# Patient Record
Sex: FEMALE | Race: WHITE | Employment: OTHER | URBAN - METROPOLITAN AREA
[De-identification: names, ages, dates, MRNs, and addresses within clinical notes are randomized per-mention and may not be internally consistent; named-entity substitution may affect disease eponyms.]

---

## 2017-01-12 ENCOUNTER — OFFICE VISIT (OUTPATIENT)
Dept: FAMILY MEDICINE CLINIC | Age: 72
End: 2017-01-12

## 2017-01-12 VITALS
WEIGHT: 243.8 LBS | DIASTOLIC BLOOD PRESSURE: 72 MMHG | TEMPERATURE: 98 F | HEIGHT: 63 IN | RESPIRATION RATE: 14 BRPM | SYSTOLIC BLOOD PRESSURE: 163 MMHG | HEART RATE: 51 BPM | BODY MASS INDEX: 43.2 KG/M2

## 2017-01-12 DIAGNOSIS — Z00.00 ROUTINE GENERAL MEDICAL EXAMINATION AT A HEALTH CARE FACILITY: Primary | ICD-10-CM

## 2017-01-12 PROCEDURE — G0438 PPPS, INITIAL VISIT: HCPCS | Performed by: FAMILY MEDICINE

## 2017-01-12 ASSESSMENT — ANXIETY QUESTIONNAIRES: GAD7 TOTAL SCORE: 1

## 2017-01-12 ASSESSMENT — PATIENT HEALTH QUESTIONNAIRE - PHQ9: SUM OF ALL RESPONSES TO PHQ QUESTIONS 1-9: 0

## 2017-01-12 ASSESSMENT — LIFESTYLE VARIABLES: HOW OFTEN DO YOU HAVE A DRINK CONTAINING ALCOHOL: 0

## 2017-03-04 LAB
CHOLESTEROL, TOTAL: 194 MG/DL
CHOLESTEROL/HDL RATIO: 4
HDLC SERPL-MCNC: 48 MG/DL (ref 35–70)
LDL CHOLESTEROL CALCULATED: 103 MG/DL (ref 0–160)
TRIGL SERPL-MCNC: 215 MG/DL
VLDLC SERPL CALC-MCNC: 43 MG/DL

## 2017-07-12 ENCOUNTER — OFFICE VISIT (OUTPATIENT)
Dept: FAMILY MEDICINE CLINIC | Age: 72
End: 2017-07-12

## 2017-07-12 ENCOUNTER — TELEPHONE (OUTPATIENT)
Dept: FAMILY MEDICINE CLINIC | Age: 72
End: 2017-07-12

## 2017-07-12 VITALS
HEART RATE: 57 BPM | BODY MASS INDEX: 41.28 KG/M2 | TEMPERATURE: 97.9 F | OXYGEN SATURATION: 97 % | SYSTOLIC BLOOD PRESSURE: 123 MMHG | HEIGHT: 64 IN | WEIGHT: 241.8 LBS | DIASTOLIC BLOOD PRESSURE: 71 MMHG

## 2017-07-12 DIAGNOSIS — R20.0 NUMBNESS IN FEET: Primary | ICD-10-CM

## 2017-07-12 DIAGNOSIS — E78.2 HYPERLIPIDEMIA, MIXED: ICD-10-CM

## 2017-07-12 DIAGNOSIS — Z78.0 POSTMENOPAUSAL: ICD-10-CM

## 2017-07-12 DIAGNOSIS — I10 ESSENTIAL HYPERTENSION: ICD-10-CM

## 2017-07-12 DIAGNOSIS — Z11.59 NEED FOR HEPATITIS C SCREENING TEST: ICD-10-CM

## 2017-07-12 DIAGNOSIS — Z23 NEED FOR DIPHTHERIA-TETANUS-PERTUSSIS (TDAP) VACCINE: ICD-10-CM

## 2017-07-12 PROCEDURE — 90471 IMMUNIZATION ADMIN: CPT | Performed by: NURSE PRACTITIONER

## 2017-07-12 PROCEDURE — 90715 TDAP VACCINE 7 YRS/> IM: CPT | Performed by: NURSE PRACTITIONER

## 2017-07-12 PROCEDURE — 99214 OFFICE O/P EST MOD 30 MIN: CPT | Performed by: NURSE PRACTITIONER

## 2017-07-12 RX ORDER — GABAPENTIN 300 MG/1
300 CAPSULE ORAL 3 TIMES DAILY
Qty: 90 CAPSULE | Refills: 3 | Status: SHIPPED | OUTPATIENT
Start: 2017-07-12 | End: 2017-10-01 | Stop reason: SDUPTHER

## 2017-07-12 RX ORDER — ATENOLOL 50 MG/1
50 TABLET ORAL NIGHTLY
Qty: 30 TABLET | Refills: 3 | Status: SHIPPED | OUTPATIENT
Start: 2017-07-12 | End: 2017-12-24 | Stop reason: SDUPTHER

## 2017-07-12 RX ORDER — LOSARTAN POTASSIUM AND HYDROCHLOROTHIAZIDE 25; 100 MG/1; MG/1
1 TABLET ORAL DAILY
Qty: 30 TABLET | Refills: 3 | Status: SHIPPED | OUTPATIENT
Start: 2017-07-12 | End: 2017-12-08 | Stop reason: SDUPTHER

## 2017-07-12 RX ORDER — AMLODIPINE BESYLATE 10 MG/1
10 TABLET ORAL DAILY
Qty: 30 TABLET | Refills: 3 | Status: SHIPPED | OUTPATIENT
Start: 2017-07-12 | End: 2017-12-08 | Stop reason: SDUPTHER

## 2017-07-12 ASSESSMENT — ENCOUNTER SYMPTOMS
ABDOMINAL DISTENTION: 0
ABDOMINAL PAIN: 0
EYE DISCHARGE: 0
RHINORRHEA: 0
CONSTIPATION: 0
COLOR CHANGE: 0
NAUSEA: 0
ANAL BLEEDING: 0
SORE THROAT: 0
EYE REDNESS: 0
BLOOD IN STOOL: 0
DIARRHEA: 0
COUGH: 0
SHORTNESS OF BREATH: 0

## 2017-09-28 DIAGNOSIS — R20.0 NUMBNESS IN FEET: ICD-10-CM

## 2017-09-29 RX ORDER — GABAPENTIN 300 MG/1
300 CAPSULE ORAL 3 TIMES DAILY
Qty: 270 CAPSULE | Refills: 1 | OUTPATIENT
Start: 2017-09-29

## 2017-10-01 DIAGNOSIS — R20.0 NUMBNESS IN FEET: ICD-10-CM

## 2017-10-02 RX ORDER — GABAPENTIN 300 MG/1
CAPSULE ORAL
Qty: 90 CAPSULE | Refills: 1 | Status: SHIPPED | OUTPATIENT
Start: 2017-10-02 | End: 2017-11-27 | Stop reason: SDUPTHER

## 2017-10-13 ENCOUNTER — TELEPHONE (OUTPATIENT)
Dept: FAMILY MEDICINE CLINIC | Age: 72
End: 2017-10-13

## 2017-10-13 LAB — HEPATITIS C ANTIBODY: NEGATIVE

## 2017-10-23 ENCOUNTER — HOSPITAL ENCOUNTER (OUTPATIENT)
Dept: WOMENS IMAGING | Age: 72
Discharge: HOME | End: 2017-10-23
Payer: MEDICARE

## 2017-10-23 DIAGNOSIS — Z85.3 HISTORY OF BREAST CANCER: Primary | ICD-10-CM

## 2017-10-23 DIAGNOSIS — Z78.0 POSTMENOPAUSAL: ICD-10-CM

## 2017-10-23 DIAGNOSIS — Z12.31 VISIT FOR SCREENING MAMMOGRAM: ICD-10-CM

## 2017-10-23 DIAGNOSIS — Z12.31 ENCOUNTER FOR SCREENING MAMMOGRAM FOR MALIGNANT NEOPLASM OF BREAST: ICD-10-CM

## 2017-10-23 PROCEDURE — 77063 BREAST TOMOSYNTHESIS BI: CPT

## 2017-10-23 PROCEDURE — 77080 DXA BONE DENSITY AXIAL: CPT

## 2017-10-24 ENCOUNTER — TELEPHONE (OUTPATIENT)
Dept: FAMILY MEDICINE CLINIC | Age: 72
End: 2017-10-24

## 2017-10-24 NOTE — TELEPHONE ENCOUNTER
Notes Recorded by Ron Bhat CNP on 10/24/2017 at 11:38 AM EDT  DEXA scan - within normal limits    Patient voiced understanding

## 2017-11-09 ENCOUNTER — HOSPITAL ENCOUNTER (OUTPATIENT)
Dept: INFUSION THERAPY | Age: 72
Discharge: HOME | End: 2017-11-09
Payer: MEDICARE

## 2017-11-09 ENCOUNTER — OFFICE VISIT (OUTPATIENT)
Dept: ONCOLOGY | Age: 72
End: 2017-11-09
Payer: MEDICARE

## 2017-11-09 VITALS
HEIGHT: 64 IN | SYSTOLIC BLOOD PRESSURE: 134 MMHG | HEART RATE: 52 BPM | DIASTOLIC BLOOD PRESSURE: 76 MMHG | WEIGHT: 240 LBS | TEMPERATURE: 97.2 F | OXYGEN SATURATION: 99 % | RESPIRATION RATE: 18 BRPM | BODY MASS INDEX: 40.97 KG/M2

## 2017-11-09 DIAGNOSIS — Z12.31 ENCOUNTER FOR SCREENING MAMMOGRAM FOR MALIGNANT NEOPLASM OF BREAST: ICD-10-CM

## 2017-11-09 DIAGNOSIS — Z17.0 MALIGNANT NEOPLASM OF LOWER-OUTER QUADRANT OF RIGHT BREAST OF FEMALE, ESTROGEN RECEPTOR POSITIVE (HCC): Primary | ICD-10-CM

## 2017-11-09 DIAGNOSIS — C50.511 MALIGNANT NEOPLASM OF LOWER-OUTER QUADRANT OF RIGHT BREAST OF FEMALE, ESTROGEN RECEPTOR POSITIVE (HCC): Primary | ICD-10-CM

## 2017-11-09 DIAGNOSIS — C50.511 MALIGNANT NEOPLASM OF LOWER-OUTER QUADRANT OF RIGHT FEMALE BREAST (HCC): ICD-10-CM

## 2017-11-09 LAB
ALBUMIN SERPL-MCNC: 4.2 G/DL (ref 3.5–5.1)
ALP BLD-CCNC: 92 U/L (ref 38–126)
ALT SERPL-CCNC: 23 U/L (ref 11–66)
AST SERPL-CCNC: 26 U/L (ref 5–40)
BASINOPHIL, AUTOMATED: 1 % (ref 0–12)
BILIRUB SERPL-MCNC: 0.7 MG/DL (ref 0.3–1.2)
BILIRUBIN DIRECT: < 0.2 MG/DL (ref 0–0.3)
BUN, WHOLE BLOOD: 20 MG/DL (ref 8–26)
CHLORIDE, WHOLE BLOOD: 100 MEQ/L (ref 98–109)
CREATININE, WHOLE BLOOD: 1 MG/DL (ref 0.5–1.2)
EOSINOPHILS RELATIVE PERCENT: 3 % (ref 0–12)
GFR, ESTIMATED: 58 ML/MIN/1.73M2
GLUCOSE, WHOLE BLOOD: 113 MG/DL (ref 70–108)
HCT VFR BLD CALC: 45.6 % (ref 37–47)
HEMOGLOBIN: 15.3 GM/DL (ref 12–16)
IONIZED CALCIUM, WHOLE BLOOD: 1.2 MMOL/L (ref 1.12–1.32)
LYMPHOCYTES # BLD: 20 % (ref 15–47)
MCH RBC QN AUTO: 28.8 PG (ref 27–31)
MCHC RBC AUTO-ENTMCNC: 33.4 GM/DL (ref 33–37)
MCV RBC AUTO: 86 FL (ref 81–99)
MONOCYTES: 5 % (ref 0–12)
PDW BLD-RTO: 12.9 % (ref 11.5–14.5)
PLATELET # BLD: 268 THOU/MM3 (ref 130–400)
PMV BLD AUTO: 7.3 MCM (ref 7.4–10.4)
POTASSIUM, WHOLE BLOOD: 4.5 MEQ/L (ref 3.5–4.9)
RBC # BLD: 5.3 MILL/MM3 (ref 4.2–5.4)
SEG NEUTROPHILS: 72 % (ref 43–75)
SODIUM, WHOLE BLOOD: 141 MEQ/L (ref 138–146)
TOTAL CO2, WHOLE BLOOD: 29 MEQ/L (ref 23–33)
TOTAL PROTEIN: 7.2 G/DL (ref 6.1–8)
WBC # BLD: 7.7 THOU/MM3 (ref 4.8–10.8)

## 2017-11-09 PROCEDURE — 36415 COLL VENOUS BLD VENIPUNCTURE: CPT

## 2017-11-09 PROCEDURE — 99211 OFF/OP EST MAY X REQ PHY/QHP: CPT

## 2017-11-09 PROCEDURE — 80047 BASIC METABLC PNL IONIZED CA: CPT

## 2017-11-09 PROCEDURE — 99213 OFFICE O/P EST LOW 20 MIN: CPT | Performed by: INTERNAL MEDICINE

## 2017-11-09 PROCEDURE — 80076 HEPATIC FUNCTION PANEL: CPT

## 2017-11-09 PROCEDURE — 85025 COMPLETE CBC W/AUTO DIFF WBC: CPT

## 2017-11-09 RX ORDER — AMIODARONE HYDROCHLORIDE 100 MG/1
TABLET ORAL
Refills: 8 | Status: ON HOLD | COMMUNITY
Start: 2017-10-28 | End: 2018-02-09 | Stop reason: DRUGHIGH

## 2017-11-09 RX ORDER — APIXABAN 5 MG/1
TABLET, FILM COATED ORAL
Refills: 3 | COMMUNITY
Start: 2017-10-31 | End: 2020-10-27 | Stop reason: SDUPTHER

## 2017-11-09 RX ORDER — CHLORAL HYDRATE 500 MG
3000 CAPSULE ORAL 3 TIMES DAILY
COMMUNITY
End: 2020-06-12 | Stop reason: ALTCHOICE

## 2017-11-09 NOTE — PROGRESS NOTES
[facility] PROFESSIONAL SERVICES  ONCOLOGY SPECIALISTS OF [facility]  [address] [facility] [address] [facility] [address]  [address] [address]  Dept: [phone]  Dept Fax: [phone]  Loc: [phone]    Subjective:      Chief Complaint: Le Pérez is a 67 y.o. female who is here for follow up for breast cancer. The patient was initially seen here on 05/02/2012. She was referred by Dr. Katerine Morales for consultation for a right breast cancer. .  She had a mammogram completed on March 27, 2012 which found a 2.5 cm irregular mass in the right breast.  On April 3, 2012, the patient had an ultrasound guided biopsy of the right breast mass. Pathology from this biopsy confirmed an invasive, well differentiated ductal adenocarcinoma, grade 1. Estrogen receptors were positive, progesterone receptors were positive and her-2-ana was not overexpressed. On April 19, 2012, the patient had a right partial mastectomy  and sentinel lymph node biopsy. Tissue pathology from the right breast found residual multifocal, invasive well differentiated ductal carcinoma. Margins were clear. The largest focus of invasive cancer was 2.3 cm. DCIS was present. One of the two sentinel lymph nodes had a micrometastases measuring  1 mm in size. Therefore, her stage of her right sided breast cancer was pT2, pN1mi, M0 (Stage IIB ). The patients Oncotype Dx recurrence score was noted to be 15. On 05/17/2012, the patient started chemotherapy. She had completed four cycles of Taxotere and Cytoxan chemotherapy treatment. Overall she tolerated  the combination of chemotherapy with very little complications. Her chief complaint with each cycle of therapy was fatigue. The patient has recently completed a course of radiation therapy with the [address] [place] [address]. HPI:  The patient is her today for follow up evaluation. Her general health has been stable.   The patient has no signs or symptoms that be suggestive of recurrence of malignancy on today's

## 2017-11-27 DIAGNOSIS — R20.0 NUMBNESS IN FEET: ICD-10-CM

## 2017-11-27 RX ORDER — GABAPENTIN 300 MG/1
CAPSULE ORAL
Qty: 90 CAPSULE | Refills: 2 | Status: SHIPPED | OUTPATIENT
Start: 2017-11-27 | End: 2018-01-12 | Stop reason: SDUPTHER

## 2017-12-08 DIAGNOSIS — I10 ESSENTIAL HYPERTENSION: ICD-10-CM

## 2017-12-08 RX ORDER — AMLODIPINE BESYLATE 10 MG/1
10 TABLET ORAL DAILY
Qty: 30 TABLET | Refills: 1 | Status: SHIPPED | OUTPATIENT
Start: 2017-12-08 | End: 2018-01-12 | Stop reason: SDUPTHER

## 2017-12-08 RX ORDER — LOSARTAN POTASSIUM AND HYDROCHLOROTHIAZIDE 25; 100 MG/1; MG/1
1 TABLET ORAL DAILY
Qty: 30 TABLET | Refills: 1 | Status: SHIPPED | OUTPATIENT
Start: 2017-12-08 | End: 2018-01-12 | Stop reason: SDUPTHER

## 2017-12-08 NOTE — TELEPHONE ENCOUNTER
Requested Prescriptions     Pending Prescriptions Disp Refills    amLODIPine (NORVASC) 10 MG tablet [Pharmacy Med Name: AMLODIPINE BESYLATE 10 MG TAB] 30 tablet 0     Sig: TAKE 1 TABLET BY MOUTH DAILY    losartan-hydrochlorothiazide (HYZAAR) 100-25 MG per tablet [Pharmacy Med Name: LOSARTAN-HCTZ 100-25 MG TAB] 30 tablet 0     Sig: TAKE 1 TABLET BY MOUTH DAILY      LV-07/12/2017  NV-01/12/2018

## 2017-12-24 DIAGNOSIS — I10 ESSENTIAL HYPERTENSION: ICD-10-CM

## 2017-12-26 NOTE — TELEPHONE ENCOUNTER
Last visit- 7/12/2017  Next visit- 1/12/18    Requested Prescriptions     Pending Prescriptions Disp Refills    atenolol (TENORMIN) 50 MG tablet [Pharmacy Med Name: ATENOLOL 50 MG TABLET] 30 tablet 3     Sig: TAKE 1 TABLET BY MOUTH NIGHTLY

## 2017-12-27 RX ORDER — ATENOLOL 50 MG/1
50 TABLET ORAL NIGHTLY
Qty: 30 TABLET | Refills: 3 | Status: SHIPPED | OUTPATIENT
Start: 2017-12-27 | End: 2018-01-12 | Stop reason: SDUPTHER

## 2018-01-12 ENCOUNTER — OFFICE VISIT (OUTPATIENT)
Dept: FAMILY MEDICINE CLINIC | Age: 73
End: 2018-01-12
Payer: MEDICARE

## 2018-01-12 VITALS
RESPIRATION RATE: 12 BRPM | BODY MASS INDEX: 39.27 KG/M2 | HEIGHT: 64 IN | HEART RATE: 54 BPM | WEIGHT: 230 LBS | OXYGEN SATURATION: 98 % | DIASTOLIC BLOOD PRESSURE: 86 MMHG | SYSTOLIC BLOOD PRESSURE: 124 MMHG

## 2018-01-12 DIAGNOSIS — Z00.00 ROUTINE GENERAL MEDICAL EXAMINATION AT A HEALTH CARE FACILITY: Primary | ICD-10-CM

## 2018-01-12 PROCEDURE — G0009 ADMIN PNEUMOCOCCAL VACCINE: HCPCS | Performed by: FAMILY MEDICINE

## 2018-01-12 PROCEDURE — 90732 PPSV23 VACC 2 YRS+ SUBQ/IM: CPT | Performed by: FAMILY MEDICINE

## 2018-01-12 PROCEDURE — G0439 PPPS, SUBSEQ VISIT: HCPCS | Performed by: FAMILY MEDICINE

## 2018-01-12 RX ORDER — GABAPENTIN 300 MG/1
CAPSULE ORAL
Qty: 270 CAPSULE | Refills: 1 | Status: SHIPPED | OUTPATIENT
Start: 2018-01-12 | End: 2018-01-12 | Stop reason: DRUGHIGH

## 2018-01-12 RX ORDER — LOSARTAN POTASSIUM AND HYDROCHLOROTHIAZIDE 25; 100 MG/1; MG/1
1 TABLET ORAL DAILY
Qty: 90 TABLET | Refills: 1 | Status: SHIPPED | OUTPATIENT
Start: 2018-01-12 | End: 2018-05-15 | Stop reason: SDUPTHER

## 2018-01-12 RX ORDER — ATENOLOL 50 MG/1
50 TABLET ORAL NIGHTLY
Qty: 90 TABLET | Refills: 1 | Status: ON HOLD | OUTPATIENT
Start: 2018-01-12 | End: 2018-02-09

## 2018-01-12 RX ORDER — AMLODIPINE BESYLATE 10 MG/1
10 TABLET ORAL DAILY
Qty: 90 TABLET | Refills: 1 | Status: SHIPPED | OUTPATIENT
Start: 2018-01-12 | End: 2018-02-01

## 2018-01-12 RX ORDER — GABAPENTIN 300 MG/1
CAPSULE ORAL
Qty: 360 CAPSULE | Refills: 1 | Status: ON HOLD | OUTPATIENT
Start: 2018-01-12 | End: 2018-06-07 | Stop reason: SDUPTHER

## 2018-01-12 ASSESSMENT — LIFESTYLE VARIABLES: HOW OFTEN DO YOU HAVE A DRINK CONTAINING ALCOHOL: 0

## 2018-01-12 ASSESSMENT — ANXIETY QUESTIONNAIRES: GAD7 TOTAL SCORE: 0

## 2018-01-12 ASSESSMENT — PATIENT HEALTH QUESTIONNAIRE - PHQ9: SUM OF ALL RESPONSES TO PHQ QUESTIONS 1-9: 0

## 2018-01-12 NOTE — PATIENT INSTRUCTIONS
You may receive a survey about your visit with us today. The feedback from our patients helps us identify what is working well and where the service to all patients can be enhanced. Thank you! Patient Education        Learning About Physical Activity  What is physical activity? Physical activity is any kind of activity that gets your body moving. The types of physical activity that can help you get fit and stay healthy include:  · Aerobic or \"cardio\" activities that make your heart beat faster and make you breathe harder, such as brisk walking, riding a bike, or running. Aerobic activities strengthen your heart and lungs and build up your endurance. · Strength training activities that make your muscles work against, or \"resist,\" something, such as lifting weights or doing push-ups. These activities help tone and strengthen your muscles. · Stretches that allow you to move your joints and muscles through their full range of motion. Stretching helps you be more flexible and avoid injury. What are the benefits of physical activity? Being active is one of the best things you can do to get fit and stay healthy. It helps you to:  · Feel stronger and have more energy to do all the things you like to do. · Focus better at school or work and perform better in sports. · Feel, think, and sleep better. · Reach and stay at a healthy weight. · Lose fat and build lean muscle. · Lower your risk for serious health problems. · Keep your bones, muscles, and joints strong. Being fit lets you do more physical activity. And it lets you work out harder without as much effort. How can you make physical activity part of your life? Get at least 30 minutes of exercise on most days of the week. Walking is a good choice. You also may want to do other activities, such as running, swimming, cycling, or playing tennis or team sports.   Pick activities that you like-ones that make your heart beat faster, your muscles stronger, and range used by all laboratories. The [place] of Medicine recommends a blood level of 20 ng/mL of vitamin D for healthy bones. And most people in the [place] and [facility]) meet this goal.  How can you get more vitamin D? Foods that contain vitamin D include:  · [place], tuna, and mackerel. These are some of the best foods to eat when you need to get more vitamin D.  · Cheese, egg yolks, and beef liver. These foods have vitamin D in small amounts. · Milk, soy drinks, orange juice, yogurt, margarine, and some kinds of cereal have vitamin D added to them. Some people don't make vitamin D as well as others. They may have to take extra care in getting enough vitamin D. Things that reduce how much vitamin D your body makes include:  · Dark skin, such as many  Americans have. · Age, especially if you are older than 72. · Digestive problems, such as Crohn's or celiac disease. · Liver and kidney disease. Some people who do not get enough vitamin D may need supplements. Are there any risks from taking vitamin D?  · Too much vitamin D:  ¨ Can damage your kidneys. ¨ Can cause nausea and vomiting, constipation, and weakness. ¨ Raises the amount of calcium in your blood. If this happens, you can get confused or have an irregular heart rhythm. · Vitamin D may interact with other medicines. Tell your doctor about all of the medicines you take, including over-the-counter drugs, herbs, and pills. Tell your doctor about all of your current medical problems. Where can you learn more? Go to [email or web address]://GlobantmaryEchelon.[email or web address] [email or web address] org and sign in to your Gozent account. Enter [id number] in the [facility] box to learn more about \"Learning About Vitamin D. \"     If you do not have an account, please click on the \"Sign Up Now\" link. Current as of: May 12, 2017  Content Version: 11.5  © 1328-1967 Healthwise, Incorporated. Care instructions adapted under license by [facility] ([facility]).  If you have questions about a much of it was added. You can figure out how much calcium is in a food by looking at the percent daily value section on the nutrition facts label. The food label assumes the daily value of calcium is 1,000 mg. So if one serving of a food has a daily value of 20% of calcium, that food has 200 mg of calcium in one serving. Some people who don't get enough calcium may need supplements. You can buy them as citrate or carbonate. Calcium carbonate is best absorbed when it is taken with food. Calcium citrate can be absorbed well with or without food. Spreading calcium out over the course of the day can reduce stomach upset. And your body absorbs it better when it is spread over the day. Try not to take more than 500 mg of calcium supplement at a time. Where can you learn more? Go to https://Consano.DreamDry. org and sign in to your zEconomy account. Enter S264 in the [employer] box to learn more about \"Learning About Calcium. \"     If you do not have an account, please click on the \"Sign Up Now\" link. Current as of: May 12, 2017  Content Version: 11.5  © 3051-9943 Healthwise, iLink. Care instructions adapted under license by [facility] ([facility]). If you have questions about a medical condition or this instruction, always ask your healthcare professional. [address] any warranty or liability for your use of this information. Personalized Preventive Plan for Rakan Iglesias - 1/12/2018  Medicare offers a range of preventive health benefits. Some of the tests and screenings are paid in full while other may be subject to a deductible, co-insurance, and/or copay. Some of these benefits include a comprehensive review of your medical history including lifestyle, illnesses that may run in your family, and various assessments and screenings as appropriate.     After reviewing your medical record and screening and assessments performed today your provider may have ordered likely to occur. What should I avoid before or after receiving this vaccine ? Follow your doctor's instructions about any restrictions on food, beverages, or activity. What are the possible side effects of this vaccine? You should not receive a booster vaccine if you had a life-threatening allergic reaction after the first shot. Keep track of any and all side effects you have after receiving this vaccine. If you ever need to receive a booster dose, you will need to tell your doctor if the previous shot caused any side effects. Becoming infected with pneumococcal disease (such as pneumonia or meningitis) is much more dangerous to your health than receiving this vaccine. However, like any medicine, this vaccine can cause side effects but the risk of serious side effects is extremely low. Get emergency medical help if you have any of these signs of an allergic reaction: hives; difficulty breathing; swelling of your face, lips, tongue, or throat. Call your doctor at once if you have a serious side effect such as:  high fever (103 degrees or higher);  easy bruising or bleeding;  swollen glands with skin rash or itching, joint pain, and general ill feeling;  pale or yellowed skin, dark colored urine, confusion or weakness;  numbness or tingly feeling in your feet and spreading upward, severe lower back pain;  changes in behavior, problems with vision, speech, swallowing, or bladder and bowel functions; or  slow heart rate, trouble breathing, feeling like you might pass out. Less serious side effects are more likely to occur, such as:  low fever (102 degrees or less), chills, tired feeling;  swelling, pain, tenderness, or redness anywhere on your body;  headache, nausea, vomiting;  joint or muscle pain;  swelling or stiffness in the arm or leg the vaccine was injected into;  mild skin rash; or  mild soreness, warmth, redness, swelling, or a hard lump where the shot was given.   This is not a complete list of side effects and others may occur. Call your doctor for medical advice about side effects. You may report vaccine side effects to the [place] [address] and [facility] at [phone]. What other drugs will affect pneumococcal polysaccharides vaccine (PPSV)? Before receiving this vaccine, tell the doctor about all other vaccines you have recently received. Also tell the doctor if you have recently received drugs or treatments that can weaken the immune system, including:  an oral, nasal, inhaled, or injectable steroid medicine;  medications to treat psoriasis, rheumatoid arthritis, or other autoimmune disorders, such as azathioprine (Imuran), etanercept (Enbrel), leflunomide ([address]), and others; or  medicines to treat or prevent organ transplant rejection, such as basiliximab (Simulect), cyclosporine (Sandimmune, Neoral, Gengraf), muromonab-CD3 (Orthoclone), mycophenolate mofetil (CellCept), sirolimus (Rapamune), or tacrolimus (Prograf). If you are using any of these medications, you may not be able to receive the vaccine, or may need to wait until the other treatments are finished. This list is not complete and other drugs may interact with PPSV. Tell your doctor about all medications you use. This includes prescription, over-the-counter, vitamin, and herbal products. Do not start a new medication without telling your doctor. Where can I get more information? Your doctor or pharmacist may have additional information about pneumococcal polysaccharides vaccine. You may also find additional information from your local health department or the Centers for Disease Control and Prevention. Remember, keep this and all other medicines out of the reach of children, never share your medicines with others, and use this medication only for the indication prescribed.   Every effort has been made to ensure that the information provided by [address] is accurate, up-to-date, and complete, but no guarantee is made to that effect. Drug information contained herein may be time sensitive. [employer]MVC information has been compiled for use by healthcare practitioners and consumers in the [place] and therefore [employer] does not warrant that uses outside of the [place] are appropriate, unless specifically indicated otherwise. [facility]'s drug information does not endorse drugs, diagnose patients or recommend therapy. [facility] drug information is an informational resource designed to assist licensed healthcare practitioners in caring for their patients and/or to serve consumers viewing this service as a supplement to, and not a substitute for, the expertise, skill, knowledge and judgment of healthcare practitioners. The absence of a warning for a given drug or drug combination in no way should be construed to indicate that the drug or drug combination is safe, effective or appropriate for any given patient. [facility] does not assume any responsibility for any aspect of healthcare administered with the aid of information [facility] provides. The information contained herein is not intended to cover all possible uses, directions, precautions, warnings, drug interactions, allergic reactions, or adverse effects. If you have questions about the drugs you are taking, check with your doctor, nurse or pharmacist.  Copyright 8837-7850 [address]: 5.02. Revision date: 10/17/2012. Care instructions adapted under license by [facility] ([facility]). If you have questions about a medical condition or this instruction, always ask your healthcare professional. [place] [address] any warranty or liability for your use of this information.

## 2018-01-12 NOTE — PROGRESS NOTES
Medicare Annual Wellness Visit  Name: Olivia Carreon Date: 2018   MRN: [id number] Sex: Female   Age: 67 y.o. Ethnicity: Non-/Non    : 1945 Race: Tarun Leal is here for Medicare AWV    Screenings for behavioral, psychosocial and functional/safety risks, and cognitive dysfunction are all negative except as indicated below. These results, as well as other patient data from the [address] form, are documented in Flowsheets linked to this Encounter. Allergies   Allergen Reactions    Morphine      vomit    Tape Pegge Hanks Tape] Itching    Vicodin [Hydrocodone-Acetaminophen] Nausea And Vomiting     Prior to Visit Medications    Medication Sig Taking? Authorizing Provider   atenolol (TENORMIN) 50 MG tablet TAKE 1 TABLET BY MOUTH NIGHTLY Yes Herman Jones CNP   amLODIPine (NORVASC) 10 MG tablet TAKE 1 TABLET BY MOUTH DAILY Yes Enedina Seals MD   losartan-hydrochlorothiazide (HYZAAR) 100-25 MG per tablet TAKE 1 TABLET BY MOUTH DAILY Yes Enedina Seals MD   gabapentin (NEURONTIN) 300 MG capsule TAKE 1 CAPSULE BY MOUTH 3 TIMES DAILY Yes Enedina Seals MD   ELIQUIS 5 MG TABS tablet TAKE 1 TABLET BY MOUTH TWICE A DAY Yes Historical Provider, MD   amiodarone (PACERONE) 100 MG tablet TAKE 1 TABLET DAILY.  Yes Historical Provider, MD   Omega-3 Fatty Acids (FISH OIL) 1000 MG CAPS Take 3,000 mg by mouth 3 times daily Yes Historical Provider, MD   Glucosamine-Chondroit-Vit C-Mn (GLUCOSAMINE 1500 COMPLEX PO) Take 2 tablets by mouth Yes Historical Provider, MD   CHONDROITIN SULFATE PO Take 1,200 mg by mouth Yes Historical Provider, MD   amiodarone (CORDARONE) 200 MG tablet Take 100 mg by mouth daily  Yes Historical Provider, MD   Docusate Calcium (STOOL SOFTENER PO) Take by mouth 2 times daily  Yes Historical Provider, MD   nitroGLYCERIN (NITROSTAT) 0.4 MG SL tablet Place 0.4 mg under the tongue every 5 minutes as needed for Chest pain Take 1 tablet for chest pain and repeat after 230 lb (104.3 kg)   11/09/17 240 lb (108.9 kg)   07/12/17 241 lb 12.8 oz (109.7 kg)     Vitals:    01/12/18 0842   BP: 124/86   Site: Left Arm   Position: Sitting   Cuff Size: Medium Adult   Pulse: 54   Resp: 12   SpO2: 98%   Weight: 230 lb (104.3 kg)   Height: 5' 4\" (1.626 m)         Patient's complete Health Risk Assessment and screening values have been reviewed and are found in Flowsheets. The following problems were reviewed today and where indicated follow up appointments were made and/or referrals ordered. Positive Risk Factor Screenings with Interventions:     Health Habits/Nutrition:  Health Habits/Nutrition  Do you exercise for at least 20 minutes 2-3 times per week?: Yes (does a lot of walking throughout the day)  Have you lost any weight without trying in the past 3 months?: No  Do you eat fewer than 2 meals per day?: No  Have you seen a dentist within the past year?: Yes  Body mass index is 39.48 kg/m².   Health Habits/Nutrition Interventions:  · Inadequate physical activity:  patient agrees to wear a pedometer and walk at least 10,000 steps/day    Hearing/Vision:  Hearing/Vision  Do you or your family notice any trouble with your hearing?: No  Do you have difficulty driving, watching TV, or doing any of your daily activities because of your eyesight?: No  Have you had an eye exam within the past year?: (!) No  Hearing/Vision Interventions:  · None indicated    Safety:  Safety  Do you have working smoke detectors?: Yes  Have all throw rugs been removed or fastened?: Yes  Do you have non-slip mats in all bathtubs?: Yes  Do all of your stairways have a railing or banister?: (!) No (does not have stairways in the home)  Are your doorways, halls and stairs free of clutter?: Yes  Do you always fasten your seatbelt when you are in a car?: Yes  Safety Interventions:  · Home safety tips provided      Personalized Preventive Plan   Current Health Maintenance Status  Immunization History   Administered Date(s) Administered    Influenza, High Dose 09/30/2016    Pneumococcal 13-valent Conjugate ([id number]) 09/30/2016    Tdap (Boostrix, Adacel) 07/12/2017        Health Maintenance   Topic Date Due    TSH testing  1945    Zostavax vaccine  06/01/2005    Flu vaccine (1) 09/01/2017    Pneumococcal low/med risk (2 of 2 - PPSV23) 09/30/2017    Potassium monitoring  10/08/2017    Creatinine monitoring  10/08/2017    Breast cancer screen  10/23/2018    Lipid screen  03/04/2022    Colon cancer screen colonoscopy  12/30/2024    DTaP/Tdap/Td vaccine (2 - Td) 07/12/2027    DEXA (modify frequency per FRAX score)  Completed    Hepatitis C screen  Completed     Recommendations for Preventive Services Due: see orders.   Recommended screening schedule for the next 5-10 years is provided to the patient in written form: see Patient Jasmin Hoang MD

## 2018-02-09 ENCOUNTER — HOSPITAL ENCOUNTER (OUTPATIENT)
Dept: INPATIENT UNIT | Age: 73
Discharge: HOME | End: 2018-02-09
Attending: INTERNAL MEDICINE | Admitting: INTERNAL MEDICINE
Payer: MEDICARE

## 2018-02-09 VITALS
HEIGHT: 65 IN | SYSTOLIC BLOOD PRESSURE: 166 MMHG | DIASTOLIC BLOOD PRESSURE: 94 MMHG | TEMPERATURE: 97.9 F | OXYGEN SATURATION: 97 % | WEIGHT: 230 LBS | RESPIRATION RATE: 20 BRPM | HEART RATE: 46 BPM | BODY MASS INDEX: 38.32 KG/M2

## 2018-02-09 LAB
ALBUMIN SERPL-MCNC: 3.9 G/DL (ref 3.5–5.1)
ALP BLD-CCNC: 87 U/L (ref 38–126)
ALT SERPL-CCNC: 25 U/L (ref 11–66)
ANION GAP SERPL CALCULATED.3IONS-SCNC: 14 MEQ/L (ref 8–16)
AST SERPL-CCNC: 23 U/L (ref 5–40)
BASOPHILS # BLD: 1.1 %
BASOPHILS ABSOLUTE: 0.1 THOU/MM3 (ref 0–0.1)
BILIRUB SERPL-MCNC: 0.6 MG/DL (ref 0.3–1.2)
BUN BLDV-MCNC: 19 MG/DL (ref 7–22)
CALCIUM SERPL-MCNC: 9.6 MG/DL (ref 8.5–10.5)
CHLORIDE BLD-SCNC: 100 MEQ/L (ref 98–111)
CO2: 26 MEQ/L (ref 23–33)
CREAT SERPL-MCNC: 0.7 MG/DL (ref 0.4–1.2)
EKG ATRIAL RATE: 49 BPM
EKG P AXIS: 8 DEGREES
EKG P-R INTERVAL: 198 MS
EKG Q-T INTERVAL: 484 MS
EKG QRS DURATION: 92 MS
EKG QTC CALCULATION (BAZETT): 437 MS
EKG R AXIS: -32 DEGREES
EKG T AXIS: 26 DEGREES
EKG VENTRICULAR RATE: 49 BPM
EOSINOPHIL # BLD: 1.9 %
EOSINOPHILS ABSOLUTE: 0.2 THOU/MM3 (ref 0–0.4)
GFR SERPL CREATININE-BSD FRML MDRD: 82 ML/MIN/1.73M2
GLUCOSE BLD-MCNC: 108 MG/DL (ref 70–108)
HCT VFR BLD CALC: 40.9 % (ref 37–47)
HEMOGLOBIN: 13.9 GM/DL (ref 12–16)
INR BLD: 1.03 (ref 0.85–1.13)
LYMPHOCYTES # BLD: 15.7 %
LYMPHOCYTES ABSOLUTE: 1.6 THOU/MM3 (ref 1–4.8)
MCH RBC QN AUTO: 28.7 PG (ref 27–31)
MCHC RBC AUTO-ENTMCNC: 34 GM/DL (ref 33–37)
MCV RBC AUTO: 84.5 FL (ref 81–99)
MONOCYTES # BLD: 5.3 %
MONOCYTES ABSOLUTE: 0.5 THOU/MM3 (ref 0.4–1.3)
NUCLEATED RED BLOOD CELLS: 0 /100 WBC
PDW BLD-RTO: 14.2 % (ref 11.5–14.5)
PLATELET # BLD: 274 THOU/MM3 (ref 130–400)
PMV BLD AUTO: 8.6 FL (ref 7.4–10.4)
POTASSIUM SERPL-SCNC: 3.7 MEQ/L (ref 3.5–5.2)
RBC # BLD: 4.84 MILL/MM3 (ref 4.2–5.4)
SEG NEUTROPHILS: 76 %
SEGMENTED NEUTROPHILS ABSOLUTE COUNT: 7.8 THOU/MM3 (ref 1.8–7.7)
SODIUM BLD-SCNC: 140 MEQ/L (ref 135–145)
TOTAL PROTEIN: 6.5 G/DL (ref 6.1–8)
WBC # BLD: 10.2 THOU/MM3 (ref 4.8–10.8)

## 2018-02-09 PROCEDURE — 85610 PROTHROMBIN TIME: CPT

## 2018-02-09 PROCEDURE — 36415 COLL VENOUS BLD VENIPUNCTURE: CPT

## 2018-02-09 PROCEDURE — 85025 COMPLETE CBC W/AUTO DIFF WBC: CPT

## 2018-02-09 PROCEDURE — 80053 COMPREHEN METABOLIC PANEL: CPT

## 2018-02-09 PROCEDURE — 93005 ELECTROCARDIOGRAM TRACING: CPT | Performed by: INTERNAL MEDICINE

## 2018-02-09 RX ORDER — SODIUM CHLORIDE 9 MG/ML
INJECTION, SOLUTION INTRAVENOUS CONTINUOUS
Status: DISCONTINUED | OUTPATIENT
Start: 2018-02-09 | End: 2018-02-09 | Stop reason: HOSPADM

## 2018-02-09 RX ORDER — ATENOLOL 50 MG/1
25 TABLET ORAL NIGHTLY
Qty: 90 TABLET | Refills: 1 | Status: SHIPPED | OUTPATIENT
Start: 2018-02-09 | End: 2018-09-27

## 2018-02-09 NOTE — FLOWSHEET NOTE
Pt noted to be in sinus bradycardia rhythm. 12 lead ekg obtained. Pt and family aware of rhythm being sinus valerie.

## 2018-02-09 NOTE — FLOWSHEET NOTE
Pt admitted to  2E14 ambulatory for cardioversion  Pt NPO. Patient accompanied by her son  Vital signs obtained. Assessment and data collection initiated. Oriented to room. Policies and procedures for 2E explained   All questions answered with no further questions at this time. Fall prevention and safety precautions discussed with patient.

## 2018-05-15 ENCOUNTER — OFFICE VISIT (OUTPATIENT)
Dept: FAMILY MEDICINE CLINIC | Age: 73
End: 2018-05-15
Payer: MEDICARE

## 2018-05-15 VITALS
SYSTOLIC BLOOD PRESSURE: 116 MMHG | OXYGEN SATURATION: 99 % | TEMPERATURE: 98.2 F | WEIGHT: 247.2 LBS | HEART RATE: 63 BPM | BODY MASS INDEX: 42.2 KG/M2 | HEIGHT: 64 IN | DIASTOLIC BLOOD PRESSURE: 82 MMHG | RESPIRATION RATE: 16 BRPM

## 2018-05-15 DIAGNOSIS — J30.1 ACUTE SEASONAL ALLERGIC RHINITIS DUE TO POLLEN: Primary | ICD-10-CM

## 2018-05-15 DIAGNOSIS — I10 ESSENTIAL HYPERTENSION: ICD-10-CM

## 2018-05-15 DIAGNOSIS — M54.10 RADICULAR PAIN OF RIGHT LOWER EXTREMITY: ICD-10-CM

## 2018-05-15 PROCEDURE — 99214 OFFICE O/P EST MOD 30 MIN: CPT | Performed by: FAMILY MEDICINE

## 2018-05-15 PROCEDURE — 96372 THER/PROPH/DIAG INJ SC/IM: CPT | Performed by: FAMILY MEDICINE

## 2018-05-15 RX ORDER — AMLODIPINE BESYLATE 10 MG/1
10 TABLET ORAL DAILY
Qty: 90 TABLET | Refills: 1 | Status: SHIPPED | OUTPATIENT
Start: 2018-05-15 | End: 2018-07-20

## 2018-05-15 RX ORDER — LOSARTAN POTASSIUM AND HYDROCHLOROTHIAZIDE 25; 100 MG/1; MG/1
1 TABLET ORAL DAILY
Qty: 90 TABLET | Refills: 1 | Status: SHIPPED | OUTPATIENT
Start: 2018-05-15 | End: 2019-02-10 | Stop reason: SDUPTHER

## 2018-05-15 RX ORDER — BETAMETHASONE SODIUM PHOSPHATE AND BETAMETHASONE ACETATE 3; 3 MG/ML; MG/ML
6 INJECTION, SUSPENSION INTRA-ARTICULAR; INTRALESIONAL; INTRAMUSCULAR; SOFT TISSUE ONCE
Status: COMPLETED | OUTPATIENT
Start: 2018-05-15 | End: 2018-05-15

## 2018-05-15 RX ORDER — GABAPENTIN 400 MG/1
CAPSULE ORAL
Qty: 120 CAPSULE | Refills: 5 | Status: SHIPPED | OUTPATIENT
Start: 2018-05-15 | End: 2018-07-16 | Stop reason: SDUPTHER

## 2018-05-15 RX ORDER — ROSUVASTATIN CALCIUM 5 MG/1
5 TABLET, COATED ORAL NIGHTLY
COMMUNITY
End: 2019-01-17 | Stop reason: CLARIF

## 2018-05-15 RX ORDER — GABAPENTIN 300 MG/1
CAPSULE ORAL
Qty: 360 CAPSULE | Refills: 1 | Status: CANCELLED | OUTPATIENT
Start: 2018-05-15 | End: 2018-06-12

## 2018-05-15 RX ADMIN — BETAMETHASONE SODIUM PHOSPHATE AND BETAMETHASONE ACETATE 6 MG: 3; 3 INJECTION, SUSPENSION INTRA-ARTICULAR; INTRALESIONAL; INTRAMUSCULAR; SOFT TISSUE at 10:54

## 2018-06-07 ENCOUNTER — HOSPITAL ENCOUNTER (OUTPATIENT)
Dept: INPATIENT UNIT | Age: 73
Discharge: HOME | End: 2018-06-07
Attending: INTERNAL MEDICINE | Admitting: INTERNAL MEDICINE
Payer: MEDICARE

## 2018-06-07 ENCOUNTER — APPOINTMENT (OUTPATIENT)
Dept: CARDIAC CATH/INVASIVE PROCEDURES | Age: 73
End: 2018-06-07
Attending: INTERNAL MEDICINE
Payer: MEDICARE

## 2018-06-07 VITALS
WEIGHT: 247 LBS | RESPIRATION RATE: 25 BRPM | TEMPERATURE: 98.5 F | HEIGHT: 64 IN | DIASTOLIC BLOOD PRESSURE: 118 MMHG | SYSTOLIC BLOOD PRESSURE: 161 MMHG | BODY MASS INDEX: 42.17 KG/M2 | OXYGEN SATURATION: 96 % | HEART RATE: 64 BPM

## 2018-06-07 PROBLEM — R94.39 ABNORMAL NUCLEAR STRESS TEST: Status: ACTIVE | Noted: 2018-06-07

## 2018-06-07 PROBLEM — I20.9 ANGINA PECTORIS SYNDROME (HCC): Status: ACTIVE | Noted: 2018-06-07

## 2018-06-07 LAB
ABO: NORMAL
ALBUMIN SERPL-MCNC: 3.8 G/DL (ref 3.5–5.1)
ALP BLD-CCNC: 78 U/L (ref 38–126)
ALT SERPL-CCNC: 20 U/L (ref 11–66)
ANION GAP SERPL CALCULATED.3IONS-SCNC: 12 MEQ/L (ref 8–16)
ANTIBODY SCREEN: NORMAL
AST SERPL-CCNC: 23 U/L (ref 5–40)
BILIRUB SERPL-MCNC: 0.8 MG/DL (ref 0.3–1.2)
BUN BLDV-MCNC: 17 MG/DL (ref 7–22)
CALCIUM SERPL-MCNC: 9.1 MG/DL (ref 8.5–10.5)
CHLORIDE BLD-SCNC: 103 MEQ/L (ref 98–111)
CHOLESTEROL, TOTAL: 132 MG/DL (ref 100–199)
CO2: 29 MEQ/L (ref 23–33)
CREAT SERPL-MCNC: 0.7 MG/DL (ref 0.4–1.2)
EKG ATRIAL RATE: 241 BPM
EKG Q-T INTERVAL: 366 MS
EKG QRS DURATION: 90 MS
EKG QTC CALCULATION (BAZETT): 356 MS
EKG R AXIS: -33 DEGREES
EKG VENTRICULAR RATE: 57 BPM
GFR SERPL CREATININE-BSD FRML MDRD: 82 ML/MIN/1.73M2
GLUCOSE BLD-MCNC: 110 MG/DL (ref 70–108)
HCT VFR BLD CALC: 39.8 % (ref 37–47)
HDLC SERPL-MCNC: 56 MG/DL
HEMOGLOBIN: 13.1 GM/DL (ref 12–16)
INR BLD: 1.03 (ref 0.85–1.13)
LDL CHOLESTEROL CALCULATED: 52 MG/DL
MCH RBC QN AUTO: 27.1 PG (ref 27–31)
MCHC RBC AUTO-ENTMCNC: 32.9 GM/DL (ref 33–37)
MCV RBC AUTO: 82.2 FL (ref 81–99)
PDW BLD-RTO: 14.8 % (ref 11.5–14.5)
PLATELET # BLD: 293 THOU/MM3 (ref 130–400)
PMV BLD AUTO: 7.8 FL (ref 7.4–10.4)
POTASSIUM SERPL-SCNC: 4.8 MEQ/L (ref 3.5–5.2)
RBC # BLD: 4.84 MILL/MM3 (ref 4.2–5.4)
RH FACTOR: NORMAL
SODIUM BLD-SCNC: 144 MEQ/L (ref 135–145)
TOTAL PROTEIN: 6.7 G/DL (ref 6.1–8)
TRIGL SERPL-MCNC: 121 MG/DL (ref 0–199)
WBC # BLD: 7.6 THOU/MM3 (ref 4.8–10.8)

## 2018-06-07 PROCEDURE — 93010 ELECTROCARDIOGRAM REPORT: CPT | Performed by: INTERNAL MEDICINE

## 2018-06-07 PROCEDURE — C1773 RET DEV, INSERTABLE: HCPCS

## 2018-06-07 PROCEDURE — 2500000003 HC RX 250 WO HCPCS

## 2018-06-07 PROCEDURE — C1769 GUIDE WIRE: HCPCS

## 2018-06-07 PROCEDURE — 93005 ELECTROCARDIOGRAM TRACING: CPT | Performed by: INTERNAL MEDICINE

## 2018-06-07 PROCEDURE — 86850 RBC ANTIBODY SCREEN: CPT

## 2018-06-07 PROCEDURE — 6360000002 HC RX W HCPCS

## 2018-06-07 PROCEDURE — 93458 L HRT ARTERY/VENTRICLE ANGIO: CPT | Performed by: INTERNAL MEDICINE

## 2018-06-07 PROCEDURE — 86901 BLOOD TYPING SEROLOGIC RH(D): CPT

## 2018-06-07 PROCEDURE — 2580000003 HC RX 258: Performed by: INTERNAL MEDICINE

## 2018-06-07 PROCEDURE — 80053 COMPREHEN METABOLIC PANEL: CPT

## 2018-06-07 PROCEDURE — 85027 COMPLETE CBC AUTOMATED: CPT

## 2018-06-07 PROCEDURE — 2780000010 HC IMPLANT OTHER

## 2018-06-07 PROCEDURE — 80061 LIPID PANEL: CPT

## 2018-06-07 PROCEDURE — 36415 COLL VENOUS BLD VENIPUNCTURE: CPT

## 2018-06-07 PROCEDURE — 85610 PROTHROMBIN TIME: CPT

## 2018-06-07 PROCEDURE — 86900 BLOOD TYPING SEROLOGIC ABO: CPT

## 2018-06-07 PROCEDURE — C1894 INTRO/SHEATH, NON-LASER: HCPCS

## 2018-06-07 RX ORDER — ONDANSETRON 2 MG/ML
4 INJECTION INTRAMUSCULAR; INTRAVENOUS EVERY 6 HOURS PRN
Status: DISCONTINUED | OUTPATIENT
Start: 2018-06-07 | End: 2018-06-07 | Stop reason: HOSPADM

## 2018-06-07 RX ORDER — ASPIRIN 325 MG
325 TABLET ORAL ONCE
Status: DISCONTINUED | OUTPATIENT
Start: 2018-06-07 | End: 2018-06-07 | Stop reason: HOSPADM

## 2018-06-07 RX ORDER — SODIUM CHLORIDE 0.9 % (FLUSH) 0.9 %
10 SYRINGE (ML) INJECTION PRN
Status: DISCONTINUED | OUTPATIENT
Start: 2018-06-07 | End: 2018-06-07 | Stop reason: HOSPADM

## 2018-06-07 RX ORDER — ACETAMINOPHEN 325 MG/1
650 TABLET ORAL EVERY 4 HOURS PRN
Status: DISCONTINUED | OUTPATIENT
Start: 2018-06-07 | End: 2018-06-07 | Stop reason: HOSPADM

## 2018-06-07 RX ORDER — SODIUM CHLORIDE 9 MG/ML
100 INJECTION, SOLUTION INTRAVENOUS CONTINUOUS
Status: DISCONTINUED | OUTPATIENT
Start: 2018-06-07 | End: 2018-06-07 | Stop reason: HOSPADM

## 2018-06-07 RX ORDER — SODIUM CHLORIDE 0.9 % (FLUSH) 0.9 %
10 SYRINGE (ML) INJECTION EVERY 12 HOURS SCHEDULED
Status: DISCONTINUED | OUTPATIENT
Start: 2018-06-07 | End: 2018-06-07 | Stop reason: SDUPTHER

## 2018-06-07 RX ORDER — SODIUM CHLORIDE 9 MG/ML
INJECTION, SOLUTION INTRAVENOUS CONTINUOUS
Status: DISCONTINUED | OUTPATIENT
Start: 2018-06-07 | End: 2018-06-07 | Stop reason: SDUPTHER

## 2018-06-07 RX ORDER — DIPHENHYDRAMINE HCL 25 MG
50 TABLET ORAL ONCE
Status: DISCONTINUED | OUTPATIENT
Start: 2018-06-07 | End: 2018-06-07 | Stop reason: HOSPADM

## 2018-06-07 RX ORDER — ATROPINE SULFATE 0.4 MG/ML
0.5 AMPUL (ML) INJECTION
Status: DISCONTINUED | OUTPATIENT
Start: 2018-06-07 | End: 2018-06-07 | Stop reason: HOSPADM

## 2018-06-07 RX ORDER — SODIUM CHLORIDE 0.9 % (FLUSH) 0.9 %
10 SYRINGE (ML) INJECTION EVERY 12 HOURS SCHEDULED
Status: DISCONTINUED | OUTPATIENT
Start: 2018-06-07 | End: 2018-06-07 | Stop reason: HOSPADM

## 2018-06-07 RX ADMIN — SODIUM CHLORIDE: 9 INJECTION, SOLUTION INTRAVENOUS at 08:33

## 2018-07-16 ENCOUNTER — OFFICE VISIT (OUTPATIENT)
Dept: FAMILY MEDICINE CLINIC | Age: 73
End: 2018-07-16
Payer: MEDICARE

## 2018-07-16 VITALS
BODY MASS INDEX: 43.64 KG/M2 | OXYGEN SATURATION: 95 % | SYSTOLIC BLOOD PRESSURE: 131 MMHG | WEIGHT: 254.25 LBS | HEART RATE: 65 BPM | DIASTOLIC BLOOD PRESSURE: 60 MMHG

## 2018-07-16 DIAGNOSIS — G57.93 NEUROPATHY INVOLVING BOTH LOWER EXTREMITIES: Primary | ICD-10-CM

## 2018-07-16 DIAGNOSIS — E78.00 HYPERCHOLESTEROLEMIA: ICD-10-CM

## 2018-07-16 DIAGNOSIS — I10 ESSENTIAL HYPERTENSION: ICD-10-CM

## 2018-07-16 PROCEDURE — 99214 OFFICE O/P EST MOD 30 MIN: CPT | Performed by: FAMILY MEDICINE

## 2018-07-16 RX ORDER — FLUTICASONE PROPIONATE 50 MCG
1 SPRAY, SUSPENSION (ML) NASAL 2 TIMES DAILY
COMMUNITY
Start: 2018-06-06 | End: 2019-12-12 | Stop reason: SDUPTHER

## 2018-07-16 RX ORDER — GABAPENTIN 600 MG/1
TABLET ORAL
Qty: 90 TABLET | Refills: 1 | Status: SHIPPED | OUTPATIENT
Start: 2018-07-16 | End: 2018-07-16 | Stop reason: SDUPTHER

## 2018-07-16 RX ORDER — GABAPENTIN 600 MG/1
TABLET ORAL
Qty: 90 TABLET | Refills: 1 | Status: SHIPPED | OUTPATIENT
Start: 2018-07-16 | End: 2018-09-13 | Stop reason: SDUPTHER

## 2018-07-16 RX ORDER — GABAPENTIN 400 MG/1
CAPSULE ORAL
Qty: 270 CAPSULE | Refills: 1 | Status: SHIPPED | OUTPATIENT
Start: 2018-07-16 | End: 2018-09-13 | Stop reason: SDUPTHER

## 2018-07-16 NOTE — PROGRESS NOTES
year after recieving vaccine     Hypercholesterolemia 2010    Hypertension 1987    Iron deficiency 08/2016    PONV (postoperative nausea and vomiting)     Sleep apnea 06/2018    Vertigo 1957    as a child           Allergies:  is allergic to morphine; tape [adhesive tape]; and vicodin [hydrocodone-acetaminophen]. Medications:   Current Outpatient Prescriptions   Medication Sig Dispense Refill    fluticasone (FLONASE) 50 MCG/ACT nasal spray 1 spray by Nasal route 2 times daily      gabapentin (NEURONTIN) 600 MG tablet Take 1 tablet PO at noon. 90 tablet 1    gabapentin (NEURONTIN) 400 MG capsule Take 400 mg in am and 800 mg QHS. 270 capsule 1    amLODIPine (NORVASC) 10 MG tablet Take 1 tablet by mouth daily 90 tablet 1    losartan-hydrochlorothiazide (HYZAAR) 100-25 MG per tablet Take 1 tablet by mouth daily 90 tablet 1    rosuvastatin (CRESTOR) 5 MG tablet Take 5 mg by mouth nightly       atenolol (TENORMIN) 50 MG tablet Take 0.5 tablets by mouth nightly 90 tablet 1    ELIQUIS 5 MG TABS tablet TAKE 1 TABLET BY MOUTH TWICE A DAY  3    Omega-3 Fatty Acids (FISH OIL) 1000 MG CAPS Take 3,000 mg by mouth 3 times daily      Glucosamine-Chondroit-Vit C-Mn (GLUCOSAMINE 1500 COMPLEX PO) Take 2 tablets by mouth      CHONDROITIN SULFATE PO Take 1,200 mg by mouth      amiodarone (CORDARONE) 200 MG tablet Take 100 mg by mouth daily       Docusate Calcium (STOOL SOFTENER PO) Take by mouth 2 times daily       nitroGLYCERIN (NITROSTAT) 0.4 MG SL tablet Place 0.4 mg under the tongue every 5 minutes as needed for Chest pain Take 1 tablet for chest pain and repeat after 5 min if no relief, call 911 and take another dose 5 min later. Max of 3 doses in 15 min.  aspirin 81 MG EC tablet Take 81 mg by mouth daily.  Multiple Vitamins-Minerals (CENTRUM SILVER PO) Take by mouth daily        No current facility-administered medications for this visit.         Review of systems:  Review of Systems - History obtained from the patient  General ROS: negative for - chills, fatigue or fever  ENT ROS: positive for - headaches, nasal congestion and nasal discharge  Allergy and Immunology ROS: positive for - seasonal allergies  Respiratory ROS: negative for - cough,  shortness of breath or wheezing  Cardiovascular ROS: negative for - chest pain or edema  Musculoskeletal ROS: positive for - leg pain  Neurological ROS: negative for - dizziness  Dermatological ROS: negative for - rash    Physical Examination:  /60 (Site: Left Arm, Position: Sitting, Cuff Size: Large Adult)   Pulse 65   Wt 254 lb 4 oz (115.3 kg)   SpO2 95%   BMI 43.64 kg/m²     General-  Alert and oriented x 3, NAD  HEENT: NC, AT, PERRLA, EOMI, anicteric sclerae  Ears: Normal tympanic membranes bilaterally  Nose: patent, clear runny nose  Mouth: no lesions, moist mucosas  Neck - supple, no significant adenopathy  Chest - clear to auscultation, no wheezes, rales or rhonchi, symmetric air entry  Heart - normal rate, regular rhythm  Abdomen - soft, nontender, nondistended, no masses or organomegaly  Extremities -  no pedal edema, no clubbing or cyanosis    Impression:   Diagnosis Orders   1. Neuropathy involving both lower extremities     2. Essential hypertension     3. Hypercholesterolemia         Plan:    Restart Zyrtec 10 mg PO daily  Start Flonase nasal spray 2 sprays each nostril daily  Celestone 1 cc IM  Continue Hyzaar 100/25 1 tablet PO daily  Continue Norvasc 10 mg PO daily  Continue Tenormin 50 mg PO QHS. Refer to pain management to help with radiculopathy  Increase Neurontin as follows: 300 mg in am and lunch, 800 mg at night x 3-4 days, then 400 in am, 300 at lunch and 800 at night, then 400 in am and lunch and 800 at night until follow up    Orders Placed This Encounter   Medications    DISCONTD: gabapentin (NEURONTIN) 600 MG tablet     Sig: Take 1 tablet PO at noon.      Dispense:  90 tablet     Refill:  1    gabapentin (NEURONTIN) 600 MG tablet     Sig: Take 1 tablet PO at noon. Dispense:  90 tablet     Refill:  1    gabapentin (NEURONTIN) 400 MG capsule     Sig: Take 400 mg in am and 800 mg QHS. Dispense:  270 capsule     Refill:  1       Follow Up:  Return in about 6 months (around 1/16/2019) for Routine Follow-Up, Medicare AWV.     Herman Park MD

## 2018-07-20 RX ORDER — AMLODIPINE BESYLATE 10 MG/1
10 TABLET ORAL DAILY
Qty: 90 TABLET | Refills: 1 | Status: SHIPPED | OUTPATIENT
Start: 2018-07-20 | End: 2019-05-23 | Stop reason: SDUPTHER

## 2018-08-30 ENCOUNTER — OFFICE VISIT (OUTPATIENT)
Dept: PHYSICAL MEDICINE AND REHAB | Age: 73
End: 2018-08-30
Payer: MEDICARE

## 2018-08-30 VITALS
HEIGHT: 64 IN | BODY MASS INDEX: 44.56 KG/M2 | DIASTOLIC BLOOD PRESSURE: 85 MMHG | HEART RATE: 61 BPM | WEIGHT: 261 LBS | SYSTOLIC BLOOD PRESSURE: 167 MMHG

## 2018-08-30 DIAGNOSIS — E66.01 OBESITY, CLASS III, BMI 40-49.9 (MORBID OBESITY) (HCC): ICD-10-CM

## 2018-08-30 DIAGNOSIS — M96.1 LUMBAR POST-LAMINECTOMY SYNDROME: ICD-10-CM

## 2018-08-30 DIAGNOSIS — M54.16 LUMBAR RADICULITIS: Primary | ICD-10-CM

## 2018-08-30 DIAGNOSIS — K43.2 INCISIONAL HERNIA OF ANTERIOR ABDOMINAL WALL WITHOUT OBSTRUCTION OR GANGRENE: ICD-10-CM

## 2018-08-30 DIAGNOSIS — Z01.818 PRE-OP EVALUATION: Primary | ICD-10-CM

## 2018-08-30 DIAGNOSIS — G89.4 CHRONIC PAIN SYNDROME: ICD-10-CM

## 2018-08-30 PROCEDURE — 99205 OFFICE O/P NEW HI 60 MIN: CPT | Performed by: PAIN MEDICINE

## 2018-08-30 ASSESSMENT — ENCOUNTER SYMPTOMS
ABDOMINAL PAIN: 0
RHINORRHEA: 0
NAUSEA: 0
SHORTNESS OF BREATH: 0
SORE THROAT: 0
WHEEZING: 0
BACK PAIN: 0
COLOR CHANGE: 0
DIARRHEA: 0
EYE PAIN: 0
VOMITING: 0
COUGH: 0
SINUS PRESSURE: 0
PHOTOPHOBIA: 0
CHEST TIGHTNESS: 0
CONSTIPATION: 0

## 2018-08-30 NOTE — PROGRESS NOTES
[address] [place] [address]  [address] [facility] [place] [address]  Dept: [phone]  Dept Fax: [phone]  Loc: [phone]    Visit Date: 8/30/2018    Jeff Smalls is a 68 y.o. female who is referred for pain management evaluation and treatment per Dr. Gino Noyola. CAGE and CAGE-AID Questions   1. In the last three months, have you felt you should cut down or stop drinking or using drugs? Yes []        No [x]     2. In the last three months, has anyone annoyed you or gotten on your nerves by telling you to cut down or stop drinking or using drugs? Yes []        No [x]     3. In the last three months, have you felt guilty or bad about how much you drink or use drugs? Yes []        No [x]     4. In the last three months, have you been waking up wanting to have an alcoholic drink or use drugs? Yes []        No []        Opioid Risk Tool:  Clinician Form       1. Family History of Substance Abuse: Female Male    Alcohol   []1   []3    Illegal drugs   []2   []3    Prescription drugs     []4   []4   2. Personal History of Substance Abuse:          Alcohol   []3   []3    Illegal drugs   []4   []4    Prescription drugs     []5   []5   3. Age (perez box if between 12 and 39):     []1   []1   4. History of Preadolescent Sexual Abuse:     []3   []0   5. Psychological Disease:      Attention deficit disorder, obsessive-compulsive disorder, bipolar, schizophrenia   []2   []2      Depression     []1   []1    Scoring Totals       Total Score  Low Risk  Moderate Risk  High Risk   Risk Category   0 - 3   4 - 7   8 or Above      Patient states symptoms interfere with:  A.  General Activity:  yes   B. Mood: yes    C. Walking Ability:   yes   D. Normal Work (Includes both work outside the home and housework):   no    E.  Relations with Other People:  yes   F. Sleep:   yes   G.  Enjoyment of Life:  yes       HPI:     Chief Complaint: Gastrointestinal: Negative for abdominal pain, constipation, diarrhea, nausea and vomiting. Endocrine: Negative for cold intolerance, heat intolerance, polydipsia, polyphagia and polyuria. Genitourinary: Negative for decreased urine volume, difficulty urinating, frequency and hematuria. Musculoskeletal: Positive for gait problem. Negative for arthralgias, back pain, joint swelling, myalgias, neck pain and neck stiffness. Skin: Negative for color change and rash. Allergic/Immunologic: Negative for food allergies and immunocompromised state. Neurological: Positive for tingling, weakness and numbness. Negative for dizziness, tremors, seizures, syncope, facial asymmetry, speech difficulty, light-headedness and headaches. Hematological: Does not bruise/bleed easily. Psychiatric/Behavioral: Positive for sleep disturbance. Negative for agitation, behavioral problems, confusion, decreased concentration, dysphoric mood, hallucinations, self-injury and suicidal ideas. The patient is not nervous/anxious and is not hyperactive. Objective:     Vitals:    08/30/18 1158 08/30/18 1205   BP: (!) 169/85 (!) 167/85   Site: Right Arm Left Arm   Position: Sitting Sitting   Pulse: 64 61   Weight: 261 lb (118.4 kg)    Height: 5' 4.02\" (1.626 m)        Physical Exam   Constitutional: She is oriented to person, place, and time. She appears well-developed and well-nourished. No distress. HENT:   Head: Normocephalic and atraumatic. Right Ear: External ear normal.   Left Ear: External ear normal.   Nose: Nose normal.   Mouth/Throat: Oropharynx is clear and moist. No oropharyngeal exudate. Eyes: Pupils are equal, round, and reactive to light. Conjunctivae and EOM are normal. Right eye exhibits no discharge. Left eye exhibits no discharge. No scleral icterus. Neck: Normal range of motion and full passive range of motion without pain. Neck supple. No muscular tenderness present. No neck rigidity.  No edema, no erythema and normal range of motion present. No thyromegaly present. Cardiovascular: Normal rate, regular rhythm, normal heart sounds and intact distal pulses. Exam reveals no gallop and no friction rub. No murmur heard. Pulmonary/Chest: Effort normal and breath sounds normal. No respiratory distress. She has no wheezes. She has no rales. She exhibits no tenderness. Abdominal: Soft. Bowel sounds are normal. She exhibits no distension. There is no tenderness. There is no rebound and no guarding. A hernia is present. Hernia confirmed positive in the ventral area. Musculoskeletal:        Right shoulder: She exhibits normal range of motion and no tenderness. Left shoulder: She exhibits normal range of motion and no tenderness. Right hip: She exhibits tenderness. Left hip: She exhibits no tenderness. Right ankle: She exhibits no swelling. Left ankle: She exhibits no swelling. Cervical back: She exhibits normal range of motion and no tenderness. Thoracic back: She exhibits no tenderness. Lumbar back: She exhibits decreased range of motion, tenderness and pain. Back:         Right lower leg: She exhibits no swelling. Left lower leg: She exhibits no swelling. Right foot: There is no swelling. Left foot: There is no swelling. Neurological: She is alert and oriented to person, place, and time. She has normal strength and normal reflexes. She is not disoriented. She displays no atrophy. No cranial nerve deficit or sensory deficit. She exhibits normal muscle tone. She displays a negative Romberg sign. Coordination and gait normal. She displays no Babinski's sign on the right side. She displays no Babinski's sign on the left side. SLR- NEG  SENSORY-RLE decreased light touch   Skin: Skin is warm. No rash noted. She is not diaphoretic. No erythema. No pallor. Psychiatric: Her mood appears not anxious.  Her affect is not angry, not blunt, not labile and not inappropriate. Her speech is not rapid and/or pressured, not delayed, not tangential and not slurred. She is not agitated, not aggressive, not hyperactive, not slowed, not withdrawn, not actively hallucinating and not combative. Thought content is not paranoid and not delusional. Cognition and memory are not impaired. She does not express impulsivity or inappropriate judgment. She does not exhibit a depressed mood. She expresses no homicidal and no suicidal ideation. She expresses no suicidal plans and no homicidal plans. She is communicative. She exhibits normal recent memory and normal remote memory. She is attentive. Nursing note and vitals reviewed. JEREMY test: neg  Yeoman's test: neg  Gaenslen test: neg     Assessment:     1. Lumbar radiculitis    2. Lumbar post-laminectomy syndrome    3. Chronic pain syndrome    4. Incisional hernia of anterior abdominal wall without obstruction or gangrene    5. Obesity, Class III, BMI 40-49.9 (morbid obesity) ([facility] [address]            Plan:      · Patient read and signed orientation and opioid agreement. · OARRS reviewed. · Discussed long term side effects of medications. · Discussed tolerance, dependency and addiction. · Discussed with patient they may not be pain free. · No evidence of abuse, diversion or aberrant behavior. · Medications and/or procedures improve function and quality of life. · Reviewed prior MRI Lumbar Spine in detail with patient. · Told patient MRI is 3years old. Need New MRI Lumbar Spine to evaluate pathology. · Patient is on Blood thinners for A.Fib. Told patient would need clearance prior to consider any interventional pain procedures. Verbalizes understanding. · Prescription Needs: STATES CAN NOT TOLERATE NARCOTICS. · Discussed Gabapentin, told to increase dose to 1200 mg every HS. Verbalize understanding. Previous Treatments tried:  · PT: Yes,  any benefit? No, how many weeks?  6, last date done: several

## 2018-09-05 ENCOUNTER — HOSPITAL ENCOUNTER (OUTPATIENT)
Dept: INPATIENT UNIT | Age: 73
Discharge: HOME | End: 2018-09-05
Attending: INTERNAL MEDICINE | Admitting: INTERNAL MEDICINE
Payer: MEDICARE

## 2018-09-05 VITALS
WEIGHT: 261 LBS | DIASTOLIC BLOOD PRESSURE: 91 MMHG | HEIGHT: 64 IN | TEMPERATURE: 98.2 F | RESPIRATION RATE: 15 BRPM | HEART RATE: 51 BPM | SYSTOLIC BLOOD PRESSURE: 135 MMHG | OXYGEN SATURATION: 98 % | BODY MASS INDEX: 44.56 KG/M2

## 2018-09-05 LAB
ALBUMIN SERPL-MCNC: 4 G/DL (ref 3.5–5.1)
ALP BLD-CCNC: 79 U/L (ref 38–126)
ALT SERPL-CCNC: 16 U/L (ref 11–66)
ANION GAP SERPL CALCULATED.3IONS-SCNC: 13 MEQ/L (ref 8–16)
AST SERPL-CCNC: 21 U/L (ref 5–40)
BASOPHILS # BLD: 1.4 %
BASOPHILS ABSOLUTE: 0.1 THOU/MM3 (ref 0–0.1)
BILIRUB SERPL-MCNC: 0.5 MG/DL (ref 0.3–1.2)
BUN BLDV-MCNC: 21 MG/DL (ref 7–22)
CALCIUM SERPL-MCNC: 9.5 MG/DL (ref 8.5–10.5)
CHLORIDE BLD-SCNC: 102 MEQ/L (ref 98–111)
CO2: 28 MEQ/L (ref 23–33)
CREAT SERPL-MCNC: 1 MG/DL (ref 0.4–1.2)
EKG ATRIAL RATE: 48 BPM
EKG ATRIAL RATE: 65 BPM
EKG P AXIS: 63 DEGREES
EKG P-R INTERVAL: 234 MS
EKG Q-T INTERVAL: 382 MS
EKG Q-T INTERVAL: 414 MS
EKG QRS DURATION: 94 MS
EKG QRS DURATION: 96 MS
EKG QTC CALCULATION (BAZETT): 341 MS
EKG QTC CALCULATION (BAZETT): 440 MS
EKG R AXIS: -36 DEGREES
EKG R AXIS: -41 DEGREES
EKG T AXIS: 0 DEGREES
EKG T AXIS: 17 DEGREES
EKG VENTRICULAR RATE: 48 BPM
EKG VENTRICULAR RATE: 68 BPM
EOSINOPHIL # BLD: 2.4 %
EOSINOPHILS ABSOLUTE: 0.2 THOU/MM3 (ref 0–0.4)
ERYTHROCYTE [DISTWIDTH] IN BLOOD BY AUTOMATED COUNT: 14 % (ref 11.5–14.5)
ERYTHROCYTE [DISTWIDTH] IN BLOOD BY AUTOMATED COUNT: 41.7 FL (ref 35–45)
GFR SERPL CREATININE-BSD FRML MDRD: 54 ML/MIN/1.73M2
GLUCOSE BLD-MCNC: 114 MG/DL (ref 70–108)
HCT VFR BLD CALC: 40.8 % (ref 37–47)
HEMOGLOBIN: 13.5 GM/DL (ref 12–16)
IMMATURE GRANS (ABS): 0.03 THOU/MM3 (ref 0–0.07)
IMMATURE GRANULOCYTES: 0.4 %
INR BLD: 1.51 (ref 0.85–1.13)
LYMPHOCYTES # BLD: 20.8 %
LYMPHOCYTES ABSOLUTE: 1.7 THOU/MM3 (ref 1–4.8)
MCH RBC QN AUTO: 27.4 PG (ref 26–33)
MCHC RBC AUTO-ENTMCNC: 33.1 GM/DL (ref 32.2–35.5)
MCV RBC AUTO: 82.9 FL (ref 81–99)
MONOCYTES # BLD: 8.5 %
MONOCYTES ABSOLUTE: 0.7 THOU/MM3 (ref 0.4–1.3)
NUCLEATED RED BLOOD CELLS: 0 /100 WBC
PLATELET # BLD: 280 THOU/MM3 (ref 130–400)
PMV BLD AUTO: 9.3 FL (ref 9.4–12.4)
POTASSIUM REFLEX MAGNESIUM: 4.8 MEQ/L (ref 3.5–5.2)
RBC # BLD: 4.92 MILL/MM3 (ref 4.2–5.4)
SEG NEUTROPHILS: 66.5 %
SEGMENTED NEUTROPHILS ABSOLUTE COUNT: 5.6 THOU/MM3 (ref 1.8–7.7)
SODIUM BLD-SCNC: 143 MEQ/L (ref 135–145)
TOTAL PROTEIN: 6.7 G/DL (ref 6.1–8)
WBC # BLD: 8.4 THOU/MM3 (ref 4.8–10.8)

## 2018-09-05 PROCEDURE — 2580000003 HC RX 258: Performed by: INTERNAL MEDICINE

## 2018-09-05 PROCEDURE — 2709999900 HC NON-CHARGEABLE SUPPLY

## 2018-09-05 PROCEDURE — 93005 ELECTROCARDIOGRAM TRACING: CPT | Performed by: INTERNAL MEDICINE

## 2018-09-05 PROCEDURE — 2500000003 HC RX 250 WO HCPCS

## 2018-09-05 PROCEDURE — 85610 PROTHROMBIN TIME: CPT

## 2018-09-05 PROCEDURE — 6360000002 HC RX W HCPCS

## 2018-09-05 PROCEDURE — 92960 CARDIOVERSION ELECTRIC EXT: CPT | Performed by: INTERNAL MEDICINE

## 2018-09-05 PROCEDURE — 85025 COMPLETE CBC W/AUTO DIFF WBC: CPT

## 2018-09-05 PROCEDURE — 80053 COMPREHEN METABOLIC PANEL: CPT

## 2018-09-05 PROCEDURE — 93010 ELECTROCARDIOGRAM REPORT: CPT | Performed by: NUCLEAR MEDICINE

## 2018-09-05 PROCEDURE — 36415 COLL VENOUS BLD VENIPUNCTURE: CPT

## 2018-09-05 RX ORDER — SODIUM CHLORIDE 9 MG/ML
INJECTION, SOLUTION INTRAVENOUS CONTINUOUS
Status: DISCONTINUED | OUTPATIENT
Start: 2018-09-05 | End: 2018-09-05 | Stop reason: HOSPADM

## 2018-09-05 RX ADMIN — SODIUM CHLORIDE: 9 INJECTION, SOLUTION INTRAVENOUS at 07:39

## 2018-09-05 NOTE — H&P
[address] [address]   Sedation/Analgesia History and Physical    Pt Name: Mateo Luis  MRN: [id number]  YOB: 1945  Provider Performing Procedure: Adebayo Mcfarland MD  Primary Care Physician: Nelson Ramirez MD      Pre-Procedure: {CARDIAC [id number]: symptomatic atrial fib    Consent: I have discussed with the patient and/or the patient representative the indication, alternatives, and the possible risks and/or complications of the planned procedure and the anesthesia methods. The patient and/or representative appear to understand and agree to proceed. Medical History:   has a past medical history of Atrial fibrillation ([facility] [address] Breast cancer ([facility] [address] GERD (gastroesophageal reflux disease); H/O: whooping cough; History of shingles; Hypercholesterolemia; Hypertension; Iron deficiency; PONV (postoperative nausea and vomiting); Sleep apnea; and Vertigo. .    Surgical History:     has a past surgical history that includes Abdominal hernia repair (2011); Foot surgery (Left, 1990); Colonoscopy (2015); Upper gastrointestinal endoscopy (2006); Breast surgery (Left, 2001); Breast surgery (Left, 1967, 1992); Breast lumpectomy (Right, 2012); back surgery (2009); Hysterectomy (1994); hernia repair (1995); Hemorrhoid surgery (09/01/2016); and Cardiac catheterization (09/08/2016). Allergies: Allergies as of 09/05/2018 - Review Complete 09/05/2018   Allergen Reaction Noted    Morphine  05/01/2012    Tape [adhesive tape] Itching 09/23/2016    Unable to assess  09/05/2018    Vicodin [hydrocodone-acetaminophen] Nausea And Vomiting 05/01/2012       Medications:   Coumadin use last 5 days:  No  Antiplatelet drug therapy use last 5 days:  Yes  Other anticoagulant use last 5 days:  No    Prior to Admission medications    Medication Sig Start Date End Date Taking?  Authorizing Provider   amLODIPine (NORVASC) 10 MG tablet TAKE 1 TABLET BY MOUTH DAILY 7/20/18  Yes Nelson Ramirez MD   fluticasone (FLONASE) 50 MCG/ACT nasal spray 1 spray by Nasal route 2 times daily As needed 6/6/18  Yes Historical Provider, MD   gabapentin (NEURONTIN) 600 MG tablet Take 1 tablet PO at noon. 7/16/18 8/27/18 Yes Maribel Miramontes MD   gabapentin (NEURONTIN) 400 MG capsule Take 400 mg in am and 800 mg QHS. Patient taking differently: Take 400 mg in am and 1200 mg QHS. 7/16/18 8/27/18 Yes Maribel Miramontes MD   losartan-hydrochlorothiazide [facility]) 100-25 MG per tablet Take 1 tablet by mouth daily 5/15/18  Yes Maribel Miramontes MD   rosuvastatin (CRESTOR) 5 MG tablet Take 5 mg by mouth nightly    Yes Historical Provider, MD   atenolol (TENORMIN) 50 MG tablet Take 0.5 tablets by mouth nightly 2/9/18  Yes Fili Artis MD   ELIQUIS 5 MG TABS tablet TAKE 1 TABLET BY MOUTH TWICE A DAY 10/31/17  Yes Historical Provider, MD   Omega-3 Fatty Acids (FISH OIL) 1000 MG CAPS Take 3,000 mg by mouth 3 times daily   Yes Historical Provider, MD   Glucosamine-Chondroit-Vit C-Mn (GLUCOSAMINE 1500 COMPLEX PO) Take 2 tablets by mouth   Yes Historical Provider, MD   amiodarone (CORDARONE) 200 MG tablet Take 100 mg by mouth daily    Yes Historical Provider, MD   Docusate Calcium (STOOL SOFTENER PO) Take by mouth 2 times daily    Yes Historical Provider, MD   aspirin 81 MG EC tablet Take 81 mg by mouth daily. Yes Historical Provider, MD   Multiple Vitamins-Minerals (CENTRUM SILVER PO) Take by mouth daily    Yes Historical Provider, MD   nitroGLYCERIN (NITROSTAT) 0.4 MG SL tablet Place 0.4 mg under the tongue every 5 minutes as needed for Chest pain Take 1 tablet for chest pain and repeat after 5 min if no relief, call 911 and take another dose 5 min later. Max of 3 doses in 15 min.     Historical Provider, MD       Vital Signs  Vitals:    09/05/18 0615   BP: 135/61   Pulse: 62   Resp: 24   Temp: 98.2 °F (36.8 °C)   SpO2: 97%       Physical:  Heart:  regular rate and rhythm  Lungs:  Clear  Abdomen:  Soft  Mental Status:  Alert and Oriented    Planned Procedure:  {[facility] CATH LAB

## 2018-09-05 NOTE — FLOWSHEET NOTE
CARMELLA STEIN [facility] cardioversion @ 360 joules by Dr. Guille Moran. Cardioverted to SB 44 per min.

## 2018-09-05 NOTE — FLOWSHEET NOTE
09/05/18 0951   Encounter Summary   Services provided to: Patient and family together   Referral/Consult From: Nurse;Patient; Family   Continue Visiting No  (9/5/18)   Complexity of Encounter Low   Length of Encounter 15 minutes   Routine   Type Initial   Assessment Calm; Approachable   Intervention Active listening;Nurtured hope   Outcome Acceptance;Comfort;Expressed gratitude;Engaged in conversation   Advance Directives (For Healthcare)   Healthcare Directive Yes, patient has an advance directive for healthcare treatment   S- The patient requested to make changes to her Advance Directive (LW/POA.)  O- The pt was in bed and the pt's son was supportively present. The pt stated that she has a [employer] but         would like to make changes. Since the pt was medicated, the pt will review the documents that were            given before completing. A- I offered emotional and spiritual support to the pt and her son. P- No follow up is needed at this time.

## 2018-09-05 NOTE — PLAN OF CARE
Problem: Discharge Planning:  Goal: Discharged to appropriate level of care  Discharged to appropriate level of care   Outcome: Completed Date Met: 09/05/18  Discharge to home

## 2018-09-07 RX ORDER — GABAPENTIN 400 MG/1
CAPSULE ORAL
Qty: 120 CAPSULE | Refills: 1 | Status: ON HOLD | OUTPATIENT
Start: 2018-09-07 | End: 2018-12-04 | Stop reason: DRUGHIGH

## 2018-09-11 ENCOUNTER — HOSPITAL ENCOUNTER (OUTPATIENT)
Dept: MRI IMAGING | Age: 73
Discharge: HOME | End: 2018-09-11
Payer: MEDICARE

## 2018-09-11 DIAGNOSIS — M96.1 LUMBAR POST-LAMINECTOMY SYNDROME: ICD-10-CM

## 2018-09-11 DIAGNOSIS — E66.01 OBESITY, CLASS III, BMI 40-49.9 (MORBID OBESITY) (HCC): ICD-10-CM

## 2018-09-11 DIAGNOSIS — M54.16 LUMBAR RADICULITIS: ICD-10-CM

## 2018-09-11 DIAGNOSIS — K43.2 INCISIONAL HERNIA OF ANTERIOR ABDOMINAL WALL WITHOUT OBSTRUCTION OR GANGRENE: ICD-10-CM

## 2018-09-11 DIAGNOSIS — G89.4 CHRONIC PAIN SYNDROME: ICD-10-CM

## 2018-09-11 PROCEDURE — 6360000004 HC RX CONTRAST MEDICATION: Performed by: PAIN MEDICINE

## 2018-09-11 PROCEDURE — 72158 MRI LUMBAR SPINE W/O & W/DYE: CPT

## 2018-09-11 PROCEDURE — A9579 GAD-BASE MR CONTRAST NOS,1ML: HCPCS | Performed by: PAIN MEDICINE

## 2018-09-11 RX ADMIN — GADOTERIDOL 20 ML: 279.3 INJECTION, SOLUTION INTRAVENOUS at 13:31

## 2018-09-13 ENCOUNTER — TELEPHONE (OUTPATIENT)
Dept: PHYSICAL MEDICINE AND REHAB | Age: 73
End: 2018-09-13

## 2018-09-13 ENCOUNTER — OFFICE VISIT (OUTPATIENT)
Dept: PHYSICAL MEDICINE AND REHAB | Age: 73
End: 2018-09-13
Payer: MEDICARE

## 2018-09-13 VITALS
HEIGHT: 64 IN | HEART RATE: 52 BPM | WEIGHT: 261 LBS | SYSTOLIC BLOOD PRESSURE: 118 MMHG | DIASTOLIC BLOOD PRESSURE: 76 MMHG | BODY MASS INDEX: 44.56 KG/M2

## 2018-09-13 DIAGNOSIS — M48.062 LUMBAR STENOSIS WITH NEUROGENIC CLAUDICATION: ICD-10-CM

## 2018-09-13 DIAGNOSIS — M96.1 POST LAMINECTOMY SYNDROME: ICD-10-CM

## 2018-09-13 DIAGNOSIS — M54.50 LUMBAR PAIN: ICD-10-CM

## 2018-09-13 DIAGNOSIS — M47.816 LUMBAR SPONDYLOSIS: ICD-10-CM

## 2018-09-13 DIAGNOSIS — M54.16 LUMBAR RADICULOPATHY: Primary | ICD-10-CM

## 2018-09-13 PROCEDURE — 99214 OFFICE O/P EST MOD 30 MIN: CPT | Performed by: NURSE PRACTITIONER

## 2018-09-13 ASSESSMENT — ENCOUNTER SYMPTOMS
ABDOMINAL PAIN: 0
BACK PAIN: 1
DIARRHEA: 0
CONSTIPATION: 0
COLOR CHANGE: 0

## 2018-09-13 NOTE — PROGRESS NOTES
stenosis. There is no unexpected enhancement. There are no suspicious findings in the visualized aspects of the retroperitoneum and paraspinal soft tissues. Impression       Moderate to severe spinal canal stenosis with stenosis of both lateral recesses at the L3-4 level. There is moderate to severe bilateral foraminal stenosis. This is the level above the fusion. Spinal canal stenosis at this level has worsened. The patient is allergic to morphine; tape [adhesive tape]; unable to assess; and vicodin [hydrocodone-acetaminophen]. Past Medical History  Yariel Fallon  has a past medical history of Atrial fibrillation ([address] Breast cancer ([facility] [address] GERD (gastroesophageal reflux disease); H/O: whooping cough; History of shingles; Hypercholesterolemia; Hypertension; Iron deficiency; PONV (postoperative nausea and vomiting); Sleep apnea; and Vertigo. Past Surgical History  The patient  has a past surgical history that includes Abdominal hernia repair (2011); Foot surgery (Left, 1990); Colonoscopy (2015); Upper gastrointestinal endoscopy (2006); Breast surgery (Left, 2001); Breast surgery (Left, 1967, 1992); Breast lumpectomy (Right, 2012); back surgery (2009); Hysterectomy (1994); hernia repair (1995); Hemorrhoid surgery (09/01/2016); and Cardiac catheterization (09/08/2016). Family History  This patient's family history includes Cancer in her mother, sister, sister, and sister; Diabetes in her mother; Heart Disease in her brother and mother; High Blood Pressure in her father and mother; Stroke in her brother and mother. Social History  Yariel Fallon  reports that she quit smoking about 23 years ago. She has a 2.00 pack-year smoking history. She has never used smokeless tobacco. She reports that she does not drink alcohol or use drugs.     Medications    Current Outpatient Prescriptions:     gabapentin (NEURONTIN) 400 MG capsule, TAKE ONE CAPSULE BY MOUTH IN THE MORNING, TAKE ONE CAPSULE AT LUNCH AND 2 CAPSULES AT BEDTIME (Patient taking differently: TAKE ONE CAPSULE BY MOUTH IN THE MORNING, TAKE 600mg CAPSULE AT LUNCH AND 1200mg CAPSULES AT BEDTIME), Disp: 120 capsule, Rfl: 1    amLODIPine (NORVASC) 10 MG tablet, TAKE 1 TABLET BY MOUTH DAILY, Disp: 90 tablet, Rfl: 1    fluticasone (FLONASE) 50 MCG/ACT nasal spray, 1 spray by Nasal route 2 times daily As needed, Disp: , Rfl:     losartan-hydrochlorothiazide (HYZAAR) 100-25 MG per tablet, Take 1 tablet by mouth daily, Disp: 90 tablet, Rfl: 1    rosuvastatin (CRESTOR) 5 MG tablet, Take 5 mg by mouth nightly , Disp: , Rfl:     atenolol (TENORMIN) 50 MG tablet, Take 0.5 tablets by mouth nightly, Disp: 90 tablet, Rfl: 1    ELIQUIS 5 MG TABS tablet, TAKE 1 TABLET BY MOUTH TWICE A DAY, Disp: , Rfl: 3    Omega-3 Fatty Acids (FISH OIL) 1000 MG CAPS, Take 3,000 mg by mouth 3 times daily, Disp: , Rfl:     Glucosamine-Chondroit-Vit C-Mn (GLUCOSAMINE 1500 COMPLEX PO), Take 2 tablets by mouth, Disp: , Rfl:     amiodarone (CORDARONE) 200 MG tablet, Take 100 mg by mouth daily , Disp: , Rfl:     Docusate Calcium (STOOL SOFTENER PO), Take by mouth 2 times daily , Disp: , Rfl:     nitroGLYCERIN (NITROSTAT) 0.4 MG SL tablet, Place 0.4 mg under the tongue every 5 minutes as needed for Chest pain Take 1 tablet for chest pain and repeat after 5 min if no relief, call 911 and take another dose 5 min later. Max of 3 doses in 15 min., Disp: , Rfl:     aspirin 81 MG EC tablet, Take 81 mg by mouth daily. , Disp: , Rfl:     Multiple Vitamins-Minerals (CENTRUM SILVER PO), Take by mouth daily , Disp: , Rfl:     Subjective:      Review of Systems   Constitutional: Positive for activity change. Negative for chills, diaphoresis, fatigue and fever. Gastrointestinal: Negative for abdominal pain, constipation and diarrhea. Musculoskeletal: Positive for arthralgias and back pain. Skin: Negative for color change, rash and wound.    Neurological: Positive for dizziness (position changes since increase in gabapentin, states she takes it slow and it goes away) and numbness. Hematological: Does not bruise/bleed easily. Psychiatric/Behavioral: Positive for sleep disturbance. Objective:     Vitals:    09/13/18 1406   BP: 118/76   Pulse: 52   Weight: 261 lb (118.4 kg)   Height: 5' 4\" (1.626 m)       Physical Exam   Constitutional: She is oriented to person, place, and time. She appears well-developed and well-nourished. No distress. Eyes: Right eye exhibits no discharge. Left eye exhibits no discharge. Neck: Neck supple. Pulmonary/Chest: Effort normal. No respiratory distress. Musculoskeletal: She exhibits no edema. Lumbar back: She exhibits decreased range of motion, tenderness and pain. Legs:  Neurological: She is alert and oriented to person, place, and time. A sensory deficit is present. No cranial nerve deficit. Skin: Skin is warm and dry. No rash noted. She is not diaphoretic. No erythema. No pallor. Psychiatric: She has a normal mood and affect. Her speech is normal and behavior is normal. Judgment and thought content normal.          Assessment:     1. Lumbar radiculopathy    2. Lumbar stenosis with neurogenic claudication    3. Post laminectomy syndrome    4. Lumbar spondylosis    5. Lumbar pain            Plan:      · OARRS reviewed. · Discussed long term side effects of medications. · Discussed tolerance, dependency and addiction. · Previous UDS reviewed  · UDS preformed today for compliance. · Patient told can not receive any pain medications from any other source. · Discussed with patient that they may not be pain free. · No evidence of abuse, diversion or aberrant behavior.   Medications and/or procedures to improve function and quality of life- patient understanding with this and that may not be pain free  Testing: none further  Procedures: LESI L3-4 of preferred other level   Will need clearance from Dr. Alon Chamberlain   Discussed with

## 2018-09-14 ENCOUNTER — TELEPHONE (OUTPATIENT)
Dept: PHYSICAL MEDICINE AND REHAB | Age: 73
End: 2018-09-14

## 2018-09-27 RX ORDER — ATENOLOL 50 MG/1
TABLET ORAL
Qty: 90 TABLET | Refills: 1 | Status: ON HOLD | OUTPATIENT
Start: 2018-09-27 | End: 2018-12-04 | Stop reason: DRUGHIGH

## 2018-10-15 ENCOUNTER — NURSE ONLY (OUTPATIENT)
Dept: FAMILY MEDICINE CLINIC | Age: 73
End: 2018-10-15
Payer: MEDICARE

## 2018-10-15 DIAGNOSIS — Z23 FLU VACCINE NEED: Primary | ICD-10-CM

## 2018-10-15 PROCEDURE — G0008 ADMIN INFLUENZA VIRUS VAC: HCPCS | Performed by: FAMILY MEDICINE

## 2018-10-15 PROCEDURE — 90662 IIV NO PRSV INCREASED AG IM: CPT | Performed by: FAMILY MEDICINE

## 2018-10-22 DIAGNOSIS — Z12.39 SCREENING BREAST EXAMINATION: ICD-10-CM

## 2018-10-22 DIAGNOSIS — Z85.3 HX OF BREAST CANCER: Primary | ICD-10-CM

## 2018-10-24 ENCOUNTER — HOSPITAL ENCOUNTER (OUTPATIENT)
Dept: WOMENS IMAGING | Age: 73
Discharge: HOME | End: 2018-10-24
Payer: MEDICARE

## 2018-10-24 DIAGNOSIS — C50.511 MALIGNANT NEOPLASM OF LOWER-OUTER QUADRANT OF RIGHT BREAST OF FEMALE, ESTROGEN RECEPTOR POSITIVE (HCC): ICD-10-CM

## 2018-10-24 DIAGNOSIS — Z17.0 MALIGNANT NEOPLASM OF LOWER-OUTER QUADRANT OF RIGHT BREAST OF FEMALE, ESTROGEN RECEPTOR POSITIVE (HCC): ICD-10-CM

## 2018-10-24 DIAGNOSIS — Z12.31 ENCOUNTER FOR SCREENING MAMMOGRAM FOR MALIGNANT NEOPLASM OF BREAST: ICD-10-CM

## 2018-10-24 PROCEDURE — 77063 BREAST TOMOSYNTHESIS BI: CPT

## 2018-11-09 ENCOUNTER — OFFICE VISIT (OUTPATIENT)
Dept: ONCOLOGY | Age: 73
End: 2018-11-09
Payer: MEDICARE

## 2018-11-09 ENCOUNTER — HOSPITAL ENCOUNTER (OUTPATIENT)
Dept: INFUSION THERAPY | Age: 73
Discharge: HOME | End: 2018-11-09
Payer: MEDICARE

## 2018-11-09 VITALS
HEIGHT: 64 IN | HEART RATE: 77 BPM | BODY MASS INDEX: 45.27 KG/M2 | RESPIRATION RATE: 18 BRPM | TEMPERATURE: 98 F | OXYGEN SATURATION: 95 % | WEIGHT: 265.2 LBS | SYSTOLIC BLOOD PRESSURE: 146 MMHG | DIASTOLIC BLOOD PRESSURE: 69 MMHG

## 2018-11-09 DIAGNOSIS — Z17.0 MALIGNANT NEOPLASM OF LOWER-OUTER QUADRANT OF RIGHT BREAST OF FEMALE, ESTROGEN RECEPTOR POSITIVE (HCC): Primary | ICD-10-CM

## 2018-11-09 DIAGNOSIS — C50.511 MALIGNANT NEOPLASM OF LOWER-OUTER QUADRANT OF RIGHT BREAST OF FEMALE, ESTROGEN RECEPTOR POSITIVE (HCC): Primary | ICD-10-CM

## 2018-11-09 DIAGNOSIS — Z17.0 MALIGNANT NEOPLASM OF LOWER-OUTER QUADRANT OF RIGHT BREAST OF FEMALE, ESTROGEN RECEPTOR POSITIVE (HCC): ICD-10-CM

## 2018-11-09 DIAGNOSIS — C50.511 MALIGNANT NEOPLASM OF LOWER-OUTER QUADRANT OF RIGHT BREAST OF FEMALE, ESTROGEN RECEPTOR POSITIVE (HCC): ICD-10-CM

## 2018-11-09 LAB
BASINOPHIL, AUTOMATED: 0 % (ref 0–3)
BUN, WHOLE BLOOD: 21 MG/DL (ref 8–26)
CHLORIDE, WHOLE BLOOD: 101 MEQ/L (ref 98–109)
CREATININE, WHOLE BLOOD: 0.8 MG/DL (ref 0.5–1.2)
EOSINOPHILS RELATIVE PERCENT: 3 % (ref 0–4)
GFR, ESTIMATED: 75 ML/MIN/1.73M2
GLUCOSE, WHOLE BLOOD: 119 MG/DL (ref 70–108)
HCT VFR BLD CALC: 39 % (ref 37–47)
HEMOGLOBIN: 13.2 GM/DL (ref 12–16)
IONIZED CALCIUM, WHOLE BLOOD: 1.17 MMOL/L (ref 1.12–1.32)
LYMPHOCYTES # BLD: 17 % (ref 15–47)
MCH RBC QN AUTO: 26.9 PG (ref 27–31)
MCHC RBC AUTO-ENTMCNC: 33.9 GM/DL (ref 33–37)
MCV RBC AUTO: 79 FL (ref 81–99)
MONOCYTES: 6 % (ref 0–12)
PDW BLD-RTO: 12.9 % (ref 11.5–14.5)
PLATELET # BLD: 268 THOU/MM3 (ref 130–400)
PMV BLD AUTO: 6.9 FL (ref 7.4–10.4)
POTASSIUM, WHOLE BLOOD: 4.3 MEQ/L (ref 3.5–4.9)
RBC # BLD: 4.92 MILL/MM3 (ref 4.2–5.4)
SEG NEUTROPHILS: 73 % (ref 43–75)
SODIUM, WHOLE BLOOD: 143 MEQ/L (ref 138–146)
TOTAL CO2, WHOLE BLOOD: 28 MEQ/L (ref 23–33)
WBC # BLD: 8.4 THOU/MM3 (ref 4.8–10.8)

## 2018-11-09 PROCEDURE — 99211 OFF/OP EST MAY X REQ PHY/QHP: CPT

## 2018-11-09 PROCEDURE — 80076 HEPATIC FUNCTION PANEL: CPT

## 2018-11-09 PROCEDURE — 36415 COLL VENOUS BLD VENIPUNCTURE: CPT

## 2018-11-09 PROCEDURE — 85025 COMPLETE CBC W/AUTO DIFF WBC: CPT

## 2018-11-09 PROCEDURE — 99213 OFFICE O/P EST LOW 20 MIN: CPT | Performed by: INTERNAL MEDICINE

## 2018-11-09 PROCEDURE — 80047 BASIC METABLC PNL IONIZED CA: CPT

## 2018-11-21 ENCOUNTER — HOSPITAL ENCOUNTER (OUTPATIENT)
Dept: CT IMAGING | Age: 73
Discharge: HOME | End: 2018-11-21
Payer: MEDICARE

## 2018-11-21 DIAGNOSIS — I48.0 AF (PAROXYSMAL ATRIAL FIBRILLATION) (HCC): ICD-10-CM

## 2018-11-21 PROCEDURE — 75572 CT HRT W/3D IMAGE: CPT

## 2018-11-21 PROCEDURE — 6360000004 HC RX CONTRAST MEDICATION: Performed by: INTERNAL MEDICINE

## 2018-11-21 RX ADMIN — IOPAMIDOL 80 ML: 755 INJECTION, SOLUTION INTRAVENOUS at 11:10

## 2018-12-03 ENCOUNTER — HOSPITAL ENCOUNTER (OUTPATIENT)
Dept: NURSING | Age: 73
Discharge: HOME | End: 2018-12-03
Payer: MEDICARE

## 2018-12-03 VITALS
SYSTOLIC BLOOD PRESSURE: 131 MMHG | RESPIRATION RATE: 16 BRPM | OXYGEN SATURATION: 95 % | HEART RATE: 71 BPM | DIASTOLIC BLOOD PRESSURE: 58 MMHG | WEIGHT: 264 LBS | BODY MASS INDEX: 45.32 KG/M2 | TEMPERATURE: 97.7 F

## 2018-12-03 LAB
LV EF: 55 %
LVEF MODALITY: NORMAL

## 2018-12-03 PROCEDURE — 93320 DOPPLER ECHO COMPLETE: CPT | Performed by: NUCLEAR MEDICINE

## 2018-12-03 PROCEDURE — 93325 DOPPLER ECHO COLOR FLOW MAPG: CPT

## 2018-12-03 PROCEDURE — 6370000000 HC RX 637 (ALT 250 FOR IP)

## 2018-12-03 PROCEDURE — 2709999900 HC NON-CHARGEABLE SUPPLY

## 2018-12-03 PROCEDURE — 93325 DOPPLER ECHO COLOR FLOW MAPG: CPT | Performed by: NUCLEAR MEDICINE

## 2018-12-03 PROCEDURE — 93320 DOPPLER ECHO COMPLETE: CPT

## 2018-12-03 PROCEDURE — 99152 MOD SED SAME PHYS/QHP 5/>YRS: CPT

## 2018-12-03 PROCEDURE — 6360000002 HC RX W HCPCS: Performed by: NUCLEAR MEDICINE

## 2018-12-03 PROCEDURE — 93312 ECHO TRANSESOPHAGEAL: CPT

## 2018-12-03 PROCEDURE — 93312 ECHO TRANSESOPHAGEAL: CPT | Performed by: NUCLEAR MEDICINE

## 2018-12-03 RX ORDER — FLUMAZENIL 0.1 MG/ML
0.5 INJECTION, SOLUTION INTRAVENOUS ONCE
Status: DISCONTINUED | OUTPATIENT
Start: 2018-12-03 | End: 2018-12-04 | Stop reason: HOSPADM

## 2018-12-03 RX ORDER — NALOXONE HYDROCHLORIDE 0.4 MG/ML
0.4 INJECTION, SOLUTION INTRAMUSCULAR; INTRAVENOUS; SUBCUTANEOUS PRN
Status: DISCONTINUED | OUTPATIENT
Start: 2018-12-03 | End: 2018-12-04 | Stop reason: HOSPADM

## 2018-12-03 RX ORDER — MIDAZOLAM HYDROCHLORIDE 1 MG/ML
10 INJECTION INTRAMUSCULAR; INTRAVENOUS PRN
Status: DISCONTINUED | OUTPATIENT
Start: 2018-12-03 | End: 2018-12-04 | Stop reason: HOSPADM

## 2018-12-03 RX ORDER — FENTANYL CITRATE 50 UG/ML
100 INJECTION, SOLUTION INTRAMUSCULAR; INTRAVENOUS PRN
Status: DISCONTINUED | OUTPATIENT
Start: 2018-12-03 | End: 2018-12-04 | Stop reason: HOSPADM

## 2018-12-03 RX ADMIN — MIDAZOLAM 2 MG: 1 INJECTION INTRAMUSCULAR; INTRAVENOUS at 14:01

## 2018-12-03 RX ADMIN — FENTANYL CITRATE 25 MCG: 50 INJECTION, SOLUTION INTRAMUSCULAR; INTRAVENOUS at 14:01

## 2018-12-03 ASSESSMENT — PAIN - FUNCTIONAL ASSESSMENT: PAIN_FUNCTIONAL_ASSESSMENT: 0-10

## 2018-12-03 NOTE — PROGRESS NOTES
1400 Dr. Binta Corral in.  1404 scope in.  1410 bubble study  1415 dr. Georgette Wang out of room to speak to son. Pt able to swallow without diffiuclty. 1430 pt provided with water. Pt able to swallow water without complaints. Son at bedside. 1445 vitals stable. Pt tolerating water. 1500 vitals stable. Pt tolerating water without difficulty. 1515 pt discharged via wheelchair with instructions with son with instructions with no complaints.                      __m__ Safety:       (Environmental)   [place] to environment   Ensure ID band is correct and in place/ allergy band as needed   Assess for fall risk   Initiate fall precautions as applicable (fall band, side rails, etc.)   Call light within reach   Bed in low position/ wheels locked    __m__ Pain:        Assess pain level and characteristics   Administer analgesics as ordered   Assess effectiveness of pain management and report to MD as needed    _m___ Knowledge Deficit:   Assess baseline knowledge   Provide teaching at level of understanding   Provide teaching via preferred learning method   Evaluate teaching effectiveness    __m__ Hemodynamic/Respiratory Status:       (Pre and Post Procedure Monitoring)   Assess/Monitor vital signs and LOC   Assess Baseline SpO2 prior to any sedation   Obtain weight/height   Assess vital signs/ LOC until patient meets discharge criteria   Monitor procedure site and notify MD of any issues

## 2018-12-03 NOTE — PROGRESS NOTES
_M___ Safety:       (Environmental)   [place] to environment   Ensure ID band is correct and in place/ allergy band as needed   Assess for fall risk   Initiate fall precautions as applicable (fall band, side rails, etc.)   Call light within reach   Bed in low position/ wheels locked    ____ Pain:        Assess pain level and characteristics   Administer analgesics as ordered   Assess effectiveness of pain management and report to MD as needed    ____ Knowledge Deficit:   Assess baseline knowledge   Provide teaching at level of understanding   Provide teaching via preferred learning method   Evaluate teaching effectiveness    ____ Hemodynamic/Respiratory Status:       (Pre and Post Procedure Monitoring)   Assess/Monitor vital signs and LOC   Assess Baseline SpO2 prior to any sedation   Obtain weight/height   Assess vital signs/ LOC until patient meets discharge criteria   Monitor procedure site and notify MD of any issues    ____ Infection-Risk of Central Venous Catheter:   Monitor for infection signs and symptoms (catheter site redness, temperature elevation, etc)   Assess for infection risks   Educate regarding infection prevention   Manage central venous catheter (flushes/ dressing changes per protocol)

## 2018-12-04 ENCOUNTER — HOSPITAL ENCOUNTER (OUTPATIENT)
Dept: INPATIENT UNIT | Age: 73
Discharge: HOME | End: 2018-12-04

## 2018-12-04 ENCOUNTER — ANESTHESIA EVENT (OUTPATIENT)
Dept: CARDIAC CATH/INVASIVE PROCEDURES | Age: 73
End: 2018-12-04
Payer: MEDICARE

## 2018-12-04 ENCOUNTER — ANESTHESIA (OUTPATIENT)
Dept: CARDIAC CATH/INVASIVE PROCEDURES | Age: 73
End: 2018-12-04
Payer: MEDICARE

## 2018-12-04 ENCOUNTER — APPOINTMENT (OUTPATIENT)
Dept: CARDIAC CATH/INVASIVE PROCEDURES | Age: 73
End: 2018-12-04
Payer: MEDICARE

## 2018-12-04 ENCOUNTER — HOSPITAL ENCOUNTER (OUTPATIENT)
Dept: INPATIENT UNIT | Age: 73
Discharge: HOME | End: 2018-12-05
Attending: INTERNAL MEDICINE | Admitting: INTERNAL MEDICINE
Payer: MEDICARE

## 2018-12-04 VITALS — DIASTOLIC BLOOD PRESSURE: 59 MMHG | SYSTOLIC BLOOD PRESSURE: 127 MMHG | OXYGEN SATURATION: 98 % | TEMPERATURE: 95.5 F

## 2018-12-04 DIAGNOSIS — I48.0 AF (PAROXYSMAL ATRIAL FIBRILLATION) (HCC): Primary | ICD-10-CM

## 2018-12-04 PROBLEM — Z98.890 S/P ABLATION OF ATRIAL FIBRILLATION: Status: ACTIVE | Noted: 2018-12-04

## 2018-12-04 PROBLEM — Z86.79 S/P ABLATION OF ATRIAL FIBRILLATION: Status: ACTIVE | Noted: 2018-12-04

## 2018-12-04 LAB
ACTIVATED CLOTTING TIME: 120 SECONDS (ref 1–150)
ACTIVATED CLOTTING TIME: 180 SECONDS (ref 1–150)
ACTIVATED CLOTTING TIME: 296 SECONDS (ref 1–150)
ACTIVATED CLOTTING TIME: 334 SECONDS (ref 1–150)
ANION GAP SERPL CALCULATED.3IONS-SCNC: 12 MEQ/L (ref 8–16)
BUN BLDV-MCNC: 19 MG/DL (ref 7–22)
CALCIUM SERPL-MCNC: 9.4 MG/DL (ref 8.5–10.5)
CHLORIDE BLD-SCNC: 103 MEQ/L (ref 98–111)
CO2: 30 MEQ/L (ref 23–33)
CREAT SERPL-MCNC: 0.8 MG/DL (ref 0.4–1.2)
EKG ATRIAL RATE: 101 BPM
EKG Q-T INTERVAL: 446 MS
EKG QRS DURATION: 90 MS
EKG QTC CALCULATION (BAZETT): 434 MS
EKG R AXIS: -38 DEGREES
EKG T AXIS: -28 DEGREES
EKG VENTRICULAR RATE: 57 BPM
ERYTHROCYTE [DISTWIDTH] IN BLOOD BY AUTOMATED COUNT: 14.4 % (ref 11.5–14.5)
ERYTHROCYTE [DISTWIDTH] IN BLOOD BY AUTOMATED COUNT: 42.9 FL (ref 35–45)
GFR SERPL CREATININE-BSD FRML MDRD: 70 ML/MIN/1.73M2
GLUCOSE BLD-MCNC: 114 MG/DL (ref 70–108)
HCT VFR BLD CALC: 40.5 % (ref 37–47)
HEMOGLOBIN: 13.1 GM/DL (ref 12–16)
INR BLD: 1.02 (ref 0.85–1.13)
MCH RBC QN AUTO: 26.7 PG (ref 26–33)
MCHC RBC AUTO-ENTMCNC: 32.3 GM/DL (ref 32.2–35.5)
MCV RBC AUTO: 82.5 FL (ref 81–99)
PLATELET # BLD: 294 THOU/MM3 (ref 130–400)
PMV BLD AUTO: 9.7 FL (ref 9.4–12.4)
POTASSIUM SERPL-SCNC: 4.6 MEQ/L (ref 3.5–5.2)
RBC # BLD: 4.91 MILL/MM3 (ref 4.2–5.4)
SODIUM BLD-SCNC: 145 MEQ/L (ref 135–145)
WBC # BLD: 8.7 THOU/MM3 (ref 4.8–10.8)

## 2018-12-04 PROCEDURE — C1732 CATH, EP, DIAG/ABL, 3D/VECT: HCPCS

## 2018-12-04 PROCEDURE — 2500000003 HC RX 250 WO HCPCS: Performed by: NURSE ANESTHETIST, CERTIFIED REGISTERED

## 2018-12-04 PROCEDURE — C1894 INTRO/SHEATH, NON-LASER: HCPCS

## 2018-12-04 PROCEDURE — 3700000000 HC ANESTHESIA ATTENDED CARE

## 2018-12-04 PROCEDURE — 93010 ELECTROCARDIOGRAM REPORT: CPT | Performed by: NUCLEAR MEDICINE

## 2018-12-04 PROCEDURE — 36415 COLL VENOUS BLD VENIPUNCTURE: CPT

## 2018-12-04 PROCEDURE — 93662 INTRACARDIAC ECG (ICE): CPT | Performed by: INTERNAL MEDICINE

## 2018-12-04 PROCEDURE — 2709999900 HC NON-CHARGEABLE SUPPLY

## 2018-12-04 PROCEDURE — 85027 COMPLETE CBC AUTOMATED: CPT

## 2018-12-04 PROCEDURE — 85347 COAGULATION TIME ACTIVATED: CPT

## 2018-12-04 PROCEDURE — C1759 CATH, INTRA ECHOCARDIOGRAPHY: HCPCS

## 2018-12-04 PROCEDURE — 7100000001 HC PACU RECOVERY - ADDTL 15 MIN

## 2018-12-04 PROCEDURE — 2580000003 HC RX 258: Performed by: NURSE ANESTHETIST, CERTIFIED REGISTERED

## 2018-12-04 PROCEDURE — C1733 CATH, EP, OTHR THAN COOL-TIP: HCPCS

## 2018-12-04 PROCEDURE — 2580000003 HC RX 258: Performed by: INTERNAL MEDICINE

## 2018-12-04 PROCEDURE — 93656 COMPRE EP EVAL ABLTJ ATR FIB: CPT | Performed by: INTERNAL MEDICINE

## 2018-12-04 PROCEDURE — 85610 PROTHROMBIN TIME: CPT

## 2018-12-04 PROCEDURE — 3700000001 HC ADD 15 MINUTES (ANESTHESIA)

## 2018-12-04 PROCEDURE — C1730 CATH, EP, 19 OR FEW ELECT: HCPCS

## 2018-12-04 PROCEDURE — 6360000002 HC RX W HCPCS

## 2018-12-04 PROCEDURE — 7100000000 HC PACU RECOVERY - FIRST 15 MIN

## 2018-12-04 PROCEDURE — 2720000010 HC SURG SUPPLY STERILE

## 2018-12-04 PROCEDURE — 94760 N-INVAS EAR/PLS OXIMETRY 1: CPT

## 2018-12-04 PROCEDURE — 6370000000 HC RX 637 (ALT 250 FOR IP): Performed by: INTERNAL MEDICINE

## 2018-12-04 PROCEDURE — 93613 INTRACARDIAC EPHYS 3D MAPG: CPT | Performed by: INTERNAL MEDICINE

## 2018-12-04 PROCEDURE — 2500000003 HC RX 250 WO HCPCS

## 2018-12-04 PROCEDURE — C1769 GUIDE WIRE: HCPCS

## 2018-12-04 PROCEDURE — 2700000000 HC OXYGEN THERAPY PER DAY

## 2018-12-04 PROCEDURE — 6360000002 HC RX W HCPCS: Performed by: NURSE ANESTHETIST, CERTIFIED REGISTERED

## 2018-12-04 PROCEDURE — 93005 ELECTROCARDIOGRAM TRACING: CPT | Performed by: INTERNAL MEDICINE

## 2018-12-04 PROCEDURE — 6360000004 HC RX CONTRAST MEDICATION: Performed by: INTERNAL MEDICINE

## 2018-12-04 PROCEDURE — 80048 BASIC METABOLIC PNL TOTAL CA: CPT

## 2018-12-04 RX ORDER — M-VIT,TX,IRON,MINS/CALC/FOLIC 27MG-0.4MG
1 TABLET ORAL DAILY
Status: DISCONTINUED | OUTPATIENT
Start: 2018-12-05 | End: 2018-12-05 | Stop reason: HOSPADM

## 2018-12-04 RX ORDER — LIDOCAINE HYDROCHLORIDE 20 MG/ML
INJECTION, SOLUTION INFILTRATION; PERINEURAL PRN
Status: DISCONTINUED | OUTPATIENT
Start: 2018-12-04 | End: 2018-12-04 | Stop reason: SDUPTHER

## 2018-12-04 RX ORDER — SODIUM CHLORIDE 0.9 % (FLUSH) 0.9 %
10 SYRINGE (ML) INJECTION EVERY 12 HOURS SCHEDULED
Status: DISCONTINUED | OUTPATIENT
Start: 2018-12-04 | End: 2018-12-05 | Stop reason: HOSPADM

## 2018-12-04 RX ORDER — HEPARIN SODIUM 1000 [USP'U]/ML
INJECTION, SOLUTION INTRAVENOUS; SUBCUTANEOUS PRN
Status: DISCONTINUED | OUTPATIENT
Start: 2018-12-04 | End: 2018-12-04 | Stop reason: SDUPTHER

## 2018-12-04 RX ORDER — SODIUM CHLORIDE 0.9 % (FLUSH) 0.9 %
10 SYRINGE (ML) INJECTION EVERY 12 HOURS SCHEDULED
Status: DISCONTINUED | OUTPATIENT
Start: 2018-12-04 | End: 2018-12-04

## 2018-12-04 RX ORDER — GABAPENTIN 300 MG/1
300 CAPSULE ORAL ONCE
Status: COMPLETED | OUTPATIENT
Start: 2018-12-04 | End: 2018-12-04

## 2018-12-04 RX ORDER — GABAPENTIN 300 MG/1
300 CAPSULE ORAL 3 TIMES DAILY
Status: DISCONTINUED | OUTPATIENT
Start: 2018-12-04 | End: 2018-12-04

## 2018-12-04 RX ORDER — GABAPENTIN 600 MG/1
TABLET ORAL DAILY
COMMUNITY
End: 2019-01-17 | Stop reason: CLARIF

## 2018-12-04 RX ORDER — FENTANYL CITRATE 50 UG/ML
INJECTION, SOLUTION INTRAMUSCULAR; INTRAVENOUS PRN
Status: DISCONTINUED | OUTPATIENT
Start: 2018-12-04 | End: 2018-12-04 | Stop reason: SDUPTHER

## 2018-12-04 RX ORDER — LABETALOL HYDROCHLORIDE 5 MG/ML
10 INJECTION, SOLUTION INTRAVENOUS EVERY 10 MIN PRN
Status: DISCONTINUED | OUTPATIENT
Start: 2018-12-04 | End: 2018-12-04

## 2018-12-04 RX ORDER — ASPIRIN 81 MG/1
81 TABLET ORAL DAILY
Status: DISCONTINUED | OUTPATIENT
Start: 2018-12-04 | End: 2018-12-05 | Stop reason: HOSPADM

## 2018-12-04 RX ORDER — NITROGLYCERIN 0.4 MG/1
0.4 TABLET SUBLINGUAL EVERY 5 MIN PRN
Status: DISCONTINUED | OUTPATIENT
Start: 2018-12-04 | End: 2018-12-05 | Stop reason: HOSPADM

## 2018-12-04 RX ORDER — AMIODARONE HYDROCHLORIDE 200 MG/1
100 TABLET ORAL DAILY
Status: DISCONTINUED | OUTPATIENT
Start: 2018-12-04 | End: 2018-12-05 | Stop reason: HOSPADM

## 2018-12-04 RX ORDER — ROSUVASTATIN CALCIUM 10 MG/1
5 TABLET, COATED ORAL NIGHTLY
Status: DISCONTINUED | OUTPATIENT
Start: 2018-12-04 | End: 2018-12-05 | Stop reason: HOSPADM

## 2018-12-04 RX ORDER — SODIUM CHLORIDE 0.9 % (FLUSH) 0.9 %
10 SYRINGE (ML) INJECTION PRN
Status: DISCONTINUED | OUTPATIENT
Start: 2018-12-04 | End: 2018-12-04

## 2018-12-04 RX ORDER — ATENOLOL 50 MG/1
25 TABLET ORAL DAILY
Status: DISCONTINUED | OUTPATIENT
Start: 2018-12-04 | End: 2018-12-05 | Stop reason: HOSPADM

## 2018-12-04 RX ORDER — PROPOFOL 10 MG/ML
INJECTION, EMULSION INTRAVENOUS PRN
Status: DISCONTINUED | OUTPATIENT
Start: 2018-12-04 | End: 2018-12-04 | Stop reason: SDUPTHER

## 2018-12-04 RX ORDER — ATENOLOL 50 MG/1
25 TABLET ORAL NIGHTLY
Status: ON HOLD | COMMUNITY
End: 2019-11-15

## 2018-12-04 RX ORDER — HYDROCORTISONE ACETATE 0.5 %
1500 CREAM (GRAM) TOPICAL ONCE
Status: DISCONTINUED | OUTPATIENT
Start: 2018-12-04 | End: 2018-12-04 | Stop reason: CLARIF

## 2018-12-04 RX ORDER — ONDANSETRON 2 MG/ML
INJECTION INTRAMUSCULAR; INTRAVENOUS PRN
Status: DISCONTINUED | OUTPATIENT
Start: 2018-12-04 | End: 2018-12-04 | Stop reason: SDUPTHER

## 2018-12-04 RX ORDER — GLYCOPYRROLATE 1 MG/5 ML
SYRINGE (ML) INTRAVENOUS PRN
Status: DISCONTINUED | OUTPATIENT
Start: 2018-12-04 | End: 2018-12-04 | Stop reason: SDUPTHER

## 2018-12-04 RX ORDER — FENTANYL CITRATE 50 UG/ML
25 INJECTION, SOLUTION INTRAMUSCULAR; INTRAVENOUS EVERY 5 MIN PRN
Status: DISCONTINUED | OUTPATIENT
Start: 2018-12-04 | End: 2018-12-04

## 2018-12-04 RX ORDER — LOSARTAN POTASSIUM AND HYDROCHLOROTHIAZIDE 25; 100 MG/1; MG/1
1 TABLET ORAL DAILY
Status: DISCONTINUED | OUTPATIENT
Start: 2018-12-04 | End: 2018-12-04

## 2018-12-04 RX ORDER — ACETAMINOPHEN 325 MG/1
650 TABLET ORAL EVERY 4 HOURS PRN
Status: DISCONTINUED | OUTPATIENT
Start: 2018-12-04 | End: 2018-12-05 | Stop reason: HOSPADM

## 2018-12-04 RX ORDER — PROTAMINE SULFATE 10 MG/ML
INJECTION, SOLUTION INTRAVENOUS PRN
Status: DISCONTINUED | OUTPATIENT
Start: 2018-12-04 | End: 2018-12-04 | Stop reason: SDUPTHER

## 2018-12-04 RX ORDER — DOCUSATE SODIUM 100 MG/1
100 CAPSULE, LIQUID FILLED ORAL 2 TIMES DAILY
Status: DISCONTINUED | OUTPATIENT
Start: 2018-12-04 | End: 2018-12-05 | Stop reason: HOSPADM

## 2018-12-04 RX ORDER — LOSARTAN POTASSIUM 100 MG/1
100 TABLET ORAL DAILY
Status: DISCONTINUED | OUTPATIENT
Start: 2018-12-05 | End: 2018-12-05

## 2018-12-04 RX ORDER — SODIUM CHLORIDE 0.9 % (FLUSH) 0.9 %
10 SYRINGE (ML) INJECTION PRN
Status: DISCONTINUED | OUTPATIENT
Start: 2018-12-04 | End: 2018-12-05 | Stop reason: HOSPADM

## 2018-12-04 RX ORDER — SUCCINYLCHOLINE CHLORIDE 20 MG/ML
INJECTION INTRAMUSCULAR; INTRAVENOUS PRN
Status: DISCONTINUED | OUTPATIENT
Start: 2018-12-04 | End: 2018-12-04 | Stop reason: SDUPTHER

## 2018-12-04 RX ORDER — SODIUM CHLORIDE 9 MG/ML
INJECTION, SOLUTION INTRAVENOUS CONTINUOUS
Status: DISCONTINUED | OUTPATIENT
Start: 2018-12-04 | End: 2018-12-05 | Stop reason: HOSPADM

## 2018-12-04 RX ORDER — HYDROCHLOROTHIAZIDE 25 MG/1
25 TABLET ORAL DAILY
Status: DISCONTINUED | OUTPATIENT
Start: 2018-12-05 | End: 2018-12-05

## 2018-12-04 RX ORDER — GABAPENTIN 400 MG/1
400 CAPSULE ORAL DAILY
Status: DISCONTINUED | OUTPATIENT
Start: 2018-12-05 | End: 2018-12-05 | Stop reason: HOSPADM

## 2018-12-04 RX ORDER — AMLODIPINE BESYLATE 10 MG/1
10 TABLET ORAL DAILY
Status: DISCONTINUED | OUTPATIENT
Start: 2018-12-05 | End: 2018-12-05 | Stop reason: HOSPADM

## 2018-12-04 RX ORDER — GABAPENTIN 600 MG/1
1200 TABLET ORAL NIGHTLY
Status: DISCONTINUED | OUTPATIENT
Start: 2018-12-04 | End: 2018-12-05 | Stop reason: HOSPADM

## 2018-12-04 RX ORDER — DEXAMETHASONE SODIUM PHOSPHATE 4 MG/ML
INJECTION, SOLUTION INTRA-ARTICULAR; INTRALESIONAL; INTRAMUSCULAR; INTRAVENOUS; SOFT TISSUE PRN
Status: DISCONTINUED | OUTPATIENT
Start: 2018-12-04 | End: 2018-12-04 | Stop reason: SDUPTHER

## 2018-12-04 RX ORDER — GABAPENTIN 300 MG/1
600 CAPSULE ORAL
Status: DISCONTINUED | OUTPATIENT
Start: 2018-12-05 | End: 2018-12-05 | Stop reason: HOSPADM

## 2018-12-04 RX ORDER — CHLORAL HYDRATE 500 MG
3000 CAPSULE ORAL DAILY
Status: DISCONTINUED | OUTPATIENT
Start: 2018-12-04 | End: 2018-12-04 | Stop reason: CLARIF

## 2018-12-04 RX ORDER — FENTANYL CITRATE 50 UG/ML
50 INJECTION, SOLUTION INTRAMUSCULAR; INTRAVENOUS EVERY 5 MIN PRN
Status: DISCONTINUED | OUTPATIENT
Start: 2018-12-04 | End: 2018-12-04

## 2018-12-04 RX ORDER — GABAPENTIN 400 MG/1
CAPSULE ORAL 2 TIMES DAILY
COMMUNITY
End: 2019-04-24

## 2018-12-04 RX ADMIN — PHENYLEPHRINE HYDROCHLORIDE 40 MCG/MIN: 10 INJECTION INTRAVENOUS at 09:59

## 2018-12-04 RX ADMIN — HEPARIN SODIUM 22000 UNITS: 1000 INJECTION, SOLUTION INTRAVENOUS; SUBCUTANEOUS at 10:03

## 2018-12-04 RX ADMIN — FENTANYL CITRATE 50 MCG: 50 INJECTION INTRAMUSCULAR; INTRAVENOUS at 09:10

## 2018-12-04 RX ADMIN — HEPARIN SODIUM 1000 UNITS: 1000 INJECTION, SOLUTION INTRAVENOUS; SUBCUTANEOUS at 10:58

## 2018-12-04 RX ADMIN — ASPIRIN 81 MG: 81 TABLET, COATED ORAL at 19:57

## 2018-12-04 RX ADMIN — HEPARIN SODIUM 4000 UNITS: 1000 INJECTION, SOLUTION INTRAVENOUS; SUBCUTANEOUS at 10:21

## 2018-12-04 RX ADMIN — APIXABAN 5 MG: 5 TABLET, FILM COATED ORAL at 19:57

## 2018-12-04 RX ADMIN — PROPOFOL 50 MG: 10 INJECTION, EMULSION INTRAVENOUS at 09:10

## 2018-12-04 RX ADMIN — SODIUM CHLORIDE: 9 INJECTION, SOLUTION INTRAVENOUS at 07:08

## 2018-12-04 RX ADMIN — GABAPENTIN 300 MG: 300 CAPSULE ORAL at 14:59

## 2018-12-04 RX ADMIN — PHENYLEPHRINE HYDROCHLORIDE 100 MCG: 10 INJECTION INTRAVENOUS at 09:05

## 2018-12-04 RX ADMIN — PROTAMINE SULFATE 45 MG: 10 INJECTION, SOLUTION INTRAVENOUS at 11:33

## 2018-12-04 RX ADMIN — IOPAMIDOL 10 ML: 755 INJECTION, SOLUTION INTRAVENOUS at 11:57

## 2018-12-04 RX ADMIN — SODIUM CHLORIDE: 9 INJECTION, SOLUTION INTRAVENOUS at 09:55

## 2018-12-04 RX ADMIN — GABAPENTIN 300 MG: 300 CAPSULE ORAL at 15:59

## 2018-12-04 RX ADMIN — Medication 0.2 MG: at 09:05

## 2018-12-04 RX ADMIN — ATENOLOL 25 MG: 25 TABLET ORAL at 19:57

## 2018-12-04 RX ADMIN — GABAPENTIN 1200 MG: 600 TABLET, FILM COATED ORAL at 20:20

## 2018-12-04 RX ADMIN — LIDOCAINE HYDROCHLORIDE 100 MG: 20 INJECTION, SOLUTION INFILTRATION; PERINEURAL at 09:05

## 2018-12-04 RX ADMIN — ONDANSETRON 4 MG: 2 INJECTION INTRAMUSCULAR; INTRAVENOUS at 11:25

## 2018-12-04 RX ADMIN — FENTANYL CITRATE 50 MCG: 50 INJECTION INTRAMUSCULAR; INTRAVENOUS at 09:05

## 2018-12-04 RX ADMIN — DEXAMETHASONE SODIUM PHOSPHATE 10 MG: 4 INJECTION, SOLUTION INTRAMUSCULAR; INTRAVENOUS at 09:05

## 2018-12-04 RX ADMIN — PROPOFOL 150 MG: 10 INJECTION, EMULSION INTRAVENOUS at 09:05

## 2018-12-04 RX ADMIN — Medication 10 ML: at 19:58

## 2018-12-04 RX ADMIN — SUCCINYLCHOLINE CHLORIDE 120 MG: 20 INJECTION, SOLUTION INTRAMUSCULAR; INTRAVENOUS at 09:05

## 2018-12-04 RX ADMIN — AMIODARONE HYDROCHLORIDE 100 MG: 200 TABLET ORAL at 19:56

## 2018-12-04 RX ADMIN — ROSUVASTATIN CALCIUM 5 MG: 10 TABLET, FILM COATED ORAL at 19:56

## 2018-12-04 RX ADMIN — DOCUSATE SODIUM 100 MG: 100 CAPSULE, LIQUID FILLED ORAL at 19:57

## 2018-12-04 ASSESSMENT — PULMONARY FUNCTION TESTS
PIF_VALUE: 22
PIF_VALUE: 1
PIF_VALUE: 22
PIF_VALUE: 3
PIF_VALUE: 1
PIF_VALUE: 23
PIF_VALUE: 22
PIF_VALUE: 23
PIF_VALUE: 7
PIF_VALUE: 22
PIF_VALUE: 23
PIF_VALUE: 23
PIF_VALUE: 22
PIF_VALUE: 23
PIF_VALUE: 23
PIF_VALUE: 22
PIF_VALUE: 22
PIF_VALUE: 8
PIF_VALUE: 22
PIF_VALUE: 22
PIF_VALUE: 23
PIF_VALUE: 22
PIF_VALUE: 3
PIF_VALUE: 22
PIF_VALUE: 21
PIF_VALUE: 22
PIF_VALUE: 22
PIF_VALUE: 23
PIF_VALUE: 22
PIF_VALUE: 23
PIF_VALUE: 23
PIF_VALUE: 2
PIF_VALUE: 23
PIF_VALUE: 22
PIF_VALUE: 23
PIF_VALUE: 7
PIF_VALUE: 19
PIF_VALUE: 22
PIF_VALUE: 1
PIF_VALUE: 24
PIF_VALUE: 22
PIF_VALUE: 21
PIF_VALUE: 23
PIF_VALUE: 23
PIF_VALUE: 22
PIF_VALUE: 23
PIF_VALUE: 1
PIF_VALUE: 22
PIF_VALUE: 4
PIF_VALUE: 22
PIF_VALUE: 24
PIF_VALUE: 23
PIF_VALUE: 22
PIF_VALUE: 23
PIF_VALUE: 23
PIF_VALUE: 22
PIF_VALUE: 5
PIF_VALUE: 22
PIF_VALUE: 3
PIF_VALUE: 23
PIF_VALUE: 21
PIF_VALUE: 22
PIF_VALUE: 23
PIF_VALUE: 23
PIF_VALUE: 21
PIF_VALUE: 22
PIF_VALUE: 18
PIF_VALUE: 34
PIF_VALUE: 22
PIF_VALUE: 22
PIF_VALUE: 8
PIF_VALUE: 22
PIF_VALUE: 1
PIF_VALUE: 23
PIF_VALUE: 22
PIF_VALUE: 21
PIF_VALUE: 22
PIF_VALUE: 22
PIF_VALUE: 23
PIF_VALUE: 21
PIF_VALUE: 22
PIF_VALUE: 23
PIF_VALUE: 3
PIF_VALUE: 7
PIF_VALUE: 21
PIF_VALUE: 2
PIF_VALUE: 22
PIF_VALUE: 23
PIF_VALUE: 28
PIF_VALUE: 22
PIF_VALUE: 23
PIF_VALUE: 22
PIF_VALUE: 8
PIF_VALUE: 22
PIF_VALUE: 23
PIF_VALUE: 22
PIF_VALUE: 21
PIF_VALUE: 22
PIF_VALUE: 23
PIF_VALUE: 22
PIF_VALUE: 23
PIF_VALUE: 3
PIF_VALUE: 23
PIF_VALUE: 22
PIF_VALUE: 21
PIF_VALUE: 22
PIF_VALUE: 22
PIF_VALUE: 2
PIF_VALUE: 21
PIF_VALUE: 22
PIF_VALUE: 19
PIF_VALUE: 21
PIF_VALUE: 22
PIF_VALUE: 19
PIF_VALUE: 21
PIF_VALUE: 24
PIF_VALUE: 8
PIF_VALUE: 2
PIF_VALUE: 22
PIF_VALUE: 21
PIF_VALUE: 22
PIF_VALUE: 7
PIF_VALUE: 21
PIF_VALUE: 22
PIF_VALUE: 23
PIF_VALUE: 22
PIF_VALUE: 23
PIF_VALUE: 23
PIF_VALUE: 22
PIF_VALUE: 22
PIF_VALUE: 21
PIF_VALUE: 22
PIF_VALUE: 22
PIF_VALUE: 3
PIF_VALUE: 19
PIF_VALUE: 23
PIF_VALUE: 22
PIF_VALUE: 21
PIF_VALUE: 22
PIF_VALUE: 23
PIF_VALUE: 23

## 2018-12-04 ASSESSMENT — PAIN DESCRIPTION - LOCATION
LOCATION: GROIN
LOCATION: GROIN

## 2018-12-04 ASSESSMENT — PAIN DESCRIPTION - PAIN TYPE
TYPE: SURGICAL PAIN
TYPE: SURGICAL PAIN

## 2018-12-04 ASSESSMENT — PAIN SCALES - GENERAL: PAINLEVEL_OUTOF10: 0

## 2018-12-04 ASSESSMENT — PAIN DESCRIPTION - ORIENTATION
ORIENTATION: RIGHT
ORIENTATION: RIGHT

## 2018-12-04 NOTE — ANESTHESIA POSTPROCEDURE EVALUATION
Department of Anesthesiology  Postprocedure Note    Patient: Steve Li  MRN: [id number]  YOB: 1945  Date of evaluation: 12/4/2018  Time:  2:40 PM     Procedure Summary     Date:  12/04/18 Room / Location:  [facility]    Anesthesia Start:  0900 Anesthesia Stop:  1211    Procedure:  [facility] W ANESTHESIA Diagnosis:      Scheduled Providers:  Arturo Alonso MD; VIC Lee - CRNA Responsible Provider:  Arturo Alonso MD    Anesthesia Type:  general ASA Status:  3          Anesthesia Type: general    Mulu Phase I: Mulu Score: 9    Mulu Phase II:      Last vitals: Reviewed and per EMR flowsheets.        Anesthesia Post Evaluation    Patient location during evaluation: PACU  Patient participation: complete - patient participated  Level of consciousness: awake and alert  Airway patency: patent  Nausea & Vomiting: no nausea  Complications: no  Cardiovascular status: blood pressure returned to baseline and hemodynamically stable  Respiratory status: acceptable and spontaneous ventilation  Hydration status: euvolemic

## 2018-12-04 NOTE — ANESTHESIA PRE PROCEDURE
Department of Anesthesiology  Preprocedure Note       Name:  Kristan Montana   Age:  68 y.o.  :  1945                                          MRN:  [id number]         Date:  2018      Surgeon: * No surgeons listed *    Procedure: [facility] CATH LAB W ANESTHESIA    Medications prior to admission:   Prior to Admission medications    Medication Sig Start Date End Date Taking?  Authorizing Provider   atenolol (TENORMIN) 50 MG tablet TAKE 1 TABLET BY MOUTH AT BEDTIME 18  Yes Tha Fontaine MD   gabapentin (NEURONTIN) 400 MG capsule TAKE ONE CAPSULE BY MOUTH IN THE MORNING, TAKE ONE CAPSULE AT LUNCH AND 2 CAPSULES AT BEDTIME  Patient taking differently: TAKE ONE CAPSULE BY MOUTH IN THE MORNING, TAKE 600mg CAPSULE AT LUNCH AND 1200mg CAPSULES AT BEDTIME 18 Yes Tha Fontaine MD   amLODIPine (NORVASC) 10 MG tablet TAKE 1 TABLET BY MOUTH DAILY 18  Yes Tha Fontaine MD   fluticasone (FLONASE) 50 MCG/ACT nasal spray 1 spray by Nasal route 2 times daily As needed 18  Yes Historical Provider, MD   losartan-hydrochlorothiazide (HYZAAR) 100-25 MG per tablet Take 1 tablet by mouth daily 5/15/18  Yes Tha Fontaine MD   rosuvastatin (CRESTOR) 5 MG tablet Take 5 mg by mouth nightly    Yes Historical Provider, MD   Omega-3 Fatty Acids (FISH OIL) 1000 MG CAPS Take 3,000 mg by mouth 3 times daily   Yes Historical Provider, MD   Glucosamine-Chondroit-Vit C-Mn (GLUCOSAMINE 1500 COMPLEX PO) Take 2 tablets by mouth   Yes Historical Provider, MD   amiodarone (CORDARONE) 200 MG tablet Take 100 mg by mouth daily    Yes Historical Provider, MD   Docusate Calcium (STOOL SOFTENER PO) Take by mouth 2 times daily    Yes Historical Provider, MD   Multiple Vitamins-Minerals (CENTRUM SILVER PO) Take by mouth daily    Yes Historical Provider, MD   ELIQUIS 5 MG TABS tablet TAKE 1 TABLET BY MOUTH TWICE A DAY 10/31/17   Historical Provider, MD   nitroGLYCERIN (NITROSTAT) 0.4 MG SL tablet Place 0.4 mg under the tongue every 5 minutes as

## 2018-12-04 NOTE — PROGRESS NOTES
Pharmacy Medication History Note      List of current medications patient is taking is complete. Source of information: Patient    Changes made to medication list:  Medications removed (include reason, ex. therapy complete or physician discontinued): ·     Medications added/doses adjusted:  · Atenolol changed to half a tablet nightly (25 mg)  · Corrected gabapentin orders    Other notes (ex. Recent course of antibiotics, Coumadin dosing):  ·   · Denies use of other OTC or herbal medications.       Allergies reviewed      Electronically signed by Cynthia Becerra, [address] [place] [address] on 12/4/2018 at 3:44 PM

## 2018-12-04 NOTE — PROCEDURES
[address] [place] [address]                                 PROCEDURE NOTE    PATIENT NAME: Elieser Anna                      :        1945  MED REC NO:   [id number]                           ROOM:       0005  ACCOUNT NO:   [de-identified]                           ADMIT DATE: 2018  PROVIDER:     ROCKY Luevano Tahiras:  2018    REFERRING PROVIDER:  Brianna Tanner. Josette Schneider M.D. PREOPERATIVE DIAGNOSES:  1. Long persistent atrial fibrillation. 2.  Hypertension. 3.  Coronary artery disease. 4.  Hypercholesterolemia. 5.  Obstructive sleep apnea. 6.  Family history of atherosclerotic heart disease. 7.  Iron deficiency anemia. POSTOPERATIVE DIAGNOSES:  1. Long persistent atrial fibrillation. 2.  Hypertension. 3.  Coronary artery disease. 4.  Hypercholesterolemia. 5.  Obstructive sleep apnea. 6.  Family history of atherosclerotic heart disease. 7.  Iron deficiency anemia. PROCEDURES PERFORMED:  1. Insertion of intracardiac echocardiogram.  2.  Transseptal puncture under the guidance of intracardiac  echocardiogram and fluoroscopy. 3.  Cryoballoon ablation of the pulmonary vein x4.  4.  Electrophysiology study. RECOMMENDATIONS:  1. No lifting more than 10 pounds for 10 days. 2.  No swimming for 10 days. 3.  No bathtub or whirlpool for more than 10 minutes for 10 days. 4.  It is okay to use shower less than 10 minutes. 5.  Continue same current medications. 6.  Restart Eliquis 4 hours after hemostasis of the right femoral vein  has been achieved. 7.  Stop amiodarone in one month. 8.  Thirty days event monitor in two months. 9.  Follow up with Dr. Josette Schneider in one to two months.     BRIEF HISTORY:  The patient is 26-year-old white female with history of  symptomatic long persistent atrial fibrillation, has been on amiodarone,  tried cardioversion without any success in the past, who has

## 2018-12-05 VITALS
WEIGHT: 264.4 LBS | HEART RATE: 53 BPM | RESPIRATION RATE: 16 BRPM | HEIGHT: 64 IN | TEMPERATURE: 98 F | BODY MASS INDEX: 45.14 KG/M2 | SYSTOLIC BLOOD PRESSURE: 115 MMHG | OXYGEN SATURATION: 96 % | DIASTOLIC BLOOD PRESSURE: 79 MMHG

## 2018-12-05 RX ORDER — LOSARTAN POTASSIUM AND HYDROCHLOROTHIAZIDE 25; 100 MG/1; MG/1
1 TABLET ORAL DAILY
Status: DISCONTINUED | OUTPATIENT
Start: 2018-12-05 | End: 2018-12-05 | Stop reason: HOSPADM

## 2018-12-05 RX ADMIN — GABAPENTIN 400 MG: 400 CAPSULE ORAL at 09:43

## 2018-12-05 RX ADMIN — AMLODIPINE BESYLATE 10 MG: 10 TABLET ORAL at 09:41

## 2018-12-05 RX ADMIN — LOSARTAN POTASSIUM AND HYDROCHLOROTHIAZIDE 1 TABLET: 25; 100 TABLET ORAL at 09:44

## 2018-12-05 RX ADMIN — APIXABAN 5 MG: 5 TABLET, FILM COATED ORAL at 09:42

## 2018-12-05 RX ADMIN — GABAPENTIN 600 MG: 300 CAPSULE ORAL at 12:19

## 2018-12-06 ENCOUNTER — TELEPHONE (OUTPATIENT)
Dept: FAMILY MEDICINE CLINIC | Age: 73
End: 2018-12-06

## 2018-12-10 ENCOUNTER — OFFICE VISIT (OUTPATIENT)
Dept: FAMILY MEDICINE CLINIC | Age: 73
End: 2018-12-10
Payer: MEDICARE

## 2018-12-10 VITALS
DIASTOLIC BLOOD PRESSURE: 82 MMHG | RESPIRATION RATE: 18 BRPM | HEART RATE: 76 BPM | HEIGHT: 64 IN | TEMPERATURE: 98.2 F | WEIGHT: 267 LBS | SYSTOLIC BLOOD PRESSURE: 131 MMHG | BODY MASS INDEX: 45.58 KG/M2

## 2018-12-10 DIAGNOSIS — Z98.890 STATUS POST ABLATION OF ATRIAL FIBRILLATION: Primary | ICD-10-CM

## 2018-12-10 DIAGNOSIS — Z86.79 STATUS POST ABLATION OF ATRIAL FIBRILLATION: Primary | ICD-10-CM

## 2018-12-10 DIAGNOSIS — M48.061 SPINAL STENOSIS OF LUMBAR REGION, UNSPECIFIED WHETHER NEUROGENIC CLAUDICATION PRESENT: ICD-10-CM

## 2018-12-10 PROCEDURE — 99214 OFFICE O/P EST MOD 30 MIN: CPT | Performed by: FAMILY MEDICINE

## 2018-12-10 RX ORDER — GABAPENTIN 600 MG/1
TABLET ORAL
Qty: 270 TABLET | Refills: 1 | Status: SHIPPED | OUTPATIENT
Start: 2018-12-10 | End: 2019-04-24 | Stop reason: SDUPTHER

## 2019-01-02 RX ORDER — GABAPENTIN 600 MG/1
TABLET ORAL
Qty: 90 TABLET | Refills: 1 | Status: SHIPPED | OUTPATIENT
Start: 2019-01-02 | End: 2019-01-17 | Stop reason: CLARIF

## 2019-01-17 ENCOUNTER — OFFICE VISIT (OUTPATIENT)
Dept: FAMILY MEDICINE CLINIC | Age: 74
End: 2019-01-17
Payer: MEDICARE

## 2019-01-17 VITALS
HEART RATE: 46 BPM | WEIGHT: 261 LBS | TEMPERATURE: 97.8 F | DIASTOLIC BLOOD PRESSURE: 80 MMHG | OXYGEN SATURATION: 97 % | SYSTOLIC BLOOD PRESSURE: 142 MMHG | BODY MASS INDEX: 44.56 KG/M2 | HEIGHT: 64 IN

## 2019-01-17 DIAGNOSIS — Z00.00 ROUTINE GENERAL MEDICAL EXAMINATION AT A HEALTH CARE FACILITY: Primary | ICD-10-CM

## 2019-01-17 PROCEDURE — G0439 PPPS, SUBSEQ VISIT: HCPCS | Performed by: FAMILY MEDICINE

## 2019-01-17 RX ORDER — ATORVASTATIN CALCIUM 20 MG/1
TABLET, FILM COATED ORAL
Refills: 4 | COMMUNITY
Start: 2019-01-11 | End: 2020-01-10 | Stop reason: SDUPTHER

## 2019-01-17 ASSESSMENT — PATIENT HEALTH QUESTIONNAIRE - PHQ9
SUM OF ALL RESPONSES TO PHQ QUESTIONS 1-9: 0
SUM OF ALL RESPONSES TO PHQ QUESTIONS 1-9: 0

## 2019-01-17 ASSESSMENT — LIFESTYLE VARIABLES: HOW OFTEN DO YOU HAVE A DRINK CONTAINING ALCOHOL: 0

## 2019-01-17 ASSESSMENT — ANXIETY QUESTIONNAIRES: GAD7 TOTAL SCORE: 0

## 2019-04-22 NOTE — TELEPHONE ENCOUNTER
Blake Mast called requesting a refill on the following medications:  Requested Prescriptions     Pending Prescriptions Disp Refills    gabapentin (NEURONTIN) 600 MG tablet 270 tablet 1     Sig: Take 1 tablet at noon and 2 at night     Pharmacy verified:  [facility]  . pv      Date of last visit:  1/17/2019  Date of next visit (if applicable): 0/36/7379

## 2019-04-24 RX ORDER — GABAPENTIN 600 MG/1
TABLET ORAL
Qty: 270 TABLET | Refills: 1 | Status: SHIPPED | OUTPATIENT
Start: 2019-04-24 | End: 2019-05-23 | Stop reason: SDUPTHER

## 2019-05-01 RX ORDER — GABAPENTIN 400 MG/1
400 CAPSULE ORAL
COMMUNITY
End: 2019-05-23 | Stop reason: SDUPTHER

## 2019-05-01 NOTE — PROGRESS NOTES
NPO after midnight  [employer] and drivers license  Wear comfortable clean clothing  Do not bring jewelry  Shower night before and morning of surgery with a liquid antibacterial soap  Bring  medications   Follow all instructions given by your physician   needed at discharge  Call [phone] for any questions      PT USES CPAP AT [address]

## 2019-05-08 ENCOUNTER — HOSPITAL ENCOUNTER (OUTPATIENT)
Dept: INPATIENT UNIT | Age: 74
Discharge: HOME | End: 2019-05-08
Attending: INTERNAL MEDICINE | Admitting: INTERNAL MEDICINE
Payer: MEDICARE

## 2019-05-08 VITALS
HEART RATE: 60 BPM | RESPIRATION RATE: 20 BRPM | WEIGHT: 250 LBS | BODY MASS INDEX: 42.68 KG/M2 | TEMPERATURE: 97.8 F | HEIGHT: 64 IN

## 2019-05-08 LAB
EKG ATRIAL RATE: 54 BPM
EKG P AXIS: 3 DEGREES
EKG P-R INTERVAL: 198 MS
EKG Q-T INTERVAL: 452 MS
EKG QRS DURATION: 96 MS
EKG QTC CALCULATION (BAZETT): 428 MS
EKG R AXIS: -37 DEGREES
EKG VENTRICULAR RATE: 54 BPM

## 2019-05-08 PROCEDURE — 93005 ELECTROCARDIOGRAM TRACING: CPT | Performed by: INTERNAL MEDICINE

## 2019-05-08 PROCEDURE — 2709999900 HC NON-CHARGEABLE SUPPLY

## 2019-05-08 PROCEDURE — 93010 ELECTROCARDIOGRAM REPORT: CPT | Performed by: NUCLEAR MEDICINE

## 2019-05-08 RX ORDER — SODIUM CHLORIDE 0.9 % (FLUSH) 0.9 %
10 SYRINGE (ML) INJECTION EVERY 12 HOURS SCHEDULED
Status: DISCONTINUED | OUTPATIENT
Start: 2019-05-08 | End: 2019-05-08 | Stop reason: HOSPADM

## 2019-05-08 RX ORDER — SODIUM CHLORIDE 9 MG/ML
INJECTION, SOLUTION INTRAVENOUS CONTINUOUS
Status: DISCONTINUED | OUTPATIENT
Start: 2019-05-08 | End: 2019-05-08 | Stop reason: HOSPADM

## 2019-05-08 NOTE — H&P
[facility]   Sedation/Analgesia History and Physical    Pt Name: Chelo Walters  MRN: [id number]  YOB: 1945  Provider Performing Procedure: Estela Horton MD  Primary Care Physician: Elyssa Mares MD      [facility]] symptomatic atrial fib  Consent: \"I have discu  Symptomatic atrial fib  Medical History:   has a past medical history of Arthritis, Atrial fibrillation ([address] Breast cancer ([facility] [address] CAD (coronary artery disease), Cerebral artery occlusion with cerebral infarction ([facility] [address] GERD (gastroesophageal reflux disease), H/O: whooping cough, History of shingles, Hypercholesterolemia, Hypertension, Iron deficiency, PONV (postoperative nausea and vomiting), Sleep apnea, and Vertigo. .    Surgical History:     has a past surgical history that includes Abdominal hernia repair (); Foot surgery (Left, ); Colonoscopy (); Upper gastrointestinal endoscopy (); Breast surgery (Left, ); Breast surgery (Left, , ); Breast lumpectomy (Right, ); back surgery (); Hysterectomy (); hernia repair (); Hemorrhoid surgery (2016); and Cardiac catheterization (2016). Allergies: Allergies as of 2019 - Review Complete 2019   Allergen Reaction Noted    Morphine  2012    Tape Kristin Mcardle tape] Itching 2016    Unable to assess  2018    Vicodin [hydrocodone-acetaminophen] Nausea And Vomiting 2012       Medications:   Coumadin use last 5 days:  No  Antiplatelet drug therapy use last 5 days:  Yes  Other anticoagulant use last 5 days:  No   sodium chloride flush  10 mL Intravenous 2 times per day     Prior to Admission medications    Medication Sig Start Date End Date Taking? Authorizing Provider   gabapentin (NEURONTIN) 400 MG capsule Take 400 mg by mouth daily (with breakfast).    Yes Historical Provider, MD   gabapentin (NEURONTIN) 600 MG tablet Take 1 tablet at noon and 2 at night 19 cardioversion  Sedation/ Anesthesia Plan: Midazolam and Sublimaze    ASA Classification: Class 3 - A patient with severe systemic disease that limits activity but is not incapacitating    Mallampati Airway Classification: II (soft palate, uvula, fauces visible)    · Pre-procedure diagnostic studies complete and results available. · Previous sedation/anesthesia experiences assessed. · The patient is an appropriate candidate to undergo the planned procedure sedation and anesthesia. (Refer to nursing sedation/analgesia documentation record)  · Formulation and discussion of sedation/procedure plan, risks, and expectations with patient and/or responsible adult completed. · Patient examined immediately prior to the procedure.  (Refer to nursing sedation/analgesia documentation record)    Ethan Vásquez MD  Electronically signed 5/8/2019 at 9:06 AM  Patient Name: Aubery Collet Record Number: [id number]  Date: 5/8/2019   Time: 9:06 AM   Room/Bed: 2E-17/017-A

## 2019-05-08 NOTE — PROGRESS NOTES
Patient admitted to 2E17  Ambulatory for cardioversion. Patient NPO. Patient accompanied by son. Vital signs obtained. Assessment and data collection intiated. Oriented to room. Policies and procedures for 2E explained. All questions answered with no further questions at this time. Fall prevention and safety precautions discussed with patient.

## 2019-05-23 ENCOUNTER — OFFICE VISIT (OUTPATIENT)
Dept: FAMILY MEDICINE CLINIC | Age: 74
End: 2019-05-23
Payer: MEDICARE

## 2019-05-23 VITALS
BODY MASS INDEX: 44.56 KG/M2 | TEMPERATURE: 97.7 F | WEIGHT: 261 LBS | HEIGHT: 64 IN | OXYGEN SATURATION: 97 % | HEART RATE: 48 BPM | SYSTOLIC BLOOD PRESSURE: 150 MMHG | DIASTOLIC BLOOD PRESSURE: 65 MMHG

## 2019-05-23 DIAGNOSIS — M62.830 SPASM OF MUSCLE OF LOWER BACK: ICD-10-CM

## 2019-05-23 DIAGNOSIS — Z23 NEED FOR PROPHYLACTIC VACCINATION AND INOCULATION AGAINST VARICELLA: ICD-10-CM

## 2019-05-23 DIAGNOSIS — Z13.1 DIABETES MELLITUS SCREENING: ICD-10-CM

## 2019-05-23 DIAGNOSIS — G57.93 NEUROPATHY INVOLVING BOTH LOWER EXTREMITIES: ICD-10-CM

## 2019-05-23 DIAGNOSIS — I10 ESSENTIAL HYPERTENSION: Primary | ICD-10-CM

## 2019-05-23 DIAGNOSIS — E78.00 HYPERCHOLESTEREMIA: ICD-10-CM

## 2019-05-23 PROCEDURE — 99214 OFFICE O/P EST MOD 30 MIN: CPT | Performed by: NURSE PRACTITIONER

## 2019-05-23 RX ORDER — TIZANIDINE 2 MG/1
2 TABLET ORAL EVERY 8 HOURS PRN
Qty: 10 TABLET | Refills: 0 | Status: SHIPPED | OUTPATIENT
Start: 2019-05-23 | End: 2019-10-03

## 2019-05-23 RX ORDER — LOSARTAN POTASSIUM AND HYDROCHLOROTHIAZIDE 25; 100 MG/1; MG/1
TABLET ORAL
Qty: 90 TABLET | Refills: 1 | Status: SHIPPED | OUTPATIENT
Start: 2019-05-23 | End: 2019-10-03 | Stop reason: SDUPTHER

## 2019-05-23 RX ORDER — AMLODIPINE BESYLATE 10 MG/1
10 TABLET ORAL DAILY
Qty: 90 TABLET | Refills: 1 | Status: SHIPPED | OUTPATIENT
Start: 2019-05-23 | End: 2019-10-03

## 2019-05-23 RX ORDER — GABAPENTIN 600 MG/1
TABLET ORAL
Qty: 270 TABLET | Refills: 1 | Status: SHIPPED | OUTPATIENT
Start: 2019-05-23 | End: 2019-10-03

## 2019-05-23 RX ORDER — GABAPENTIN 400 MG/1
400 CAPSULE ORAL
Qty: 30 CAPSULE | Refills: 3 | Status: SHIPPED | OUTPATIENT
Start: 2019-05-23 | End: 2019-09-12 | Stop reason: SDUPTHER

## 2019-05-23 NOTE — PROGRESS NOTES
[facility] [address] [facility], [address]  Ph:   [phone]  Fax: [phone]    Provider:  VIC Seals CNP    Patient:  Justine Alicea  YOB: 1945      Visit Date:  5/23/2019     Reason For Visit:   Chief Complaint   Patient presents with    Follow-up     4 month. Needs handicap placecard letter. Wants letter for 5 years if possible.  Hypertension    Hyperlipidemia    Lower Back Pain     Pulled muscle in back after twisting last friday. Discomfort started Sunday. Hermilo Prasad is a 68 y.o. female who comes today to the office for follow up of HTN, HLD, Neuropathy, back pain, and she needs her handicap placecard renewed. HTN: She is taking Hyzaar 100-25 mg 1 tablet PO daily and Amlodipine 10 mg 1 tablet PO daily. She also take Atenolol 50 1/2 tablet PO nightly, prescribed by Dr Cecilia Hogue. She follows a low sodium diet. She is not exercising regularly. Her blood pressures at  run about 120/80. She denies chest pain, shortness of breath, swelling of the extremities, palpitations or near syncope. She does have a family history of early ischemic heart disease. HLD: She is taking Atorvastatin 20 mg 1 tablet PO daily, prescribed by Dr Cecilia Hogue. Last lipid panel was June 2018, which showed total cholesterol 132, triglycerides 121, HDL 56, LDL 52. She is adherent to a low cholesterol diet. Cardiac history: she has history of Afib and CAD. she follows with Dr Cecilia Hogue. Cardiac cath June 2018 showed previous stented area of the RCA was still patent.  Distally the RCA was patent. Blaine Patella was a dominant artery. Patent left main.    Nonobstructive disease of the proximal LAD. Distally the LAD was patent. Patent codominant circumflex. Preserved systolic function of left ventricle, ejection fraction was 55%. Diastolic dysfunction of the left ventricle  She had elective cardioversion Sept 2018, a cryoablation in Dec 2018 with Dr Fredy Uriostegui (from [facility]). She was supposed to have cardioversion with Dr Alfrieda Lesch May 8 of this year, but she was found to be in sinus rhythm, and was discharged home. She remains on Eliquis 5 mg 1 tablet PO twice daily. Neuropathy: caused by a pinched Sciatic nerve for many years, she had spinal fusion in [address] [place] [address] in 2009. The neuropathy is in both feet but worse in the right. She is taking Neurontin 400 mg in the morning 1 tablet, 600 mg 1 tablet at noon, and 600 mg 2 tablets at night, and that has pain the discomfort tolerable. She is not able to walk for long distances due to the neuropathy and pain. She is having low back pain. May 17 she twisted to look out the window and developed a muscle spasm in the right side of her waist. It took a few days for that pain to subside, and the she was in a car for several hours over the weekend. She has been taking Tylenol which dulls the pain some, but she is very careful how she moves. The pain does not radiate down her leg. She does not have trouble sleeping at night, it is when she moves a certain way it \"\" her, like a spasm. Lupe Caraballo has lost a sister to breast cancer, and another sister just found out she needs a double mastectomy due to breast CA. She is tearful today talking about it. She denies helplessness or hopelessness. She still finds randa in activities and with her family. She reports her family is all very supportive of each other. She states she has been doing well, taking her medications as prescribed. She denies any side effects from her medications. Significant Past Medical History:    Past Medical History:   Diagnosis Date    Arthritis     Atrial fibrillation ([facility] [address] 08/2016    Breast cancer [facility]) 2012    [place]    CAD (coronary artery disease)     Cerebral artery occlusion with cerebral infarction ([facility] [address]     GERD (gastroesophageal reflux disease) 2001    dx 2001. but has never had an issue.  requires no meds    H/O: whooping cough 1945    History of shingles     1 year after recieving vaccine     Hypercholesterolemia 2010    Hypertension 1987    Iron deficiency 08/2016    PONV (postoperative nausea and vomiting)     Sleep apnea 06/2018    Vertigo 1957    as a child           Allergies:  is allergic to morphine; tape [adhesive tape]; unable to assess; and vicodin [hydrocodone-acetaminophen]. Medications:   Current Outpatient Medications   Medication Sig Dispense Refill    zoster recombinant adjuvanted vaccine (SHINGRIX) 50 MCG/0.5ML SUSR injection Inject 0.5 mLs into the muscle once for 1 dose 0.5 mL 0    amLODIPine (NORVASC) 10 MG tablet Take 1 tablet by mouth daily 90 tablet 1    gabapentin (NEURONTIN) 600 MG tablet Take 1 tablet at noon and 2 at night 270 tablet 1    losartan-hydrochlorothiazide (HYZAAR) 100-25 MG per tablet TAKE 1 TABLET BY MOUTH EVERY DAY 90 tablet 1    gabapentin (NEURONTIN) 400 MG capsule Take 1 capsule by mouth daily (with breakfast) for 120 days. 30 capsule 3    tiZANidine (ZANAFLEX) 2 MG tablet Take 1 tablet by mouth every 8 hours as needed (back spasms, pain) 10 tablet 0    atorvastatin (LIPITOR) 20 MG tablet TAKE 1 TABLET BY MOUTH AT BEDTIME  4    atenolol (TENORMIN) 50 MG tablet Take 25 mg by mouth nightly      fluticasone (FLONASE) 50 MCG/ACT nasal spray 1 spray by Nasal route 2 times daily As needed      ELIQUIS 5 MG TABS tablet TAKE 1 TABLET BY MOUTH TWICE A DAY  3    Omega-3 Fatty Acids (FISH OIL) 1000 MG CAPS Take 3,000 mg by mouth 3 times daily      Glucosamine-Chondroit-Vit C-Mn (GLUCOSAMINE 1500 COMPLEX PO) Take 2 tablets by mouth daily       Docusate Calcium (STOOL SOFTENER PO) Take 1 capsule by mouth 2 times daily       nitroGLYCERIN (NITROSTAT) 0.4 MG SL tablet Place 0.4 mg under the tongue every 5 minutes as needed for Chest pain Take 1 tablet for chest pain and repeat after 5 min if no relief, call 911 and take another dose 5 min later.  Max of 3 doses in 15 min.      aspirin 81 MG EC tablet Take 81 mg by mouth nightly       Multiple Vitamins-Minerals (CENTRUM SILVER PO) Take by mouth daily        No current facility-administered medications for this visit. Review of systems:  Review of Systems - History obtained from the patient  General ROS: negative for - chills, fatigue or fever  Psychological ROS: Positive for - tearfulness, mild depression  ENT ROS: negative for - headaches, nasal congestion or nasal discharge  Allergy and Immunology ROS: negative for - seasonal allergies  Hematological and Lymphatic ROS: negative for - bruising  Endocrine ROS: negative for - malaise/lethargy, polydipsia/polyuria or temperature intolerance  Respiratory ROS: negative for - cough,  shortness of breath or wheezing  Cardiovascular ROS: negative for - chest pain or edema  Gastrointestinal ROS: negative for - abdominal pain, constipation, diarrhea or nausea/vomiting  Musculoskeletal ROS: negative for - joint pain or muscle pain  Neurological ROS:  Positive for neuropathy, bilateral feet, right worse than left  Dermatological ROS: negative for - rash    Physical Examination:  BP (!) 150/65 (Site: Left Lower Arm, Position: Sitting, Cuff Size: Medium Adult)   Pulse (!) 48   Temp 97.7 °F (36.5 °C) (Oral)   Ht 5' 4\" (1.626 m)   Wt 261 lb (118.4 kg)   SpO2 97%   BMI 44.80 kg/m²     General-  Alert and oriented x 3, NAD  HEENT: NC, AT, PERRLA, EOMI, anicteric sclerae  Ears: Normal tympanic membranes bilaterally  Nose: patent, no lesions  Mouth: no lesions, moist mucosas  Neck - supple, no significant adenopathy  Chest - clear to auscultation, no wheezes, rales or rhonchi, symmetric air entry  Heart - normal rate, regular rhythm, normal S1, S2, no murmurs, rubs, clicks or gallops  Abdomen - soft, nontender, nondistended, no masses or organomegaly  Extremities - peripheral pulses normal, no pedal edema, no clubbing or cyanosis    Impression:   Diagnosis Orders   1.  Essential hypertension  amLODIPine (NORVASC) 10 MG tablet    losartan-hydrochlorothiazide (HYZAAR) 100-25 MG per tablet    Comprehensive Metabolic Panel    TSH    Urinalysis with Microscopic   2. Neuropathy involving both lower extremities  gabapentin (NEURONTIN) 600 MG tablet    gabapentin (NEURONTIN) 400 MG capsule   3. Hypercholesteremia  Lipid, Fasting    TSH   4. Spasm of muscle of lower back  tiZANidine (ZANAFLEX) 2 MG tablet   5. Need for prophylactic vaccination and inoculation against varicella  zoster recombinant adjuvanted vaccine (SHINGRIX) 50 MCG/0.5ML SUSR injection   6. Diabetes mellitus screening  Glucose, Fasting       Plan:  Orders Placed This Encounter   Procedures    Lipid, Fasting     Standing Status:   Future     Standing Expiration Date:   5/23/2020    Comprehensive Metabolic Panel     Standing Status:   Future     Standing Expiration Date:   5/23/2020    Glucose, Fasting     Standing Status:   Future     Standing Expiration Date:   5/23/2020    TSH     Standing Status:   Future     Standing Expiration Date:   5/23/2020    Urinalysis with Microscopic     Standing Status:   Future     Standing Expiration Date:   5/22/2020     Order Specific Question:   SPECIFY(EX-CATH,MIDSTREAM,CYSTO,ETC)?      Answer:   midstream     Orders Placed This Encounter   Medications    zoster recombinant adjuvanted vaccine (SHINGRIX) 50 MCG/0.5ML SUSR injection     Sig: Inject 0.5 mLs into the muscle once for 1 dose     Dispense:  0.5 mL     Refill:  0    amLODIPine (NORVASC) 10 MG tablet     Sig: Take 1 tablet by mouth daily     Dispense:  90 tablet     Refill:  1    gabapentin (NEURONTIN) 600 MG tablet     Sig: Take 1 tablet at noon and 2 at night     Dispense:  270 tablet     Refill:  1    losartan-hydrochlorothiazide (HYZAAR) 100-25 MG per tablet     Sig: TAKE 1 TABLET BY MOUTH EVERY DAY     Dispense:  90 tablet     Refill:  1    gabapentin (NEURONTIN) 400 MG capsule     Sig: Take 1 capsule by mouth daily (with breakfast) for 120 days. Dispense:  30 capsule     Refill:  3    tiZANidine (ZANAFLEX) 2 MG tablet     Sig: Take 1 tablet by mouth every 8 hours as needed (back spasms, pain)     Dispense:  10 tablet     Refill:  0     Continue Hyzaar 100-25 mg 1 tablet PO daily  Continue Amlodipine 10 mg 1 tablet PO daily  Continue Atenolol 50 mg 1/2 tablet PO daily by Dr [place] 20 mg 1 tablet PO daily by Dr Ly Gonzalez. Continue Neurontin 400 mg in the am, 600 mg 1 tablet at noon and 600 mg 2 tablets at night  Renew handicap Placecard for 5 years due to neuropathy and difficulty walking. Zanaflex 2 mg 1 tablet every 8 hours as needed for muscle spasms. Monitor for drowsiness, do not drive when taking. Stretches given for back    Follow Up:  Return in about 4 months (around 9/23/2019) for routine follow.     VIC Carney - CNP

## 2019-05-23 NOTE — PATIENT INSTRUCTIONS
Patient Education     Continue Hyzaar 100-25 mg 1 tablet PO daily  Continue Amlodipine 10 mg 1 tablet PO daily  Continue Atenolol 50 mg 1/2 tablet PO daily by Dr Marko Guzman 20 mg 1 tablet PO daily by Dr Naya Burroughs. Continue Neurontin 400 mg in the am, 600 mg 1 tablet at noon and 600 mg 2 tablets at night  Renew [employer]ap [employer] for 5 years. Zanaflex 2 mg 1 tablet every 8 hours as needed for muscle spasms. Monitor for drowsiness, do not drive when taking. Stretches given for back     Back Pain: Care Instructions  Your Care Instructions    Back pain has many possible causes. It is often related to problems with muscles and ligaments of the back. It may also be related to problems with the nerves, discs, or bones of the back. Moving, lifting, standing, sitting, or sleeping in an awkward way can strain the back. Sometimes you don't notice the injury until later. Arthritis is another common cause of back pain. Although it may hurt a lot, back pain usually improves on its own within several weeks. Most people recover in 12 weeks or less. Using good home treatment and being careful not to stress your back can help you feel better sooner. Follow-up care is a key part of your treatment and safety. Be sure to make and go to all appointments, and call your doctor if you are having problems. It's also a good idea to know your test results and keep a list of the medicines you take. How can you care for yourself at home? · Sit or lie in positions that are most comfortable and reduce your pain. Try one of these positions when you lie down:  ? Lie on your back with your knees bent and supported by large pillows. ? Lie on the floor with your legs on the seat of a sofa or chair. ? Lie on your side with your knees and hips bent and a pillow between your legs. ? Lie on your stomach if it does not make pain worse. · Do not sit up in bed, and avoid soft couches and twisted positions.  Bed rest can help relieve pain at first, but it delays healing. Avoid bed rest after the first day of back pain. · Change positions every 30 minutes. If you must sit for long periods of time, take breaks from sitting. Get up and walk around, or lie in a comfortable position. · Try using a heating pad on a low or medium setting for 15 to 20 minutes every 2 or 3 hours. Try a warm shower in place of one session with the heating pad. · You can also try an ice pack for 10 to 15 minutes every 2 to 3 hours. Put a thin cloth between the ice pack and your skin. · Take pain medicines exactly as directed. ? If the doctor gave you a prescription medicine for pain, take it as prescribed. ? If you are not taking a prescription pain medicine, ask your doctor if you can take an over-the-counter medicine. · Take short walks several times a day. You can start with 5 to 10 minutes, 3 or 4 times a day, and work up to longer walks. Walk on level surfaces and avoid hills and stairs until your back is better. · Return to work and other activities as soon as you can. Continued rest without activity is usually not good for your back. · To prevent future back pain, do exercises to stretch and strengthen your back and stomach. Learn how to use good posture, safe lifting techniques, and proper body mechanics. When should you call for help? Call your doctor now or seek immediate medical care if:    · You have new or worsening numbness in your legs.     · You have new or worsening weakness in your legs. (This could make it hard to stand up.)     · You lose control of your bladder or bowels.    Watch closely for changes in your health, and be sure to contact your doctor if:    · You have a fever, lose weight, or don't feel well.     · You do not get better as expected. Where can you learn more? Go to https://chmaryeb.WireOver. org and sign in to your RadioFrame account.  Enter Z549 in the Texert box to learn more about \"Back Pain: Care Instructions. \"     If you do not have an account, please click on the \"Sign Up Now\" link. Current as of: September 20, 2018  Content Version: 12.0  © 2912-6109 Healthwise, Incorporated. Care instructions adapted under license by [facility] ([facility]). If you have questions about a medical condition or this instruction, always ask your healthcare professional. [address] any warranty or liability for your use of this information. Patient Education        Back Spasm: Care Instructions  Your Care Instructions  A back spasm is sudden tightness and pain in your back muscles. It may happen from overuse or an injury. Things like sleeping in an awkward way, bending, lifting, standing, or sitting can sometimes cause a spasm. But the cause isn't always clear. Home treatment includes using heat or ice, taking over-the-counter (OTC) pain medicines, and avoiding activities that may cause back pain. For a back spasm that doesn't get better with home care, your doctor may prescribe medicine. Treatments such as massage or manipulation may also help ease a back spasm. Your doctor may also suggest exercise or physical therapy to help improve strength and flexibility in your back muscles. In most cases, getting back to your normal activities is good for your back. Just make sure to avoid doing things that make your pain worse. Follow-up care is a key part of your treatment and safety. Be sure to make and go to all appointments, and call your doctor if you are having problems. It's also a good idea to know your test results and keep a list of the medicines you take. How can you care for yourself at home?   Heat, ice, and medicines    · To relieve pain, use heat or ice (whichever feels better) on the affected area. ? Put a warm water bottle, a heating pad set on low, or a warm cloth on your back. Put a thin cloth between the heating pad and your skin.  Do not go to sleep with a heating pad on your skin.  ? Try ice or a cold pack on the area for 10 to 20 minutes at a time. Put a thin cloth between the ice and your skin.     · For most back pain you can take over-the-counter pain medicine. Nonsteroidal anti-inflammatory drugs (NSAIDs) such as ibuprofen or naproxen seem to work best. But if you can't take NSAIDs you can try acetaminophen. Your doctor can prescribe stronger medicines if needed. Be safe with medicines. Read and follow all instructions on the label.    Body positions and posture    · Sit or lie in positions that are most comfortable for you and that reduce pain. Try one of these positions when you lie down:  ? Lie on your back with your knees bent and supported by large pillows. ? Lie on the floor with your legs on the seat of a sofa or chair. ? Lie on your side with your knees and hips bent and a pillow between your legs. ? Lie on your stomach if it does not make pain worse.     · Do not sit up in bed. Avoid soft couches and twisted positions.     · Avoid bed rest after the first day of back pain. Bed rest can help relieve pain at first, but it delays healing. Continued rest without activity is usually not good for your back.     · If you must sit for long periods of time, take breaks from sitting. Change positions every 30 minutes. Get up and walk around, or lie in a comfortable position. Activity    · Take short walks several times a day. You can start with 5 to 10 minutes, 3 or 4 times a day, and work up to longer walks. Walk on level surfaces and avoid hills and stairs until your back starts to feel better.     · After your back spasm starts to feel better, try to stretch your muscles every day, especially before and after exercise and at bedtime. Regular stretching can help relax your muscles.     · To prevent future back pain, do exercises to stretch and strengthen your back and stomach.  Learn to use good posture, safe lifting techniques, and other ways to move to help you avoid back pain.   When should you call for help? Call 911 anytime you think you may need emergency care. For example, call if:    · You are unable to move an arm or a leg at all.   [facility] your doctor now or seek immediate medical care if:    · You have new or worse symptoms in your legs, belly, or buttocks. Symptoms may include:  ? Numbness or tingling. ? Weakness. ? Pain.     · You lose bladder or bowel control.    Watch closely for changes in your health, and be sure to contact your doctor if:    · You have a fever, lose weight, or don't feel well.     · You do not get better as expected. Where can you learn more? Go to https://Bit9eb.Morphlabs. org and sign in to your [employer] account. Enter E232 in the Model Metrics box to learn more about \"Back Spasm: Care Instructions. \"     If you do not have an account, please click on the \"Sign Up Now\" link. Current as of: September 20, 2018  Content Version: 12.0  © 5483-3869 51wan. Care instructions adapted under license by [facility] ([facility]). If you have questions about a medical condition or this instruction, always ask your healthcare professional. [place] [address] any warranty or liability for your use of this information. Patient Education        Back Stretches: Exercises  Your Care Instructions  Here are some examples of exercises for stretching your back. Start each exercise slowly. Ease off the exercise if you start to have pain. Your doctor or physical therapist will tell you when you can start these exercises and which ones will work best for you. How to do the exercises  Overhead stretch    1. Stand comfortably with your feet shoulder-width apart. 2. Looking straight ahead, raise both arms over your head and reach toward the ceiling. Do not allow your head to tilt back. 3. Hold for 15 to 30 seconds, then lower your arms to your sides. 4. Repeat 2 to 4 times.     Side stretch    1. Stand comfortably with your feet shoulder-width apart. 2. Raise one arm over your head, and then lean to the other side. 3. Slide your hand down your leg as you let the weight of your arm gently stretch your side muscles. Hold for 15 to 30 seconds. 4. Repeat 2 to 4 times on each side. Press-up    1. Lie on your stomach, supporting your body with your forearms. 2. Press your elbows down into the floor to raise your upper back. As you do this, relax your stomach muscles and allow your back to arch without using your back muscles. As your press up, do not let your hips or pelvis come off the floor. 3. Hold for 15 to 30 seconds, then relax. 4. Repeat 2 to 4 times. Relax and rest    1. Lie on your back with a rolled towel under your neck and a pillow under your knees. Extend your arms comfortably to your sides. 2. Relax and breathe normally. 3. Remain in this position for about 10 minutes. 4. If you can, do this 2 or 3 times each day. Follow-up care is a key part of your treatment and safety. Be sure to make and go to all appointments, and call your doctor if you are having problems. It's also a good idea to know your test results and keep a list of the medicines you take. Where can you learn more? Go to https://Vintedpe"RapidValue Solutions, Inc".EMUZE. org and sign in to your Zipalong account. Enter I848 in the [facility] box to learn more about \"Back Stretches: Exercises. \"     If you do not have an account, please click on the \"Sign Up Now\" link. Current as of: September 20, 2018  Content Version: 12.0  © 3252-3342 Healthwise, Ometria. Care instructions adapted under license by [facility] ([facility]). If you have questions about a medical condition or this instruction, always ask your healthcare professional. [place] [address] any warranty or liability for your use of this information.          Patient Education        Acute Low Back Pain: Exercises  Your Care Instructions  Here are some examples of typical rehabilitation exercises for your condition. Start each exercise slowly. Ease off the exercise if you start to have pain. Your doctor or physical therapist will tell you when you can start these exercises and which ones will work best for you. When you are not being active, find a comfortable position for rest. Some people are comfortable on the floor or a medium-firm bed with a small pillow under their head and another under their knees. Some people prefer to lie on their side with a pillow between their knees. Don't stay in one position for too long. Take short walks (10 to 20 minutes) every 2 to 3 hours. Avoid slopes, hills, and stairs until you feel better. Walk only distances you can manage without pain, especially leg pain. How to do the exercises  Back stretches    5. Get down on your hands and knees on the floor. 6. Relax your head and allow it to droop. Round your back up toward the ceiling until you feel a nice stretch in your upper, middle, and lower back. Hold this stretch for as long as it feels comfortable, or about 15 to 30 seconds. 7. Return to the starting position with a flat back while you are on your hands and knees. 8. Let your back sway by pressing your stomach toward the floor. Lift your buttocks toward the ceiling. 9. Hold this position for 15 to 30 seconds. 10. Repeat 2 to 4 times. Follow-up care is a key part of your treatment and safety. Be sure to make and go to all appointments, and call your doctor if you are having problems. It's also a good idea to know your test results and keep a list of the medicines you take. Where can you learn more? Go to https://natalie.Decision Rocket. org and sign in to your [employer] account. Enter J410 in the Blue Saint box to learn more about \"Acute Low Back Pain: Exercises. \"     If you do not have an account, please click on the \"Sign Up Now\" link.   Current as of: September 20, 2018  Content Version: 12.0  © 5336-7282 Healthwise, Incorporated. Care instructions adapted under license by [facility] ([facility]). If you have questions about a medical condition or this instruction, always ask your healthcare professional. [address] any warranty or liability for your use of this information. Patient Education        Sciatica: Exercises  Your Care Instructions  Here are some examples of typical rehabilitation exercises for your condition. Start each exercise slowly. Ease off the exercise if you start to have pain. Your doctor or physical therapist will tell you when you can start these exercises and which ones will work best for you. When you are not being active, find a comfortable position for rest. Some people are comfortable on the floor or a medium-firm bed with a small pillow under their head and another under their knees. Some people prefer to lie on their side with a pillow between their knees. Don't stay in one position for too long. Take short walks (10 to 20 minutes) every 2 to 3 hours. Avoid slopes, hills, and stairs until you feel better. Walk only distances you can manage without pain, especially leg pain. How to do the exercises  Back stretches    11. Get down on your hands and knees on the floor. 12. Relax your head and allow it to droop. Round your back up toward the ceiling until you feel a nice stretch in your upper, middle, and lower back. Hold this stretch for as long as it feels comfortable, or about 15 to 30 seconds. 13. Return to the starting position with a flat back while you are on your hands and knees. 14. Let your back sway by pressing your stomach toward the floor. Lift your buttocks toward the ceiling. 15. Hold this position for 15 to 30 seconds. 16. Repeat 2 to 4 times. Follow-up care is a key part of your treatment and safety. Be sure to make and go to all appointments, and call your doctor if you are having problems.  It's also a good idea to know your test results and keep a list of the medicines you take. Where can you learn more? Go to https://chpepiceweb.healthArcherMind [email or web address]. org and sign in to your QM Power account. Enter K498 in the userADgentshire box to learn more about \"Sciatica: Exercises. \"     If you do not have an account, please click on the \"Sign Up Now\" link. Current as of: September 20, 2018  Content Version: 12.0  © 4117-5071 Healthwise, Incorporated. Care instructions adapted under license by [facility] ([facility]). If you have questions about a medical condition or this instruction, always ask your healthcare professional. [place] [address] any warranty or liability for your use of this information. Patient Education        Grief (Actual/Anticipated): Care Instructions  Your Care Instructions    Grief is your emotional reaction to a major loss. The words \"sorrow\" and \"heartache\" often are used to describe feelings of grief. You feel grief when you lose a beloved person, pet, place, or thing. It is also natural to feel grief when you lose a valued way of life, such as a job, marriage, or good health. You may begin to grieve before a loss occurs. You may grieve for a loved one who is sick and dying. Children and adults often feel the pain of loss before a big move or divorce. This type of grief helps you get ready for a loss. Grief is different for each person. There is no \"normal\" or \"expected\" period of time for grieving. Some people adjust to their loss within a couple of months. Others may take 2 years or longer, especially if their lives were changed a lot or if the loss was sudden and shocking. Grieving can cause problems such as headaches, loss of appetite, and trouble with thinking or sleeping. You may withdraw from friends and family and behave in ways that are unusual for you. Grief may cause you to question your beliefs and views about life. Grief is natural and does not require medical treatment.  But if you have trouble sleeping, it may help to take sleeping pills for a short time. It may help to talk with people who have been through or are going through similar losses. You may also want to talk to a counselor about your feelings. Talking about your loss, sharing your cares and concerns, and getting support from others are important parts of healthy grieving. Follow-up care is a key part of your treatment and safety. Be sure to make and go to all appointments, and call your doctor if you are having problems. It's also a good idea to know your test results and keep a list of the medicines you take. How can you care for yourself at home? · Get enough sleep. Your mind helps make sense of your life while you sleep. Missing sleep can lead to illness and make it harder for you to deal with your grief. · Eat healthy foods. Try to avoid eating only foods that give you comfort. Ask someone to join you for a meal if you do not like eating alone. Consider taking a multivitamin every day. · Get some exercise every day. Even a walk can help you deal with your grief. Other exercises, such as yoga, can also help you manage stress. · Comfort yourself. Take time to look at photos or use special items that make you feel better. · Stay involved in your life. Do not withdraw from the activities you enjoy. People you know at work, Baptism, clubs, or other groups can help you get through your period of grief. · Think about joining a support group to help you deal with your grief. There are many support groups to help people recover from grief. When should you call for help? Call 911 anytime you think you may need emergency care. For example, call if:    · You feel you cannot stop from hurting yourself or someone else.    Watch closely for changes in your health, and be sure to contact your doctor if:    · You think you may be depressed.     · You do not get better as expected. Where can you learn more?   Go to https://chpepiceweb.[email or web address] and sign in to your Motion Traxxt account. Enter H249 in the Premium Advert Solutionshire box to learn more about \"Grief (Actual/Anticipated): Care Instructions. \"     If you do not have an account, please click on the \"Sign Up Now\" link. Current as of: April 18, 2018  Content Version: 12.0  © 4935-5767 Healthwise, Incorporated. Care instructions adapted under license by [facility] ([facility]). If you have questions about a medical condition or this instruction, always ask your healthcare professional. [place] [address] any warranty or liability for your use of this information.

## 2019-06-07 ENCOUNTER — NURSE ONLY (OUTPATIENT)
Dept: FAMILY MEDICINE CLINIC | Age: 74
End: 2019-06-07
Payer: MEDICARE

## 2019-06-07 DIAGNOSIS — Z13.1 DIABETES MELLITUS SCREENING: ICD-10-CM

## 2019-06-07 DIAGNOSIS — E78.00 HYPERCHOLESTEREMIA: ICD-10-CM

## 2019-06-07 DIAGNOSIS — I10 ESSENTIAL HYPERTENSION: ICD-10-CM

## 2019-06-07 LAB
ALBUMIN SERPL-MCNC: 4.1 G/DL (ref 3.5–5.1)
ALP BLD-CCNC: 102 U/L (ref 38–126)
ALT SERPL-CCNC: 22 U/L (ref 11–66)
ANION GAP SERPL CALCULATED.3IONS-SCNC: 16 MEQ/L (ref 8–16)
AST SERPL-CCNC: 21 U/L (ref 5–40)
BACTERIA: ABNORMAL
BILIRUB SERPL-MCNC: 0.8 MG/DL (ref 0.3–1.2)
BILIRUBIN URINE: NEGATIVE
BLOOD, URINE: NEGATIVE
BUN BLDV-MCNC: 19 MG/DL (ref 7–22)
CALCIUM SERPL-MCNC: 9.9 MG/DL (ref 8.5–10.5)
CASTS: ABNORMAL /LPF
CASTS: ABNORMAL /LPF
CHARACTER, URINE: ABNORMAL
CHLORIDE BLD-SCNC: 100 MEQ/L (ref 98–111)
CHOLESTEROL, FASTING: 137 MG/DL (ref 100–199)
CO2: 28 MEQ/L (ref 23–33)
COLOR: YELLOW
CREAT SERPL-MCNC: 0.8 MG/DL (ref 0.4–1.2)
CRYSTALS: ABNORMAL
EPITHELIAL CELLS, UA: ABNORMAL /HPF
GFR SERPL CREATININE-BSD FRML MDRD: 70 ML/MIN/1.73M2
GLUCOSE BLD-MCNC: 117 MG/DL (ref 70–108)
GLUCOSE, URINE: NEGATIVE MG/DL
HDLC SERPL-MCNC: 51 MG/DL
KETONES, URINE: NEGATIVE
LDL CHOLESTEROL CALCULATED: 46 MG/DL
LEUKOCYTE ESTERASE, URINE: ABNORMAL
MISCELLANEOUS LAB TEST RESULT: ABNORMAL
NITRITE, URINE: NEGATIVE
PH UA: 6.5 (ref 5–9)
POTASSIUM SERPL-SCNC: 4.8 MEQ/L (ref 3.5–5.2)
PROTEIN UA: NEGATIVE MG/DL
RBC URINE: ABNORMAL /HPF
RENAL EPITHELIAL, UA: ABNORMAL
SODIUM BLD-SCNC: 144 MEQ/L (ref 135–145)
SPECIFIC GRAVITY UA: 1.02 (ref 1–1.03)
TOTAL PROTEIN: 7.1 G/DL (ref 6.1–8)
TRIGLYCERIDE, FASTING: 201 MG/DL (ref 0–199)
TSH SERPL DL<=0.05 MIU/L-ACNC: 2.26 UIU/ML (ref 0.4–4.2)
UROBILINOGEN, URINE: 1 EU/DL (ref 0–1)
WBC UA: ABNORMAL /HPF
YEAST: ABNORMAL

## 2019-06-07 PROCEDURE — 36415 COLL VENOUS BLD VENIPUNCTURE: CPT | Performed by: NURSE PRACTITIONER

## 2019-06-11 ENCOUNTER — TELEPHONE (OUTPATIENT)
Dept: FAMILY MEDICINE CLINIC | Age: 74
End: 2019-06-11

## 2019-06-11 DIAGNOSIS — R73.09 ELEVATED GLUCOSE: Primary | ICD-10-CM

## 2019-06-11 NOTE — TELEPHONE ENCOUNTER
Called and advised patient on result note. Verbalized understanding. Aic will be drawn here this week per patient.

## 2019-09-12 DIAGNOSIS — G57.93 NEUROPATHY INVOLVING BOTH LOWER EXTREMITIES: ICD-10-CM

## 2019-09-12 RX ORDER — GABAPENTIN 400 MG/1
CAPSULE ORAL
Qty: 90 CAPSULE | Refills: 1 | Status: SHIPPED | OUTPATIENT
Start: 2019-09-12 | End: 2019-10-03

## 2019-10-03 ENCOUNTER — OFFICE VISIT (OUTPATIENT)
Dept: FAMILY MEDICINE CLINIC | Age: 74
End: 2019-10-03
Payer: MEDICARE

## 2019-10-03 VITALS
SYSTOLIC BLOOD PRESSURE: 183 MMHG | DIASTOLIC BLOOD PRESSURE: 96 MMHG | HEART RATE: 51 BPM | WEIGHT: 267.4 LBS | TEMPERATURE: 98.3 F | RESPIRATION RATE: 12 BRPM | BODY MASS INDEX: 45.65 KG/M2 | HEIGHT: 64 IN

## 2019-10-03 DIAGNOSIS — I48.0 PAROXYSMAL ATRIAL FIBRILLATION (HCC): ICD-10-CM

## 2019-10-03 DIAGNOSIS — I10 ESSENTIAL HYPERTENSION: Primary | ICD-10-CM

## 2019-10-03 DIAGNOSIS — E78.00 HYPERCHOLESTEREMIA: ICD-10-CM

## 2019-10-03 DIAGNOSIS — G57.93 NEUROPATHY INVOLVING BOTH LOWER EXTREMITIES: ICD-10-CM

## 2019-10-03 PROCEDURE — G0008 ADMIN INFLUENZA VIRUS VAC: HCPCS | Performed by: FAMILY MEDICINE

## 2019-10-03 PROCEDURE — 90653 IIV ADJUVANT VACCINE IM: CPT | Performed by: FAMILY MEDICINE

## 2019-10-03 PROCEDURE — 99214 OFFICE O/P EST MOD 30 MIN: CPT | Performed by: FAMILY MEDICINE

## 2019-10-03 RX ORDER — GABAPENTIN 600 MG/1
600 TABLET ORAL 3 TIMES DAILY
COMMUNITY
End: 2019-10-03 | Stop reason: SDUPTHER

## 2019-10-03 RX ORDER — LOSARTAN POTASSIUM AND HYDROCHLOROTHIAZIDE 25; 100 MG/1; MG/1
TABLET ORAL
Qty: 90 TABLET | Refills: 1 | Status: SHIPPED | OUTPATIENT
Start: 2019-10-03 | End: 2020-03-23

## 2019-10-03 RX ORDER — GABAPENTIN 600 MG/1
TABLET ORAL
Qty: 360 TABLET | Refills: 1 | Status: SHIPPED | OUTPATIENT
Start: 2019-10-03 | End: 2019-10-28

## 2019-10-08 ENCOUNTER — TELEPHONE (OUTPATIENT)
Dept: FAMILY MEDICINE CLINIC | Age: 74
End: 2019-10-08

## 2019-10-09 RX ORDER — LOSARTAN POTASSIUM 100 MG/1
100 TABLET ORAL DAILY
Qty: 90 TABLET | Refills: 0 | Status: ON HOLD | OUTPATIENT
Start: 2019-10-09 | End: 2019-11-15

## 2019-10-09 RX ORDER — HYDROCHLOROTHIAZIDE 25 MG/1
25 TABLET ORAL EVERY MORNING
Qty: 90 TABLET | Refills: 0 | Status: ON HOLD | OUTPATIENT
Start: 2019-10-09 | End: 2019-11-15

## 2019-10-28 ENCOUNTER — HOSPITAL ENCOUNTER (OUTPATIENT)
Dept: WOMENS IMAGING | Age: 74
Discharge: HOME | End: 2019-10-28
Payer: MEDICARE

## 2019-10-28 DIAGNOSIS — Z12.31 VISIT FOR SCREENING MAMMOGRAM: ICD-10-CM

## 2019-10-28 DIAGNOSIS — Z12.39 ENCOUNTER FOR SCREENING FOR MALIGNANT NEOPLASM OF BREAST: Primary | ICD-10-CM

## 2019-10-28 DIAGNOSIS — Z12.31 VISIT FOR SCREENING MAMMOGRAM: Primary | ICD-10-CM

## 2019-10-28 DIAGNOSIS — C50.511 MALIGNANT NEOPLASM OF LOWER-OUTER QUADRANT OF RIGHT BREAST OF FEMALE, ESTROGEN RECEPTOR POSITIVE (HCC): ICD-10-CM

## 2019-10-28 DIAGNOSIS — Z17.0 MALIGNANT NEOPLASM OF LOWER-OUTER QUADRANT OF RIGHT BREAST OF FEMALE, ESTROGEN RECEPTOR POSITIVE (HCC): ICD-10-CM

## 2019-10-28 DIAGNOSIS — Z12.39 SCREENING FOR MALIGNANT NEOPLASM OF BREAST: ICD-10-CM

## 2019-10-28 PROCEDURE — 77067 SCR MAMMO BI INCL CAD: CPT

## 2019-11-15 ENCOUNTER — APPOINTMENT (OUTPATIENT)
Dept: GENERAL RADIOLOGY | Age: 74
DRG: 308 | End: 2019-11-15
Payer: MEDICARE

## 2019-11-15 ENCOUNTER — HOSPITAL ENCOUNTER (INPATIENT)
Age: 74
LOS: 6 days | Discharge: HOME | DRG: 308 | End: 2019-11-22
Attending: EMERGENCY MEDICINE | Admitting: INTERNAL MEDICINE
Payer: MEDICARE

## 2019-11-15 DIAGNOSIS — I50.43 ACUTE ON CHRONIC COMBINED SYSTOLIC AND DIASTOLIC CONGESTIVE HEART FAILURE (HCC): Primary | ICD-10-CM

## 2019-11-15 DIAGNOSIS — I48.19 PERSISTENT ATRIAL FIBRILLATION (HCC): ICD-10-CM

## 2019-11-15 PROBLEM — I50.9 HEART FAILURE, UNSPECIFIED (HCC): Status: ACTIVE | Noted: 2019-11-15

## 2019-11-15 LAB
ALBUMIN SERPL-MCNC: 3.5 G/DL (ref 3.5–5.1)
ALP BLD-CCNC: 73 U/L (ref 38–126)
ALT SERPL-CCNC: 45 U/L (ref 11–66)
ANION GAP SERPL CALCULATED.3IONS-SCNC: 15 MEQ/L (ref 8–16)
AST SERPL-CCNC: 97 U/L (ref 5–40)
BACTERIA: ABNORMAL /HPF
BASOPHILS # BLD: 0.6 %
BASOPHILS ABSOLUTE: 0.1 THOU/MM3 (ref 0–0.1)
BILIRUB SERPL-MCNC: 1 MG/DL (ref 0.3–1.2)
BILIRUBIN DIRECT: 0.3 MG/DL (ref 0–0.3)
BILIRUBIN URINE: NEGATIVE
BLOOD, URINE: NEGATIVE
BUN BLDV-MCNC: 21 MG/DL (ref 7–22)
CALCIUM SERPL-MCNC: 8.8 MG/DL (ref 8.5–10.5)
CASTS 2: ABNORMAL /LPF
CASTS UA: ABNORMAL /LPF
CHARACTER, URINE: ABNORMAL
CHLORIDE BLD-SCNC: 94 MEQ/L (ref 98–111)
CO2: 23 MEQ/L (ref 23–33)
COLOR: ABNORMAL
CREAT SERPL-MCNC: 1.2 MG/DL (ref 0.4–1.2)
CRYSTALS, UA: ABNORMAL
EKG ATRIAL RATE: 312 BPM
EKG Q-T INTERVAL: 294 MS
EKG QRS DURATION: 92 MS
EKG QTC CALCULATION (BAZETT): 349 MS
EKG R AXIS: -51 DEGREES
EKG T AXIS: -41 DEGREES
EKG VENTRICULAR RATE: 85 BPM
EOSINOPHIL # BLD: 0.2 %
EOSINOPHILS ABSOLUTE: 0 THOU/MM3 (ref 0–0.4)
EPITHELIAL CELLS, UA: ABNORMAL /HPF
ERYTHROCYTE [DISTWIDTH] IN BLOOD BY AUTOMATED COUNT: 15.4 % (ref 11.5–14.5)
ERYTHROCYTE [DISTWIDTH] IN BLOOD BY AUTOMATED COUNT: 49.1 FL (ref 35–45)
GFR SERPL CREATININE-BSD FRML MDRD: 44 ML/MIN/1.73M2
GLUCOSE BLD-MCNC: 130 MG/DL (ref 70–108)
GLUCOSE URINE: NEGATIVE MG/DL
HCT VFR BLD CALC: 45.1 % (ref 37–47)
HEMOGLOBIN: 14.2 GM/DL (ref 12–16)
IMMATURE GRANS (ABS): 0.11 THOU/MM3 (ref 0–0.07)
IMMATURE GRANULOCYTES: 1.1 %
KETONES, URINE: NEGATIVE
LEUKOCYTE ESTERASE, URINE: ABNORMAL
LYMPHOCYTES # BLD: 2.7 %
LYMPHOCYTES ABSOLUTE: 0.3 THOU/MM3 (ref 1–4.8)
MAGNESIUM: 2.4 MG/DL (ref 1.6–2.4)
MCH RBC QN AUTO: 27.4 PG (ref 26–33)
MCHC RBC AUTO-ENTMCNC: 31.5 GM/DL (ref 32.2–35.5)
MCV RBC AUTO: 87.1 FL (ref 81–99)
MISCELLANEOUS 2: ABNORMAL
MONOCYTES # BLD: 2.5 %
MONOCYTES ABSOLUTE: 0.3 THOU/MM3 (ref 0.4–1.3)
NITRITE, URINE: POSITIVE
NUCLEATED RED BLOOD CELLS: 0 /100 WBC
OSMOLALITY CALCULATION: 269.2 MOSMOL/KG (ref 275–300)
PH UA: 5.5 (ref 5–9)
PLATELET # BLD: 247 THOU/MM3 (ref 130–400)
PMV BLD AUTO: 8.7 FL (ref 9.4–12.4)
POTASSIUM REFLEX MAGNESIUM: 4.2 MEQ/L (ref 3.5–5.2)
PRO-BNP: 4707 PG/ML (ref 0–900)
PROTEIN UA: 30
RBC # BLD: 5.18 MILL/MM3 (ref 4.2–5.4)
RBC URINE: ABNORMAL /HPF
RENAL EPITHELIAL, UA: ABNORMAL
SEG NEUTROPHILS: 92.9 %
SEGMENTED NEUTROPHILS ABSOLUTE COUNT: 9.4 THOU/MM3 (ref 1.8–7.7)
SODIUM BLD-SCNC: 132 MEQ/L (ref 135–145)
SPECIFIC GRAVITY, URINE: 1.02 (ref 1–1.03)
TOTAL PROTEIN: 6.6 G/DL (ref 6.1–8)
TROPONIN T: < 0.01 NG/ML
UROBILINOGEN, URINE: 2 EU/DL (ref 0–1)
WBC # BLD: 10.1 THOU/MM3 (ref 4.8–10.8)
WBC UA: ABNORMAL /HPF
YEAST: ABNORMAL

## 2019-11-15 PROCEDURE — G0378 HOSPITAL OBSERVATION PER HR: HCPCS

## 2019-11-15 PROCEDURE — 96372 THER/PROPH/DIAG INJ SC/IM: CPT

## 2019-11-15 PROCEDURE — 80048 BASIC METABOLIC PNL TOTAL CA: CPT

## 2019-11-15 PROCEDURE — 87077 CULTURE AEROBIC IDENTIFY: CPT

## 2019-11-15 PROCEDURE — 99285 EMERGENCY DEPT VISIT HI MDM: CPT

## 2019-11-15 PROCEDURE — 36415 COLL VENOUS BLD VENIPUNCTURE: CPT

## 2019-11-15 PROCEDURE — 84484 ASSAY OF TROPONIN QUANT: CPT

## 2019-11-15 PROCEDURE — 2709999900 HC NON-CHARGEABLE SUPPLY

## 2019-11-15 PROCEDURE — 6360000002 HC RX W HCPCS: Performed by: INTERNAL MEDICINE

## 2019-11-15 PROCEDURE — 6370000000 HC RX 637 (ALT 250 FOR IP): Performed by: INTERNAL MEDICINE

## 2019-11-15 PROCEDURE — 87186 SC STD MICRODIL/AGAR DIL: CPT

## 2019-11-15 PROCEDURE — 81001 URINALYSIS AUTO W/SCOPE: CPT

## 2019-11-15 PROCEDURE — 71045 X-RAY EXAM CHEST 1 VIEW: CPT

## 2019-11-15 PROCEDURE — 87086 URINE CULTURE/COLONY COUNT: CPT

## 2019-11-15 PROCEDURE — 93005 ELECTROCARDIOGRAM TRACING: CPT | Performed by: EMERGENCY MEDICINE

## 2019-11-15 PROCEDURE — 83880 ASSAY OF NATRIURETIC PEPTIDE: CPT

## 2019-11-15 PROCEDURE — 2580000003 HC RX 258: Performed by: EMERGENCY MEDICINE

## 2019-11-15 PROCEDURE — 85025 COMPLETE CBC W/AUTO DIFF WBC: CPT

## 2019-11-15 PROCEDURE — 83735 ASSAY OF MAGNESIUM: CPT

## 2019-11-15 PROCEDURE — 80076 HEPATIC FUNCTION PANEL: CPT

## 2019-11-15 RX ORDER — GABAPENTIN 600 MG/1
600 TABLET ORAL 2 TIMES DAILY
Status: DISCONTINUED | OUTPATIENT
Start: 2019-11-15 | End: 2019-11-16

## 2019-11-15 RX ORDER — M-VIT,TX,IRON,MINS/CALC/FOLIC 27MG-0.4MG
1 TABLET ORAL DAILY
Status: DISCONTINUED | OUTPATIENT
Start: 2019-11-16 | End: 2019-11-16

## 2019-11-15 RX ORDER — POTASSIUM CHLORIDE 7.45 MG/ML
10 INJECTION INTRAVENOUS PRN
Status: DISCONTINUED | OUTPATIENT
Start: 2019-11-15 | End: 2019-11-22 | Stop reason: HOSPADM

## 2019-11-15 RX ORDER — POTASSIUM CHLORIDE 20 MEQ/1
40 TABLET, EXTENDED RELEASE ORAL PRN
Status: DISCONTINUED | OUTPATIENT
Start: 2019-11-15 | End: 2019-11-22 | Stop reason: HOSPADM

## 2019-11-15 RX ORDER — PROMETHAZINE HYDROCHLORIDE 25 MG/ML
6.25 INJECTION, SOLUTION INTRAMUSCULAR; INTRAVENOUS EVERY 6 HOURS PRN
Status: DISCONTINUED | OUTPATIENT
Start: 2019-11-15 | End: 2019-11-22 | Stop reason: HOSPADM

## 2019-11-15 RX ORDER — GABAPENTIN 600 MG/1
1200 TABLET ORAL NIGHTLY
COMMUNITY
End: 2020-04-20

## 2019-11-15 RX ORDER — SODIUM CHLORIDE 0.9 % (FLUSH) 0.9 %
10 SYRINGE (ML) INJECTION EVERY 12 HOURS SCHEDULED
Status: DISCONTINUED | OUTPATIENT
Start: 2019-11-15 | End: 2019-11-22 | Stop reason: HOSPADM

## 2019-11-15 RX ORDER — DOCUSATE SODIUM 100 MG/1
100 CAPSULE, LIQUID FILLED ORAL 2 TIMES DAILY
Status: DISCONTINUED | OUTPATIENT
Start: 2019-11-15 | End: 2019-11-16

## 2019-11-15 RX ORDER — ASPIRIN 81 MG/1
81 TABLET ORAL NIGHTLY
Status: DISCONTINUED | OUTPATIENT
Start: 2019-11-15 | End: 2019-11-15 | Stop reason: SDUPTHER

## 2019-11-15 RX ORDER — SOTALOL HYDROCHLORIDE 120 MG/1
120 TABLET ORAL 2 TIMES DAILY
Status: DISCONTINUED | OUTPATIENT
Start: 2019-11-15 | End: 2019-11-22 | Stop reason: HOSPADM

## 2019-11-15 RX ORDER — SODIUM CHLORIDE 0.9 % (FLUSH) 0.9 %
10 SYRINGE (ML) INJECTION PRN
Status: DISCONTINUED | OUTPATIENT
Start: 2019-11-15 | End: 2019-11-22 | Stop reason: HOSPADM

## 2019-11-15 RX ORDER — SOTALOL HYDROCHLORIDE 120 MG/1
120 TABLET ORAL 2 TIMES DAILY
COMMUNITY
End: 2020-04-24 | Stop reason: SDUPTHER

## 2019-11-15 RX ORDER — HYDROCORTISONE ACETATE 0.5 %
2 CREAM (GRAM) TOPICAL DAILY
Status: DISCONTINUED | OUTPATIENT
Start: 2019-11-16 | End: 2019-11-16

## 2019-11-15 RX ORDER — CHLORAL HYDRATE 500 MG
3000 CAPSULE ORAL 3 TIMES DAILY
Status: DISCONTINUED | OUTPATIENT
Start: 2019-11-15 | End: 2019-11-16

## 2019-11-15 RX ORDER — GABAPENTIN 600 MG/1
1200 TABLET ORAL NIGHTLY
Status: DISCONTINUED | OUTPATIENT
Start: 2019-11-15 | End: 2019-11-15 | Stop reason: DRUGHIGH

## 2019-11-15 RX ORDER — OMEGA-3-ACID ETHYL ESTERS 1 G/1
1 CAPSULE, LIQUID FILLED ORAL DAILY
Status: DISCONTINUED | OUTPATIENT
Start: 2019-11-16 | End: 2019-11-15 | Stop reason: ALTCHOICE

## 2019-11-15 RX ORDER — LOSARTAN POTASSIUM 100 MG/1
100 TABLET ORAL DAILY
Status: DISCONTINUED | OUTPATIENT
Start: 2019-11-16 | End: 2019-11-15 | Stop reason: SDUPTHER

## 2019-11-15 RX ORDER — HYDROCORTISONE ACETATE 0.5 %
2 CREAM (GRAM) TOPICAL DAILY
Status: DISCONTINUED | OUTPATIENT
Start: 2019-11-15 | End: 2019-11-15 | Stop reason: RX

## 2019-11-15 RX ORDER — HYDRALAZINE HYDROCHLORIDE 100 MG/1
100 TABLET, FILM COATED ORAL 3 TIMES DAILY
Status: ON HOLD | COMMUNITY
End: 2019-11-19 | Stop reason: HOSPADM

## 2019-11-15 RX ORDER — NITROGLYCERIN 0.4 MG/1
0.4 TABLET SUBLINGUAL EVERY 5 MIN PRN
Status: DISCONTINUED | OUTPATIENT
Start: 2019-11-15 | End: 2019-11-22 | Stop reason: HOSPADM

## 2019-11-15 RX ORDER — 0.9 % SODIUM CHLORIDE 0.9 %
500 INTRAVENOUS SOLUTION INTRAVENOUS ONCE
Status: COMPLETED | OUTPATIENT
Start: 2019-11-15 | End: 2019-11-15

## 2019-11-15 RX ORDER — CHLORAL HYDRATE 500 MG
3000 CAPSULE ORAL 3 TIMES DAILY
Status: DISCONTINUED | OUTPATIENT
Start: 2019-11-15 | End: 2019-11-15 | Stop reason: CLARIF

## 2019-11-15 RX ORDER — ASPIRIN 81 MG/1
81 TABLET, CHEWABLE ORAL DAILY
Status: DISCONTINUED | OUTPATIENT
Start: 2019-11-16 | End: 2019-11-22 | Stop reason: HOSPADM

## 2019-11-15 RX ORDER — POTASSIUM CHLORIDE 20 MEQ/1
20 TABLET, EXTENDED RELEASE ORAL DAILY
Status: DISCONTINUED | OUTPATIENT
Start: 2019-11-16 | End: 2019-11-22 | Stop reason: HOSPADM

## 2019-11-15 RX ORDER — HYDROCHLOROTHIAZIDE 25 MG/1
25 TABLET ORAL DAILY
Status: DISCONTINUED | OUTPATIENT
Start: 2019-11-16 | End: 2019-11-15 | Stop reason: SDUPTHER

## 2019-11-15 RX ORDER — POTASSIUM CHLORIDE 1.5 G/1.77G
20 POWDER, FOR SOLUTION ORAL DAILY
COMMUNITY
End: 2019-12-30 | Stop reason: SDUPTHER

## 2019-11-15 RX ORDER — FLUTICASONE PROPIONATE 50 MCG
1 SPRAY, SUSPENSION (ML) NASAL 2 TIMES DAILY
Status: DISCONTINUED | OUTPATIENT
Start: 2019-11-15 | End: 2019-11-16

## 2019-11-15 RX ORDER — LOSARTAN POTASSIUM AND HYDROCHLOROTHIAZIDE 25; 100 MG/1; MG/1
1 TABLET ORAL DAILY
Status: DISCONTINUED | OUTPATIENT
Start: 2019-11-16 | End: 2019-11-16

## 2019-11-15 RX ORDER — GABAPENTIN 600 MG/1
600 TABLET ORAL 2 TIMES DAILY
Status: DISCONTINUED | OUTPATIENT
Start: 2019-11-16 | End: 2019-11-15

## 2019-11-15 RX ORDER — AMLODIPINE BESYLATE 10 MG/1
10 TABLET ORAL DAILY
Status: DISCONTINUED | OUTPATIENT
Start: 2019-11-16 | End: 2019-11-16

## 2019-11-15 RX ORDER — LOSARTAN POTASSIUM AND HYDROCHLOROTHIAZIDE 25; 100 MG/1; MG/1
1 TABLET ORAL DAILY
Status: DISCONTINUED | OUTPATIENT
Start: 2019-11-15 | End: 2019-11-15 | Stop reason: CLARIF

## 2019-11-15 RX ORDER — GABAPENTIN 600 MG/1
600 TABLET ORAL 2 TIMES DAILY
COMMUNITY
End: 2020-03-02 | Stop reason: SDUPTHER

## 2019-11-15 RX ORDER — ATORVASTATIN CALCIUM 20 MG/1
20 TABLET, FILM COATED ORAL NIGHTLY
Status: DISCONTINUED | OUTPATIENT
Start: 2019-11-15 | End: 2019-11-16

## 2019-11-15 RX ORDER — HYDRALAZINE HYDROCHLORIDE 50 MG/1
100 TABLET, FILM COATED ORAL 3 TIMES DAILY
Status: DISCONTINUED | OUTPATIENT
Start: 2019-11-15 | End: 2019-11-16

## 2019-11-15 RX ORDER — AMLODIPINE BESYLATE 5 MG/1
10 TABLET ORAL DAILY
Status: ON HOLD | COMMUNITY
End: 2019-11-19 | Stop reason: HOSPADM

## 2019-11-15 RX ADMIN — DOCUSATE SODIUM 100 MG: 100 CAPSULE, LIQUID FILLED ORAL at 20:46

## 2019-11-15 RX ADMIN — PROMETHAZINE HYDROCHLORIDE 6.25 MG: 25 INJECTION INTRAMUSCULAR; INTRAVENOUS at 20:38

## 2019-11-15 RX ADMIN — HYDRALAZINE HYDROCHLORIDE 100 MG: 50 TABLET, FILM COATED ORAL at 20:52

## 2019-11-15 RX ADMIN — Medication 3000 MG: at 20:46

## 2019-11-15 RX ADMIN — ATORVASTATIN CALCIUM 20 MG: 20 TABLET, FILM COATED ORAL at 20:46

## 2019-11-15 RX ADMIN — SODIUM CHLORIDE 500 ML: 9 INJECTION, SOLUTION INTRAVENOUS at 13:42

## 2019-11-15 RX ADMIN — GABAPENTIN 600 MG: 600 TABLET ORAL at 20:47

## 2019-11-15 RX ADMIN — SOTALOL HYDROCHLORIDE 120 MG: 120 TABLET ORAL at 20:51

## 2019-11-15 ASSESSMENT — PAIN SCALES - GENERAL: PAINLEVEL_OUTOF10: 0

## 2019-11-15 ASSESSMENT — ENCOUNTER SYMPTOMS
SHORTNESS OF BREATH: 0
VOMITING: 0
NAUSEA: 1
BACK PAIN: 0
EYE DISCHARGE: 0
RHINORRHEA: 0
ABDOMINAL PAIN: 0
EYE PAIN: 0
WHEEZING: 0
COUGH: 0
SORE THROAT: 0
DIARRHEA: 0

## 2019-11-16 PROBLEM — I50.9 HEART FAILURE (HCC): Status: ACTIVE | Noted: 2019-11-16

## 2019-11-16 LAB
EKG ATRIAL RATE: 288 BPM
EKG Q-T INTERVAL: 408 MS
EKG QRS DURATION: 88 MS
EKG QTC CALCULATION (BAZETT): 470 MS
EKG R AXIS: -49 DEGREES
EKG T AXIS: -49 DEGREES
EKG VENTRICULAR RATE: 80 BPM
ERYTHROCYTE [DISTWIDTH] IN BLOOD BY AUTOMATED COUNT: 15.2 % (ref 11.5–14.5)
ERYTHROCYTE [DISTWIDTH] IN BLOOD BY AUTOMATED COUNT: 47.4 FL (ref 35–45)
HCT VFR BLD CALC: 40.9 % (ref 37–47)
HEMOGLOBIN: 13.2 GM/DL (ref 12–16)
MCH RBC QN AUTO: 27.4 PG (ref 26–33)
MCHC RBC AUTO-ENTMCNC: 32.3 GM/DL (ref 32.2–35.5)
MCV RBC AUTO: 85 FL (ref 81–99)
PLATELET # BLD: 190 THOU/MM3 (ref 130–400)
PMV BLD AUTO: 9 FL (ref 9.4–12.4)
RBC # BLD: 4.81 MILL/MM3 (ref 4.2–5.4)
WBC # BLD: 7.2 THOU/MM3 (ref 4.8–10.8)

## 2019-11-16 PROCEDURE — 1200000003 HC TELEMETRY R&B

## 2019-11-16 PROCEDURE — 94760 N-INVAS EAR/PLS OXIMETRY 1: CPT

## 2019-11-16 PROCEDURE — 87040 BLOOD CULTURE FOR BACTERIA: CPT

## 2019-11-16 PROCEDURE — 36415 COLL VENOUS BLD VENIPUNCTURE: CPT

## 2019-11-16 PROCEDURE — 2580000003 HC RX 258: Performed by: INTERNAL MEDICINE

## 2019-11-16 PROCEDURE — 6370000000 HC RX 637 (ALT 250 FOR IP): Performed by: INTERNAL MEDICINE

## 2019-11-16 PROCEDURE — 85027 COMPLETE CBC AUTOMATED: CPT

## 2019-11-16 PROCEDURE — 2709999900 HC NON-CHARGEABLE SUPPLY

## 2019-11-16 PROCEDURE — 87186 SC STD MICRODIL/AGAR DIL: CPT

## 2019-11-16 PROCEDURE — 87086 URINE CULTURE/COLONY COUNT: CPT

## 2019-11-16 PROCEDURE — 87077 CULTURE AEROBIC IDENTIFY: CPT

## 2019-11-16 PROCEDURE — 93005 ELECTROCARDIOGRAM TRACING: CPT | Performed by: INTERNAL MEDICINE

## 2019-11-16 RX ORDER — ACETAMINOPHEN 500 MG
1000 TABLET ORAL EVERY 6 HOURS PRN
COMMUNITY

## 2019-11-16 RX ORDER — GABAPENTIN 400 MG/1
1200 CAPSULE ORAL NIGHTLY
Status: DISCONTINUED | OUTPATIENT
Start: 2019-11-16 | End: 2019-11-22 | Stop reason: HOSPADM

## 2019-11-16 RX ORDER — SODIUM CHLORIDE 9 MG/ML
INJECTION, SOLUTION INTRAVENOUS CONTINUOUS
Status: DISCONTINUED | OUTPATIENT
Start: 2019-11-16 | End: 2019-11-22 | Stop reason: HOSPADM

## 2019-11-16 RX ORDER — GABAPENTIN 600 MG/1
600 TABLET ORAL 2 TIMES DAILY
Status: DISCONTINUED | OUTPATIENT
Start: 2019-11-17 | End: 2019-11-22 | Stop reason: HOSPADM

## 2019-11-16 RX ORDER — ACETAMINOPHEN 500 MG
1000 TABLET ORAL EVERY 6 HOURS PRN
Status: DISCONTINUED | OUTPATIENT
Start: 2019-11-16 | End: 2019-11-22 | Stop reason: HOSPADM

## 2019-11-16 RX ADMIN — Medication 2 TABLET: at 08:14

## 2019-11-16 RX ADMIN — Medication 3000 MG: at 08:13

## 2019-11-16 RX ADMIN — SOTALOL HYDROCHLORIDE 120 MG: 120 TABLET ORAL at 20:28

## 2019-11-16 RX ADMIN — GABAPENTIN 1200 MG: 400 CAPSULE ORAL at 20:48

## 2019-11-16 RX ADMIN — ACETAMINOPHEN 1000 MG: 500 TABLET ORAL at 00:56

## 2019-11-16 RX ADMIN — GABAPENTIN 600 MG: 600 TABLET ORAL at 08:13

## 2019-11-16 RX ADMIN — MULTIPLE VITAMINS W/ MINERALS TAB 1 TABLET: TAB at 08:14

## 2019-11-16 RX ADMIN — ASPIRIN 81 MG: 81 TABLET, CHEWABLE ORAL at 08:12

## 2019-11-16 RX ADMIN — DOCUSATE SODIUM 100 MG: 100 CAPSULE, LIQUID FILLED ORAL at 08:13

## 2019-11-16 RX ADMIN — POTASSIUM CHLORIDE 20 MEQ: 1500 TABLET, EXTENDED RELEASE ORAL at 08:14

## 2019-11-16 RX ADMIN — SODIUM CHLORIDE: 9 INJECTION, SOLUTION INTRAVENOUS at 15:38

## 2019-11-16 ASSESSMENT — PAIN SCALES - GENERAL
PAINLEVEL_OUTOF10: 0

## 2019-11-17 LAB
ORGANISM: ABNORMAL
ORGANISM: ABNORMAL
URINE CULTURE REFLEX: ABNORMAL
URINE CULTURE REFLEX: ABNORMAL

## 2019-11-17 PROCEDURE — 94760 N-INVAS EAR/PLS OXIMETRY 1: CPT

## 2019-11-17 PROCEDURE — 2709999900 HC NON-CHARGEABLE SUPPLY

## 2019-11-17 PROCEDURE — 6360000002 HC RX W HCPCS: Performed by: INTERNAL MEDICINE

## 2019-11-17 PROCEDURE — 6370000000 HC RX 637 (ALT 250 FOR IP): Performed by: INTERNAL MEDICINE

## 2019-11-17 PROCEDURE — 1200000003 HC TELEMETRY R&B

## 2019-11-17 RX ORDER — LEVOFLOXACIN 5 MG/ML
750 INJECTION, SOLUTION INTRAVENOUS EVERY 24 HOURS
Status: DISCONTINUED | OUTPATIENT
Start: 2019-11-17 | End: 2019-11-17

## 2019-11-17 RX ORDER — SULFAMETHOXAZOLE AND TRIMETHOPRIM 800; 160 MG/1; MG/1
1 TABLET ORAL EVERY 12 HOURS SCHEDULED
Status: DISCONTINUED | OUTPATIENT
Start: 2019-11-17 | End: 2019-11-20

## 2019-11-17 RX ADMIN — ACETAMINOPHEN 1000 MG: 500 TABLET ORAL at 04:47

## 2019-11-17 RX ADMIN — SOTALOL HYDROCHLORIDE 120 MG: 120 TABLET ORAL at 20:14

## 2019-11-17 RX ADMIN — GABAPENTIN 600 MG: 600 TABLET ORAL at 13:12

## 2019-11-17 RX ADMIN — SULFAMETHOXAZOLE AND TRIMETHOPRIM 1 TABLET: 800; 160 TABLET ORAL at 22:05

## 2019-11-17 RX ADMIN — SOTALOL HYDROCHLORIDE 120 MG: 120 TABLET ORAL at 09:14

## 2019-11-17 RX ADMIN — LEVOFLOXACIN 750 MG: 5 INJECTION, SOLUTION INTRAVENOUS at 10:48

## 2019-11-17 RX ADMIN — POTASSIUM CHLORIDE 20 MEQ: 1500 TABLET, EXTENDED RELEASE ORAL at 09:14

## 2019-11-17 RX ADMIN — GABAPENTIN 600 MG: 600 TABLET ORAL at 09:13

## 2019-11-17 RX ADMIN — ASPIRIN 81 MG: 81 TABLET, CHEWABLE ORAL at 09:13

## 2019-11-17 RX ADMIN — GABAPENTIN 1200 MG: 400 CAPSULE ORAL at 20:14

## 2019-11-17 RX ADMIN — PROMETHAZINE HYDROCHLORIDE 6.25 MG: 25 INJECTION INTRAMUSCULAR; INTRAVENOUS at 10:46

## 2019-11-17 ASSESSMENT — PAIN SCALES - GENERAL
PAINLEVEL_OUTOF10: 0

## 2019-11-18 ENCOUNTER — APPOINTMENT (OUTPATIENT)
Dept: CARDIAC CATH/INVASIVE PROCEDURES | Age: 74
DRG: 308 | End: 2019-11-18
Payer: MEDICARE

## 2019-11-18 LAB
ORGANISM: ABNORMAL
URINE CULTURE, ROUTINE: ABNORMAL

## 2019-11-18 PROCEDURE — 97530 THERAPEUTIC ACTIVITIES: CPT

## 2019-11-18 PROCEDURE — 97166 OT EVAL MOD COMPLEX 45 MIN: CPT

## 2019-11-18 PROCEDURE — 2709999900 HC NON-CHARGEABLE SUPPLY

## 2019-11-18 PROCEDURE — 94761 N-INVAS EAR/PLS OXIMETRY MLT: CPT

## 2019-11-18 PROCEDURE — 92960 CARDIOVERSION ELECTRIC EXT: CPT | Performed by: INTERNAL MEDICINE

## 2019-11-18 PROCEDURE — 97162 PT EVAL MOD COMPLEX 30 MIN: CPT

## 2019-11-18 PROCEDURE — 93307 TTE W/O DOPPLER COMPLETE: CPT

## 2019-11-18 PROCEDURE — 2700000000 HC OXYGEN THERAPY PER DAY

## 2019-11-18 PROCEDURE — 5A2204Z RESTORATION OF CARDIAC RHYTHM, SINGLE: ICD-10-PCS | Performed by: INTERNAL MEDICINE

## 2019-11-18 PROCEDURE — 6360000002 HC RX W HCPCS

## 2019-11-18 PROCEDURE — 2500000003 HC RX 250 WO HCPCS

## 2019-11-18 PROCEDURE — 6370000000 HC RX 637 (ALT 250 FOR IP): Performed by: INTERNAL MEDICINE

## 2019-11-18 PROCEDURE — 1200000003 HC TELEMETRY R&B

## 2019-11-18 PROCEDURE — 2580000003 HC RX 258: Performed by: INTERNAL MEDICINE

## 2019-11-18 PROCEDURE — 93005 ELECTROCARDIOGRAM TRACING: CPT | Performed by: INTERNAL MEDICINE

## 2019-11-18 PROCEDURE — 97110 THERAPEUTIC EXERCISES: CPT

## 2019-11-18 RX ADMIN — SODIUM CHLORIDE: 9 INJECTION, SOLUTION INTRAVENOUS at 13:03

## 2019-11-18 RX ADMIN — GABAPENTIN 1200 MG: 400 CAPSULE ORAL at 21:40

## 2019-11-18 RX ADMIN — SOTALOL HYDROCHLORIDE 120 MG: 120 TABLET ORAL at 08:34

## 2019-11-18 RX ADMIN — POTASSIUM CHLORIDE 20 MEQ: 1500 TABLET, EXTENDED RELEASE ORAL at 08:34

## 2019-11-18 RX ADMIN — GABAPENTIN 600 MG: 600 TABLET ORAL at 08:35

## 2019-11-18 RX ADMIN — SULFAMETHOXAZOLE AND TRIMETHOPRIM 1 TABLET: 800; 160 TABLET ORAL at 08:35

## 2019-11-18 RX ADMIN — ASPIRIN 81 MG: 81 TABLET, CHEWABLE ORAL at 06:50

## 2019-11-18 RX ADMIN — SULFAMETHOXAZOLE AND TRIMETHOPRIM 1 TABLET: 800; 160 TABLET ORAL at 21:39

## 2019-11-18 RX ADMIN — SOTALOL HYDROCHLORIDE 120 MG: 120 TABLET ORAL at 21:44

## 2019-11-18 RX ADMIN — GABAPENTIN 600 MG: 600 TABLET ORAL at 14:42

## 2019-11-18 RX ADMIN — SODIUM CHLORIDE: 9 INJECTION, SOLUTION INTRAVENOUS at 23:30

## 2019-11-18 ASSESSMENT — PAIN SCALES - GENERAL
PAINLEVEL_OUTOF10: 0

## 2019-11-19 ENCOUNTER — TELEPHONE (OUTPATIENT)
Dept: FAMILY MEDICINE CLINIC | Age: 74
End: 2019-11-19

## 2019-11-19 LAB
ANION GAP SERPL CALCULATED.3IONS-SCNC: 12 MEQ/L (ref 8–16)
BUN BLDV-MCNC: 23 MG/DL (ref 7–22)
CALCIUM SERPL-MCNC: 8.6 MG/DL (ref 8.5–10.5)
CHLORIDE BLD-SCNC: 105 MEQ/L (ref 98–111)
CO2: 21 MEQ/L (ref 23–33)
CREAT SERPL-MCNC: 0.8 MG/DL (ref 0.4–1.2)
ERYTHROCYTE [DISTWIDTH] IN BLOOD BY AUTOMATED COUNT: 15.5 % (ref 11.5–14.5)
ERYTHROCYTE [DISTWIDTH] IN BLOOD BY AUTOMATED COUNT: 49.2 FL (ref 35–45)
GFR SERPL CREATININE-BSD FRML MDRD: 70 ML/MIN/1.73M2
GLUCOSE BLD-MCNC: 116 MG/DL (ref 70–108)
HCT VFR BLD CALC: 34.2 % (ref 37–47)
HEMOGLOBIN: 10.7 GM/DL (ref 12–16)
MCH RBC QN AUTO: 27 PG (ref 26–33)
MCHC RBC AUTO-ENTMCNC: 31.3 GM/DL (ref 32.2–35.5)
MCV RBC AUTO: 86.4 FL (ref 81–99)
PLATELET # BLD: 169 THOU/MM3 (ref 130–400)
PMV BLD AUTO: 9.8 FL (ref 9.4–12.4)
POTASSIUM SERPL-SCNC: 4.8 MEQ/L (ref 3.5–5.2)
RBC # BLD: 3.96 MILL/MM3 (ref 4.2–5.4)
SODIUM BLD-SCNC: 138 MEQ/L (ref 135–145)
WBC # BLD: 6.9 THOU/MM3 (ref 4.8–10.8)

## 2019-11-19 PROCEDURE — 2700000000 HC OXYGEN THERAPY PER DAY

## 2019-11-19 PROCEDURE — 2709999900 HC NON-CHARGEABLE SUPPLY

## 2019-11-19 PROCEDURE — 80048 BASIC METABOLIC PNL TOTAL CA: CPT

## 2019-11-19 PROCEDURE — 6360000002 HC RX W HCPCS

## 2019-11-19 PROCEDURE — 85027 COMPLETE CBC AUTOMATED: CPT

## 2019-11-19 PROCEDURE — 6360000002 HC RX W HCPCS: Performed by: INTERNAL MEDICINE

## 2019-11-19 PROCEDURE — 94640 AIRWAY INHALATION TREATMENT: CPT

## 2019-11-19 PROCEDURE — 97116 GAIT TRAINING THERAPY: CPT

## 2019-11-19 PROCEDURE — 97530 THERAPEUTIC ACTIVITIES: CPT

## 2019-11-19 PROCEDURE — 97110 THERAPEUTIC EXERCISES: CPT

## 2019-11-19 PROCEDURE — 1200000003 HC TELEMETRY R&B

## 2019-11-19 PROCEDURE — 93005 ELECTROCARDIOGRAM TRACING: CPT | Performed by: INTERNAL MEDICINE

## 2019-11-19 PROCEDURE — 94760 N-INVAS EAR/PLS OXIMETRY 1: CPT

## 2019-11-19 PROCEDURE — 97535 SELF CARE MNGMENT TRAINING: CPT

## 2019-11-19 PROCEDURE — 6370000000 HC RX 637 (ALT 250 FOR IP): Performed by: INTERNAL MEDICINE

## 2019-11-19 PROCEDURE — 2580000003 HC RX 258: Performed by: INTERNAL MEDICINE

## 2019-11-19 PROCEDURE — 36415 COLL VENOUS BLD VENIPUNCTURE: CPT

## 2019-11-19 RX ORDER — SULFAMETHOXAZOLE AND TRIMETHOPRIM 400; 80 MG/1; MG/1
1 TABLET ORAL DAILY
Qty: 7 TABLET | Refills: 0 | Status: SHIPPED | OUTPATIENT
Start: 2019-11-19 | End: 2019-11-29

## 2019-11-19 RX ORDER — FUROSEMIDE 10 MG/ML
20 INJECTION INTRAMUSCULAR; INTRAVENOUS ONCE
Status: COMPLETED | OUTPATIENT
Start: 2019-11-19 | End: 2019-11-19

## 2019-11-19 RX ORDER — FUROSEMIDE 10 MG/ML
INJECTION INTRAMUSCULAR; INTRAVENOUS
Status: COMPLETED
Start: 2019-11-19 | End: 2019-11-19

## 2019-11-19 RX ORDER — ALBUTEROL SULFATE 2.5 MG/3ML
2.5 SOLUTION RESPIRATORY (INHALATION) EVERY 6 HOURS PRN
Status: DISCONTINUED | OUTPATIENT
Start: 2019-11-19 | End: 2019-11-22 | Stop reason: HOSPADM

## 2019-11-19 RX ADMIN — FUROSEMIDE 20 MG: 10 INJECTION, SOLUTION INTRAMUSCULAR; INTRAVENOUS at 19:43

## 2019-11-19 RX ADMIN — ASPIRIN 81 MG: 81 TABLET, CHEWABLE ORAL at 09:02

## 2019-11-19 RX ADMIN — GABAPENTIN 1200 MG: 400 CAPSULE ORAL at 22:47

## 2019-11-19 RX ADMIN — SULFAMETHOXAZOLE AND TRIMETHOPRIM 1 TABLET: 800; 160 TABLET ORAL at 22:46

## 2019-11-19 RX ADMIN — GABAPENTIN 600 MG: 600 TABLET ORAL at 09:02

## 2019-11-19 RX ADMIN — SODIUM CHLORIDE: 9 INJECTION, SOLUTION INTRAVENOUS at 09:14

## 2019-11-19 RX ADMIN — POTASSIUM CHLORIDE 20 MEQ: 1500 TABLET, EXTENDED RELEASE ORAL at 09:11

## 2019-11-19 RX ADMIN — SULFAMETHOXAZOLE AND TRIMETHOPRIM 1 TABLET: 800; 160 TABLET ORAL at 09:03

## 2019-11-19 RX ADMIN — FUROSEMIDE 20 MG: 10 INJECTION INTRAMUSCULAR; INTRAVENOUS at 19:43

## 2019-11-19 RX ADMIN — ALBUTEROL SULFATE 2.5 MG: 2.5 SOLUTION RESPIRATORY (INHALATION) at 20:08

## 2019-11-19 RX ADMIN — Medication 10 ML: at 22:46

## 2019-11-19 RX ADMIN — GABAPENTIN 600 MG: 600 TABLET ORAL at 13:07

## 2019-11-19 RX ADMIN — SOTALOL HYDROCHLORIDE 120 MG: 120 TABLET ORAL at 09:02

## 2019-11-19 RX ADMIN — SOTALOL HYDROCHLORIDE 120 MG: 120 TABLET ORAL at 22:47

## 2019-11-19 ASSESSMENT — PAIN SCALES - GENERAL
PAINLEVEL_OUTOF10: 0

## 2019-11-20 ENCOUNTER — APPOINTMENT (OUTPATIENT)
Dept: GENERAL RADIOLOGY | Age: 74
DRG: 308 | End: 2019-11-20
Payer: MEDICARE

## 2019-11-20 LAB
ALLEN TEST: POSITIVE
BASE EXCESS (CALCULATED): -3.2 MMOL/L (ref -2.5–2.5)
COLLECTED BY:: ABNORMAL
DEVICE: ABNORMAL
EKG ATRIAL RATE: 53 BPM
EKG ATRIAL RATE: 87 BPM
EKG P AXIS: 64 DEGREES
EKG P AXIS: 82 DEGREES
EKG P-R INTERVAL: 208 MS
EKG P-R INTERVAL: 210 MS
EKG Q-T INTERVAL: 340 MS
EKG Q-T INTERVAL: 420 MS
EKG QRS DURATION: 90 MS
EKG QRS DURATION: 96 MS
EKG QTC CALCULATION (BAZETT): 394 MS
EKG QTC CALCULATION (BAZETT): 409 MS
EKG R AXIS: -48 DEGREES
EKG R AXIS: -70 DEGREES
EKG T AXIS: -15 DEGREES
EKG T AXIS: 51 DEGREES
EKG VENTRICULAR RATE: 53 BPM
EKG VENTRICULAR RATE: 87 BPM
HCO3: 23 MMOL/L (ref 23–28)
IFIO2: 5
O2 SATURATION: 95 %
PCO2: 43 MMHG (ref 35–45)
PH BLOOD GAS: 7.33 (ref 7.35–7.45)
PO2: 80 MMHG (ref 71–104)
PRO-BNP: 8143 PG/ML (ref 0–900)
SOURCE, BLOOD GAS: ABNORMAL
TROPONIN T: 0.15 NG/ML

## 2019-11-20 PROCEDURE — 94761 N-INVAS EAR/PLS OXIMETRY MLT: CPT

## 2019-11-20 PROCEDURE — 6370000000 HC RX 637 (ALT 250 FOR IP): Performed by: INTERNAL MEDICINE

## 2019-11-20 PROCEDURE — 2709999900 HC NON-CHARGEABLE SUPPLY

## 2019-11-20 PROCEDURE — 84484 ASSAY OF TROPONIN QUANT: CPT

## 2019-11-20 PROCEDURE — 2700000000 HC OXYGEN THERAPY PER DAY

## 2019-11-20 PROCEDURE — 6360000002 HC RX W HCPCS: Performed by: INTERNAL MEDICINE

## 2019-11-20 PROCEDURE — 94640 AIRWAY INHALATION TREATMENT: CPT

## 2019-11-20 PROCEDURE — 71045 X-RAY EXAM CHEST 1 VIEW: CPT

## 2019-11-20 PROCEDURE — 83880 ASSAY OF NATRIURETIC PEPTIDE: CPT

## 2019-11-20 PROCEDURE — 36415 COLL VENOUS BLD VENIPUNCTURE: CPT

## 2019-11-20 PROCEDURE — 2500000003 HC RX 250 WO HCPCS: Performed by: INTERNAL MEDICINE

## 2019-11-20 PROCEDURE — 36600 WITHDRAWAL OF ARTERIAL BLOOD: CPT

## 2019-11-20 PROCEDURE — 1200000003 HC TELEMETRY R&B

## 2019-11-20 PROCEDURE — 76937 US GUIDE VASCULAR ACCESS: CPT

## 2019-11-20 PROCEDURE — 82803 BLOOD GASES ANY COMBINATION: CPT

## 2019-11-20 PROCEDURE — C1751 CATH, INF, PER/CENT/MIDLINE: HCPCS

## 2019-11-20 PROCEDURE — 2580000003 HC RX 258: Performed by: INTERNAL MEDICINE

## 2019-11-20 RX ORDER — METHYLPREDNISOLONE SODIUM SUCCINATE 125 MG/2ML
125 INJECTION, POWDER, LYOPHILIZED, FOR SOLUTION INTRAMUSCULAR; INTRAVENOUS ONCE
Status: COMPLETED | OUTPATIENT
Start: 2019-11-20 | End: 2019-11-20

## 2019-11-20 RX ORDER — BUMETANIDE 0.25 MG/ML
2 INJECTION, SOLUTION INTRAMUSCULAR; INTRAVENOUS ONCE
Status: COMPLETED | OUTPATIENT
Start: 2019-11-20 | End: 2019-11-20

## 2019-11-20 RX ORDER — BUMETANIDE 0.25 MG/ML
2 INJECTION, SOLUTION INTRAMUSCULAR; INTRAVENOUS 2 TIMES DAILY
Status: COMPLETED | OUTPATIENT
Start: 2019-11-20 | End: 2019-11-21

## 2019-11-20 RX ORDER — IPRATROPIUM BROMIDE AND ALBUTEROL SULFATE 2.5; .5 MG/3ML; MG/3ML
1 SOLUTION RESPIRATORY (INHALATION) ONCE
Status: COMPLETED | OUTPATIENT
Start: 2019-11-20 | End: 2019-11-20

## 2019-11-20 RX ORDER — SODIUM CHLORIDE 0.9 % (FLUSH) 0.9 %
10 SYRINGE (ML) INJECTION EVERY 12 HOURS SCHEDULED
Status: DISCONTINUED | OUTPATIENT
Start: 2019-11-20 | End: 2019-11-20 | Stop reason: SDUPTHER

## 2019-11-20 RX ORDER — LIDOCAINE HYDROCHLORIDE 10 MG/ML
5 INJECTION, SOLUTION EPIDURAL; INFILTRATION; INTRACAUDAL; PERINEURAL ONCE
Status: DISCONTINUED | OUTPATIENT
Start: 2019-11-20 | End: 2019-11-22 | Stop reason: HOSPADM

## 2019-11-20 RX ORDER — SODIUM CHLORIDE 0.9 % (FLUSH) 0.9 %
10 SYRINGE (ML) INJECTION PRN
Status: DISCONTINUED | OUTPATIENT
Start: 2019-11-20 | End: 2019-11-20 | Stop reason: SDUPTHER

## 2019-11-20 RX ORDER — POTASSIUM CHLORIDE 20 MEQ/1
20 TABLET, EXTENDED RELEASE ORAL ONCE
Status: COMPLETED | OUTPATIENT
Start: 2019-11-20 | End: 2019-11-20

## 2019-11-20 RX ADMIN — PROMETHAZINE HYDROCHLORIDE 6.25 MG: 25 INJECTION INTRAMUSCULAR; INTRAVENOUS at 10:02

## 2019-11-20 RX ADMIN — GABAPENTIN 1200 MG: 400 CAPSULE ORAL at 21:56

## 2019-11-20 RX ADMIN — SOTALOL HYDROCHLORIDE 120 MG: 120 TABLET ORAL at 21:55

## 2019-11-20 RX ADMIN — METHYLPREDNISOLONE SODIUM SUCCINATE 125 MG: 125 INJECTION, POWDER, FOR SOLUTION INTRAMUSCULAR; INTRAVENOUS at 07:34

## 2019-11-20 RX ADMIN — IPRATROPIUM BROMIDE AND ALBUTEROL SULFATE 1 AMPULE: .5; 3 SOLUTION RESPIRATORY (INHALATION) at 06:50

## 2019-11-20 RX ADMIN — POTASSIUM CHLORIDE 20 MEQ: 1500 TABLET, EXTENDED RELEASE ORAL at 02:48

## 2019-11-20 RX ADMIN — BUMETANIDE 2 MG: 0.25 INJECTION INTRAMUSCULAR; INTRAVENOUS at 01:06

## 2019-11-20 RX ADMIN — GABAPENTIN 600 MG: 600 TABLET ORAL at 15:25

## 2019-11-20 RX ADMIN — ALBUTEROL SULFATE 2.5 MG: 2.5 SOLUTION RESPIRATORY (INHALATION) at 02:03

## 2019-11-20 RX ADMIN — BUMETANIDE 2 MG: 0.25 INJECTION INTRAMUSCULAR; INTRAVENOUS at 10:56

## 2019-11-20 RX ADMIN — SOTALOL HYDROCHLORIDE 120 MG: 120 TABLET ORAL at 13:11

## 2019-11-20 RX ADMIN — Medication 10 ML: at 10:58

## 2019-11-20 RX ADMIN — BUMETANIDE 2 MG: 0.25 INJECTION INTRAMUSCULAR; INTRAVENOUS at 21:55

## 2019-11-20 RX ADMIN — GABAPENTIN 600 MG: 600 TABLET ORAL at 10:49

## 2019-11-20 RX ADMIN — CEFTRIAXONE SODIUM 1 G: 1 INJECTION, POWDER, FOR SOLUTION INTRAMUSCULAR; INTRAVENOUS at 07:06

## 2019-11-20 RX ADMIN — ALBUTEROL SULFATE 2.5 MG: 2.5 SOLUTION RESPIRATORY (INHALATION) at 17:33

## 2019-11-20 RX ADMIN — Medication 10 ML: at 21:51

## 2019-11-20 RX ADMIN — ALBUTEROL SULFATE 2.5 MG: 2.5 SOLUTION RESPIRATORY (INHALATION) at 12:29

## 2019-11-20 RX ADMIN — ASPIRIN 81 MG: 81 TABLET, CHEWABLE ORAL at 10:50

## 2019-11-20 RX ADMIN — POTASSIUM CHLORIDE 20 MEQ: 1500 TABLET, EXTENDED RELEASE ORAL at 10:51

## 2019-11-20 ASSESSMENT — PAIN SCALES - GENERAL
PAINLEVEL_OUTOF10: 0

## 2019-11-21 ENCOUNTER — APPOINTMENT (OUTPATIENT)
Dept: GENERAL RADIOLOGY | Age: 74
DRG: 308 | End: 2019-11-21
Payer: MEDICARE

## 2019-11-21 PROCEDURE — 2500000003 HC RX 250 WO HCPCS: Performed by: INTERNAL MEDICINE

## 2019-11-21 PROCEDURE — 6370000000 HC RX 637 (ALT 250 FOR IP): Performed by: INTERNAL MEDICINE

## 2019-11-21 PROCEDURE — 97110 THERAPEUTIC EXERCISES: CPT

## 2019-11-21 PROCEDURE — 94761 N-INVAS EAR/PLS OXIMETRY MLT: CPT

## 2019-11-21 PROCEDURE — 97535 SELF CARE MNGMENT TRAINING: CPT

## 2019-11-21 PROCEDURE — 71045 X-RAY EXAM CHEST 1 VIEW: CPT

## 2019-11-21 PROCEDURE — 6360000002 HC RX W HCPCS: Performed by: INTERNAL MEDICINE

## 2019-11-21 PROCEDURE — 2700000000 HC OXYGEN THERAPY PER DAY

## 2019-11-21 PROCEDURE — 97116 GAIT TRAINING THERAPY: CPT

## 2019-11-21 PROCEDURE — 1200000003 HC TELEMETRY R&B

## 2019-11-21 PROCEDURE — 2709999900 HC NON-CHARGEABLE SUPPLY

## 2019-11-21 PROCEDURE — 2580000003 HC RX 258: Performed by: INTERNAL MEDICINE

## 2019-11-21 RX ADMIN — BUMETANIDE 2 MG: 0.25 INJECTION INTRAMUSCULAR; INTRAVENOUS at 07:54

## 2019-11-21 RX ADMIN — GABAPENTIN 1200 MG: 400 CAPSULE ORAL at 22:05

## 2019-11-21 RX ADMIN — Medication 10 ML: at 07:53

## 2019-11-21 RX ADMIN — GABAPENTIN 600 MG: 600 TABLET ORAL at 13:47

## 2019-11-21 RX ADMIN — CEFTRIAXONE SODIUM 1 G: 1 INJECTION, POWDER, FOR SOLUTION INTRAMUSCULAR; INTRAVENOUS at 07:53

## 2019-11-21 RX ADMIN — BUMETANIDE 2 MG: 0.25 INJECTION INTRAMUSCULAR; INTRAVENOUS at 22:05

## 2019-11-21 RX ADMIN — ASPIRIN 81 MG: 81 TABLET, CHEWABLE ORAL at 07:54

## 2019-11-21 RX ADMIN — Medication 10 ML: at 22:06

## 2019-11-21 RX ADMIN — SOTALOL HYDROCHLORIDE 120 MG: 120 TABLET ORAL at 07:53

## 2019-11-21 RX ADMIN — GABAPENTIN 600 MG: 600 TABLET ORAL at 07:55

## 2019-11-21 RX ADMIN — POTASSIUM CHLORIDE 20 MEQ: 1500 TABLET, EXTENDED RELEASE ORAL at 07:54

## 2019-11-21 RX ADMIN — SOTALOL HYDROCHLORIDE 120 MG: 120 TABLET ORAL at 22:06

## 2019-11-21 ASSESSMENT — PAIN SCALES - GENERAL
PAINLEVEL_OUTOF10: 0

## 2019-11-22 VITALS
TEMPERATURE: 97.9 F | RESPIRATION RATE: 16 BRPM | BODY MASS INDEX: 42.08 KG/M2 | HEIGHT: 64 IN | WEIGHT: 246.5 LBS | HEART RATE: 47 BPM | SYSTOLIC BLOOD PRESSURE: 124 MMHG | DIASTOLIC BLOOD PRESSURE: 64 MMHG | OXYGEN SATURATION: 94 %

## 2019-11-22 LAB — BLOOD CULTURE, ROUTINE: NORMAL

## 2019-11-22 PROCEDURE — 6370000000 HC RX 637 (ALT 250 FOR IP): Performed by: INTERNAL MEDICINE

## 2019-11-22 PROCEDURE — 2580000003 HC RX 258: Performed by: INTERNAL MEDICINE

## 2019-11-22 PROCEDURE — 6360000002 HC RX W HCPCS: Performed by: INTERNAL MEDICINE

## 2019-11-22 PROCEDURE — 2709999900 HC NON-CHARGEABLE SUPPLY

## 2019-11-22 PROCEDURE — 97110 THERAPEUTIC EXERCISES: CPT

## 2019-11-22 PROCEDURE — 97116 GAIT TRAINING THERAPY: CPT

## 2019-11-22 RX ADMIN — Medication 10 ML: at 09:52

## 2019-11-22 RX ADMIN — CEFTRIAXONE SODIUM 1 G: 1 INJECTION, POWDER, FOR SOLUTION INTRAMUSCULAR; INTRAVENOUS at 09:53

## 2019-11-22 RX ADMIN — GABAPENTIN 600 MG: 600 TABLET ORAL at 09:57

## 2019-11-22 RX ADMIN — GABAPENTIN 600 MG: 600 TABLET ORAL at 13:54

## 2019-11-22 RX ADMIN — POTASSIUM CHLORIDE 20 MEQ: 1500 TABLET, EXTENDED RELEASE ORAL at 09:52

## 2019-11-22 RX ADMIN — ASPIRIN 81 MG: 81 TABLET, CHEWABLE ORAL at 10:01

## 2019-11-22 RX ADMIN — SOTALOL HYDROCHLORIDE 120 MG: 120 TABLET ORAL at 09:52

## 2019-11-22 ASSESSMENT — PAIN SCALES - GENERAL
PAINLEVEL_OUTOF10: 0

## 2019-12-12 ENCOUNTER — OFFICE VISIT (OUTPATIENT)
Dept: INTERNAL MEDICINE CLINIC | Age: 74
End: 2019-12-12
Payer: MEDICARE

## 2019-12-12 VITALS
WEIGHT: 256.6 LBS | DIASTOLIC BLOOD PRESSURE: 80 MMHG | HEIGHT: 64 IN | SYSTOLIC BLOOD PRESSURE: 112 MMHG | BODY MASS INDEX: 43.81 KG/M2

## 2019-12-12 DIAGNOSIS — I48.0 AF (PAROXYSMAL ATRIAL FIBRILLATION) (HCC): Primary | ICD-10-CM

## 2019-12-12 DIAGNOSIS — R73.01 IMPAIRED FASTING GLUCOSE: ICD-10-CM

## 2019-12-12 DIAGNOSIS — I48.0 AF (PAROXYSMAL ATRIAL FIBRILLATION) (HCC): ICD-10-CM

## 2019-12-12 DIAGNOSIS — I10 ESSENTIAL HYPERTENSION: ICD-10-CM

## 2019-12-12 DIAGNOSIS — G62.9 NEUROPATHY: ICD-10-CM

## 2019-12-12 DIAGNOSIS — I50.32 CHRONIC DIASTOLIC HEART FAILURE (HCC): ICD-10-CM

## 2019-12-12 LAB
A/G RATIO: 1.4 (ref 1.1–2.2)
ALBUMIN SERPL-MCNC: 3.8 G/DL (ref 3.4–5)
ALP BLD-CCNC: 100 U/L (ref 40–129)
ALT SERPL-CCNC: 18 U/L (ref 10–40)
ANION GAP SERPL CALCULATED.3IONS-SCNC: 17 MMOL/L (ref 3–16)
AST SERPL-CCNC: 24 U/L (ref 15–37)
BASOPHILS ABSOLUTE: 0.1 K/UL (ref 0–0.2)
BASOPHILS RELATIVE PERCENT: 1.3 %
BILIRUB SERPL-MCNC: 0.9 MG/DL (ref 0–1)
BUN BLDV-MCNC: 14 MG/DL (ref 7–20)
CALCIUM SERPL-MCNC: 9.4 MG/DL (ref 8.3–10.6)
CHLORIDE BLD-SCNC: 100 MMOL/L (ref 99–110)
CO2: 24 MMOL/L (ref 21–32)
CREAT SERPL-MCNC: 0.7 MG/DL (ref 0.6–1.2)
EOSINOPHILS ABSOLUTE: 0.2 K/UL (ref 0–0.6)
EOSINOPHILS RELATIVE PERCENT: 2.8 %
GFR AFRICAN AMERICAN: >60
GFR NON-AFRICAN AMERICAN: >60
GLOBULIN: 2.7 G/DL
GLUCOSE BLD-MCNC: 120 MG/DL (ref 70–99)
HCT VFR BLD CALC: 39.5 % (ref 36–48)
HEMOGLOBIN: 13.2 G/DL (ref 12–16)
LYMPHOCYTES ABSOLUTE: 1.7 K/UL (ref 1–5.1)
LYMPHOCYTES RELATIVE PERCENT: 22.6 %
MCH RBC QN AUTO: 27.8 PG (ref 26–34)
MCHC RBC AUTO-ENTMCNC: 33.4 G/DL (ref 31–36)
MCV RBC AUTO: 83.2 FL (ref 80–100)
MONOCYTES ABSOLUTE: 0.4 K/UL (ref 0–1.3)
MONOCYTES RELATIVE PERCENT: 5.9 %
NEUTROPHILS ABSOLUTE: 5.1 K/UL (ref 1.7–7.7)
NEUTROPHILS RELATIVE PERCENT: 67.4 %
PDW BLD-RTO: 16.5 % (ref 12.4–15.4)
PLATELET # BLD: 270 K/UL (ref 135–450)
PMV BLD AUTO: 8.1 FL (ref 5–10.5)
POTASSIUM SERPL-SCNC: 4.1 MMOL/L (ref 3.5–5.1)
RBC # BLD: 4.75 M/UL (ref 4–5.2)
SODIUM BLD-SCNC: 141 MMOL/L (ref 136–145)
TOTAL PROTEIN: 6.5 G/DL (ref 6.4–8.2)
WBC # BLD: 7.5 K/UL (ref 4–11)

## 2019-12-12 PROCEDURE — 99203 OFFICE O/P NEW LOW 30 MIN: CPT | Performed by: INTERNAL MEDICINE

## 2019-12-12 RX ORDER — FLUTICASONE PROPIONATE 50 MCG
1 SPRAY, SUSPENSION (ML) NASAL 2 TIMES DAILY
Qty: 1 BOTTLE | Refills: 2 | Status: SHIPPED | OUTPATIENT
Start: 2019-12-12 | End: 2021-01-21

## 2019-12-12 ASSESSMENT — ENCOUNTER SYMPTOMS: SHORTNESS OF BREATH: 0

## 2019-12-13 LAB
ESTIMATED AVERAGE GLUCOSE: 111.2 MG/DL
HBA1C MFR BLD: 5.5 %

## 2019-12-23 ENCOUNTER — TELEPHONE (OUTPATIENT)
Dept: INTERNAL MEDICINE CLINIC | Age: 74
End: 2019-12-23

## 2019-12-30 RX ORDER — POTASSIUM CHLORIDE 1.5 G/1.77G
20 POWDER, FOR SOLUTION ORAL DAILY
Qty: 30 EACH | Refills: 3 | Status: SHIPPED | OUTPATIENT
Start: 2019-12-30 | End: 2020-04-20 | Stop reason: SDUPTHER

## 2020-01-07 NOTE — PROGRESS NOTES
None       Family History   Problem Relation Age of Onset    Cancer Mother         breast    High Blood Pressure Mother     Diabetes Mother     Heart Disease Mother         pacemaker   [place] Stroke Mother     High Blood Pressure Father     Cancer Sister         colon    Cancer Sister         breast    Stroke Brother     Heart Disease Brother     Cancer Sister         breast        has a past medical history of Arthritis, Atrial fibrillation ([facility] [address] Breast cancer ([facility] [address] CAD (coronary artery disease), GERD (gastroesophageal reflux disease), H/O: whooping cough, History of shingles, Hypercholesterolemia, Hypertension, Iron deficiency, PONV (postoperative nausea and vomiting), Sleep apnea, and Vertigo. Review of Systems   Constitutional: Negative. HENT: Negative. Eyes: Negative. Respiratory: Negative. Cardiovascular: Negative. Gastrointestinal: Negative. Endocrine: Negative. Genitourinary: Negative. Musculoskeletal: Negative. Skin: Negative. Allergic/Immunologic: Negative. Neurological: Negative. Hematological: Negative. Psychiatric/Behavioral: Negative. Vitals:    01/10/20 1426   BP: 120/80   Pulse: 68        Echo 11/15/19  Summary   Left ventricle size is normal.   Normal left ventricular wall thickness. Ejection fraction is visually estimated in the range of 55% to 60%. There were no regional wall motion abnormalities. There is a small circumferential pericardial effusion noted. Vitals:    01/10/20 1426   BP: 120/80   Pulse: 68         Objective:   Physical Exam  HENT:      Head: Normocephalic and atraumatic. Mouth/Throat:      Mouth: Mucous membranes are dry. Eyes:      Extraocular Movements: Extraocular movements intact. Pupils: Pupils are equal, round, and reactive to light. Neck:      Musculoskeletal: Normal range of motion and neck supple. Cardiovascular:      Rate and Rhythm: Normal rate and regular rhythm.       Pulses: Normal if no relief, call 911 and take another dose 5 min later. Max of 3 doses in 15 min.  aspirin 81 MG EC tablet Take 81 mg by mouth nightly       Multiple Vitamins-Minerals (CENTRUM SILVER PO) Take by mouth daily       gabapentin (NEURONTIN) 600 MG tablet Take 1 tablet in am and at noon, and 2 in the pm 120 tablet 5     No current facility-administered medications for this visit. Assessment:       Diagnosis Orders   1. AF (paroxysmal atrial fibrillation) ([facility])  EKG 12 lead   2. SOB (shortness of breath)     3. Diastolic heart failure, unspecified HF chronicity ([facility] [address]     4. Near syncope     5. RENE (obstructive sleep apnea)               Plan:      1 AF:    S/p ablation on sotalol and eliquis but in SR.  2  SOB:  Will check echo and MPI. 3. RENE  Not wearing mask. 4 near syncope:  No rhythm disturbance. Still has palpitations. 5.  bnp [id number] on 11/20/19. No lasix since hospitalization. Would resume lasix 20 mg daily.

## 2020-01-10 ENCOUNTER — OFFICE VISIT (OUTPATIENT)
Dept: CARDIOLOGY CLINIC | Age: 75
End: 2020-01-10
Payer: MEDICARE

## 2020-01-10 VITALS
SYSTOLIC BLOOD PRESSURE: 120 MMHG | WEIGHT: 251.4 LBS | HEART RATE: 68 BPM | BODY MASS INDEX: 43.15 KG/M2 | DIASTOLIC BLOOD PRESSURE: 80 MMHG

## 2020-01-10 PROCEDURE — 99203 OFFICE O/P NEW LOW 30 MIN: CPT | Performed by: INTERNAL MEDICINE

## 2020-01-10 PROCEDURE — 93000 ELECTROCARDIOGRAM COMPLETE: CPT | Performed by: INTERNAL MEDICINE

## 2020-01-10 RX ORDER — ATORVASTATIN CALCIUM 20 MG/1
20 TABLET, FILM COATED ORAL DAILY
Qty: 90 TABLET | Refills: 3 | Status: SHIPPED | OUTPATIENT
Start: 2020-01-10 | End: 2020-10-29 | Stop reason: SDUPTHER

## 2020-01-10 RX ORDER — FUROSEMIDE 20 MG/1
20 TABLET ORAL 2 TIMES DAILY
Qty: 60 TABLET | Refills: 3 | Status: SHIPPED | OUTPATIENT
Start: 2020-01-10 | End: 2020-04-20 | Stop reason: SDUPTHER

## 2020-01-10 ASSESSMENT — ENCOUNTER SYMPTOMS
EYES NEGATIVE: 1
ALLERGIC/IMMUNOLOGIC NEGATIVE: 1
GASTROINTESTINAL NEGATIVE: 1
RESPIRATORY NEGATIVE: 1

## 2020-01-22 ENCOUNTER — TELEPHONE (OUTPATIENT)
Dept: INTERNAL MEDICINE CLINIC | Age: 75
End: 2020-01-22

## 2020-01-22 NOTE — TELEPHONE ENCOUNTER
Patient daughter called stating her mother told her  She thinks she has a UTI. She states her urine has a very strong odor and feels the same way she did when she had this in November. She does have frequency but daughter states Dr. Federico Spangler just put her on Lasix so not sure if its that or not. She has been on this medication for a week and a half. Patient told her this has been going for about 3 days. Does she need an appointment or can you call her something in? Please call to advise.      [employer] [address], [facility].

## 2020-01-23 ENCOUNTER — NURSE ONLY (OUTPATIENT)
Dept: INTERNAL MEDICINE CLINIC | Age: 75
End: 2020-01-23
Payer: MEDICARE

## 2020-01-23 LAB
BILIRUBIN, POC: NORMAL
BLOOD URINE, POC: NEGATIVE
CLARITY, POC: NORMAL
COLOR, POC: NORMAL
GLUCOSE URINE, POC: NEGATIVE
KETONES, POC: NEGATIVE
LEUKOCYTE EST, POC: NORMAL
NITRITE, POC: NEGATIVE
PH, POC: 6
PROTEIN, POC: NORMAL
SPECIFIC GRAVITY, POC: 1.02
UROBILINOGEN, POC: 0.2

## 2020-01-23 PROCEDURE — 81002 URINALYSIS NONAUTO W/O SCOPE: CPT | Performed by: INTERNAL MEDICINE

## 2020-01-24 ENCOUNTER — TELEPHONE (OUTPATIENT)
Dept: INTERNAL MEDICINE CLINIC | Age: 75
End: 2020-01-24

## 2020-01-24 RX ORDER — NITROFURANTOIN 25; 75 MG/1; MG/1
100 CAPSULE ORAL 2 TIMES DAILY
Qty: 10 CAPSULE | Refills: 0 | Status: SHIPPED | OUTPATIENT
Start: 2020-01-24 | End: 2020-01-29

## 2020-01-24 NOTE — TELEPHONE ENCOUNTER
I sent in macrobid that she can take empirically over the weekend, pending the results of the culture on Monday, but odor is not a reliable indicator of a UTI so it's not clear whether a UTI is causing her symptoms.   If she continues to feel poorly with low blood pressure she should be evaluated in the ED over the weekend

## 2020-01-24 NOTE — TELEPHONE ENCOUNTER
Patient daughter awaiting her mother urine culture results. Daughter states that her mother is really uncomfortable , very foul smelling urine, no burning, but patient felling wiped out and blood pressure has been los, 92/55 and 95-59.  Daughter doesn't want to go over the weekend with out any advise    [facility] [phone]

## 2020-01-25 LAB
ORGANISM: ABNORMAL
URINE CULTURE, ROUTINE: ABNORMAL

## 2020-01-30 ENCOUNTER — TELEPHONE (OUTPATIENT)
Dept: INTERNAL MEDICINE CLINIC | Age: 75
End: 2020-01-30

## 2020-01-30 NOTE — TELEPHONE ENCOUNTER
Pt finished her abx 1/29 yesterday and still reporting symptoms of weakness with low BP. Her daughter not sure if due to the UTI and would need another round. Please advise.

## 2020-02-11 ENCOUNTER — TELEPHONE (OUTPATIENT)
Dept: CARDIOLOGY CLINIC | Age: 75
End: 2020-02-11

## 2020-02-12 ENCOUNTER — HOSPITAL ENCOUNTER (OUTPATIENT)
Dept: NON INVASIVE DIAGNOSTICS | Age: 75
Discharge: HOME | End: 2020-02-12
Payer: MEDICARE

## 2020-02-12 LAB
LV EF: 58 %
LV EF: 74 %
LVEF MODALITY: NORMAL
LVEF MODALITY: NORMAL

## 2020-02-12 PROCEDURE — A9502 TC99M TETROFOSMIN: HCPCS | Performed by: INTERNAL MEDICINE

## 2020-02-12 PROCEDURE — 93017 CV STRESS TEST TRACING ONLY: CPT

## 2020-02-12 PROCEDURE — 93306 TTE W/DOPPLER COMPLETE: CPT

## 2020-02-12 PROCEDURE — 3430000000 HC RX DIAGNOSTIC RADIOPHARMACEUTICAL: Performed by: INTERNAL MEDICINE

## 2020-02-12 PROCEDURE — 6360000002 HC RX W HCPCS: Performed by: INTERNAL MEDICINE

## 2020-02-12 PROCEDURE — 2580000003 HC RX 258: Performed by: INTERNAL MEDICINE

## 2020-02-12 PROCEDURE — 78452 HT MUSCLE IMAGE SPECT MULT: CPT

## 2020-02-12 RX ORDER — SODIUM CHLORIDE 0.9 % (FLUSH) 0.9 %
10 SYRINGE (ML) INJECTION PRN
Status: DISCONTINUED | OUTPATIENT
Start: 2020-02-12 | End: 2020-02-13 | Stop reason: HOSPADM

## 2020-02-12 RX ADMIN — TETROFOSMIN 30 MILLICURIE: 1.38 INJECTION, POWDER, LYOPHILIZED, FOR SOLUTION INTRAVENOUS at 10:26

## 2020-02-12 RX ADMIN — Medication 10 ML: at 09:21

## 2020-02-12 RX ADMIN — REGADENOSON 0.4 MG: 0.08 INJECTION, SOLUTION INTRAVENOUS at 10:26

## 2020-02-12 RX ADMIN — Medication 10 ML: at 10:27

## 2020-02-12 RX ADMIN — TETROFOSMIN 10 MILLICURIE: 1.38 INJECTION, POWDER, LYOPHILIZED, FOR SOLUTION INTRAVENOUS at 09:21

## 2020-02-14 ENCOUNTER — OFFICE VISIT (OUTPATIENT)
Dept: CARDIOLOGY CLINIC | Age: 75
End: 2020-02-14
Payer: MEDICARE

## 2020-02-14 VITALS
BODY MASS INDEX: 41.44 KG/M2 | SYSTOLIC BLOOD PRESSURE: 130 MMHG | WEIGHT: 241.4 LBS | HEART RATE: 56 BPM | DIASTOLIC BLOOD PRESSURE: 80 MMHG

## 2020-02-14 DIAGNOSIS — Z86.79 S/P ABLATION OF ATRIAL FIBRILLATION: ICD-10-CM

## 2020-02-14 DIAGNOSIS — Z98.890 S/P ABLATION OF ATRIAL FIBRILLATION: ICD-10-CM

## 2020-02-14 LAB
A/G RATIO: 1.6 (ref 1.1–2.2)
ALBUMIN SERPL-MCNC: 4.1 G/DL (ref 3.4–5)
ALP BLD-CCNC: 93 U/L (ref 40–129)
ALT SERPL-CCNC: 16 U/L (ref 10–40)
ANION GAP SERPL CALCULATED.3IONS-SCNC: 14 MMOL/L (ref 3–16)
AST SERPL-CCNC: 20 U/L (ref 15–37)
BASOPHILS ABSOLUTE: 0.1 K/UL (ref 0–0.2)
BASOPHILS RELATIVE PERCENT: 1.5 %
BILIRUB SERPL-MCNC: 0.7 MG/DL (ref 0–1)
BUN BLDV-MCNC: 22 MG/DL (ref 7–20)
CALCIUM SERPL-MCNC: 9.7 MG/DL (ref 8.3–10.6)
CHLORIDE BLD-SCNC: 101 MMOL/L (ref 99–110)
CHOLESTEROL, TOTAL: 128 MG/DL (ref 0–199)
CO2: 31 MMOL/L (ref 21–32)
CREAT SERPL-MCNC: 0.8 MG/DL (ref 0.6–1.2)
EOSINOPHILS ABSOLUTE: 0.2 K/UL (ref 0–0.6)
EOSINOPHILS RELATIVE PERCENT: 3.2 %
GFR AFRICAN AMERICAN: >60
GFR NON-AFRICAN AMERICAN: >60
GLOBULIN: 2.6 G/DL
GLUCOSE BLD-MCNC: 109 MG/DL (ref 70–99)
HCT VFR BLD CALC: 39.5 % (ref 36–48)
HDLC SERPL-MCNC: 43 MG/DL (ref 40–60)
HEMOGLOBIN: 12.6 G/DL (ref 12–16)
LDL CHOLESTEROL CALCULATED: 51 MG/DL
LYMPHOCYTES ABSOLUTE: 1.2 K/UL (ref 1–5.1)
LYMPHOCYTES RELATIVE PERCENT: 17.9 %
MCH RBC QN AUTO: 26.2 PG (ref 26–34)
MCHC RBC AUTO-ENTMCNC: 32 G/DL (ref 31–36)
MCV RBC AUTO: 82 FL (ref 80–100)
MONOCYTES ABSOLUTE: 0.5 K/UL (ref 0–1.3)
MONOCYTES RELATIVE PERCENT: 7 %
NEUTROPHILS ABSOLUTE: 4.8 K/UL (ref 1.7–7.7)
NEUTROPHILS RELATIVE PERCENT: 70.4 %
PDW BLD-RTO: 17.6 % (ref 12.4–15.4)
PLATELET # BLD: 304 K/UL (ref 135–450)
PMV BLD AUTO: 8.2 FL (ref 5–10.5)
POTASSIUM SERPL-SCNC: 3.3 MMOL/L (ref 3.5–5.1)
RBC # BLD: 4.82 M/UL (ref 4–5.2)
SODIUM BLD-SCNC: 146 MMOL/L (ref 136–145)
TOTAL PROTEIN: 6.7 G/DL (ref 6.4–8.2)
TRIGL SERPL-MCNC: 170 MG/DL (ref 0–150)
VLDLC SERPL CALC-MCNC: 34 MG/DL
WBC # BLD: 6.9 K/UL (ref 4–11)

## 2020-02-14 PROCEDURE — 99213 OFFICE O/P EST LOW 20 MIN: CPT | Performed by: INTERNAL MEDICINE

## 2020-02-14 NOTE — PROGRESS NOTES
Subjective:      Patient ID: Samreen Can is a 76 y.o. female    No chief complaint on file. HPI:  Pt has recently moved to [place], and is establishing a new visit with a Cardiologist.    Pt is s/p successful AF-RFA (19) . Procedure performed at [facility]. PMH includes AF, HTN, Heart Failure.      Allergies   Allergen Reactions    Morphine      vomit    Tape Henreitta Latus Tape] Itching    Unable To Assess      Any pain med makes her sick, vomit violently  Tolerates fentanly    Vicodin [Hydrocodone-Acetaminophen] Nausea And Vomiting       Social History     Socioeconomic History    Marital status:      Spouse name: None    Number of children: None    Years of education: None    Highest education level: None   Occupational History    Occupation: retired   Social Needs    Financial resource strain: None    Food insecurity:     Worry: None     Inability: None    Transportation needs:     Medical: None     Non-medical: None   Tobacco Use    Smoking status: Former Smoker     Packs/day: 1.00     Years: 2.00     Pack years: 2.00     Types: Cigarettes     Last attempt to quit: 1995     Years since quittin.1    Smokeless tobacco: Never Used   Substance and Sexual Activity    Alcohol use: No    Drug use: No    Sexual activity: Not Currently   Lifestyle    Physical activity:     Days per week: None     Minutes per session: None    Stress: None   Relationships    Social connections:     Talks on phone: None     Gets together: None     Attends Sabianist service: None     Active member of club or organization: None     Attends meetings of clubs or organizations: None     Relationship status: None    Intimate partner violence:     Fear of current or ex partner: None     Emotionally abused: None     Physically abused: None     Forced sexual activity: None   Other Topics Concern    None   Social History Narrative    None       Family History   Problem Relation Age of Onset    Cancer Mother         breast    High Blood Pressure Mother     Diabetes Mother     Heart Disease Mother         pacemaker    Stroke Mother     High Blood Pressure Father     Cancer Sister         colon    Cancer Sister         breast    Stroke Brother     Heart Disease Brother     Cancer Sister         breast        has a past medical history of Arthritis, Atrial fibrillation ([facility] [address] Breast cancer ([facility] [address] CAD (coronary artery disease), GERD (gastroesophageal reflux disease), H/O: whooping cough, History of shingles, Hypercholesterolemia, Hypertension, Iron deficiency, PONV (postoperative nausea and vomiting), Sleep apnea, and Vertigo. Review of Systems   Constitutional: Negative. HENT: Negative. Eyes: Negative. Respiratory: Negative. Cardiovascular: Negative. Gastrointestinal: Negative. Endocrine: Negative. Genitourinary: Negative. Musculoskeletal: Negative. Skin: Negative. Allergic/Immunologic: Negative. Neurological: Negative. Hematological: Negative. Psychiatric/Behavioral: Negative. Vitals:    02/14/20 1440   BP: 130/80   Pulse: 56        Echo 11/15/19  Summary   Left ventricle size is normal.   Normal left ventricular wall thickness. Ejection fraction is visually estimated in the range of 55% to 60%. There were no regional wall motion abnormalities. There is a small circumferential pericardial effusion noted. Vitals:    02/14/20 1440   BP: 130/80   Pulse: 56         Objective:   Physical Exam  HENT:      Head: Normocephalic and atraumatic. Mouth/Throat:      Mouth: Mucous membranes are dry. Eyes:      Extraocular Movements: Extraocular movements intact. Pupils: Pupils are equal, round, and reactive to light. Neck:      Musculoskeletal: Normal range of motion and neck supple. Cardiovascular:      Rate and Rhythm: Normal rate and regular rhythm. Pulses: Normal pulses.       Heart sounds: Normal heart Take 1 tablet for chest pain and repeat after 5 min if no relief, call 911 and take another dose 5 min later. Max of 3 doses in 15 min.  aspirin 81 MG EC tablet Take 81 mg by mouth nightly       Multiple Vitamins-Minerals (CENTRUM SILVER PO) Take by mouth daily       gabapentin (NEURONTIN) 600 MG tablet Take 1 tablet in am and at noon, and 2 in the pm 120 tablet 5     No current facility-administered medications for this visit. Assessment:       Diagnosis Orders   1. S/P ablation of atrial fibrillation  Comprehensive Metabolic Panel    CBC WITH AUTO DIFFERENTIAL    Lipid Panel   2. Essential hypertension               Plan:      1 AF:    S/p ablation on sotalol and eliquis but in SR.  2  SOB:  Will check echo and MPI. 3. RENE  Not wearing mask. 4 near syncope:  No rhythm disturbance. Still has palpitations. 5.  bnp 8143 on 11/20/19. No lasix since hospitalization. Would resume lasix 20 mg daily. Feels better check labs.

## 2020-03-02 ENCOUNTER — TELEPHONE (OUTPATIENT)
Dept: INTERNAL MEDICINE CLINIC | Age: 75
End: 2020-03-02

## 2020-03-02 RX ORDER — GABAPENTIN 600 MG/1
TABLET ORAL
Qty: 120 TABLET | Refills: 2 | Status: SHIPPED | OUTPATIENT
Start: 2020-03-02 | End: 2020-04-20 | Stop reason: SDUPTHER

## 2020-03-02 NOTE — TELEPHONE ENCOUNTER
Pt daughter stated the following:  Pt needs a refill on  gabapentin (NEURONTIN) 600 MG tablet.   Pharmacy on the file verified  Please call

## 2020-04-20 ENCOUNTER — TELEPHONE (OUTPATIENT)
Dept: INTERNAL MEDICINE CLINIC | Age: 75
End: 2020-04-20

## 2020-04-20 RX ORDER — LOSARTAN POTASSIUM AND HYDROCHLOROTHIAZIDE 25; 100 MG/1; MG/1
TABLET ORAL
Qty: 30 TABLET | Refills: 0 | Status: SHIPPED | OUTPATIENT
Start: 2020-04-20 | End: 2020-06-18 | Stop reason: SDUPTHER

## 2020-04-20 RX ORDER — FUROSEMIDE 20 MG/1
20 TABLET ORAL 2 TIMES DAILY
Qty: 60 TABLET | Refills: 3 | Status: SHIPPED | OUTPATIENT
Start: 2020-04-20 | End: 2020-08-18

## 2020-04-20 RX ORDER — POTASSIUM CHLORIDE 1.5 G/1.77G
20 POWDER, FOR SOLUTION ORAL DAILY
Qty: 30 EACH | Refills: 3 | Status: SHIPPED | OUTPATIENT
Start: 2020-04-20 | End: 2020-07-17

## 2020-04-20 RX ORDER — GABAPENTIN 600 MG/1
TABLET ORAL
Qty: 120 TABLET | Refills: 2 | Status: SHIPPED
Start: 2020-04-20 | End: 2020-06-12 | Stop reason: SDUPTHER

## 2020-04-24 ENCOUNTER — TELEPHONE (OUTPATIENT)
Dept: CARDIOLOGY CLINIC | Age: 75
End: 2020-04-24

## 2020-04-24 RX ORDER — SOTALOL HYDROCHLORIDE 120 MG/1
120 TABLET ORAL 2 TIMES DAILY
Qty: 180 TABLET | Refills: 1 | Status: SHIPPED | OUTPATIENT
Start: 2020-04-24 | End: 2020-08-21 | Stop reason: DRUGHIGH

## 2020-06-12 ENCOUNTER — OFFICE VISIT (OUTPATIENT)
Dept: INTERNAL MEDICINE CLINIC | Age: 75
End: 2020-06-12
Payer: MEDICARE

## 2020-06-12 VITALS
SYSTOLIC BLOOD PRESSURE: 130 MMHG | BODY MASS INDEX: 40.33 KG/M2 | TEMPERATURE: 98.6 F | DIASTOLIC BLOOD PRESSURE: 60 MMHG | HEIGHT: 64 IN | WEIGHT: 236.2 LBS

## 2020-06-12 PROCEDURE — 99213 OFFICE O/P EST LOW 20 MIN: CPT | Performed by: INTERNAL MEDICINE

## 2020-06-12 PROCEDURE — G0439 PPPS, SUBSEQ VISIT: HCPCS | Performed by: INTERNAL MEDICINE

## 2020-06-12 RX ORDER — HYDROCODONE BITARTRATE AND ACETAMINOPHEN 5; 325 MG/1; MG/1
1 TABLET ORAL 2 TIMES DAILY PRN
Qty: 10 TABLET | Refills: 0 | Status: SHIPPED | OUTPATIENT
Start: 2020-06-12 | End: 2020-06-17

## 2020-06-12 RX ORDER — PREGABALIN 50 MG/1
50 CAPSULE ORAL 3 TIMES DAILY
Qty: 90 CAPSULE | Refills: 1 | Status: SHIPPED | OUTPATIENT
Start: 2020-06-12 | End: 2020-07-17

## 2020-06-12 RX ORDER — ONDANSETRON 4 MG/1
4 TABLET, FILM COATED ORAL 3 TIMES DAILY PRN
Qty: 20 TABLET | Refills: 0 | Status: ON HOLD | OUTPATIENT
Start: 2020-06-12 | End: 2021-10-07 | Stop reason: HOSPADM

## 2020-06-12 ASSESSMENT — LIFESTYLE VARIABLES
AUDIT-C TOTAL SCORE: 1
HOW OFTEN DO YOU HAVE SIX OR MORE DRINKS ON ONE OCCASION: 0
HOW MANY STANDARD DRINKS CONTAINING ALCOHOL DO YOU HAVE ON A TYPICAL DAY: 0
HOW OFTEN DO YOU HAVE A DRINK CONTAINING ALCOHOL: 1

## 2020-06-12 ASSESSMENT — PATIENT HEALTH QUESTIONNAIRE - PHQ9
SUM OF ALL RESPONSES TO PHQ QUESTIONS 1-9: 0
SUM OF ALL RESPONSES TO PHQ QUESTIONS 1-9: 0

## 2020-06-12 NOTE — PROGRESS NOTES
2020     Luis Hsu (:  1945) is a 76 y.o. female, here for evaluation of the following medical concerns:    Chief Complaint   Patient presents with    Medicare AWV        HPI    Foot pain - gabapentin not working well in the afternoon and evening. Started working less well about 6 months ago. Last 3 months has been very bad. Previously gabapentin had worked better but affect seems to be wearing off. She has not used Lyrica previously. Will be having hernia surgery, working on weight loss which have been recommended by the surgeon. Following up later this summer. She has remained out of A. fib, feeling well. Review of Systems   Cardiovascular: Negative for palpitations. Musculoskeletal:        +foot pain   Neurological: Positive for numbness. Negative for weakness. Prior to Visit Medications    Medication Sig Taking? Authorizing Provider   HYDROcodone-acetaminophen (NORCO) 5-325 MG per tablet Take 1 tablet by mouth 2 times daily as needed for Pain for up to 5 days. Intended supply: 7 days. Take lowest dose possible to manage pain Yes John Sanders MD   ondansetron (ZOFRAN) 4 MG tablet Take 1 tablet by mouth 3 times daily as needed for Nausea or Vomiting Yes John Sanders MD   pregabalin (LYRICA) 50 MG capsule Take 1 capsule by mouth 3 times daily for 60 days.  Yes John Sanders MD   sotalol (BETAPACE) 120 MG tablet Take 1 tablet by mouth 2 times daily Yes VIC Valle - CNP   furosemide (LASIX) 20 MG tablet Take 1 tablet by mouth 2 times daily Yes John Sanders MD   potassium chloride (KLOR-CON) 20 MEQ packet Take 20 mEq by mouth daily Yes John Sanders MD   losartan-hydrochlorothiazide (HYZAAR) 100-25 MG per tablet TAKE ONE TABLET BY MOUTH DAILY Yes John Sanders MD   atorvastatin (LIPITOR) 20 MG tablet Take 1 tablet by mouth daily Yes Frank Vela MD   fluticasone (FLONASE) 50 MCG/ACT nasal spray 1 spray by Nasal route 2 times daily As needed Yes John Sanders MD Pulmonary effort is normal. No respiratory distress. Breath sounds: Normal breath sounds. Abdominal:      Palpations: Abdomen is soft. Hernia: A hernia is present. Musculoskeletal:      Right lower leg: No edema. Left lower leg: No edema. Lymphadenopathy:      Cervical: No cervical adenopathy. Skin:     General: Skin is warm and dry. Neurological:      Mental Status: She is alert and oriented to person, place, and time. Psychiatric:         Behavior: Behavior normal.         Thought Content: Thought content normal.         Judgment: Judgment normal.         ASSESSMENT/PLAN:  1. Routine general medical examination at a health care facility  - Discussed age appropriate preventive care including healthy diet, daily exercise, immunizations and age & gender guided screening tests. 2. Neuropathy  -Symptoms have not been well controlled with gabapentin at maximum dose. Symptoms worsening over time. We will change gabapentin to pregabalin. Counseled on titration down on the gabapentin dose, then will transition, started 50 mg 3 times daily then reassess. Given her severe pain, while she is titrating we will use a low dose narcotic. She has difficulty tolerating this medication so will use sparingly. - pregabalin (LYRICA) 50 MG capsule; Take 1 capsule by mouth 3 times daily for 60 days. Dispense: 90 capsule; Refill: 1    3. Right foot pain  -As above, will use while titrating up her medication for chronic neuropathy pain  - HYDROcodone-acetaminophen (NORCO) 5-325 MG per tablet; Take 1 tablet by mouth 2 times daily as needed for Pain for up to 5 days. Intended supply: 7 days. Take lowest dose possible to manage pain  Dispense: 10 tablet; Refill: 0    4. Malignant neoplasm of right female breast, unspecified estrogen receptor status, unspecified site of breast ([facility] [address]  -Due for mammogram in the fall    5. AF (paroxysmal atrial fibrillation) (HCC)  -Stable, continue current medication    6.

## 2020-06-12 NOTE — PROGRESS NOTES
Medicare Annual Wellness Visit  Name: Caitlyn Sánchez Date: 2020   MRN: <[id number]> Sex: Female   Age: 76 y.o. Ethnicity: Non-/Non    : 1945 Race: Isreal Whyte is here for Medicare AWV    Screenings for behavioral, psychosocial and functional/safety risks, and cognitive dysfunction are all negative except as indicated below. These results, as well as other patient data from the [address] form, are documented in Flowsheets linked to this Encounter. Allergies   Allergen Reactions    Morphine      vomit    Tape Yeimi Tyrell Tape] Itching    Unable To Assess      Any pain med makes her sick, vomit violently  Tolerates fentanly    Vicodin [Hydrocodone-Acetaminophen] Nausea And Vomiting         Prior to Visit Medications    Medication Sig Taking? Authorizing Provider   HYDROcodone-acetaminophen (NORCO) 5-325 MG per tablet Take 1 tablet by mouth 2 times daily as needed for Pain for up to 5 days. Intended supply: 7 days. Take lowest dose possible to manage pain Yes Soila Sepulveda MD   ondansetron (ZOFRAN) 4 MG tablet Take 1 tablet by mouth 3 times daily as needed for Nausea or Vomiting Yes Soila Sepulveda MD   pregabalin (LYRICA) 50 MG capsule Take 1 capsule by mouth 3 times daily for 60 days.  Yes Soila Sepulveda MD   sotalol (BETAPACE) 120 MG tablet Take 1 tablet by mouth 2 times daily Yes VIC Pacheco - CNP   furosemide (LASIX) 20 MG tablet Take 1 tablet by mouth 2 times daily Yes Soila Sepulveda MD   potassium chloride (KLOR-CON) 20 MEQ packet Take 20 mEq by mouth daily Yes Soila Sepulveda MD   losartan-hydrochlorothiazide (HYZAAR) 100-25 MG per tablet TAKE ONE TABLET BY MOUTH DAILY Yes Soila Sepulveda MD   atorvastatin (LIPITOR) 20 MG tablet Take 1 tablet by mouth daily Yes Evon Knowles MD   fluticasone (FLONASE) 50 MCG/ACT nasal spray 1 spray by Nasal route 2 times daily As needed Yes Soila Sepulveda MD   acetaminophen (TYLENOL) 500 MG tablet Take 1,000 mg by mouth every 6 hours as needed for Pain or Fever Yes Historical Provider, MD   gabapentin (NEURONTIN) 600 MG tablet Take 1 tablet in am and at noon, and 2 in the pm Yes Kasey Lo MD   ELIQUIS 5 MG TABS tablet TAKE 1 TABLET BY MOUTH TWICE A DAY Yes Historical Provider, MD   Glucosamine-Chondroit-Vit C-Mn (GLUCOSAMINE 1500 COMPLEX PO) Take 2 tablets by mouth daily  Yes Historical Provider, MD   Docusate Calcium (STOOL SOFTENER PO) Take 1 capsule by mouth 2 times daily  Yes Historical Provider, MD   nitroGLYCERIN (NITROSTAT) 0.4 MG SL tablet Place 0.4 mg under the tongue every 5 minutes as needed for Chest pain Take 1 tablet for chest pain and repeat after 5 min if no relief, call 911 and take another dose 5 min later. Max of 3 doses in 15 min. Yes Historical Provider, MD   aspirin 81 MG EC tablet Take 81 mg by mouth nightly  Yes Historical Provider, MD   Multiple Vitamins-Minerals (CENTRUM SILVER PO) Take by mouth daily  Yes Historical Provider, MD         Past Medical History:   Diagnosis Date    Arthritis     Atrial fibrillation ([facility] [address] 08/2016    Breast cancer ([facility] [address] 2012    Matthew Lav    CAD (coronary artery disease)     GERD (gastroesophageal reflux disease) 2001    dx 2001. but has never had an issue.  requires no meds    H/O: whooping cough 1945    History of shingles     1 year after recieving vaccine     Hypercholesterolemia 2010    Hypertension 1987    Iron deficiency 08/2016    PONV (postoperative nausea and vomiting)     Sleep apnea 06/2018    Vertigo 1957    as a child       Past Surgical History:   Procedure Laterality Date    ABDOMINAL HERNIA REPAIR  2011   [address] SURGERY  11/01/2019    BACK SURGERY  2009    disk replacement     BREAST LUMPECTOMY Right 2012    cancer    BREAST SURGERY Left 2001    benign tumor left breast 2001    BREAST SURGERY Left 1967, 1992    cyst in left    CARDIAC CATHETERIZATION  09/08/2016    3 [facility]    CARDIOVERSION  11/07/2019   

## 2020-06-14 PROBLEM — I50.9 HEART FAILURE, UNSPECIFIED (HCC): Status: RESOLVED | Noted: 2019-11-15 | Resolved: 2020-06-14

## 2020-06-18 ENCOUNTER — TELEPHONE (OUTPATIENT)
Dept: INTERNAL MEDICINE CLINIC | Age: 75
End: 2020-06-18

## 2020-06-18 RX ORDER — LOSARTAN POTASSIUM AND HYDROCHLOROTHIAZIDE 25; 100 MG/1; MG/1
TABLET ORAL
Qty: 90 TABLET | Refills: 1 | Status: SHIPPED | OUTPATIENT
Start: 2020-06-18 | End: 2020-12-18 | Stop reason: SDUPTHER

## 2020-07-10 ENCOUNTER — TELEPHONE (OUTPATIENT)
Dept: INTERNAL MEDICINE CLINIC | Age: 75
End: 2020-07-10

## 2020-07-17 ENCOUNTER — OFFICE VISIT (OUTPATIENT)
Dept: INTERNAL MEDICINE CLINIC | Age: 75
End: 2020-07-17
Payer: MEDICARE

## 2020-07-17 VITALS
BODY MASS INDEX: 39.37 KG/M2 | HEART RATE: 41 BPM | SYSTOLIC BLOOD PRESSURE: 120 MMHG | HEIGHT: 64 IN | DIASTOLIC BLOOD PRESSURE: 80 MMHG | WEIGHT: 230.6 LBS | OXYGEN SATURATION: 99 % | TEMPERATURE: 98.2 F

## 2020-07-17 DIAGNOSIS — I48.0 AF (PAROXYSMAL ATRIAL FIBRILLATION) (HCC): ICD-10-CM

## 2020-07-17 DIAGNOSIS — I10 ESSENTIAL HYPERTENSION: ICD-10-CM

## 2020-07-17 LAB
A/G RATIO: 1.7 (ref 1.1–2.2)
ALBUMIN SERPL-MCNC: 4.2 G/DL (ref 3.4–5)
ALP BLD-CCNC: 87 U/L (ref 40–129)
ALT SERPL-CCNC: 19 U/L (ref 10–40)
ANION GAP SERPL CALCULATED.3IONS-SCNC: 15 MMOL/L (ref 3–16)
AST SERPL-CCNC: 22 U/L (ref 15–37)
BASOPHILS ABSOLUTE: 0.1 K/UL (ref 0–0.2)
BASOPHILS RELATIVE PERCENT: 1.6 %
BILIRUB SERPL-MCNC: 0.7 MG/DL (ref 0–1)
BUN BLDV-MCNC: 34 MG/DL (ref 7–20)
CALCIUM SERPL-MCNC: 10 MG/DL (ref 8.3–10.6)
CHLORIDE BLD-SCNC: 104 MMOL/L (ref 99–110)
CHOLESTEROL, TOTAL: 167 MG/DL (ref 0–199)
CO2: 29 MMOL/L (ref 21–32)
CREAT SERPL-MCNC: 1 MG/DL (ref 0.6–1.2)
EOSINOPHILS ABSOLUTE: 0.1 K/UL (ref 0–0.6)
EOSINOPHILS RELATIVE PERCENT: 1.6 %
GFR AFRICAN AMERICAN: >60
GFR NON-AFRICAN AMERICAN: 54
GLOBULIN: 2.5 G/DL
GLUCOSE BLD-MCNC: 114 MG/DL (ref 70–99)
HCT VFR BLD CALC: 42.5 % (ref 36–48)
HDLC SERPL-MCNC: 48 MG/DL (ref 40–60)
HEMOGLOBIN: 14.1 G/DL (ref 12–16)
LDL CHOLESTEROL CALCULATED: 75 MG/DL
LYMPHOCYTES ABSOLUTE: 1.4 K/UL (ref 1–5.1)
LYMPHOCYTES RELATIVE PERCENT: 19.1 %
MCH RBC QN AUTO: 28.4 PG (ref 26–34)
MCHC RBC AUTO-ENTMCNC: 33.3 G/DL (ref 31–36)
MCV RBC AUTO: 85.5 FL (ref 80–100)
MONOCYTES ABSOLUTE: 0.6 K/UL (ref 0–1.3)
MONOCYTES RELATIVE PERCENT: 7.7 %
NEUTROPHILS ABSOLUTE: 5.1 K/UL (ref 1.7–7.7)
NEUTROPHILS RELATIVE PERCENT: 70 %
PDW BLD-RTO: 14.4 % (ref 12.4–15.4)
PLATELET # BLD: 264 K/UL (ref 135–450)
PMV BLD AUTO: 8.7 FL (ref 5–10.5)
POTASSIUM SERPL-SCNC: 4.5 MMOL/L (ref 3.5–5.1)
RBC # BLD: 4.97 M/UL (ref 4–5.2)
SODIUM BLD-SCNC: 148 MMOL/L (ref 136–145)
TOTAL PROTEIN: 6.7 G/DL (ref 6.4–8.2)
TRIGL SERPL-MCNC: 222 MG/DL (ref 0–150)
VLDLC SERPL CALC-MCNC: 44 MG/DL
WBC # BLD: 7.3 K/UL (ref 4–11)

## 2020-07-17 PROCEDURE — 93000 ELECTROCARDIOGRAM COMPLETE: CPT | Performed by: INTERNAL MEDICINE

## 2020-07-17 PROCEDURE — 99214 OFFICE O/P EST MOD 30 MIN: CPT | Performed by: INTERNAL MEDICINE

## 2020-07-17 RX ORDER — PREGABALIN 75 MG/1
75 CAPSULE ORAL 3 TIMES DAILY
Qty: 90 CAPSULE | Refills: 1 | Status: SHIPPED | OUTPATIENT
Start: 2020-07-17 | End: 2020-09-14 | Stop reason: SDUPTHER

## 2020-07-17 RX ORDER — POTASSIUM CHLORIDE 20 MEQ/1
20 TABLET, EXTENDED RELEASE ORAL DAILY
Qty: 90 TABLET | Refills: 1 | Status: SHIPPED | OUTPATIENT
Start: 2020-07-17 | End: 2020-12-18 | Stop reason: SDUPTHER

## 2020-07-17 NOTE — PROGRESS NOTES
2020     Ty Ramirez (:  1945) is a 76 y.o. female, here for evaluation of the following medical concerns:    Chief Complaint   Patient presents with    Foot Pain     bilat, worse in the right         HPI    Foot pain -Lyrica is helping but at the 50mg dose relief is fairly similar to the gabapentin. She has been able to tolerate it 3 times a day. About every 2-3 nights she is having trouble sleeping due to the pain cramping. A. Fib -she has remained out of A. fib. She has to be careful when getting up, she sometimes feels lightheaded and weak when she initially stands. No palpitations. Heart rate has been slow at home, into the 40s. She is taking sotalol. Hypertension - adherent to medication. Denies chest pain, SOB. Review of Systems   Cardiovascular: Negative for palpitations and leg swelling. Neurological: Positive for numbness. Prior to Visit Medications    Medication Sig Taking? Authorizing Provider   pregabalin (LYRICA) 75 MG capsule Take 1 capsule by mouth 3 times daily for 30 days.  Yes Kiki Bradley MD   potassium chloride (KLOR-CON M) 20 MEQ extended release tablet Take 1 tablet by mouth daily Yes Kiki Bradley MD   losartan-hydrochlorothiazide (HYZAAR) 100-25 MG per tablet TAKE ONE TABLET BY MOUTH DAILY Yes Kiki Bradley MD   sotalol (BETAPACE) 120 MG tablet Take 1 tablet by mouth 2 times daily Yes VIC Lambert - SHARON   furosemide (LASIX) 20 MG tablet Take 1 tablet by mouth 2 times daily Yes Kiki Bradley MD   atorvastatin (LIPITOR) 20 MG tablet Take 1 tablet by mouth daily Yes Luz Buck MD   acetaminophen (TYLENOL) 500 MG tablet Take 1,000 mg by mouth every 6 hours as needed for Pain or Fever Yes Historical Provider, MD   ELIQUIS 5 MG TABS tablet TAKE 1 TABLET BY MOUTH TWICE A DAY Yes Historical Provider, MD   Glucosamine-Chondroit-Vit C-Mn (GLUCOSAMINE 1500 COMPLEX PO) Take 2 tablets by mouth daily  Yes Historical Provider, MD   Docusate Calcium (STOOL SOFTENER PO) Take 1 capsule by mouth 2 times daily  Yes Historical Provider, MD   aspirin 81 MG EC tablet Take 81 mg by mouth nightly  Yes Historical Provider, MD   Multiple Vitamins-Minerals (CENTRUM SILVER PO) Take by mouth daily  Yes Historical Provider, MD   ondansetron (ZOFRAN) 4 MG tablet Take 1 tablet by mouth 3 times daily as needed for Nausea or Vomiting  Patient not taking: Reported on 7/17/2020  Kiki Bradley MD   fluticasone (FLONASE) 50 MCG/ACT nasal spray 1 spray by Nasal route 2 times daily As needed  Patient not taking: Reported on 7/17/2020  Kiki Bradley MD   gabapentin (NEURONTIN) 600 MG tablet Take 1 tablet in am and at noon, and 2 in the pm  Taya Given, MD   nitroGLYCERIN (NITROSTAT) 0.4 MG SL tablet Place 0.4 mg under the tongue every 5 minutes as needed for Chest pain Take 1 tablet for chest pain and repeat after 5 min if no relief, call 911 and take another dose 5 min later. Max of 3 doses in 15 min. Historical Provider, MD        Past Medical History:   Diagnosis Date    Arthritis     Atrial fibrillation ([address] 08/2016    Breast cancer [facility]) 2012    Ileene Candelario    CAD (coronary artery disease)     GERD (gastroesophageal reflux disease) 2001    dx 2001. but has never had an issue.  requires no meds    H/O: whooping cough 1945    History of shingles     1 year after recieving vaccine     Hypercholesterolemia 2010    Hypertension 1987    Iron deficiency 08/2016    PONV (postoperative nausea and vomiting)     Sleep apnea 06/2018    Vertigo 1957    as a child       Past Surgical History:   Procedure Laterality Date    ABDOMINAL HERNIA REPAIR  2011   [address] SURGERY  11/01/2019    BACK SURGERY  2009    disk replacement     BREAST LUMPECTOMY Right 2012    cancer    BREAST SURGERY Left 2001    benign tumor left breast 2001    BREAST SURGERY Left 1967, 1992    cyst in left    CARDIAC CATHETERIZATION  09/08/2016    [facility]    CARDIOVERSION  11/07/2019    COLONOSCOPY  2015    FOOT SURGERY Left 1990    heal spur    HEMORRHOID SURGERY  2016    3 removed. [facility]    UPPER GASTROINTESTINAL ENDOSCOPY  2006    VENTRICULAR ABLATION SURGERY  2019       Social History     Tobacco Use    Smoking status: Former Smoker     Packs/day: 1.00     Years: 2.00     Pack years: 2.00     Types: Cigarettes     Last attempt to quit: 1995     Years since quittin.5    Smokeless tobacco: Never Used   Substance Use Topics    Alcohol use: No        Family History   Problem Relation Age of Onset    Cancer Mother         breast    High Blood Pressure Mother     Diabetes Mother     Heart Disease Mother         pacemaker    Stroke Mother     High Blood Pressure Father     Cancer Sister         colon    Cancer Sister         breast    Stroke Brother     Heart Disease Brother     Cancer Sister         breast       Vitals:    20 1024   BP: 120/80   Pulse: (!) 41   Temp: 98.2 °F (36.8 °C)   SpO2: 99%   Weight: 230 lb 9.6 oz (104.6 kg)   Height: 5' 4\" (1.626 m)     Estimated body mass index is 39.58 kg/m² as calculated from the following:    Height as of this encounter: 5' 4\" (1.626 m). Weight as of this encounter: 230 lb 9.6 oz (104.6 kg). Physical Exam  Vitals signs reviewed. Constitutional:       General: She is not in acute distress. Appearance: She is well-developed. HENT:      Head: Normocephalic and atraumatic. Cardiovascular:      Rate and Rhythm: Regular rhythm. Bradycardia present. Heart sounds: Normal heart sounds. Pulmonary:      Effort: Pulmonary effort is normal. No respiratory distress. Breath sounds: Normal breath sounds. Musculoskeletal:      Right lower leg: No edema. Left lower leg: No edema. Skin:     General: Skin is warm and dry. Neurological:      General: No focal deficit present. Mental Status: She is alert.    Psychiatric:         Behavior: Behavior

## 2020-08-03 ENCOUNTER — TELEPHONE (OUTPATIENT)
Dept: CARDIOLOGY CLINIC | Age: 75
End: 2020-08-03

## 2020-08-05 RX ORDER — GABAPENTIN 600 MG/1
TABLET ORAL
Qty: 120 TABLET | Refills: 2 | Status: SHIPPED | OUTPATIENT
Start: 2020-08-05 | End: 2020-09-18

## 2020-08-18 RX ORDER — FUROSEMIDE 20 MG/1
TABLET ORAL
Qty: 60 TABLET | Refills: 2 | Status: SHIPPED | OUTPATIENT
Start: 2020-08-18 | End: 2020-09-18 | Stop reason: SDUPTHER

## 2020-08-21 ENCOUNTER — OFFICE VISIT (OUTPATIENT)
Dept: CARDIOLOGY CLINIC | Age: 75
End: 2020-08-21
Payer: MEDICARE

## 2020-08-21 VITALS
WEIGHT: 232.8 LBS | DIASTOLIC BLOOD PRESSURE: 80 MMHG | SYSTOLIC BLOOD PRESSURE: 130 MMHG | HEART RATE: 44 BPM | BODY MASS INDEX: 39.96 KG/M2 | TEMPERATURE: 97.3 F

## 2020-08-21 PROCEDURE — 99214 OFFICE O/P EST MOD 30 MIN: CPT | Performed by: INTERNAL MEDICINE

## 2020-08-21 RX ORDER — SOTALOL HYDROCHLORIDE 80 MG/1
80 TABLET ORAL 2 TIMES DAILY
Qty: 180 TABLET | Refills: 3 | Status: SHIPPED | OUTPATIENT
Start: 2020-08-21 | End: 2020-08-25 | Stop reason: SDUPTHER

## 2020-08-21 ASSESSMENT — ENCOUNTER SYMPTOMS
RESPIRATORY NEGATIVE: 1
ALLERGIC/IMMUNOLOGIC NEGATIVE: 1
GASTROINTESTINAL NEGATIVE: 1
EYES NEGATIVE: 1

## 2020-08-21 NOTE — PROGRESS NOTES
Subjective:      Patient ID: Zane Casey is a 76 y.o. female    CC:  Fatigue and slow HR on sotalol. HPI:  Pt has recently moved to [place], and is establishing a new visit with a Cardiologist.    Pt is s/p successful AF-RFA (19) . Procedure performed at [facility]. PMH includes AF, HTN, Heart Failure.      Allergies   Allergen Reactions    Morphine      vomit    Tape Lowanda Chamois Tape] Itching    Unable To Assess      Any pain med makes her sick, vomit violently  Tolerates fentanly    Vicodin [Hydrocodone-Acetaminophen] Nausea And Vomiting       Social History     Socioeconomic History    Marital status:      Spouse name: None    Number of children: None    Years of education: None    Highest education level: None   Occupational History    Occupation: retired   Social Needs    Financial resource strain: None    Food insecurity     Worry: None     Inability: None    Transportation needs     Medical: None     Non-medical: None   Tobacco Use    Smoking status: Former Smoker     Packs/day: 1.00     Years: 2.00     Pack years: 2.00     Types: Cigarettes     Last attempt to quit: 1995     Years since quittin.6    Smokeless tobacco: Never Used   Substance and Sexual Activity    Alcohol use: No    Drug use: No    Sexual activity: Not Currently   Lifestyle    Physical activity     Days per week: None     Minutes per session: None    Stress: None   Relationships    Social connections     Talks on phone: None     Gets together: None     Attends Gnosticist service: None     Active member of club or organization: None     Attends meetings of clubs or organizations: None     Relationship status: None    Intimate partner violence     Fear of current or ex partner: None     Emotionally abused: None     Physically abused: None     Forced sexual activity: None   Other Topics Concern    None   Social History Narrative    None       Family History   Problem Relation Age of Onset    Cancer Mother         breast    High Blood Pressure Mother     Diabetes Mother     Heart Disease Mother         pacemaker    Stroke Mother     High Blood Pressure Father     Cancer Sister         colon    Cancer Sister         breast    Stroke Brother     Heart Disease Brother     Cancer Sister         breast        has a past medical history of Arthritis, Atrial fibrillation ([facility] [address] Breast cancer ([facility] [address] CAD (coronary artery disease), GERD (gastroesophageal reflux disease), H/O: whooping cough, History of shingles, Hypercholesterolemia, Hypertension, Iron deficiency, PONV (postoperative nausea and vomiting), Sleep apnea, and Vertigo. Review of Systems   Constitutional: Negative. HENT: Negative. Eyes: Negative. Respiratory: Negative. Cardiovascular: Negative. Gastrointestinal: Negative. Endocrine: Negative. Genitourinary: Negative. Musculoskeletal: Negative. Skin: Negative. Allergic/Immunologic: Negative. Neurological: Negative. Hematological: Negative. Psychiatric/Behavioral: Negative. Vitals:    08/21/20 1533   BP: 130/80   Pulse: (!) 44   Temp: 97.3 °F (36.3 °C)        Echo 11/15/19  Summary   Left ventricle size is normal.   Normal left ventricular wall thickness. Ejection fraction is visually estimated in the range of 55% to 60%. There were no regional wall motion abnormalities. There is a small circumferential pericardial effusion noted. Vitals:    08/21/20 1533   BP: 130/80   Pulse: (!) 44   Temp: 97.3 °F (36.3 °C)         Objective:   Physical Exam  HENT:      Head: Normocephalic and atraumatic. Mouth/Throat:      Mouth: Mucous membranes are dry. Eyes:      Extraocular Movements: Extraocular movements intact. Pupils: Pupils are equal, round, and reactive to light. Neck:      Musculoskeletal: Normal range of motion and neck supple. Cardiovascular:      Rate and Rhythm: Normal rate and regular rhythm. Pulses: Normal pulses. Heart sounds: Normal heart sounds. Pulmonary:      Effort: Pulmonary effort is normal.      Breath sounds: Normal breath sounds. Abdominal:      General: Abdomen is flat. There is no distension. Palpations: Abdomen is soft. Hernia: A hernia is present. Skin:     General: Skin is warm and dry. Capillary Refill: Capillary refill takes 2 to 3 seconds. Neurological:      General: No focal deficit present. Mental Status: She is oriented to person, place, and time. Psychiatric:         Mood and Affect: Mood normal.         Behavior: Behavior normal.         Thought Content:  Thought content normal.         Current Outpatient Medications   Medication Sig Dispense Refill    sotalol (BETAPACE) 80 MG tablet Take 1 tablet by mouth 2 times daily 180 tablet 3    furosemide (LASIX) 20 MG tablet TAKE ONE TABLET BY MOUTH TWICE A DAY 60 tablet 2    gabapentin (NEURONTIN) 600 MG tablet TAKE ONE TABLET BY MOUTH DAILY AT 8AM AND 2PM, AND TAKE TWO TABLETS BY MOUTH EVERY NIGHT AT BEDTIME 120 tablet 2    potassium chloride (KLOR-CON M) 20 MEQ extended release tablet Take 1 tablet by mouth daily 90 tablet 1    losartan-hydrochlorothiazide (HYZAAR) 100-25 MG per tablet TAKE ONE TABLET BY MOUTH DAILY 90 tablet 1    ondansetron (ZOFRAN) 4 MG tablet Take 1 tablet by mouth 3 times daily as needed for Nausea or Vomiting 20 tablet 0    atorvastatin (LIPITOR) 20 MG tablet Take 1 tablet by mouth daily 90 tablet 3    fluticasone (FLONASE) 50 MCG/ACT nasal spray 1 spray by Nasal route 2 times daily As needed 1 Bottle 2    acetaminophen (TYLENOL) 500 MG tablet Take 1,000 mg by mouth every 6 hours as needed for Pain or Fever      ELIQUIS 5 MG TABS tablet TAKE 1 TABLET BY MOUTH TWICE A DAY  3    Glucosamine-Chondroit-Vit C-Mn (GLUCOSAMINE 1500 COMPLEX PO) Take 2 tablets by mouth daily       Docusate Calcium (STOOL SOFTENER PO) Take 1 capsule by mouth 2 times daily       nitroGLYCERIN (NITROSTAT) 0.4 MG SL tablet Place 0.4 mg under the tongue every 5 minutes as needed for Chest pain Take 1 tablet for chest pain and repeat after 5 min if no relief, call 911 and take another dose 5 min later. Max of 3 doses in 15 min.  aspirin 81 MG EC tablet Take 81 mg by mouth nightly       Multiple Vitamins-Minerals (CENTRUM SILVER PO) Take by mouth daily       pregabalin (LYRICA) 75 MG capsule Take 1 capsule by mouth 3 times daily for 30 days. 90 capsule 1     No current facility-administered medications for this visit. Assessment:          Diagnosis Orders   1. Essential hypertension     2. Hyperlipidemia, unspecified hyperlipidemia type     3. AF (paroxysmal atrial fibrillation) ([facility])            Plan:      1 AF:    S/p ablation on sotalol and eliquis but in SR but valerie in 45s. Need to decrease sotalol to 80 mg bid. 2  SOB:  Will check echo and MPI. 3. RENE  Not wearing mask. 4 near syncope:  No rhythm disturbance. Still has palpitations. 5.  bnp 8143 on 11/20/19. No lasix since hospitalization. Would resume lasix 20 mg daily. Feels better check labs.

## 2020-08-25 ENCOUNTER — TELEPHONE (OUTPATIENT)
Dept: CARDIOLOGY CLINIC | Age: 75
End: 2020-08-25

## 2020-08-25 RX ORDER — SOTALOL HYDROCHLORIDE 80 MG/1
80 TABLET ORAL 2 TIMES DAILY
Qty: 180 TABLET | Refills: 3 | Status: SHIPPED | OUTPATIENT
Start: 2020-08-25 | End: 2020-10-12 | Stop reason: ALTCHOICE

## 2020-08-25 NOTE — TELEPHONE ENCOUNTER
Pt daughter called in stated pt medication that was sent to pharmacy on 8-21-20 was not received by the pharmacy. Pharmacy would need medication resent out.  Thanks     [facility]   [address]   [address] [place] [address]

## 2020-09-14 RX ORDER — PREGABALIN 75 MG/1
75 CAPSULE ORAL 3 TIMES DAILY
Qty: 90 CAPSULE | Refills: 1 | Status: SHIPPED | OUTPATIENT
Start: 2020-09-14 | End: 2020-09-18 | Stop reason: SDUPTHER

## 2020-09-18 ENCOUNTER — OFFICE VISIT (OUTPATIENT)
Dept: INTERNAL MEDICINE CLINIC | Age: 75
End: 2020-09-18
Payer: MEDICARE

## 2020-09-18 VITALS
OXYGEN SATURATION: 97 % | HEIGHT: 64 IN | SYSTOLIC BLOOD PRESSURE: 140 MMHG | BODY MASS INDEX: 39.91 KG/M2 | DIASTOLIC BLOOD PRESSURE: 78 MMHG | HEART RATE: 48 BPM | WEIGHT: 233.8 LBS | TEMPERATURE: 97.5 F

## 2020-09-18 PROCEDURE — 99214 OFFICE O/P EST MOD 30 MIN: CPT | Performed by: INTERNAL MEDICINE

## 2020-09-18 RX ORDER — FUROSEMIDE 20 MG/1
20 TABLET ORAL 2 TIMES DAILY
Qty: 180 TABLET | Refills: 1 | Status: SHIPPED | OUTPATIENT
Start: 2020-09-18 | End: 2020-12-18 | Stop reason: SDUPTHER

## 2020-09-18 RX ORDER — PREGABALIN 75 MG/1
75 CAPSULE ORAL 3 TIMES DAILY
Qty: 270 CAPSULE | Refills: 1 | Status: SHIPPED | OUTPATIENT
Start: 2020-10-12 | End: 2021-03-16 | Stop reason: SDUPTHER

## 2020-09-18 NOTE — PROGRESS NOTES
2020     Cordelia Blizzard (:  1945) is a 76 y.o. female, here for evaluation of the following medical concerns:    Chief Complaint   Patient presents with    Follow-up      2 month        HPI    Neuropathy pain is improved with the lyrica. Much better during the day, has more symptoms at night but doesn't want to increase the dose as she is concerned about side effects since her balance is poor. A fib - sotalol dose was decreased due to bradycardia. Still has a lot of fatigue but HR has improved to about 50 bpm. No lightheadedness. BP has been a little high since decreasing the sotalol. Review of Systems   Cardiovascular: Negative for leg swelling. Neurological: Positive for numbness. Negative for light-headedness. Prior to Visit Medications    Medication Sig Taking? Authorizing Provider   pregabalin (LYRICA) 75 MG capsule Take 1 capsule by mouth 3 times daily for 180 days.  Yes Cat Galindo MD   furosemide (LASIX) 20 MG tablet Take 1 tablet by mouth 2 times daily Yes Cat Galindo MD   sotalol (BETAPACE) 80 MG tablet Take 1 tablet by mouth 2 times daily Yes Dana Murillo MD   potassium chloride (KLOR-CON M) 20 MEQ extended release tablet Take 1 tablet by mouth daily Yes Cat Galindo MD   losartan-hydrochlorothiazide (HYZAAR) 100-25 MG per tablet TAKE ONE TABLET BY MOUTH DAILY Yes Cat Galindo MD   atorvastatin (LIPITOR) 20 MG tablet Take 1 tablet by mouth daily Yes Dana Murillo MD   acetaminophen (TYLENOL) 500 MG tablet Take 1,000 mg by mouth every 6 hours as needed for Pain or Fever Yes Historical Provider, MD   ELIQUIS 5 MG TABS tablet TAKE 1 TABLET BY MOUTH TWICE A DAY Yes Historical Provider, MD   Glucosamine-Chondroit-Vit C-Mn (GLUCOSAMINE 1500 COMPLEX PO) Take 2 tablets by mouth daily  Yes Historical Provider, MD   Docusate Calcium (STOOL SOFTENER PO) Take 1 capsule by mouth 2 times daily  Yes Historical Provider, MD   aspirin 81 MG EC tablet Take 81 mg by mouth nightly Yes Historical Provider, MD   Multiple Vitamins-Minerals (CENTRUM SILVER PO) Take by mouth daily  Yes Historical Provider, MD   ondansetron (ZOFRAN) 4 MG tablet Take 1 tablet by mouth 3 times daily as needed for Nausea or Vomiting  Patient not taking: Reported on 9/18/2020  Toni Sheehan MD   fluticasone (FLONASE) 50 MCG/ACT nasal spray 1 spray by Nasal route 2 times daily As needed  Patient not taking: Reported on 9/18/2020  Toni Sheehan MD   nitroGLYCERIN (NITROSTAT) 0.4 MG SL tablet Place 0.4 mg under the tongue every 5 minutes as needed for Chest pain Take 1 tablet for chest pain and repeat after 5 min if no relief, call 911 and take another dose 5 min later. Max of 3 doses in 15 min. Historical Provider, MD        Past Medical History:   Diagnosis Date    Arthritis     Atrial fibrillation ([facility] [address] 08/2016    Breast cancer [facility]) 2012    Deejya Cortez    CAD (coronary artery disease)     GERD (gastroesophageal reflux disease) 2001    dx 2001. but has never had an issue. requires no meds    H/O: whooping cough 1945    History of shingles     1 year after recieving vaccine     Hypercholesterolemia 2010    Hypertension 1987    Iron deficiency 08/2016    PONV (postoperative nausea and vomiting)     Sleep apnea 06/2018    Vertigo 1957    as a child       Past Surgical History:   Procedure Laterality Date    ABDOMINAL HERNIA REPAIR  2011   [address] SURGERY  11/01/2019    BACK SURGERY  2009    disk replacement     BREAST LUMPECTOMY Right 2012    cancer    BREAST SURGERY Left 2001    benign tumor left breast 2001    BREAST SURGERY Left 1967, 1992    cyst in left    CARDIAC CATHETERIZATION  09/08/2016    3 Stents-at [facility]  11/07/2019    COLONOSCOPY  2015    FOOT SURGERY Left 1990    heal spur    HEMORRHOID SURGERY  09/01/2016    3 removed.     [address]    HYSTERECTOMY  1994    UPPER GASTROINTESTINAL ENDOSCOPY  2006    VENTRICULAR ABLATION SURGERY  11/01/2019 Social History     Tobacco Use    Smoking status: Former Smoker     Packs/day: 1.00     Years: 2.00     Pack years: 2.00     Types: Cigarettes     Last attempt to quit: 1995     Years since quittin.7    Smokeless tobacco: Never Used   Substance Use Topics    Alcohol use: No        Family History   Problem Relation Age of Onset    Cancer Mother         breast    High Blood Pressure Mother     Diabetes Mother     Heart Disease Mother         pacemaker   [facility] Stroke Mother     High Blood Pressure Father     Cancer Sister         colon    Cancer Sister         breast    Stroke Brother     Heart Disease Brother     Cancer Sister         breast       Vitals:    20 1112 20 1113   BP: (!) 140/80 (!) 140/78   Pulse: (!) 48    Temp: 97.5 °F (36.4 °C)    SpO2: 97%    Weight: 233 lb 12.8 oz (106.1 kg)    Height: 5' 4\" (1.626 m)      Estimated body mass index is 40.13 kg/m² as calculated from the following:    Height as of this encounter: 5' 4\" (1.626 m). Weight as of this encounter: 233 lb 12.8 oz (106.1 kg). Physical Exam  Vitals signs reviewed. Constitutional:       General: She is not in acute distress. Appearance: She is well-developed. HENT:      Head: Normocephalic and atraumatic. Cardiovascular:      Rate and Rhythm: Regular rhythm. Bradycardia present. Heart sounds: Normal heart sounds. Pulmonary:      Effort: Pulmonary effort is normal. No respiratory distress. Breath sounds: Normal breath sounds. Musculoskeletal:      Right lower leg: No edema. Left lower leg: No edema. Skin:     General: Skin is warm and dry. Neurological:      Mental Status: She is alert. Psychiatric:         Mood and Affect: Mood normal.         Behavior: Behavior normal.         Thought Content: Thought content normal.         Judgment: Judgment normal.         ASSESSMENT/PLAN:  1.  Neuropathy  - improved  - OARRS reviewed  - continue pregabalin 75 mg TID  - pregabalin (LYRICA) 75 MG capsule; Take 1 capsule by mouth 3 times daily for 180 days. Dispense: 270 capsule; Refill: 1    2. AF (paroxysmal atrial fibrillation) (HCC)  - stable, HR improved  - following up with cardiologist  - Comprehensive Metabolic Panel; Future  - CBC Auto Differential; Future  - Lipid Panel; Future    3. Essential hypertension  - above goal today. She will be seeing her cardiologist in 2 weeks, will monitor BP at home, if persistently high antihypertensive regimen will need to be adjusted  - Comprehensive Metabolic Panel; Future  - CBC Auto Differential; Future  - Lipid Panel; Future      Return in about 3 months (around 12/18/2020).

## 2020-10-12 RX ORDER — SOTALOL HYDROCHLORIDE 80 MG/1
TABLET ORAL
Qty: 60 TABLET | Refills: 3
Start: 2020-10-12 | End: 2021-01-21

## 2020-10-27 NOTE — TELEPHONE ENCOUNTER
Pt called in for medication refill ELIQUIS 5 MG TABS tablet     Last refill: 10-13-17   Last visit:08-21-20  Next visit:11-13-20    atorvastatin (LIPITOR) 20 MG tablet     Last refill:01-10-20  Last visit:08-21-20  Next visit:11-13-20    [address] [facility]   [phone]

## 2020-10-29 RX ORDER — ATORVASTATIN CALCIUM 20 MG/1
20 TABLET, FILM COATED ORAL DAILY
Qty: 90 TABLET | Refills: 3 | Status: SHIPPED | OUTPATIENT
Start: 2020-10-29 | End: 2021-01-11 | Stop reason: SDUPTHER

## 2020-10-29 RX ORDER — APIXABAN 5 MG/1
TABLET, FILM COATED ORAL
Qty: 60 TABLET | Refills: 3 | Status: SHIPPED | OUTPATIENT
Start: 2020-10-29 | End: 2021-02-23

## 2020-11-13 ENCOUNTER — OFFICE VISIT (OUTPATIENT)
Dept: CARDIOLOGY CLINIC | Age: 75
End: 2020-11-13
Payer: MEDICARE

## 2020-11-13 VITALS
TEMPERATURE: 97.5 F | WEIGHT: 230 LBS | DIASTOLIC BLOOD PRESSURE: 70 MMHG | SYSTOLIC BLOOD PRESSURE: 132 MMHG | BODY MASS INDEX: 39.48 KG/M2 | HEART RATE: 51 BPM

## 2020-11-13 PROCEDURE — 99214 OFFICE O/P EST MOD 30 MIN: CPT | Performed by: INTERNAL MEDICINE

## 2020-11-13 ASSESSMENT — ENCOUNTER SYMPTOMS
RESPIRATORY NEGATIVE: 1
EYES NEGATIVE: 1
ALLERGIC/IMMUNOLOGIC NEGATIVE: 1
GASTROINTESTINAL NEGATIVE: 1

## 2020-11-13 NOTE — PROGRESS NOTES
Subjective:      Patient ID: Salty Yu is a 76 y.o. female    CC:  Fatigue and slow HR on sotalol. HPI:  Pt has recently moved to [place], and is establishing a new visit with a Cardiologist.    Pt is s/p successful AF-RFA (19) . Procedure performed at [facility]. PMH includes AF, HTN, Heart Failure. Had 3 days of AF.     Allergies   Allergen Reactions    Morphine      vomit    Tape Hagan Just Tape] Itching    Unable To Assess      Any pain med makes her sick, vomit violently  Tolerates fentanly    Vicodin [Hydrocodone-Acetaminophen] Nausea And Vomiting       Social History     Socioeconomic History    Marital status:      Spouse name: None    Number of children: None    Years of education: None    Highest education level: None   Occupational History    Occupation: retired   Social Needs    Financial resource strain: None    Food insecurity     Worry: None     Inability: None    Transportation needs     Medical: None     Non-medical: None   Tobacco Use    Smoking status: Former Smoker     Packs/day: 1.00     Years: 2.00     Pack years: 2.00     Types: Cigarettes     Last attempt to quit: 1995     Years since quittin.8    Smokeless tobacco: Never Used   Substance and Sexual Activity    Alcohol use: No    Drug use: No    Sexual activity: Not Currently   Lifestyle    Physical activity     Days per week: None     Minutes per session: None    Stress: None   Relationships    Social connections     Talks on phone: None     Gets together: None     Attends Latter-day service: None     Active member of club or organization: None     Attends meetings of clubs or organizations: None     Relationship status: None    Intimate partner violence     Fear of current or ex partner: None     Emotionally abused: None     Physically abused: None     Forced sexual activity: None   Other Topics Concern    None   Social History Narrative    None       Family History   Problem Relation Age of Onset    Cancer Mother         breast    High Blood Pressure Mother     Diabetes Mother     Heart Disease Mother         pacemaker   [facility] Stroke Mother     High Blood Pressure Father     Cancer Sister         colon    Cancer Sister         breast    Stroke Brother     Heart Disease Brother     Cancer Sister         breast        has a past medical history of Arthritis, Atrial fibrillation ([facility] [address] Breast cancer ([facility] [address] CAD (coronary artery disease), GERD (gastroesophageal reflux disease), H/O: whooping cough, History of shingles, Hypercholesterolemia, Hypertension, Iron deficiency, PONV (postoperative nausea and vomiting), Sleep apnea, and Vertigo. Review of Systems   Constitutional: Negative. HENT: Negative. Eyes: Negative. Respiratory: Negative. Cardiovascular: Negative. Gastrointestinal: Negative. Endocrine: Negative. Genitourinary: Negative. Musculoskeletal: Negative. Skin: Negative. Allergic/Immunologic: Negative. Neurological: Negative. Hematological: Negative. Psychiatric/Behavioral: Negative. Vitals:    11/13/20 1614   BP: 132/70   Pulse: 51   Temp: 97.5 °F (36.4 °C)        Echo 11/15/19  Summary   Left ventricle size is normal.   Normal left ventricular wall thickness. Ejection fraction is visually estimated in the range of 55% to 60%. There were no regional wall motion abnormalities. There is a small circumferential pericardial effusion noted. Vitals:    11/13/20 1614   BP: 132/70   Pulse: 51   Temp: 97.5 °F (36.4 °C)         Objective:   Physical Exam  HENT:      Head: Normocephalic and atraumatic. Mouth/Throat:      Mouth: Mucous membranes are dry. Eyes:      Extraocular Movements: Extraocular movements intact. Pupils: Pupils are equal, round, and reactive to light. Neck:      Musculoskeletal: Normal range of motion and neck supple.    Cardiovascular:      Rate and Rhythm: Normal rate and regular rhythm. Pulses: Normal pulses. Heart sounds: Normal heart sounds. Pulmonary:      Effort: Pulmonary effort is normal.      Breath sounds: Normal breath sounds. Abdominal:      General: Abdomen is flat. There is no distension. Palpations: Abdomen is soft. Hernia: A hernia is present. Skin:     General: Skin is warm and dry. Capillary Refill: Capillary refill takes 2 to 3 seconds. Neurological:      General: No focal deficit present. Mental Status: She is oriented to person, place, and time. Psychiatric:         Mood and Affect: Mood normal.         Behavior: Behavior normal.         Thought Content: Thought content normal.         Current Outpatient Medications   Medication Sig Dispense Refill    ELIQUIS 5 MG TABS tablet TAKE 1 TABLET BY MOUTH TWICE A DAY 60 tablet 3    atorvastatin (LIPITOR) 20 MG tablet Take 1 tablet by mouth daily 90 tablet 3    sotalol (BETAPACE) 80 MG tablet Take one half tablet (40 mg) two times a day 60 tablet 3    pregabalin (LYRICA) 75 MG capsule Take 1 capsule by mouth 3 times daily for 180 days.  270 capsule 1    furosemide (LASIX) 20 MG tablet Take 1 tablet by mouth 2 times daily 180 tablet 1    potassium chloride (KLOR-CON M) 20 MEQ extended release tablet Take 1 tablet by mouth daily 90 tablet 1    losartan-hydrochlorothiazide (HYZAAR) 100-25 MG per tablet TAKE ONE TABLET BY MOUTH DAILY 90 tablet 1    ondansetron (ZOFRAN) 4 MG tablet Take 1 tablet by mouth 3 times daily as needed for Nausea or Vomiting 20 tablet 0    fluticasone (FLONASE) 50 MCG/ACT nasal spray 1 spray by Nasal route 2 times daily As needed 1 Bottle 2    acetaminophen (TYLENOL) 500 MG tablet Take 1,000 mg by mouth every 6 hours as needed for Pain or Fever      Glucosamine-Chondroit-Vit C-Mn (GLUCOSAMINE 1500 COMPLEX PO) Take 2 tablets by mouth daily       Docusate Calcium (STOOL SOFTENER PO) Take 1 capsule by mouth 2 times daily       nitroGLYCERIN (NITROSTAT) 0.4 MG SL tablet Place 0.4 mg under the tongue every 5 minutes as needed for Chest pain Take 1 tablet for chest pain and repeat after 5 min if no relief, call 911 and take another dose 5 min later. Max of 3 doses in 15 min.  aspirin 81 MG EC tablet Take 81 mg by mouth nightly       Multiple Vitamins-Minerals (CENTRUM SILVER PO) Take by mouth daily        No current facility-administered medications for this visit. Assessment:          Diagnosis Orders   1. AF (paroxysmal atrial fibrillation) ([facility] [address]     2. Essential hypertension            Plan:      1 AF:    S/p ablation on sotalol and eliquis but in SR but valerie in 45s. Taking sotalol to 40 bid. Some valerie and some af breakthru but not sustained needs to see EPS.   2  SOB:  Will check echo and MPI. 3. RENE  Not wearing mask. 4 near syncope:  No rhythm disturbance. Still has palpitations. 5.  bnp 8143 on 11/20/19. No lasix since hospitalization. Would resume lasix 20 mg daily. Feels better check labs.

## 2020-11-23 ENCOUNTER — TELEPHONE (OUTPATIENT)
Dept: CARDIOLOGY CLINIC | Age: 75
End: 2020-11-23

## 2020-11-24 ENCOUNTER — TELEPHONE (OUTPATIENT)
Dept: CARDIOLOGY CLINIC | Age: 75
End: 2020-11-24

## 2020-11-24 NOTE — TELEPHONE ENCOUNTER
The patient's daughter Jeremiah Reid called stating that she was unable to schedule the patient's echo due to the diagnosis code not meeting medical necessity. Please call  Jeremiah Reid back at [phone] once the order is updated, so she can schedule the patients echo.

## 2020-11-24 NOTE — TELEPHONE ENCOUNTER
Spoke with patient's daughter to let her know the order has been corrected to reflect proper diagnosis to meet medical necessity. She thanked us for letting her know and handling this so quickly.

## 2020-12-14 ENCOUNTER — TELEPHONE (OUTPATIENT)
Dept: INTERNAL MEDICINE CLINIC | Age: 75
End: 2020-12-14

## 2020-12-14 NOTE — TELEPHONE ENCOUNTER
There's a couple of options - she's scheduled to see me later in the week, why don't we discuss it then and decide which to try

## 2020-12-15 DIAGNOSIS — I10 ESSENTIAL HYPERTENSION: ICD-10-CM

## 2020-12-15 DIAGNOSIS — I48.0 AF (PAROXYSMAL ATRIAL FIBRILLATION) (HCC): ICD-10-CM

## 2020-12-15 LAB
A/G RATIO: 1.7 (ref 1.1–2.2)
ALBUMIN SERPL-MCNC: 4.1 G/DL (ref 3.4–5)
ALP BLD-CCNC: 105 U/L (ref 40–129)
ALT SERPL-CCNC: 17 U/L (ref 10–40)
ANION GAP SERPL CALCULATED.3IONS-SCNC: 11 MMOL/L (ref 3–16)
AST SERPL-CCNC: 23 U/L (ref 15–37)
BASOPHILS ABSOLUTE: 0.2 K/UL (ref 0–0.2)
BASOPHILS RELATIVE PERCENT: 2.3 %
BILIRUB SERPL-MCNC: 0.8 MG/DL (ref 0–1)
BUN BLDV-MCNC: 23 MG/DL (ref 7–20)
CALCIUM SERPL-MCNC: 9.5 MG/DL (ref 8.3–10.6)
CHLORIDE BLD-SCNC: 101 MMOL/L (ref 99–110)
CHOLESTEROL, TOTAL: 154 MG/DL (ref 0–199)
CO2: 30 MMOL/L (ref 21–32)
CREAT SERPL-MCNC: 0.8 MG/DL (ref 0.6–1.2)
EOSINOPHILS ABSOLUTE: 0.1 K/UL (ref 0–0.6)
EOSINOPHILS RELATIVE PERCENT: 2 %
GFR AFRICAN AMERICAN: >60
GFR NON-AFRICAN AMERICAN: >60
GLOBULIN: 2.4 G/DL
GLUCOSE BLD-MCNC: 114 MG/DL (ref 70–99)
HCT VFR BLD CALC: 42.8 % (ref 36–48)
HDLC SERPL-MCNC: 48 MG/DL (ref 40–60)
HEMOGLOBIN: 14.3 G/DL (ref 12–16)
LDL CHOLESTEROL CALCULATED: 59 MG/DL
LYMPHOCYTES ABSOLUTE: 1.2 K/UL (ref 1–5.1)
LYMPHOCYTES RELATIVE PERCENT: 16 %
MCH RBC QN AUTO: 28.3 PG (ref 26–34)
MCHC RBC AUTO-ENTMCNC: 33.5 G/DL (ref 31–36)
MCV RBC AUTO: 84.6 FL (ref 80–100)
MONOCYTES ABSOLUTE: 0.4 K/UL (ref 0–1.3)
MONOCYTES RELATIVE PERCENT: 6.1 %
NEUTROPHILS ABSOLUTE: 5.3 K/UL (ref 1.7–7.7)
NEUTROPHILS RELATIVE PERCENT: 73.6 %
PDW BLD-RTO: 13.9 % (ref 12.4–15.4)
PLATELET # BLD: 251 K/UL (ref 135–450)
PMV BLD AUTO: 8.6 FL (ref 5–10.5)
POTASSIUM SERPL-SCNC: 4 MMOL/L (ref 3.5–5.1)
RBC # BLD: 5.06 M/UL (ref 4–5.2)
SODIUM BLD-SCNC: 142 MMOL/L (ref 136–145)
TOTAL PROTEIN: 6.5 G/DL (ref 6.4–8.2)
TRIGL SERPL-MCNC: 237 MG/DL (ref 0–150)
VLDLC SERPL CALC-MCNC: 47 MG/DL
WBC # BLD: 7.3 K/UL (ref 4–11)

## 2020-12-18 ENCOUNTER — OFFICE VISIT (OUTPATIENT)
Dept: INTERNAL MEDICINE CLINIC | Age: 75
End: 2020-12-18
Payer: MEDICARE

## 2020-12-18 VITALS
SYSTOLIC BLOOD PRESSURE: 126 MMHG | WEIGHT: 228.8 LBS | HEIGHT: 64 IN | HEART RATE: 53 BPM | TEMPERATURE: 98 F | OXYGEN SATURATION: 98 % | DIASTOLIC BLOOD PRESSURE: 70 MMHG | BODY MASS INDEX: 39.06 KG/M2

## 2020-12-18 PROCEDURE — 99214 OFFICE O/P EST MOD 30 MIN: CPT | Performed by: INTERNAL MEDICINE

## 2020-12-18 RX ORDER — POTASSIUM CHLORIDE 20 MEQ/1
20 TABLET, EXTENDED RELEASE ORAL DAILY
Qty: 90 TABLET | Refills: 1 | Status: SHIPPED | OUTPATIENT
Start: 2020-12-18 | End: 2021-06-25

## 2020-12-18 RX ORDER — LOSARTAN POTASSIUM AND HYDROCHLOROTHIAZIDE 25; 100 MG/1; MG/1
TABLET ORAL
Qty: 90 TABLET | Refills: 1 | Status: SHIPPED | OUTPATIENT
Start: 2020-12-18 | End: 2021-06-25

## 2020-12-18 RX ORDER — FUROSEMIDE 20 MG/1
20 TABLET ORAL 2 TIMES DAILY
Qty: 180 TABLET | Refills: 1 | Status: SHIPPED | OUTPATIENT
Start: 2020-12-18 | End: 2021-06-08 | Stop reason: SDUPTHER

## 2020-12-18 SDOH — ECONOMIC STABILITY: FOOD INSECURITY: WITHIN THE PAST 12 MONTHS, THE FOOD YOU BOUGHT JUST DIDN'T LAST AND YOU DIDN'T HAVE MONEY TO GET MORE.: NEVER TRUE

## 2020-12-18 SDOH — ECONOMIC STABILITY: INCOME INSECURITY: HOW HARD IS IT FOR YOU TO PAY FOR THE VERY BASICS LIKE FOOD, HOUSING, MEDICAL CARE, AND HEATING?: NOT HARD AT ALL

## 2020-12-18 SDOH — ECONOMIC STABILITY: FOOD INSECURITY: WITHIN THE PAST 12 MONTHS, YOU WORRIED THAT YOUR FOOD WOULD RUN OUT BEFORE YOU GOT MONEY TO BUY MORE.: NEVER TRUE

## 2020-12-18 SDOH — ECONOMIC STABILITY: TRANSPORTATION INSECURITY
IN THE PAST 12 MONTHS, HAS LACK OF TRANSPORTATION KEPT YOU FROM MEETINGS, WORK, OR FROM GETTING THINGS NEEDED FOR DAILY LIVING?: NOT ASKED

## 2020-12-18 SDOH — ECONOMIC STABILITY: TRANSPORTATION INSECURITY
IN THE PAST 12 MONTHS, HAS THE LACK OF TRANSPORTATION KEPT YOU FROM MEDICAL APPOINTMENTS OR FROM GETTING MEDICATIONS?: NOT ASKED

## 2020-12-18 ASSESSMENT — ENCOUNTER SYMPTOMS: SHORTNESS OF BREATH: 0

## 2020-12-18 NOTE — PROGRESS NOTES
2020     Tameka Stovall (:  1945) is a 76 y.o. female, here for evaluation of the following medical concerns:    Chief Complaint   Patient presents with    Hypertension     3 month follow up    Medication Management     lyrica    Medication Refill        HPI    Hypertension -adherent to medication. No chest pain, shortness of breath, or leg swelling. Neuropathy -she is taking the Lyrica 75 mg 3 times a day. During the day her symptoms are manageable but at night it is more severe. Having more trouble sleeping. A. Fib -she was referred to EP by her cardiologist due to the bradycardia, sooner to keep her out of A. fib without causing bradycardia. She has her mammogram scheduled for January, and is a little overdue. Review of Systems   Respiratory: Negative for shortness of breath. Cardiovascular: Negative for leg swelling. Neurological: Positive for numbness. Prior to Visit Medications    Medication Sig Taking? Authorizing Provider   furosemide (LASIX) 20 MG tablet Take 1 tablet by mouth 2 times daily Yes David Ortiz MD   losartan-hydroCHLOROthiazide (HYZAAR) 100-25 MG per tablet TAKE ONE TABLET BY MOUTH DAILY Yes David Ortiz MD   potassium chloride (KLOR-CON M) 20 MEQ extended release tablet Take 1 tablet by mouth daily Yes David Ortiz MD   ELIQUIS 5 MG TABS tablet TAKE 1 TABLET BY MOUTH TWICE A DAY Yes Cary Messina MD   atorvastatin (LIPITOR) 20 MG tablet Take 1 tablet by mouth daily Yes Cary Messina MD   sotalol (BETAPACE) 80 MG tablet Take one half tablet (40 mg) two times a day Yes Cary Messina MD   pregabalin (LYRICA) 75 MG capsule Take 1 capsule by mouth 3 times daily for 180 days.  Yes David Ortiz MD   ondansetron (ZOFRAN) 4 MG tablet Take 1 tablet by mouth 3 times daily as needed for Nausea or Vomiting Yes David Ortiz MD   fluticasone (FLONASE) 50 MCG/ACT nasal spray 1 spray by Nasal route 2 times daily As needed Yes David Ortiz MD acetaminophen (TYLENOL) 500 MG tablet Take 1,000 mg by mouth every 6 hours as needed for Pain or Fever Yes Historical Provider, MD   Glucosamine-Chondroit-Vit C-Mn (GLUCOSAMINE 1500 COMPLEX PO) Take 2 tablets by mouth daily  Yes Historical Provider, MD   Docusate Calcium (STOOL SOFTENER PO) Take 1 capsule by mouth 2 times daily  Yes Historical Provider, MD   nitroGLYCERIN (NITROSTAT) 0.4 MG SL tablet Place 0.4 mg under the tongue every 5 minutes as needed for Chest pain Take 1 tablet for chest pain and repeat after 5 min if no relief, call 911 and take another dose 5 min later. Max of 3 doses in 15 min. Yes Historical Provider, MD   aspirin 81 MG EC tablet Take 81 mg by mouth nightly  Yes Historical Provider, MD   Multiple Vitamins-Minerals (CENTRUM SILVER PO) Take by mouth daily  Yes Historical Provider, MD        Past Medical History:   Diagnosis Date    Arthritis     Atrial fibrillation ([facility] [address] 08/2016    Breast cancer ([facility] [address] 2012    Author [place]    CAD (coronary artery disease)     GERD (gastroesophageal reflux disease) 2001    dx 2001. but has never had an issue. requires no meds    H/O: whooping cough 1945    History of shingles     1 year after recieving vaccine     Hypercholesterolemia 2010    Hypertension 1987    Iron deficiency 08/2016    PONV (postoperative nausea and vomiting)     Sleep apnea 06/2018    Vertigo 1957    as a child       Past Surgical History:   Procedure Laterality Date    ABDOMINAL HERNIA REPAIR  2011   [address] SURGERY  11/01/2019    BACK SURGERY  2009    disk replacement     BREAST LUMPECTOMY Right 2012    cancer    BREAST SURGERY Left 2001    benign tumor left breast 2001    BREAST SURGERY Left 1967, 1992    cyst in left    CARDIAC CATHETERIZATION  09/08/2016    3 Stents-at [facility]  11/07/2019    COLONOSCOPY  2015    FOOT SURGERY Left 1990    heal spur    HEMORRHOID SURGERY  09/01/2016    3 removed.     [id number] [address]    UPPER GASTROINTESTINAL ENDOSCOPY  2006    VENTRICULAR ABLATION SURGERY  2019       Social History     Tobacco Use    Smoking status: Former Smoker     Packs/day: 1.00     Years: 2.00     Pack years: 2.00     Types: Cigarettes     Quit date: 1995     Years since quittin.9    Smokeless tobacco: Never Used   Substance Use Topics    Alcohol use: No        Family History   Problem Relation Age of Onset    Cancer Mother         breast    High Blood Pressure Mother     Diabetes Mother     Heart Disease Mother         pacemaker    Stroke Mother     High Blood Pressure Father     Cancer Sister         colon    Cancer Sister         breast    Stroke Brother     Heart Disease Brother     Cancer Sister         breast       Vitals:    20 1124   BP: 126/70   Site: Left Upper Arm   Pulse: 53   Temp: 98 °F (36.7 °C)   SpO2: 98%   Weight: 228 lb 12.8 oz (103.8 kg)   Height: 5' 4\" (1.626 m)     Estimated body mass index is 39.27 kg/m² as calculated from the following:    Height as of this encounter: 5' 4\" (1.626 m). Weight as of this encounter: 228 lb 12.8 oz (103.8 kg). Physical Exam  Vitals signs reviewed. Constitutional:       General: She is not in acute distress. Appearance: Normal appearance. She is well-developed. HENT:      Head: Normocephalic and atraumatic. Cardiovascular:      Rate and Rhythm: Normal rate and regular rhythm. Heart sounds: Normal heart sounds. Pulmonary:      Effort: Pulmonary effort is normal. No respiratory distress. Breath sounds: Normal breath sounds. Musculoskeletal:      Right lower leg: No edema. Left lower leg: No edema. Skin:     General: Skin is warm and dry. Neurological:      General: No focal deficit present. Mental Status: She is alert and oriented to person, place, and time.    Psychiatric:         Mood and Affect: Mood normal.         Behavior: Behavior normal. Thought Content: Thought content normal.         Judgment: Judgment normal.         ASSESSMENT/PLAN:  1. Essential hypertension  -Stable, continue current medication  - losartan-hydroCHLOROthiazide (HYZAAR) 100-25 MG per tablet; TAKE ONE TABLET BY MOUTH DAILY  Dispense: 90 tablet; Refill: 1    2. Neuropathy  -Since her symptoms are less severe at night, will try changing administration time of Lyrica to 75 mg in the morning and 150 mg at night.  -If this is not adequate, then can titrate up further on the daily dose    3. Chronic atrial fibrillation (HCC)  -Stable, still having bradycardia. Will be seeing EP    4. Malignant neoplasm of lower-outer quadrant of right breast of female, estrogen receptor positive ([facility] [address]  -Mammogram scheduled      Return in about 3 months (around 3/18/2021) for neuropathy.

## 2021-01-08 ENCOUNTER — HOSPITAL ENCOUNTER (OUTPATIENT)
Dept: MAMMOGRAPHY | Age: 76
Discharge: HOME | End: 2021-01-08
Payer: MEDICARE

## 2021-01-08 ENCOUNTER — NURSE ONLY (OUTPATIENT)
Dept: CARDIOLOGY CLINIC | Age: 76
End: 2021-01-08

## 2021-01-08 ENCOUNTER — HOSPITAL ENCOUNTER (OUTPATIENT)
Dept: NON INVASIVE DIAGNOSTICS | Age: 76
Discharge: HOME | End: 2021-01-08
Payer: MEDICARE

## 2021-01-08 DIAGNOSIS — I48.91 ATRIAL FIBRILLATION, UNSPECIFIED TYPE (HCC): ICD-10-CM

## 2021-01-08 DIAGNOSIS — Z12.31 VISIT FOR SCREENING MAMMOGRAM: ICD-10-CM

## 2021-01-08 DIAGNOSIS — R06.02 SOB (SHORTNESS OF BREATH): ICD-10-CM

## 2021-01-08 DIAGNOSIS — R00.1 BRADYCARDIA: ICD-10-CM

## 2021-01-08 DIAGNOSIS — I10 ESSENTIAL HYPERTENSION: ICD-10-CM

## 2021-01-08 LAB
LV EF: 58 %
LVEF MODALITY: NORMAL

## 2021-01-08 PROCEDURE — 93242 EXT ECG>48HR<7D RECORDING: CPT | Performed by: INTERNAL MEDICINE

## 2021-01-08 PROCEDURE — 77063 BREAST TOMOSYNTHESIS BI: CPT

## 2021-01-08 PROCEDURE — 93306 TTE W/DOPPLER COMPLETE: CPT

## 2021-01-08 PROCEDURE — 93356 MYOCRD STRAIN IMG SPCKL TRCK: CPT

## 2021-01-11 ENCOUNTER — TELEPHONE (OUTPATIENT)
Dept: CARDIOLOGY CLINIC | Age: 76
End: 2021-01-11

## 2021-01-11 RX ORDER — ATORVASTATIN CALCIUM 20 MG/1
20 TABLET, FILM COATED ORAL DAILY
Qty: 90 TABLET | Refills: 3 | Status: SHIPPED | OUTPATIENT
Start: 2021-01-11 | End: 2022-01-07

## 2021-01-11 NOTE — TELEPHONE ENCOUNTER
[facility] Medication Refills:        atorvastatin (LIPITOR) 20 MG tablet  Take 1 tablet by mouth daily, Disp-90 tablet,R-3    [facility] [address] [phone]    Last Office Visit:  11/13/20     Next Office Visit: 01/21/21    Last Refill: 10/29/20    Last Labs: 12/15/20

## 2021-01-12 NOTE — PROGRESS NOTES
[address]   Cardiac Electrophysiology Consultation   Date: 1/21/2021  Reason for Consultation: AF  Consult Requesting Physician: No att. providers found     Chief Complaint:   Chief Complaint   Patient presents with    Atrial Fibrillation      HPI: Mya Allred is a 76 y.o. female referred by Dr. Phillip Shipman for AF. PMH significant for HTN, RENE, HLD, CAD, and PAF / AFL, s/p AFL ablation [place]) and started on sotalol at this time, s/p cryoablation for Afib in 12/2018, s/p RFA/PVI of 11/1/19 ([facility]) with recurrence of AF post-procedure and initiated on sotalol with DCCV on 11/18/19. Her sotalol has been steadily decreased due to bradycardia, currently taking 20 mg BID.  7 day monitor (1/2020) showed SB with average HR (51 (36-88), but no AF. Interval History: Today, she is here today with her daughter for management of AF, presenting in SB, rate 48, with stable QTc. She states that she has always had a low HR and when it gets to 45 bpm or less, she is symptomatic with fatigue and dizziness. She reports her last episode of breakthrough of AF was around [place] time, but lasted only 2 hrs. When in AF, she feels fatigued, palpitations, and lightheadedness. She admits to being diagnosed with RENE; however, after her ablation, she has not been able to tolerate the CPAP. The daughter is unsure if the pt snores. Atrial Fibrillation:    BMI    :   Body mass index is 39.31 kg/m².     Duration   :   Longstanding    Symptoms   :  fatigue, palpitations, lightheadedness, decline in his functional capacity    Previous DCCV :  11/18/19    Previous AAD           :   Sotalol started     Beta blocker  :   sotalol    ACE / ARB  :   losartan    OAC   :   Eliquis    LVEF    :   55-60%    Left atrial size :   4.6 cm    RENE   :   Yes, noncompliant with CPAP    Past Medical History:   Diagnosis Date    Arthritis     Atrial fibrillation ([facility] [address] 08/2016    [facility]    [place]  CAD (coronary artery disease)     GERD (gastroesophageal reflux disease) 2001    dx 2001. but has never had an issue. requires no meds    H/O: whooping cough 1945    History of shingles     1 year after recieving vaccine     Hypercholesterolemia 2010    Hypertension 1987    Iron deficiency 08/2016    PONV (postoperative nausea and vomiting)     Sleep apnea 06/2018    Vertigo 1957    as a child        Past Surgical History:   Procedure Laterality Date    ABDOMINAL HERNIA REPAIR  2011   [address]GERY  11/01/2019    BACK SURGERY  2009    disk replacement     BREAST LUMPECTOMY Right 2012    cancer    BREAST SURGERY Left 2001    benign tumor left breast 2001    BREAST SURGERY Left 1967, 1992    cyst in left    CARDIAC CATHETERIZATION  09/08/2016    3 Stents[facility]at [facility]  11/07/2019    COLONOSCOPY  2015    FOOT SURGERY Left 1990    heal spur    HEMORRHOID SURGERY  09/01/2016    3 removed. [facility]    UPPER GASTROINTESTINAL ENDOSCOPY  2006    VENTRICULAR ABLATION SURGERY  11/01/2019       Allergies: Allergies   Allergen Reactions    Morphine      vomit    Tape Nena Panning Tape] Itching    Unable To Assess      Any pain med makes her sick, vomit violently  Tolerates fentanly    Vicodin [Hydrocodone-Acetaminophen] Nausea And Vomiting       Medication:   Prior to Admission medications    Medication Sig Start Date End Date Taking?  Authorizing Provider   atorvastatin (LIPITOR) 20 MG tablet Take 1 tablet by mouth daily 1/11/21  Yes Rivka Knapp MD   furosemide (LASIX) 20 MG tablet Take 1 tablet by mouth 2 times daily 12/18/20  Yes Mishel Jolly MD   losartan-hydroCHLOROthiazide (HYZAAR) 100-25 MG per tablet TAKE ONE TABLET BY MOUTH DAILY 12/18/20  Yes Mishel Jloly MD   potassium chloride (KLOR-CON M) 20 MEQ extended release tablet Take 1 tablet by mouth daily 12/18/20  Yes Mishel Jolly MD ELIQUIS 5 MG TABS tablet TAKE 1 TABLET BY MOUTH TWICE A DAY 10/29/20  Yes Rivka Knapp MD   sotalol (BETAPACE) 80 MG tablet Take one half tablet (40 mg) two times a day  Patient taking differently: Take 20 mg by mouth 2 times daily Take one half tablet (40 mg) two times a day 10/12/20  Yes Rivka Knapp MD   pregabalin (LYRICA) 75 MG capsule Take 1 capsule by mouth 3 times daily for 180 days. 10/12/20 4/10/21 Yes Mishel Jolly MD   ondansetron (ZOFRAN) 4 MG tablet Take 1 tablet by mouth 3 times daily as needed for Nausea or Vomiting 6/12/20  Yes Mishel Jolly MD   acetaminophen (TYLENOL) 500 MG tablet Take 1,000 mg by mouth every 6 hours as needed for Pain or Fever   Yes Historical Provider, MD   Glucosamine-Chondroit-Vit C-Mn (GLUCOSAMINE 1500 COMPLEX PO) Take 1 tablet by mouth 2 times daily    Yes Historical Provider, MD   Docusate Calcium (STOOL SOFTENER PO) Take 1 capsule by mouth 2 times daily    Yes Historical Provider, MD   nitroGLYCERIN (NITROSTAT) 0.4 MG SL tablet Place 0.4 mg under the tongue every 5 minutes as needed for Chest pain Take 1 tablet for chest pain and repeat after 5 min if no relief, call 911 and take another dose 5 min later. Max of 3 doses in 15 min. Yes Historical Provider, MD   aspirin 81 MG EC tablet Take 81 mg by mouth nightly    Yes Historical Provider, MD   Multiple Vitamins-Minerals (CENTRUM SILVER PO) Take by mouth daily    Yes Historical Provider, MD   fluticasone (FLONASE) 50 MCG/ACT nasal spray 1 spray by Nasal route 2 times daily As needed  Patient not taking: Reported on 1/21/2021 12/12/19   Mishel Jolly MD       Social History:   reports that she quit smoking about 26 years ago. Her smoking use included cigarettes. She has a 2.00 pack-year smoking history. She has never used smokeless tobacco. She reports that she does not drink alcohol or use drugs.         Family History: family history includes Cancer in her mother, sister, sister, and sister; Diabetes in her mother; Heart Disease in her brother and mother; High Blood Pressure in her father and mother; Stroke in her brother and mother. Reviewed. Denies family history of sudden cardiac death, arrhythmia, premature CAD    Review of System:    · General ROS: negative for - chills, fever   · Psychological ROS: negative for - anxiety or depression  · Ophthalmic ROS: negative for - eye pain or loss of vision  · ENT ROS: negative for - epistaxis, headaches, nasal discharge, sore throat   · Allergy and Immunology ROS: negative for - hives, nasal congestion   · Hematological and Lymphatic ROS: negative for - bleeding problems, blood clots, bruising or jaundice  · Endocrine ROS: negative for - skin changes, temperature intolerance or unexpected weight changes  · Respiratory ROS: negative for - cough, hemoptysis, pleuritic pain, SOB, sputum changes or wheezing  · Cardiovascular ROS: Per HPI. · Gastrointestinal ROS: negative for - abdominal pain, blood in stools, diarrhea, hematemesis, melena, nausea/vomiting or swallowing difficulty/pain  · Genito-Urinary ROS: negative for - dysuria or incontinence  · Musculoskeletal ROS: negative for - joint swelling or muscle pain  · Neurological ROS: negative for - confusion, dizziness, gait disturbance, headaches, numbness/tingling, seizures, speech problems, tremors, visual changes or weakness  · Dermatological ROS: negative for - rash    Physical Examination:  Vitals:    01/21/21 0904   Temp: 97.1 °F (36.2 °C)       · Constitutional: Oriented. No distress. · Head: Normocephalic and atraumatic. · Mouth/Throat: Oropharynx is clear and moist.   · Eyes: Conjunctivae normal. EOM are normal.   · Neck: Normal range of motion. Neck supple. No rigidity. No JVD present. · Cardiovascular: Normal rate, regular rhythm, S1&S2 and intact distal pulses. · Pulmonary/Chest: Bilateral respiratory sounds. No wheezes. No rhonchi. · Abdominal: Soft. Bowel sounds present. No distension, No tenderness. · Musculoskeletal: No tenderness. No edema    · Lymphadenopathy: Has no cervical adenopathy. · Neurological: Alert and oriented. Cranial nerve appears intact, No Gross deficit   · Skin: Skin is warm and dry. No rash noted. · Psychiatric: Has a normal mood, affect and behavior     Labs:  Reviewed. ECG: reviewed, SB, rate 48, with QRS duration 106 ms, QTc 459 ms. No pathologic Q waves, ventricular pre-excitation, or QT prolongation. Studies:   1. 7 day CAM (1/8-1/15/21):  SB with average HR 51 (36-88), 22 runs of AT with longest lasting 21 beats and max rate of 113 bpm.  PVC <1%    2. Echo 1/8/21:   Summary   Normal left ventricle size and systolic function with an estimated ejection   fraction of 55-60%. Mild concentric left ventricular hypertrophy. No regional wall motion abnormalities are seen. Grade II diastolic dysfunction with elevated LV filling pressures. Global strain: -21.7%   The left atrium is dilated. Mild mitral regurgitation. Trivial aortic regurgitation. Trivial tricuspid regurgitation. The pulmonic valve is not well visualized. Estimated pulmonary artery systolic pressure is normal at 37 mmHg assuming a   right atrial pressure of 3 mmHg. 3. Stress Test 2/12/20:    Summary    There is symmetric isotope uptake at stress and rest. There is no evidence    of myocardial ischemia.    The LVEF is 74% with normal LV wall motion.        This is a low risk cardiac scan.         4. Cath 6/7/18: The coronary angiogram showed the RCA is a dominant artery. RCA _____  intervention and the ostium of the RCA was patent. Distally the RCA was  patent. Left main was patent, bifurcates to the LAD and circumflex. Circumflex is a codominant artery and was patent.   The LAD does exhibit about 20-50% narrowing proximally. Distally circumflex was patent. The left ventriculogram showed a preserved systolic function of the left  ventricle. No mitral regurg. No gradient across the aortic valve. Left  ventricular end-diastolic pressure was 19. In summary, preserved systolic  function of the left ventricle, ejection fraction of about 55%. No mitral  regurg. No gradient across the aortic valve. Stented area of the RCA is still patent. Distally the RCA was patent. RCA  is a dominant artery. Patent left main. Nonobstructive disease of the proximal LAD. Distally the LAD was patent. Patent codominant circumflex. Preserved systolic function of left ventricle, ejection fraction was 25%   Diastolic dysfunction of the left ventricle. The MCOT, echocardiogram, stress test, and coronary angiography/PCI were reviewed by myself and used for my plan of care. Procedures:  1.  none    Assessment/Plan:   1. PAF / AFL, s/p AFL ablation, s/p cryoablation in 12/2018, s/p RFA/PVI of 11/1/19 ([facility]), on sotalol and Eliquis  2. HTN, stable on losartan  3. RENE, noncomplaint with CPAP  4. HLD, stable on statin  5. CAD   6.   Bradycardia As far as her atrial fibrillation is concerned, she had a cryoablation followed by she had recurrence in 3 months. Time, she was symptomatic and hence she was ablated with radiofrequency in November 2019. At the time, she had reisolation of the RI PV and posterior wall, along with CTI line. Postprocedure, she also have recurrent A. fib and possible pericarditis. Hence, she was initiated on sotalol 120 mg p.o. every 12 hours and on steroids. She underwent cardioversion after getting loaded with sotalol. After that, she did reasonably well for the next few months. However, she is had some personal stressors and started to have recurrent episodes of atrial fibrillation during which she was symptomatic with lightheadedness, shortness of breath and palpitations. These episodes are long lasting. In other hand, when she is in sinus rhythm, she has significant bradycardia, down to 40 bpm and she was symptomatic with fatigability when her heart rate was low. Hence, over a period of time, her sotalol has been reduced from 120, to 80 mg, 40 mg twice daily and currently she is on sotalol 20 mg BID. This current dose is not effective in treating AF; however, it is lowering her HR. hence, we will stop sotalol from today. As she has not had recurrence of AF for over a month, it is reasonable to monitor for now. If her AF becomes more frequent, will proceed with rhythm management at that time. Secondary to history of CAD, status post PCI, she is not a candidate for class Ic medications. If needed, she will require class III initiation, along with pacemaker secondary to tachybradycardia syndrome. Offered a referral to sleep physician for proper fitting CPAP. She has multiple masks at home and wishes to try it again. If she in not successful, we can refer to sleep clinic.     Follow up in 6 months with EP NP Thank you for allowing me to participate in the care of Ramon Varma. All questions and concerns were addressed to the patient/family. Alternatives to my treatment were discussed. Maddie Prado RN, am scribing for and in the presence of Dr. Susan Bucio. 01/21/21 9:14 AM  Sharmaine Couch RN    I, Nadir Morris MD, personally performed the services prescribed in this documentation as scribed by Ms. Sharmaine Couch RN in my presence and it is both accurate and complete.        Nadir Morris MD  Cardiac Electrophysiology  [facility]

## 2021-01-21 ENCOUNTER — OFFICE VISIT (OUTPATIENT)
Dept: CARDIOLOGY CLINIC | Age: 76
End: 2021-01-21
Payer: MEDICARE

## 2021-01-21 VITALS
TEMPERATURE: 97.1 F | SYSTOLIC BLOOD PRESSURE: 124 MMHG | BODY MASS INDEX: 39.09 KG/M2 | HEART RATE: 48 BPM | DIASTOLIC BLOOD PRESSURE: 74 MMHG | HEIGHT: 64 IN | WEIGHT: 229 LBS

## 2021-01-21 DIAGNOSIS — I50.30 DIASTOLIC HEART FAILURE, UNSPECIFIED HF CHRONICITY (HCC): ICD-10-CM

## 2021-01-21 DIAGNOSIS — E78.5 HYPERLIPIDEMIA, UNSPECIFIED HYPERLIPIDEMIA TYPE: ICD-10-CM

## 2021-01-21 DIAGNOSIS — Z86.79 S/P ABLATION OF ATRIAL FIBRILLATION: ICD-10-CM

## 2021-01-21 DIAGNOSIS — I10 ESSENTIAL HYPERTENSION: ICD-10-CM

## 2021-01-21 DIAGNOSIS — G47.33 OSA (OBSTRUCTIVE SLEEP APNEA): ICD-10-CM

## 2021-01-21 DIAGNOSIS — I48.0 AF (PAROXYSMAL ATRIAL FIBRILLATION) (HCC): Primary | ICD-10-CM

## 2021-01-21 DIAGNOSIS — Z98.890 S/P ABLATION OF ATRIAL FIBRILLATION: ICD-10-CM

## 2021-01-21 PROCEDURE — 99204 OFFICE O/P NEW MOD 45 MIN: CPT | Performed by: INTERNAL MEDICINE

## 2021-01-21 PROCEDURE — 93000 ELECTROCARDIOGRAM COMPLETE: CPT | Performed by: INTERNAL MEDICINE

## 2021-01-28 DIAGNOSIS — R00.2 PALPITATION: ICD-10-CM

## 2021-01-28 PROCEDURE — 93244 EXT ECG>48HR<7D REV&INTERPJ: CPT | Performed by: INTERNAL MEDICINE

## 2021-02-02 ENCOUNTER — HOSPITAL ENCOUNTER (OUTPATIENT)
Dept: WOMENS IMAGING | Age: 76
Discharge: HOME | End: 2021-02-02
Payer: MEDICARE

## 2021-02-02 ENCOUNTER — HOSPITAL ENCOUNTER (OUTPATIENT)
Dept: ULTRASOUND IMAGING | Age: 76
Discharge: HOME | End: 2021-02-02
Payer: MEDICARE

## 2021-02-02 DIAGNOSIS — R92.8 ABNORMAL MAMMOGRAM: ICD-10-CM

## 2021-02-02 PROCEDURE — G0279 TOMOSYNTHESIS, MAMMO: HCPCS

## 2021-02-02 PROCEDURE — 76642 ULTRASOUND BREAST LIMITED: CPT

## 2021-02-03 ENCOUNTER — TELEPHONE (OUTPATIENT)
Dept: CARDIOLOGY CLINIC | Age: 76
End: 2021-02-03

## 2021-02-03 NOTE — LETTER
[address]  [place] [address] [id number] [place] [address]. [address] [facility]  Phone: [phone]  Fax: [phone]        21      Dear Dr. Chanel Watkins,  45, may hold Eliquis for 2 days prior to her planned breast biopsy on 21. Please resume Eliquis as soon as possible. For any questions or concerns, please notify my office.     Sincerely,      Devyn Dotson M.D.

## 2021-02-03 NOTE — TELEPHONE ENCOUNTER
Natalie Castro from Dr. Pickett [facility] office called stating that the patient is going to have a Breast Biopsy on 02/11/21 and they need to know if the patient can hold her Eliquis 5 mg on 02/10/21& 02/11/21. The patient will resume her medication on 02/12/21. Please put a note in epic.     [id number]:[phone]  RQJ:[phone]

## 2021-02-05 NOTE — TELEPHONE ENCOUNTER
Per BISHOP, pt may hold Eliquis for 2 days prior to breast biopsy on 2/9/21. Letter completed. LVM for Monica from the breast center. Pt's daughter also aware.

## 2021-02-11 ENCOUNTER — HOSPITAL ENCOUNTER (OUTPATIENT)
Dept: ULTRASOUND IMAGING | Age: 76
Discharge: HOME | End: 2021-02-11
Payer: MEDICARE

## 2021-02-11 ENCOUNTER — HOSPITAL ENCOUNTER (OUTPATIENT)
Dept: MAMMOGRAPHY | Age: 76
Discharge: HOME | End: 2021-02-11
Payer: MEDICARE

## 2021-02-11 DIAGNOSIS — R93.89 ABNORMAL ULTRASOUND: ICD-10-CM

## 2021-02-11 DIAGNOSIS — R92.8 ABNORMAL MAMMOGRAM: ICD-10-CM

## 2021-02-11 PROCEDURE — 88342 IMHCHEM/IMCYTCHM 1ST ANTB: CPT

## 2021-02-11 PROCEDURE — 19083 BX BREAST 1ST LESION US IMAG: CPT

## 2021-02-11 PROCEDURE — 88305 TISSUE EXAM BY PATHOLOGIST: CPT

## 2021-02-11 PROCEDURE — A4648 IMPLANTABLE TISSUE MARKER: HCPCS

## 2021-02-11 PROCEDURE — 77065 DX MAMMO INCL CAD UNI: CPT

## 2021-02-11 PROCEDURE — 88360 TUMOR IMMUNOHISTOCHEM/MANUAL: CPT

## 2021-02-23 RX ORDER — APIXABAN 5 MG/1
TABLET, FILM COATED ORAL
Qty: 180 TABLET | Refills: 3 | Status: SHIPPED | OUTPATIENT
Start: 2021-02-23 | End: 2021-02-26

## 2021-02-23 NOTE — TELEPHONE ENCOUNTER
Requested Prescriptions     Pending Prescriptions Disp Refills    ELIQUIS 5 MG TABS tablet [Pharmacy Med Name: ELIQUIS 5 MG TABLET] 180 tablet 3     Sig: TAKE ONE TABLET BY MOUTH TWICE A DAY                Last Office Visit: 1/21/2021     Next Office Visit: 3/5/2021

## 2021-02-26 NOTE — TELEPHONE ENCOUNTER
Requested Prescriptions     Pending Prescriptions Disp Refills    ELIQUIS 5 MG TABS tablet [Pharmacy Med Name: ELIQUIS 5 MG TABLET] 60 tablet 2     Sig: TAKE ONE TABLET BY MOUTH TWICE A DAY            Last Office Visit: 1/21/2021     Next Office Visit: 3/5/2021

## 2021-03-03 RX ORDER — APIXABAN 5 MG/1
TABLET, FILM COATED ORAL
Qty: 90 TABLET | Refills: 3 | Status: SHIPPED | OUTPATIENT
Start: 2021-03-03 | End: 2021-07-09 | Stop reason: SDUPTHER

## 2021-03-05 ENCOUNTER — OFFICE VISIT (OUTPATIENT)
Dept: CARDIOLOGY CLINIC | Age: 76
End: 2021-03-05
Payer: MEDICARE

## 2021-03-05 VITALS
WEIGHT: 230.8 LBS | TEMPERATURE: 97.3 F | HEART RATE: 48 BPM | DIASTOLIC BLOOD PRESSURE: 88 MMHG | SYSTOLIC BLOOD PRESSURE: 148 MMHG | BODY MASS INDEX: 39.4 KG/M2 | HEIGHT: 64 IN

## 2021-03-05 DIAGNOSIS — I50.30 DIASTOLIC HEART FAILURE, UNSPECIFIED HF CHRONICITY (HCC): ICD-10-CM

## 2021-03-05 DIAGNOSIS — I10 ESSENTIAL HYPERTENSION: ICD-10-CM

## 2021-03-05 DIAGNOSIS — I48.0 AF (PAROXYSMAL ATRIAL FIBRILLATION) (HCC): Primary | ICD-10-CM

## 2021-03-05 PROCEDURE — 99215 OFFICE O/P EST HI 40 MIN: CPT | Performed by: INTERNAL MEDICINE

## 2021-03-05 PROCEDURE — 93000 ELECTROCARDIOGRAM COMPLETE: CPT | Performed by: INTERNAL MEDICINE

## 2021-03-05 NOTE — PROGRESS NOTES
Subjective:      Patient ID: Samuel Sanchez is a 76 y.o. female    CC:  Fatigue and slow HR on sotalol. HPI:  Pt has recently moved to [place], and is establishing a new visit with a Cardiologist.    Pt is s/p successful AF-RFA (19) . Procedure performed at [facility]. PMH includes AF, HTN, Heart Failure. Had 3 days of AF. No chest pains and can walk 1-2 flights of stairs w/o severe SOB and no chest pains. Needs breast seed implantation and lumpectomy 3/22 and 3/23/21.     Allergies   Allergen Reactions    Morphine      vomit    Tape Dyane Nordmann Tape] Itching    Unable To Assess      Any pain med makes her sick, vomit violently  Tolerates fentanly    Vicodin [Hydrocodone-Acetaminophen] Nausea And Vomiting       Social History     Socioeconomic History    Marital status:      Spouse name: None    Number of children: None    Years of education: None    Highest education level: None   Occupational History    Occupation: retired   Social Needs    Financial resource strain: Not hard at all   Socialtyze insecurity     Worry: Never true     Inability: Never true   [employer] needs     Medical: None     Non-medical: None   Tobacco Use    Smoking status: Former Smoker     Packs/day: 1.00     Years: 2.00     Pack years: 2.00     Types: Cigarettes     Quit date: 1995     Years since quittin.1    Smokeless tobacco: Never Used   Substance and Sexual Activity    Alcohol use: No    Drug use: No    Sexual activity: Not Currently   Lifestyle    Physical activity     Days per week: None     Minutes per session: None    Stress: None   Relationships    Social connections     Talks on phone: None     Gets together: None     Attends Denominational service: None     Active member of club or organization: None     Attends meetings of clubs or organizations: None     Relationship status: None    Intimate partner violence     Fear of current or ex partner: None Emotionally abused: None     Physically abused: None     Forced sexual activity: None   Other Topics Concern    None   Social History Narrative    None       Family History   Problem Relation Age of Onset    Cancer Mother         breast    High Blood Pressure Mother     Diabetes Mother     Heart Disease Mother         pacemaker   Buren Neer Stroke Mother     High Blood Pressure Father     Cancer Sister         colon    Cancer Sister         breast    Stroke Brother     Heart Disease Brother     Cancer Sister         breast        has a past medical history of Arthritis, Atrial fibrillation ([address] Breast cancer ([facility] [address] CAD (coronary artery disease), GERD (gastroesophageal reflux disease), H/O: whooping cough, History of shingles, Hypercholesterolemia, Hypertension, Iron deficiency, PONV (postoperative nausea and vomiting), Sleep apnea, and Vertigo. Review of Systems   Constitutional: Negative. HENT: Negative. Eyes: Negative. Respiratory: Negative. Cardiovascular: Negative. Gastrointestinal: Negative. Endocrine: Negative. Genitourinary: Negative. Musculoskeletal: Negative. Skin: Negative. Allergic/Immunologic: Negative. Neurological: Negative. Hematological: Negative. Psychiatric/Behavioral: Negative. Vitals:    03/05/21 1427   BP: (!) 148/88   Pulse: (!) 48   Temp: 97.3 °F (36.3 °C)        Echo 11/15/19  Summary   Left ventricle size is normal.   Normal left ventricular wall thickness. Ejection fraction is visually estimated in the range of 55% to 60%. There were no regional wall motion abnormalities. There is a small circumferential pericardial effusion noted. Vitals:    03/05/21 1427   BP: (!) 148/88   Pulse: (!) 48   Temp: 97.3 °F (36.3 °C)         Objective:   Physical Exam  HENT:      Head: Normocephalic and atraumatic. Mouth/Throat:      Mouth: Mucous membranes are dry. Eyes:      Extraocular Movements: Extraocular movements intact.       Pupils: Pupils are equal, round, and reactive to light. Neck:      Musculoskeletal: Normal range of motion and neck supple. Cardiovascular:      Rate and Rhythm: Normal rate and regular rhythm. Pulses: Normal pulses. Heart sounds: Normal heart sounds. Pulmonary:      Effort: Pulmonary effort is normal.      Breath sounds: Normal breath sounds. Abdominal:      General: Abdomen is flat. There is no distension. Palpations: Abdomen is soft. Hernia: A hernia is present. Skin:     General: Skin is warm and dry. Capillary Refill: Capillary refill takes 2 to 3 seconds. Neurological:      General: No focal deficit present. Mental Status: She is oriented to person, place, and time. Psychiatric:         Mood and Affect: Mood normal.         Behavior: Behavior normal.         Thought Content: Thought content normal.         Current Outpatient Medications   Medication Sig Dispense Refill    ELIQUIS 5 MG TABS tablet TAKE ONE TABLET BY MOUTH TWICE A DAY 90 tablet 3    atorvastatin (LIPITOR) 20 MG tablet Take 1 tablet by mouth daily 90 tablet 3    furosemide (LASIX) 20 MG tablet Take 1 tablet by mouth 2 times daily 180 tablet 1    losartan-hydroCHLOROthiazide (HYZAAR) 100-25 MG per tablet TAKE ONE TABLET BY MOUTH DAILY 90 tablet 1    potassium chloride (KLOR-CON M) 20 MEQ extended release tablet Take 1 tablet by mouth daily 90 tablet 1    pregabalin (LYRICA) 75 MG capsule Take 1 capsule by mouth 3 times daily for 180 days.  270 capsule 1    ondansetron (ZOFRAN) 4 MG tablet Take 1 tablet by mouth 3 times daily as needed for Nausea or Vomiting 20 tablet 0    acetaminophen (TYLENOL) 500 MG tablet Take 1,000 mg by mouth every 6 hours as needed for Pain or Fever      Glucosamine-Chondroit-Vit C-Mn (GLUCOSAMINE 1500 COMPLEX PO) Take 1 tablet by mouth 2 times daily       Docusate Calcium (STOOL SOFTENER PO) Take 1 capsule by mouth 2 times daily       nitroGLYCERIN (NITROSTAT) 0.4 MG SL tablet Place 0.4 mg under the tongue every 5 minutes as needed for Chest pain Take 1 tablet for chest pain and repeat after 5 min if no relief, call 911 and take another dose 5 min later. Max of 3 doses in 15 min.  aspirin 81 MG EC tablet Take 81 mg by mouth nightly       Multiple Vitamins-Minerals (CENTRUM SILVER PO) Take by mouth daily        No current facility-administered medications for this visit. Assessment:          Diagnosis Orders   1. AF (paroxysmal atrial fibrillation) ([facility])  EKG 12 Lead   2. Essential hypertension     3. Diastolic heart failure, unspecified HF chronicity ([facility])            Plan:      1 AF:    S/p ablation on sotalol and eliquis but in SR but valerie in 45s. Sotalol was stopped by EP services. .    2  SOB:  Will check echo and MPI. 3. RENE  Not wearing mask. 4 near syncope:  No rhythm disturbance. Still has palpitations. 5.  bnp 8143 on 11/20/19. No lasix since hospitalization. 6.  Stop eliquis 4 days before seed and then surgery. Hold lasix K and losartan day of procedure. ECG unchanged from 1/20 and myoview was negative and normal in 2/20 and echo then was WNL as well. .  7.  May proceed with breast surgery with acceptable cardiac risk.

## 2021-03-16 ENCOUNTER — OFFICE VISIT (OUTPATIENT)
Dept: INTERNAL MEDICINE CLINIC | Age: 76
End: 2021-03-16
Payer: MEDICARE

## 2021-03-16 VITALS
SYSTOLIC BLOOD PRESSURE: 138 MMHG | WEIGHT: 232 LBS | HEART RATE: 69 BPM | HEIGHT: 64 IN | DIASTOLIC BLOOD PRESSURE: 80 MMHG | BODY MASS INDEX: 39.61 KG/M2 | OXYGEN SATURATION: 98 % | TEMPERATURE: 97.3 F

## 2021-03-16 DIAGNOSIS — I50.30 DIASTOLIC HEART FAILURE, UNSPECIFIED HF CHRONICITY (HCC): ICD-10-CM

## 2021-03-16 DIAGNOSIS — I48.20 CHRONIC ATRIAL FIBRILLATION (HCC): ICD-10-CM

## 2021-03-16 DIAGNOSIS — Z01.818 PREOP EXAMINATION: Primary | ICD-10-CM

## 2021-03-16 DIAGNOSIS — G62.9 NEUROPATHY: ICD-10-CM

## 2021-03-16 LAB
ANION GAP SERPL CALCULATED.3IONS-SCNC: 10 MMOL/L (ref 3–16)
BUN BLDV-MCNC: 22 MG/DL (ref 7–20)
CALCIUM SERPL-MCNC: 9.1 MG/DL (ref 8.3–10.6)
CHLORIDE BLD-SCNC: 98 MMOL/L (ref 99–110)
CO2: 30 MMOL/L (ref 21–32)
CREAT SERPL-MCNC: 0.9 MG/DL (ref 0.6–1.2)
GFR AFRICAN AMERICAN: >60
GFR NON-AFRICAN AMERICAN: >60
GLUCOSE BLD-MCNC: 106 MG/DL (ref 70–99)
POTASSIUM SERPL-SCNC: 4.4 MMOL/L (ref 3.5–5.1)
SODIUM BLD-SCNC: 138 MMOL/L (ref 136–145)

## 2021-03-16 PROCEDURE — 99214 OFFICE O/P EST MOD 30 MIN: CPT | Performed by: INTERNAL MEDICINE

## 2021-03-16 RX ORDER — PREGABALIN 100 MG/1
100 CAPSULE ORAL 3 TIMES DAILY
Qty: 270 CAPSULE | Refills: 0 | Status: SHIPPED | OUTPATIENT
Start: 2021-03-16 | End: 2021-06-14

## 2021-03-16 ASSESSMENT — PATIENT HEALTH QUESTIONNAIRE - PHQ9
SUM OF ALL RESPONSES TO PHQ9 QUESTIONS 1 & 2: 0
SUM OF ALL RESPONSES TO PHQ QUESTIONS 1-9: 0
1. LITTLE INTEREST OR PLEASURE IN DOING THINGS: 0

## 2021-03-16 NOTE — PROGRESS NOTES
Preoperative Consultation      Charissa Watkins  YOB: 1945    Date of Service:  3/16/2021    Vitals:    03/16/21 1030   BP: 138/80   Site: Left Upper Arm   Pulse: 69   Temp: 97.3 °F (36.3 °C)   SpO2: 98%   Weight: 232 lb (105.2 kg)   Height: 5' 4\" (1.626 m)      Wt Readings from Last 2 Encounters:   03/16/21 232 lb (105.2 kg)   03/05/21 230 lb 12.8 oz (104.7 kg)     BP Readings from Last 3 Encounters:   03/16/21 138/80   03/05/21 (!) 148/88   01/21/21 124/74        Chief Complaint   Patient presents with   Jnuie Torres Pre-op Exam     breast surgery     Hypertension     6 month follow up     Allergies   Allergen Reactions    Morphine      vomit    Tape Vonna Marquita Tape] Itching    Unable To Assess      Any pain med makes her sick, vomit violently  Tolerates fentanly    Vicodin [Hydrocodone-Acetaminophen] Nausea And Vomiting     Outpatient Medications Marked as Taking for the 3/16/21 encounter (Office Visit) with Cezar Guerrier MD   Medication Sig Dispense Refill    pregabalin (LYRICA) 100 MG capsule Take 1 capsule by mouth 3 times daily for 90 days.  270 capsule 0    ELIQUIS 5 MG TABS tablet TAKE ONE TABLET BY MOUTH TWICE A DAY 90 tablet 3    atorvastatin (LIPITOR) 20 MG tablet Take 1 tablet by mouth daily 90 tablet 3    furosemide (LASIX) 20 MG tablet Take 1 tablet by mouth 2 times daily 180 tablet 1    losartan-hydroCHLOROthiazide (HYZAAR) 100-25 MG per tablet TAKE ONE TABLET BY MOUTH DAILY 90 tablet 1    potassium chloride (KLOR-CON M) 20 MEQ extended release tablet Take 1 tablet by mouth daily 90 tablet 1    ondansetron (ZOFRAN) 4 MG tablet Take 1 tablet by mouth 3 times daily as needed for Nausea or Vomiting 20 tablet 0    acetaminophen (TYLENOL) 500 MG tablet Take 1,000 mg by mouth every 6 hours as needed for Pain or Fever      Glucosamine-Chondroit-Vit C-Mn (GLUCOSAMINE 1500 COMPLEX PO) Take 1 tablet by mouth 2 times daily       Docusate Calcium (STOOL SOFTENER PO) Take 1 capsule by mouth 2 times daily       nitroGLYCERIN (NITROSTAT) 0.4 MG SL tablet Place 0.4 mg under the tongue every 5 minutes as needed for Chest pain Take 1 tablet for chest pain and repeat after 5 min if no relief, call 911 and take another dose 5 min later. Max of 3 doses in 15 min.  aspirin 81 MG EC tablet Take 81 mg by mouth nightly       Multiple Vitamins-Minerals (CENTRUM SILVER PO) Take by mouth daily          This patient presents to the office today for a preoperative consultation at the request of surgeon, Dr. Kim Huang, who plans on performing right breast seed localized lumpectomy and sentinel node biopsy on March 23. She has seen her cardiologist for preop evaluation. No a fib episodes recently. She is off of beta blockers. Denies chest pain or shortness of breath with exertion. Planned anesthesia: General   Known anesthesia problems: post-op N/V, does well with propofol   Bleeding risk: Anticoagulant therapy- Eliquis, already been told to hold 4 days prior to surgery    Patient Active Problem List   Diagnosis    [facility] [address]    Anemia    Diverticulosis of large intestine without hemorrhage    Second degree hemorrhoids    Rectal bleeding    Collagenous colitis    Vertigo    Hypertension    Breast cancer ([facility] [address]    Iron deficiency    Unstable angina (HCC)    Obesity (BMI 30-39. 9)    Angina pectoris syndrome (HCC)    Abnormal nuclear stress test    Chronic atrial fibrillation (HCC)    S/P ablation of atrial fibrillation    Heart failure ([facility] [address]       Past Medical History:   Diagnosis Date    Arthritis     Atrial fibrillation ([facility] [address] 08/2016    Breast cancer ([facility] [address] 2012    Hunter Ding    CAD (coronary artery disease)     GERD (gastroesophageal reflux disease) 2001    dx 2001. but has never had an issue.  requires no meds    H/O: whooping cough 1945    History of shingles     1 year after recieving vaccine     Hypercholesterolemia 2010    Hypertension 1987    Iron deficiency 08/2016    PONV (postoperative nausea and vomiting)     Sleep apnea 06/2018    Vertigo 1957    as a child     Past Surgical History:   Procedure Laterality Date    ABDOMINAL HERNIA REPAIR  2011   [address] SURGERY  11/01/2019    BACK SURGERY  2009    disk replacement     BREAST LUMPECTOMY Right 2012    cancer    BREAST SURGERY Left 2001    benign tumor left breast 2001    BREAST SURGERY Left 1967, 1992    cyst in left    CARDIAC CATHETERIZATION  09/08/2016    3 Stents-at [address]  11/07/2019    COLONOSCOPY  2015    FOOT SURGERY Left 1990    heal spur    HEMORRHOID SURGERY  09/01/2016    3 removed.     [address]    HYSTERECTOMY  1994    UPPER GASTROINTESTINAL ENDOSCOPY  2006    US BREAST BIOPSY NEEDLE ADDITIONAL RIGHT Right 2/11/2021    US BREAST BIOPSY NEEDLE ADDITIONAL RIGHT 2/11/2021 [facility]    US BREAST NEEDLE BIOPSY RIGHT Right 2/11/2021    US BREAST NEEDLE BIOPSY RIGHT 2/11/2021 [facility]    VENTRICULAR ABLATION SURGERY  11/01/2019     Family History   Problem Relation Age of Onset    Cancer Mother         breast    High Blood Pressure Mother     Diabetes Mother     Heart Disease Mother         pacemaker    Stroke Mother     High Blood Pressure Father     Cancer Sister         colon    Cancer Sister         breast    Stroke Brother     Heart Disease Brother     Cancer Sister         breast     Social History     Socioeconomic History    Marital status:      Spouse name: Not on file    Number of children: Not on file    Years of education: Not on file    Highest education level: Not on file   Occupational History    Occupation: retired   Social Needs    Financial resource strain: Not hard at all   Christian[employer]Gladis insecurity     Worry: Never true     Inability: Never true   [employer] needs     Medical: Not on file     Non-medical: Not on file   Tobacco Use    Smoking status: Former Smoker     Packs/day: 1.00     Years: 2.00 Pack years: 2.00     Types: Cigarettes     Quit date: 1995     Years since quittin.2    Smokeless tobacco: Never Used   Substance and Sexual Activity    Alcohol use: No    Drug use: No    Sexual activity: Not Currently   Lifestyle    Physical activity     Days per week: Not on file     Minutes per session: Not on file    Stress: Not on file   Relationships    Social connections     Talks on phone: Not on file     Gets together: Not on file     Attends Worship service: Not on file     Active member of club or organization: Not on file     Attends meetings of clubs or organizations: Not on file     Relationship status: Not on file    Intimate partner violence     Fear of current or ex partner: Not on file     Emotionally abused: Not on file     Physically abused: Not on file     Forced sexual activity: Not on file   Other Topics Concern    Not on file   Social History Narrative    Not on file       Review of Systems  A comprehensive review of systems was negative except for what was noted in the HPI. Physical Exam   Constitutional: She is oriented to person, place, and time. She appears well-developed and well-nourished. No distress. HENT:   Head: Normocephalic and atraumatic. Eyes: Conjunctivae and EOM are normal. Pupils are equal, round, and reactive to light. Neck: Trachea normal and normal range of motion. Neck supple. No JVD present. Carotid bruit is not present. No mass and no thyromegaly present. Cardiovascular: Normal rate, regular rhythm, normal heart sounds and intact distal pulses. Exam reveals no gallop and no friction rub. No murmur heard. Pulmonary/Chest: Effort normal and breath sounds normal. No respiratory distress. She has no wheezes. She has no rales. Abdominal: Soft. Normal aorta and bowel sounds are normal. She exhibits no distension and no mass. There is no hepatosplenomegaly. No tenderness. Musculoskeletal: She exhibits trace pedal edema and no tenderness. Neurological: She is alert and oriented to person, place, and time. She has normal strength. No cranial nerve deficit or sensory deficit. Coordination and gait normal.   Skin: Skin is warm and dry. No rash noted. No erythema. Psychiatric: She has a normal mood and affect. Her behavior is normal.     Lab Review   No visits with results within 2 Month(s) from this visit. Latest known visit with results is:   Hospital Outpatient Visit on 01/08/2021   Component Date Value    Left Ventricular Ejectio* 01/08/2021 58     LVEF MODALITY 01/08/2021 ECHO            Assessment:       76 y.o. patient with planned surgery as above. Known risk factors for perioperative complications: Arrhythmia, Congestive heart failure, Hypertension  Current medications which may produce withdrawal symptoms if withheld perioperatively: none      Plan:     1. Preop examination  - Preoperative workup as follows: electrolytes, creatinine  - Change in medication regimen before surgery: Hold eliquis 4 days prior to surgery, hold furosemide, losartan and potassium day of surgery  - Prophylaxis for cardiac events with perioperative beta-blockers: Not indicated  - No contraindications to planned surgery    2. Chronic atrial fibrillation (HCC)  - stable, in sinus rhythm  - Basic Metabolic Panel; Future    3. Diastolic heart failure, unspecified HF chronicity (HCC)  - stable, compensated    4. Neuropathy  - continues to be problematic, increase lyrica to 100mg three times daily  - pregabalin (LYRICA) 100 MG capsule; Take 1 capsule by mouth 3 times daily for 90 days. Dispense: 270 capsule;  Refill: 0

## 2021-03-18 ENCOUNTER — TELEPHONE (OUTPATIENT)
Dept: CARDIOLOGY CLINIC | Age: 76
End: 2021-03-18

## 2021-03-18 NOTE — TELEPHONE ENCOUNTER
Nurse Tonia with [facility] needs EKG with interpretation fax to [phone].

## 2021-04-02 ENCOUNTER — VIRTUAL VISIT (OUTPATIENT)
Dept: INTERNAL MEDICINE CLINIC | Age: 76
End: 2021-04-02
Payer: MEDICARE

## 2021-04-02 DIAGNOSIS — G62.9 NEUROPATHY: Primary | ICD-10-CM

## 2021-04-02 PROCEDURE — 99213 OFFICE O/P EST LOW 20 MIN: CPT | Performed by: INTERNAL MEDICINE

## 2021-04-02 RX ORDER — PREGABALIN 150 MG/1
CAPSULE ORAL
Qty: 30 CAPSULE | Refills: 0 | Status: SHIPPED | OUTPATIENT
Start: 2021-04-02 | End: 2021-04-29

## 2021-04-02 ASSESSMENT — PATIENT HEALTH QUESTIONNAIRE - PHQ9
SUM OF ALL RESPONSES TO PHQ QUESTIONS 1-9: 0
SUM OF ALL RESPONSES TO PHQ QUESTIONS 1-9: 0
2. FEELING DOWN, DEPRESSED OR HOPELESS: 0

## 2021-04-29 DIAGNOSIS — G62.9 NEUROPATHY: ICD-10-CM

## 2021-04-29 RX ORDER — PREGABALIN 150 MG/1
CAPSULE ORAL
Qty: 30 CAPSULE | Refills: 1 | Status: SHIPPED | OUTPATIENT
Start: 2021-04-29 | End: 2021-06-16 | Stop reason: SDUPTHER

## 2021-06-08 NOTE — TELEPHONE ENCOUNTER
I Medication Refills:      furosemide (LASIX) 20 MG tablet  Take 1 tablet by mouth 2 times daily, Disp-180 tablet, R-1    BRIAN Pryor [address] [place] [address], [address] [place] [address] [phone] [facility] [phone]    The patient is requesting a 90 day supply       Last Office Visit: 01/21/21    Next Office Visit: 07/09/21    Last Refill: 12/18/21    Last Labs: 03/18/21

## 2021-06-09 RX ORDER — FUROSEMIDE 20 MG/1
20 TABLET ORAL 2 TIMES DAILY
Qty: 180 TABLET | Refills: 3 | Status: SHIPPED | OUTPATIENT
Start: 2021-06-09 | End: 2022-04-25

## 2021-06-12 DIAGNOSIS — G62.9 NEUROPATHY: ICD-10-CM

## 2021-06-14 RX ORDER — PREGABALIN 100 MG/1
CAPSULE ORAL
Qty: 180 CAPSULE | Refills: 0 | Status: SHIPPED | OUTPATIENT
Start: 2021-06-14 | End: 2021-09-01

## 2021-06-16 ENCOUNTER — TELEPHONE (OUTPATIENT)
Dept: INTERNAL MEDICINE CLINIC | Age: 76
End: 2021-06-16

## 2021-06-16 DIAGNOSIS — G62.9 NEUROPATHY: ICD-10-CM

## 2021-06-16 RX ORDER — PREGABALIN 150 MG/1
CAPSULE ORAL
Qty: 30 CAPSULE | Refills: 1 | Status: SHIPPED | OUTPATIENT
Start: 2021-06-16 | End: 2021-07-01 | Stop reason: SDUPTHER

## 2021-06-25 DIAGNOSIS — I10 ESSENTIAL HYPERTENSION: ICD-10-CM

## 2021-06-25 RX ORDER — LOSARTAN POTASSIUM AND HYDROCHLOROTHIAZIDE 25; 100 MG/1; MG/1
TABLET ORAL
Qty: 90 TABLET | Refills: 0 | Status: SHIPPED | OUTPATIENT
Start: 2021-06-25 | End: 2021-07-01 | Stop reason: SDUPTHER

## 2021-06-25 RX ORDER — POTASSIUM CHLORIDE 1500 MG/1
TABLET, EXTENDED RELEASE ORAL
Qty: 90 TABLET | Refills: 0 | Status: SHIPPED | OUTPATIENT
Start: 2021-06-25 | End: 2021-06-28

## 2021-06-28 RX ORDER — POTASSIUM CHLORIDE 1500 MG/1
TABLET, EXTENDED RELEASE ORAL
Qty: 90 TABLET | Refills: 0 | Status: SHIPPED | OUTPATIENT
Start: 2021-06-28 | End: 2021-07-01 | Stop reason: SDUPTHER

## 2021-07-01 ENCOUNTER — VIRTUAL VISIT (OUTPATIENT)
Dept: INTERNAL MEDICINE CLINIC | Age: 76
End: 2021-07-01
Payer: MEDICARE

## 2021-07-01 DIAGNOSIS — I10 ESSENTIAL HYPERTENSION: ICD-10-CM

## 2021-07-01 DIAGNOSIS — G62.9 NEUROPATHY: Primary | ICD-10-CM

## 2021-07-01 PROCEDURE — 99214 OFFICE O/P EST MOD 30 MIN: CPT | Performed by: INTERNAL MEDICINE

## 2021-07-01 RX ORDER — POTASSIUM CHLORIDE 20 MEQ/1
TABLET, EXTENDED RELEASE ORAL
Qty: 90 TABLET | Refills: 1 | Status: SHIPPED | OUTPATIENT
Start: 2021-07-01 | End: 2021-09-02

## 2021-07-01 RX ORDER — PREGABALIN 225 MG/1
CAPSULE ORAL
Qty: 90 CAPSULE | Refills: 0 | Status: SHIPPED | OUTPATIENT
Start: 2021-07-01 | End: 2021-08-30

## 2021-07-01 RX ORDER — LOSARTAN POTASSIUM AND HYDROCHLOROTHIAZIDE 25; 100 MG/1; MG/1
TABLET ORAL
Qty: 90 TABLET | Refills: 1 | Status: SHIPPED | OUTPATIENT
Start: 2021-07-01 | End: 2021-11-16 | Stop reason: SDUPTHER

## 2021-07-01 NOTE — PROGRESS NOTES
Elena Garcia (:  1945) is a 68 y.o. female,Established patient, here for evaluation of the following chief complaint(s): Follow-Up from Hospital, Peripheral Neuropathy, and Medication Management         ASSESSMENT/PLAN:  1. Neuropathy  -Uncontrolled  -Continue pregabalin 100 mg in the morning and midday, increase the nighttime dose to 225 mg  - OARRS reviewed, no concerns  -     pregabalin (LYRICA) 225 MG capsule; TAKE ONE CAPSULE BY MOUTH ONCE NIGHTLY, Disp-90 capsule, R-0Normal  2. Essential hypertension  - Stable  -     losartan-hydroCHLOROthiazide (HYZAAR) 100-25 MG per tablet; TAKE ONE TABLET BY MOUTH DAILY, Disp-90 tablet, R-1Normal  -Potassium 20 mEq daily      Return in about 3 months (around 10/1/2021) for neuropathy. SUBJECTIVE/OBJECTIVE:  HPI    The nerve pain at night is no better with the 150 mg of pregabalin. She added 25 in addition to 175 mg and still noticed no difference. 3-4 nights per week the pain is so severe that she does not sleep. During the day pain is much better, 100 mg seems to be working and when she is busy she does not notice the pain as much. She has not noted any significant symptoms at nighttime with the higher dose. She needs a refill on the losartanHCTZ as well as a potassium. Review of Systems    Patient-Reported Vitals 2021   Patient-Reported Weight 235lb   Patient-Reported Height -   Patient-Reported Systolic -   Patient-Reported Diastolic -   Patient-Reported Pulse 85        Physical Exam  Vitals reviewed. Constitutional:       General: She is not in acute distress. Appearance: Normal appearance. HENT:      Head: Normocephalic and atraumatic. Pulmonary:      Effort: Pulmonary effort is normal. No respiratory distress. Neurological:      General: No focal deficit present. Mental Status: She is alert. Elena Garcia, was evaluated through a synchronous (real-time) audio-video encounter.  The patient (or guardian if applicable) is aware that this is a billable service. Verbal consent to proceed has been obtained within the past 12 months. The visit was conducted pursuant to the emergency declaration under the [address] [place] [address] authority and the [employer] General Act. Patient identification was verified, and a caregiver was present when appropriate. The patient was located in a state where the provider was credentialed to provide care. An electronic signature was used to authenticate this note.     --Bernie Dixon MD

## 2021-07-09 ENCOUNTER — OFFICE VISIT (OUTPATIENT)
Dept: CARDIOLOGY CLINIC | Age: 76
End: 2021-07-09
Payer: MEDICARE

## 2021-07-09 VITALS
DIASTOLIC BLOOD PRESSURE: 66 MMHG | BODY MASS INDEX: 41.33 KG/M2 | HEART RATE: 86 BPM | WEIGHT: 240.8 LBS | SYSTOLIC BLOOD PRESSURE: 128 MMHG

## 2021-07-09 DIAGNOSIS — I48.3 TYPICAL ATRIAL FLUTTER (HCC): ICD-10-CM

## 2021-07-09 DIAGNOSIS — I48.0 PAROXYSMAL ATRIAL FIBRILLATION (HCC): ICD-10-CM

## 2021-07-09 DIAGNOSIS — E78.5 HYPERLIPIDEMIA, UNSPECIFIED HYPERLIPIDEMIA TYPE: ICD-10-CM

## 2021-07-09 DIAGNOSIS — I10 HTN (HYPERTENSION), BENIGN: Primary | ICD-10-CM

## 2021-07-09 PROCEDURE — 99214 OFFICE O/P EST MOD 30 MIN: CPT | Performed by: INTERNAL MEDICINE

## 2021-07-09 RX ORDER — METOPROLOL SUCCINATE 25 MG/1
25 TABLET, EXTENDED RELEASE ORAL DAILY
Qty: 30 TABLET | Refills: 3 | Status: SHIPPED | OUTPATIENT
Start: 2021-07-09 | End: 2021-08-19 | Stop reason: DRUGHIGH

## 2021-07-09 RX ORDER — APIXABAN 5 MG/1
5 TABLET, FILM COATED ORAL 2 TIMES DAILY
Qty: 180 TABLET | Refills: 3 | Status: SHIPPED | OUTPATIENT
Start: 2021-07-09 | End: 2022-07-11

## 2021-07-09 ASSESSMENT — ENCOUNTER SYMPTOMS
RESPIRATORY NEGATIVE: 1
GASTROINTESTINAL NEGATIVE: 1
ALLERGIC/IMMUNOLOGIC NEGATIVE: 1
EYES NEGATIVE: 1

## 2021-07-09 NOTE — PROGRESS NOTES
Connections:     Frequency of Communication with Friends and Family:     Frequency of Social Gatherings with Friends and Family:     Attends Worship Services:     Active Member of Clubs or Organizations:     Attends Club or Organization Meetings:     Marital Status:    Intimate Partner Violence:     Fear of Current or Ex-Partner:     Emotionally Abused:     Physically Abused:     Sexually Abused:        Family History   Problem Relation Age of Onset    Cancer Mother         breast    High Blood Pressure Mother     Diabetes Mother     Heart Disease Mother         pacemaker    Stroke Mother     High Blood Pressure Father     Cancer Sister         colon    Cancer Sister         breast    Stroke Brother     Heart Disease Brother     Cancer Sister         breast        has a past medical history of Arthritis, Atrial fibrillation ([facility] [address] Breast cancer ([facility] [address] CAD (coronary artery disease), GERD (gastroesophageal reflux disease), H/O: whooping cough, History of shingles, Hypercholesterolemia, Hypertension, Iron deficiency, PONV (postoperative nausea and vomiting), Sleep apnea, and Vertigo. Review of Systems   Constitutional: Negative. HENT: Negative. Eyes: Negative. Respiratory: Negative. Cardiovascular: Negative. Gastrointestinal: Negative. Endocrine: Negative. Genitourinary: Negative. Musculoskeletal: Negative. Skin: Negative. Allergic/Immunologic: Negative. Neurological: Negative. Hematological: Negative. Psychiatric/Behavioral: Negative. Vitals:    07/09/21 1056   BP: 128/66   Pulse: 86        Echo 11/15/19  Summary   Left ventricle size is normal.   Normal left ventricular wall thickness. Ejection fraction is visually estimated in the range of 55% to 60%. There were no regional wall motion abnormalities. There is a small circumferential pericardial effusion noted.     Vitals:    07/09/21 1056   BP: 128/66   Pulse: 86         Objective: Physical Exam  HENT:      Head: Normocephalic and atraumatic. Mouth/Throat:      Mouth: Mucous membranes are dry. Eyes:      Extraocular Movements: Extraocular movements intact. Pupils: Pupils are equal, round, and reactive to light. Cardiovascular:      Rate and Rhythm: Normal rate and regular rhythm. Pulses: Normal pulses. Heart sounds: Normal heart sounds. Pulmonary:      Effort: Pulmonary effort is normal.      Breath sounds: Normal breath sounds. Abdominal:      General: Abdomen is flat. There is no distension. Palpations: Abdomen is soft. Hernia: A hernia is present. Musculoskeletal:      Cervical back: Normal range of motion and neck supple. Skin:     General: Skin is warm and dry. Capillary Refill: Capillary refill takes 2 to 3 seconds. Neurological:      General: No focal deficit present. Mental Status: She is oriented to person, place, and time. Psychiatric:         Mood and Affect: Mood normal.         Behavior: Behavior normal.         Thought Content:  Thought content normal.         Current Outpatient Medications   Medication Sig Dispense Refill    losartan-hydroCHLOROthiazide (HYZAAR) 100-25 MG per tablet TAKE ONE TABLET BY MOUTH DAILY 90 tablet 1    potassium chloride (KLOR-CON M20) 20 MEQ extended release tablet TAKE ONE TABLET BY MOUTH DAILY 90 tablet 1    pregabalin (LYRICA) 225 MG capsule TAKE ONE CAPSULE BY MOUTH ONCE NIGHTLY 90 capsule 0    pregabalin (LYRICA) 100 MG capsule Take one capsule by mouth twice daily in the morning and afternoon 180 capsule 0    furosemide (LASIX) 20 MG tablet Take 1 tablet by mouth 2 times daily 180 tablet 3    ELIQUIS 5 MG TABS tablet TAKE ONE TABLET BY MOUTH TWICE A DAY 90 tablet 3    atorvastatin (LIPITOR) 20 MG tablet Take 1 tablet by mouth daily 90 tablet 3    ondansetron (ZOFRAN) 4 MG tablet Take 1 tablet by mouth 3 times daily as needed for Nausea or Vomiting 20 tablet 0    acetaminophen

## 2021-07-27 ENCOUNTER — TELEPHONE (OUTPATIENT)
Dept: CARDIOLOGY CLINIC | Age: 76
End: 2021-07-27

## 2021-07-27 NOTE — TELEPHONE ENCOUNTER
Per daughter, pt's heart rate fluctuates during the day from the to 60's in the am to mid 100's while active. Pt is not experiencing any other symptoms at this time. Dr Dayan Murry started pt on Toprol during last visit in July. Daughter wanted to make BISHOP aware of the situation and was wondering if he wanted to increase any of her medications at this time. Kota can you advise in his absence?

## 2021-07-28 NOTE — TELEPHONE ENCOUNTER
Daughter is going to have pt record her pulse 3 x a day until Monday and get back to us with the readings

## 2021-08-13 NOTE — PROGRESS NOTES
[facility]   Electrophysiology  Office Visit  Date: 8/19/2021    Chief Complaint   Patient presents with    Atrial Fibrillation    Atrial Flutter    Bradycardia    Tachycardia       Cardiac HX: Edwige Kitchen is a 68 y.o. woman with a h/o HTN, HLD, RENE not on CPAP, CAD, s/p PCI of the RCA, pAF/AFL, s/p RFA of AFL ([employer], [place]), s/p cryo RFA for AF (12/2018), s/p RFA/PVI of AF (11/1/19, [facility]), recurrent pAF post RFA, placed on sotalol w/ a DCCV to NSR (11/18/19), noted to have SB on sotalol and dose decreased, now on 20 mg BID, 1 week CAM (1/8/20211/15/2021) showed an average HR 51 (1263), SB, first-degree AV block, 22 AT episodes with the longest lasting 21 beats in length, less than 1% PAC/PVC burden, no AF. Interval History/HPI: Patient is here for follow-up for paroxysmal AF/AFL. Patient has a longstanding history of atrial fibrillation and atrial flutter. She underwent a cryoablation in 2018 for AF, had a flutter ablation prior to that and had an RFA/PVI of atrial fibrillation in November 2019. Post catheter ablation she had recurrent atrial fibrillation was placed on sotalol. Underwent a cardioversion to normal sinus rhythm. She became bradycardic on the sotalol and the dose was gradually decreased to 20 mg twice a day. She had seen Dr. Aileen Cole in January at which time he stopped the sotalol due to low heart rate and an ineffective dose. Patient had seen Dr. Starr Vo in the office on 7/9/2021 and was noted to be in AFL at that time. Placed her on Toprol-XL 25 mg daily for heart rate control. Today she presents in the office in AFL with a heart rate of 107 bpm. She feels like she is probably out of rhythm the majority of the time since she had been seen in Dr. Katheryn Basurto office. She said her rate varies from 60 up to 120. She does feel tired and fatigued when she is out of rhythm. She has had no dizziness, lightheadedness or syncopal events. She denies any chest discomfort.  She does note shortness of breath especially with stairs. She denies PND, orthopnea or lower extremity edema. At the last office visit with Dr. Nicky Pretty they had discussed rhythm management if she were to go back out of rhythm. This included class III antiarrhythmic medications along with pacemaker due to tachybradycardia syndrome. This was discussed at length with the patient and the daughter in the office today. Home medications:   Current Outpatient Medications on File Prior to Visit   Medication Sig Dispense Refill    ELIQUIS 5 MG TABS tablet Take 1 tablet by mouth 2 times daily 180 tablet 3    losartan-hydroCHLOROthiazide (HYZAAR) 100-25 MG per tablet TAKE ONE TABLET BY MOUTH DAILY 90 tablet 1    potassium chloride (KLOR-CON M20) 20 MEQ extended release tablet TAKE ONE TABLET BY MOUTH DAILY 90 tablet 1    pregabalin (LYRICA) 225 MG capsule TAKE ONE CAPSULE BY MOUTH ONCE NIGHTLY 90 capsule 0    pregabalin (LYRICA) 100 MG capsule Take one capsule by mouth twice daily in the morning and afternoon 180 capsule 0    furosemide (LASIX) 20 MG tablet Take 1 tablet by mouth 2 times daily 180 tablet 3    atorvastatin (LIPITOR) 20 MG tablet Take 1 tablet by mouth daily 90 tablet 3    ondansetron (ZOFRAN) 4 MG tablet Take 1 tablet by mouth 3 times daily as needed for Nausea or Vomiting 20 tablet 0    acetaminophen (TYLENOL) 500 MG tablet Take 1,000 mg by mouth every 6 hours as needed for Pain or Fever      Glucosamine-Chondroit-Vit C-Mn (GLUCOSAMINE 1500 COMPLEX PO) Take 1 tablet by mouth 2 times daily       Docusate Calcium (STOOL SOFTENER PO) Take 1 capsule by mouth 2 times daily       nitroGLYCERIN (NITROSTAT) 0.4 MG SL tablet Place 0.4 mg under the tongue every 5 minutes as needed for Chest pain Take 1 tablet for chest pain and repeat after 5 min if no relief, call 911 and take another dose 5 min later. Max of 3 doses in 15 min.       aspirin 81 MG EC tablet Take 81 mg by mouth nightly       Multiple Vitamins-Minerals (CENTRUM SILVER PO) Take by mouth daily        No current facility-administered medications on file prior to visit. Past Medical History:   Diagnosis Date    Arthritis     Atrial fibrillation ([address] 08/2016    Breast cancer [facility]) 2012    [facility]    CAD (coronary artery disease)     GERD (gastroesophageal reflux disease) 2001    dx 2001. but has never had an issue. requires no meds    H/O: whooping cough 1945    History of shingles     1 year after recieving vaccine     Hypercholesterolemia 2010    Hypertension 1987    Iron deficiency 08/2016    PONV (postoperative nausea and vomiting)     Sleep apnea 06/2018    Vertigo 1957    as a child        Past Surgical History:   Procedure Laterality Date    ABDOMINAL HERNIA REPAIR  2011   [address] SURGERY  11/01/2019    BACK SURGERY  2009    disk replacement     BREAST LUMPECTOMY Right 2012    cancer    BREAST SURGERY Left 2001    benign tumor left breast 2001    BREAST SURGERY Left 1967, 1992    cyst in left    CARDIAC CATHETERIZATION  09/08/2016    3 Stents-at [facility]  11/07/2019    COLONOSCOPY  2015    FOOT SURGERY Left 1990    heal spur    HEMORRHOID SURGERY  09/01/2016    3 removed. [facility]    UPPER GASTROINTESTINAL ENDOSCOPY  2006    US BREAST BIOPSY NEEDLE ADDITIONAL RIGHT Right 2/11/2021    US BREAST BIOPSY NEEDLE ADDITIONAL RIGHT 2/11/2021 [facility]    US BREAST NEEDLE BIOPSY RIGHT Right 2/11/2021    US BREAST NEEDLE BIOPSY RIGHT 2/11/2021 [facility]TR[facility]    VENTRICULAR ABLATION SURGERY  11/01/2019       Allergies   Allergen Reactions    Morphine      vomit    Tape [place] Tape] Itching    Unable To Assess      Any pain med makes her sick, vomit violently  Tolerates fentanly    Vicodin [Hydrocodone-Acetaminophen] Nausea And Vomiting       Social History:  Reviewed. reports that she quit smoking about 26 years ago. Her smoking use included cigarettes. She has a 2.00 pack-year smoking history. She has never used smokeless tobacco. She reports that she does not drink alcohol and does not use drugs. Family History:  Reviewed. family history includes Cancer in her mother, sister, sister, and sister; Diabetes in her mother; Heart Disease in her brother and mother; High Blood Pressure in her father and mother; Stroke in her brother and mother. Review of System:    · Constitutional: No fevers, chills. · Eyes: No visual changes or diplopia. No scleral icterus. · ENT: No Headaches. No mouth sores or sore throat. · Cardiovascular: No for chest pain, Yes for dyspnea on exertion, Yes for palpitations or No for loss of consciousness. No cough, hemoptysis, No for pleuritic pain, or phlebitis. · Respiratory: No for cough or wheezing. No hematemesis. · Gastrointestinal: No abdominal pain, blood in stools. · Genitourinary: No dysuria, or hematuria. · Musculoskeletal: No gait disturbance,    · Integumentary: No rash or pruritis. · Neurological: No headache, change in muscle strength, numbness or tingling. · Psychiatric: No anxiety, or depression. · Endocrine: No temperature intolerance. No excessive thirst, fluid intake, or urination. · Hem/Lymph: No abnormal bruising or bleeding, blood clots or swollen lymph nodes. · Allergic/Immunologic: No nasal congestion or hives. Physical Examination:  Vitals:    08/19/21 1031   BP: 130/82   Pulse: 107         Wt Readings from Last 3 Encounters:   08/19/21 244 lb 6.4 oz (110.9 kg)   07/09/21 240 lb 12.8 oz (109.2 kg)   03/16/21 232 lb (105.2 kg)       · Constitutional: Oriented. No distress. · Head: Normocephalic and atraumatic. · Mouth/Throat: Oropharynx is clear and moist.   · Eyes: Conjunctivae clear without jaunduice. PERRL. · Neck: Neck supple. No rigidity. No JVD present. · Cardiovascular: Normal rate, regular rhythm, S1&S2. · Pulmonary/Chest: Bilateral respiratory sounds. No wheezes, No rhonchi. · Abdominal: Soft. Bowel sounds present. No distension, No tenderness. · Musculoskeletal: No tenderness. No edema    · Lymphadenopathy: Has no cervical adenopathy. · Neurological: Alert and oriented. Cranial nerve appears intact, No Gross deficit   · Skin: Skin is warm and dry. No rash noted. · Psychiatric: Has a normal mood, affect and behavior     Labs:  Reviewed. No results for input(s): NA, K, CL, CO2, PHOS, BUN, CREATININE in the last 72 hours. Invalid input(s): CA,  TSH  No results for input(s): WBC, HGB, HCT, MCV, PLT in the last 72 hours. No results found for: CKTOTAL, CKMB, CKMBINDEX, TROPONINI  No results found for: BNP  Lab Results   Component Value Date    INR 1.02 12/04/2018    INR 1.51 09/05/2018    INR 1.03 06/07/2018     Lab Results   Component Value Date    CHOL 154 12/15/2020    HDL 48 12/15/2020    TRIG 237 12/15/2020       ECG: Personally reviewed: AFL, , , QTc 442    ECHO:  1.8.2021   Summary   Normal left ventricle size and systolic function with an estimated ejection   fraction of 55-60%. Mild concentric left ventricular hypertrophy. No regional wall motion abnormalities are seen. Grade II diastolic dysfunction with elevated LV filling pressures. Global strain: -21.7%   The left atrium is dilated. Mild mitral regurgitation. Trivial aortic regurgitation. Trivial tricuspid regurgitation. The pulmonic valve is not well visualized. Estimated pulmonary artery systolic pressure is normal at 37 mmHg assuming a   right atrial pressure of 3 mmHg. Stress Test: 2.12.2020  Summary   [place] is symmetric isotope uptake at stress and rest. There is no evidence    of myocardial ischemia.    The LVEF is 74% with normal LV wall motion.        This is a low risk cardiac scan. Cardiac Angiography: 6.7.2018 (Dr Ari Esparza)  The coronary angiogram showed the RCA is a dominant artery. RCA _____  intervention and the ostium of the RCA was patent.   Distally the RCA was  patent. Left main was patent, bifurcates to the LAD and circumflex. Circumflex is a codominant artery and was patent. The LAD does exhibit  about 20-50% narrowing proximally. Distally circumflex was patent. Problem List:   Patient Active Problem List    Diagnosis Date Noted    Angina pectoris syndrome ([address] 06/07/2018    Abnormal nuclear stress test 06/07/2018    Obesity (BMI 30-39.9) 10/09/2016    Unstable angina ([address] 10/08/2016    Breast cancer ([facility] 05/10/2012    Heart failure ([address] 11/16/2019    S/P ablation of atrial fibrillation 12/04/2018    Chronic atrial fibrillation ([address]     Vertigo     Hypertension     Diverticulosis of large intestine without hemorrhage 08/24/2016    Second degree hemorrhoids 08/24/2016    Rectal bleeding 08/24/2016    Collagenous colitis 08/24/2016    Iron deficiency 08/01/2016    Anemia 05/24/2015    Breast cancer ([address] 01/01/2012       Assessment:   1. Paroxysmal atrial fibrillation (HCC)    2. Paroxysmal atrial flutter ([address]    3. Encounter for monitoring sotalol therapy    4. Coronary artery disease involving native coronary artery of native heart without angina pectoris    5. Benign essential HTN         Cardiac HX: Sandhya Ramos is a 68 y.o. woman with a h/o HTN, HLD, RENE not on CPAP, CAD, s/p PCI of the RCA, pAF/AFL, s/p RFA of AFL ([address], [place]), s/p cryo RFA for AF (12/2018), s/p RFA/PVI of AF (11/1/19, [facility]), recurrent pAF post RFA, placed on sotalol w/ a DCCV to NSR (112/18/19), noted to have SB on sotalol and dose decreased, now on 20 mg BID, 1 week CAM (1/8/20211/15/2021) showed an average HR 51 (5793), SB, first-degree AV block, 22 AT episodes with the longest lasting 21 beats in length, less than 1% PAC/PVC burden, no AF. [id number] 5. TSH 2.260 (6.7.2019). Helio Erazo     AF/AFL  - In AFL -  - S/p RFA of AFL, s/p cryo RFA of AF (12/2018), s/p RFA/PVI of AF (11/2019)  - On Eliquis 5mg BID - no s/s bleeding - continue  - Off sotalol since January  - On Toprol-XL 25 mg daily - will increase to twice daily and patient is to hold for heart rate less than 60  - 1 week CAM (1/8/20211/15/2021) showed an average HR 51 (3688), SB, first-degree AV block, 22 AT episodes with the longest lasting 21 beats in length, less than 1% PAC/PVC burden, no AF. - Will repeat 1 week CAM to assess heart rates and AF/AFL burden  - RENE -encouraged to wear CPAP  - The risks, benefits and alternatives of the procedure were discussed with the patient. The risks including, but not limited to, the risks of vascular injury, bleeding, infection, device malfunction, lead dislodgement, radiation exposure, injury to cardiac and surrounding structures (including pneumothorax), stroke, myocardial infarction and death were discussed in detail. The patient opted to proceed with the device implantation.   - Will plan for sotalol (will verify with Dr. Yris Gibson) and PPM implant after 9/17/2021 (patient will be on vacation)  - ECG ordered and results personally reviewed     CAD  - No symptoms  - On ASA/statin    HTN  - BP well controlled in the office today  - On Hyzaar 100-25 mg daily, Toprol-XL 25 mg daily    Chronic dCHF  - Appears euvolemic  - EF 55-60%  - On lasix 20 mg BID  - On Potassium 20 meq QD  - Reviewed last labs - stable    EF of 05-47%  No systolic HF  ASA and Statin for CAD  Anticoagulation for AF   No tobacco use. All questions and concerns were addressed to the patient/family. Alternatives to my treatment were discussed. The note was completed using EMR. Every effort was made to ensure accuracy; however, inadvertent computerized transcription errors may be present. Patient received education regarding their diagnosis, treatment and medications while in the office today.     Kateryna Alan [facility]       I  have spent 40 minutes in care of the patient including direct face to face time, chart preparation, reviewing diagnostic testing, other provider notes and coordinating patient care.

## 2021-08-19 ENCOUNTER — OFFICE VISIT (OUTPATIENT)
Dept: CARDIOLOGY CLINIC | Age: 76
End: 2021-08-19
Payer: MEDICARE

## 2021-08-19 VITALS
WEIGHT: 244.4 LBS | SYSTOLIC BLOOD PRESSURE: 130 MMHG | HEART RATE: 107 BPM | HEIGHT: 62 IN | BODY MASS INDEX: 44.98 KG/M2 | DIASTOLIC BLOOD PRESSURE: 82 MMHG

## 2021-08-19 DIAGNOSIS — I10 BENIGN ESSENTIAL HTN: ICD-10-CM

## 2021-08-19 DIAGNOSIS — I48.0 PAROXYSMAL ATRIAL FIBRILLATION (HCC): Primary | ICD-10-CM

## 2021-08-19 DIAGNOSIS — I48.92 PAROXYSMAL ATRIAL FLUTTER (HCC): ICD-10-CM

## 2021-08-19 DIAGNOSIS — Z51.81 ENCOUNTER FOR MONITORING SOTALOL THERAPY: ICD-10-CM

## 2021-08-19 DIAGNOSIS — I25.10 CORONARY ARTERY DISEASE INVOLVING NATIVE CORONARY ARTERY OF NATIVE HEART WITHOUT ANGINA PECTORIS: ICD-10-CM

## 2021-08-19 DIAGNOSIS — Z79.899 ENCOUNTER FOR MONITORING SOTALOL THERAPY: ICD-10-CM

## 2021-08-19 PROCEDURE — 93000 ELECTROCARDIOGRAM COMPLETE: CPT | Performed by: NURSE PRACTITIONER

## 2021-08-19 PROCEDURE — 93242 EXT ECG>48HR<7D RECORDING: CPT | Performed by: INTERNAL MEDICINE

## 2021-08-19 PROCEDURE — 99215 OFFICE O/P EST HI 40 MIN: CPT | Performed by: NURSE PRACTITIONER

## 2021-08-19 RX ORDER — METOPROLOL SUCCINATE 25 MG/1
25 TABLET, EXTENDED RELEASE ORAL 2 TIMES DAILY
Qty: 60 TABLET | Refills: 5 | Status: ON HOLD
Start: 2021-08-19 | End: 2021-10-07 | Stop reason: HOSPADM

## 2021-08-19 NOTE — PATIENT INSTRUCTIONS
Increase Toprol XL to 25 mg twice a day. Hold second dose if HR is < 60 BPM.     [facility] office at:  [facility] MD Galo Johnson RN    Permanent Pacemaker (PPM)   Implantable Cardioverter Defibrillator (ICD)  Care Instructions      Your permanent pacemaker/ICD is a sophisticated piece of electronic equipment and both the wound and the device need special care during your recovery period and into the future. Please use these instructions as guide. If you have any questions please call the office. Wound Care:    Keep the incision site clean and dry for one week.  Do not get the incision or bandage wet.  You may sponge bathe but DO NOT shower for the first seven days or until after your first follow up office visit.  Do not remove the steri strips (the pieces of \"tape\" that cover the incision). These will eventually fall off on their own. The outer dressing may be taken off once you get home.  DO NOT apply soaps, ointments,  lotion or powder to the incision site.  Wear comfortable clothes that will not rub on the incision site. Avoid bra straps or suspenders.  At your one week follow up appointment your bandage will be removed and you will be given further instruction on care of the site at that time.  Your bra strap may lay directly over the incision. If it does - please do not wear the bra strap on the incisional side for 4 weeks to prevent irritation.       When to contact the office:  Bryce Dos Santos in how your incision site is healing including:  o Increase in swelling and/or tenderness   o Increase in redness at the incision site or surrounding the device  o Drainage from the incision   If you experience:  o Lightheadedness, dizziness or passing out  o Increase in fatigue or shortness of breath  o Fever (temperature greater than 100 degrees F)  o Chills  o  Prolonged hiccups or chest discomfort   If you have any questions     Activity:    Do not raise your elbow above shoulder level or reach behind your back for 4 weeks after your procedure.  DO NOT lift more than 8 pounds with your affected arm for 4 weeks after your procedure. (A gallon of milk is 8 pounds).  Avoid excessive pushing, pulling or twisting.  DO NOT drive until instructed it is OK by your provider.  Wear a sling only as a reminder to limit the activity of your affected arm. It is important to remember to still use your affected arm to maintain mobility but no excessive movements.  No sports activities until approved by your provider. Precautions:   You may use common household appliances, if they are in good repair (even microwaves). You should not lean against them while they are on.  When using cellular and cordless phones, keep them at least 6 inches away from your device. (Use on the opposite side of your device.)   Do not hold or carry strong magnets.  You may NOT perform welding.  Airport and security screening devices should be avoided. You will need to show your ID card and ask for a hand search.  Always tell your health care professional (including the dentist) that you have a device and show your ID card. ID Card:  [facility] You will be provided a temporary ID card at the time of your hospital discharge and a permanent ID card in approximately 8 weeks.  It is very important that you carry this ID card with you at ALL times.  Consider purchasing a medical alert bracelet identifying your device and .  Please refer to your pacemaker booklet for more information about your device. Follow Up Care:   The pacemaker/ICD DOES NOT replace your current medications. It is important for you to continue your medications as prescribed.  You will be given your first follow up appointment approximately one week after your procedure to check your wound and device.    You will then have a follow up approximately one month after that appointment and then every 3-6 months thereafter.  If you are unsure of your follow up appointment [address] FOLLOW-UP APPOINTMENTS    Follow-up with device technician,  in 7-10 days for a wound and device check. Follow-up with Adela Lizarraga CNP at 1 month for a wound and device check. Follow-up with Dr. Sam Leiva at 3 months for an appointment and device check. [address] [facility], [address] Phone: [phone]. If you are unable to make this appointment, please call to reschedule. Sotalol/ Betapace      Medication Information for Your Sotalol (Betapace) Admission    Why am I going to start Sotalol? You have atrial fibrillation or atrial flutter that may be causing you  problems. Sotalol, also called Betapace, is one of the medications   used to try to keep you out of atrial fibrillation/ flutter. Why do I need to come in to the hospital?    - Although sotalol is safe in most people, there are a small  number of people who will have an exaggerated electrical  effect from this medication. You will be monitored with an EKG  (electrocardiogram) 2 hours after each oral dose of sotalol  to make sure there are no EKG changes. - If your doctor suspects that you are having this exaggerated  effect or your EKG shows changes (a prolonged QTc), your dose   will either be reduced or the medication stopped altogether.    - On the third or fifth day (physician preference), you may have a cardioversion   (electrical shock) to try to get you out of atrial fibrillation and into regular rhythm  (normal sinus rhythm). (In some cases, you will have a cardioversion before starting  the medication. )    - After five total doses monitored in the hospital, it is safe for you  to leave the hospital and continue to take the medicine at  home. How do I take Sotalol? - Sotalol is a pill that you take every 12 hours.     - You must be very careful to take it as close to every 12 hours  as possible. You may have breakthrough of your arrhythmia   (atrial fibrillation or atrial flutter) if doses are taken late and not 12 hours apart. What happens if I'm late with a dose? - If it's within one to two hours of your scheduled time, take it right away  and stay on your schedule of every 12 hours    - If you are late by more than one or two hours, skip the dose and take  the next one as scheduled. What happens if I skip more than one dose?    - Do not miss more than one dose. - If you miss more than one dose, you must stop taking the  medication and let your doctor know. You will most likely be  readmitted to the hospital to restart the medication, just as you   did the first time. - Most people set an alarm on their watches, cell phones or home alarm clocks to ring  every day at the scheduled times to help them remember. - It may help for you to have a pill carrier on your keychain and keep one or two  capsules with you at all times. - Do not take an extra dose of sotalol ever. What are the side effects of Sotalol? - This medicine is very well tolerated. There are very minimal side effects. Talk to your  doctor if you think you are having a side effect from this medicine, as it is rare and  unusual.  - Call your doctor right away or seek emergency help if you feel faint, get dizzy or  lightheaded, or have fast or abnormal heartbeat sensation different from your atrial  fibrillation. Is there anything I should look out for?    - Let your doctor know if you have any of the following:    Severe diarrhea  Heavy, profuse sweating  Vomiting  Dramatic change in urination (urinating too much or too little). - You MUST get an EKG and blood tests at least every six months, or, in some cases,  every three months.  Make sure to contact your doctor's office if it has been longer  than six months since your last EKG or blood test.    Are there any medicines I should avoid? - Look over the following list of medications carefully. Notify your doctor if you are  taking one of these medicines. You should stop these medicines two days before  your scheduled hospitalization:    - Cimetidine (Tagamet®, Tagamet HB®)  - Verapamil (Calan®, Calan SR®, Covera-HS®, Isoptin®, Isoptin SR®,    Verelan®, Verelan PM®, Tiney Skip)  - Ketoconazole (Jaleesa Less, Extina®)  - Trimethoprim alone (Proloprim®, Trimpex®) or the combination of     trimethoprim and sulfamethoxazole (Bactrim®, Septra®, Sulfatrim®)  - Prochlorperazine (Compazine®, Compro®)  - Megestrol (Megace®)  - Hydrochlorothiazide alone or in combination with other medicines (Esidrix®,    Ezide®, Hydrodiuril®, Hydro-Par®, Microzide®, Oretic®)    - If you receive any new prescriptions, please make sure the prescribing doctor is  aware you are on sotalol (betapace) to check for drug-drug interactions. You may   check with our office at [phone] to review any new prescriptions prior to starting.     - Please check any new medications/ prescriptions including over the counter medications via the website www. [employer]. org. Can I stop taking blood thinners? - In general, if you were on a blood thinner before starting Sotalol, you should  remain on one at all times, even if you believe you are no longer in atrial fibrillation. What will happen during my hospitalization?    - You will receive an EKG and blood test before your arrival in the hospital.    - Once you arrive, your hospital team will start you on dofetilide. (It is a capsule.)    - You will get an EKG two hours after each dose, for a total of five doses. - On the third or fifth day (physician preference), you may have a cardioversion (electrical shock)   to try to get you out of atrial fibrillation and into regular rhythm (normal sinus rhythm). (In some cases, you will have a cardioversion before starting the medication. )    - You will most likely go home on the third day. Be sure to follow up with your doctor as  scheduled, no later than three months after starting the medicine. Plan to have  someone drive you home. Who should I call for more questions? - Call our office and ask to speak to the heart rhythm team, including Dr. Ky Rooney CNP, Alberto Corrales, [address]  at [phone].    - Call the pharmacist who dispensed the medication. Where can I get this medication?    - In most cases, you will leave the hospital with a 30-day supply of Sotalol in hand. In addition, a prescription will be sent to your home pharmacy. - Within one to two weeks after leaving the hospital, please call your home pharmacy  and verify that the prescription was received.

## 2021-08-24 NOTE — TELEPHONE ENCOUNTER
Pt daughter called stating that new script for Metoprolol was not fill. I called in medication to pharmacy requested with enough refills .

## 2021-09-01 ENCOUNTER — TELEPHONE (OUTPATIENT)
Dept: CARDIOLOGY CLINIC | Age: 76
End: 2021-09-01

## 2021-09-01 ENCOUNTER — PATIENT MESSAGE (OUTPATIENT)
Dept: CARDIOLOGY CLINIC | Age: 76
End: 2021-09-01

## 2021-09-01 ENCOUNTER — VIRTUAL VISIT (OUTPATIENT)
Dept: INTERNAL MEDICINE CLINIC | Age: 76
End: 2021-09-01
Payer: MEDICARE

## 2021-09-01 DIAGNOSIS — I48.0 PAROXYSMAL ATRIAL FIBRILLATION (HCC): Primary | ICD-10-CM

## 2021-09-01 DIAGNOSIS — I48.20 CHRONIC ATRIAL FIBRILLATION (HCC): ICD-10-CM

## 2021-09-01 DIAGNOSIS — G62.9 NEUROPATHY: Primary | ICD-10-CM

## 2021-09-01 DIAGNOSIS — R00.1 BRADYCARDIA: ICD-10-CM

## 2021-09-01 DIAGNOSIS — I49.5 TACHY-BRADY SYNDROME (HCC): ICD-10-CM

## 2021-09-01 DIAGNOSIS — I10 ESSENTIAL HYPERTENSION: ICD-10-CM

## 2021-09-01 PROCEDURE — 99214 OFFICE O/P EST MOD 30 MIN: CPT | Performed by: INTERNAL MEDICINE

## 2021-09-01 RX ORDER — PREGABALIN 150 MG/1
CAPSULE ORAL
Qty: 60 CAPSULE | Refills: 2 | Status: SHIPPED | OUTPATIENT
Start: 2021-09-01 | End: 2021-12-03 | Stop reason: SDUPTHER

## 2021-09-01 NOTE — TELEPHONE ENCOUNTER
Per [facility], schedule pt for sotalol loading admission and PPM placement on 10/5, arriving at 0700 for admission/medication followed by PPM at 1430. Reviewed complete instructions with the pt, posted to [address], per her request.  Will ask  about holding Eliquis once her monitor report is completed (pt mailed CAM back on 8/26).

## 2021-09-01 NOTE — PROGRESS NOTES
Deysi Johnson (:  1945) is a 68 y.o. female,Established patient, here for evaluation of the following chief complaint(s): Insomnia (lyrica, pt stated it is not working, starts out good but by 2/3 she starts with nerves in feet )         ASSESSMENT/PLAN:  1. Neuropathy  -Increase pregabalin to 150 mg in the morning and early afternoon, continue 225 mg at night. -     pregabalin (LYRICA) 150 MG capsule; Take 1 capsule by mouth in the morning and early afternoon, Disp-60 capsule, R-2Normal  2. Chronic atrial fibrillation (HCC)  -     Comprehensive Metabolic Panel; Future  -     Lipid Panel; Future  -     CBC Auto Differential; Future  3. Essential hypertension  -     Comprehensive Metabolic Panel; Future  -     Lipid Panel; Future  -     CBC Auto Differential; Future      Return in about 3 months (around 2021) for neuropathy. SUBJECTIVE/OBJECTIVE:  HPI    She is still struggling with neuropathy symptoms. At about 3 PM she starts getting restless leg symptoms, she is okay in the morning. Then symptoms progressed and so she takes her nighttime dose, even with the nighttime dose she is getting up multiple times during the night, being awoken by the neuropathy in her feet. Currently she is taking her dose at 8 AM, 1 PM, then bedtime. She is supposed to be getting a pacemaker placed soon, they are waiting for the cardiologist office to call them to schedule. Review of Systems    Patient-Reported Vitals 2021   Patient-Reported Weight 239 lbs   Patient-Reported Height 5 4   Patient-Reported Systolic (No Data)   Patient-Reported Diastolic (No Data)   Patient-Reported Pulse 117        Physical Exam  Constitutional:       General: She is not in acute distress. Appearance: Normal appearance. HENT:      Head: Normocephalic and atraumatic. Pulmonary:      Effort: Pulmonary effort is normal. No respiratory distress. Neurological:      General: No focal deficit present.       Mental Status: She is alert. Psychiatric:         Mood and Affect: Mood normal.         Behavior: Behavior normal.         Thought Content: Thought content normal.         Judgment: Judgment normal.                   [place], was evaluated through a synchronous (real-time) audio-video encounter. The patient (or guardian if applicable) is aware that this is a billable service. Verbal consent to proceed has been obtained within the past 12 months. The visit was conducted pursuant to the emergency declaration under the [address] [place] [address], [address] [place] [address] authority and the [employer] General Act. Patient identification was verified, and a caregiver was present when appropriate. The patient was located in a state where the provider was credentialed to provide care. An electronic signature was used to authenticate this note.     --Erica Pham MD

## 2021-09-01 NOTE — LETTER
[address]  EP Procedure Sheet  9/1/21    James Gonzalez  1945    Physician: Dr. Patricia Conn    EP Procedures  x Pacemaker implant--dual chamber 9/27 at 1430  EP Study    ICD implant  Atrial flutter ablation     Biv implant  Atrial fibrillation ablation    Generator Change  SVT ablation    Lead revision  VT ablation    Lead extraction +/- upgrade  AVN ablation    Loop implant x Cardioversion (during PPM placement)   x Other: admission for dofetilide 9/27 at 0700  MARGIE     Equipment  x Medtronic   JAE Mapping System    [place]  [address] [address]  CryoAblation    Biotronik  Laser Lead Extraction    Special Equipment       EP Procedures Scheduling Request  Time Requested  See above   Specific Day    Anesthesia    CT surgery backup    Location [facility]     Pre-Procedure Labs / Imaging   PT/INR  Type & cross    CBC  Units PRBC    BMP/Mg  Units FFP    Venogram  CXR    Echo  Pulmonary CTA for Pulmonary vein mapping     Patient Instructions

## 2021-09-02 ENCOUNTER — PREP FOR PROCEDURE (OUTPATIENT)
Dept: CARDIOLOGY CLINIC | Age: 76
End: 2021-09-02

## 2021-09-02 RX ORDER — CEFAZOLIN SODIUM 1 G/3ML
2000 INJECTION, POWDER, FOR SOLUTION INTRAMUSCULAR; INTRAVENOUS ONCE
Status: CANCELLED | OUTPATIENT
Start: 2021-10-05

## 2021-09-02 RX ORDER — SODIUM CHLORIDE 9 MG/ML
INJECTION, SOLUTION INTRAVENOUS CONTINUOUS
Status: CANCELLED | OUTPATIENT
Start: 2021-09-02

## 2021-09-02 RX ORDER — SODIUM CHLORIDE 0.9 % (FLUSH) 0.9 %
5-40 SYRINGE (ML) INJECTION EVERY 12 HOURS SCHEDULED
Status: CANCELLED | OUTPATIENT
Start: 2021-09-02

## 2021-09-02 RX ORDER — SODIUM CHLORIDE 9 MG/ML
25 INJECTION, SOLUTION INTRAVENOUS PRN
Status: CANCELLED | OUTPATIENT
Start: 2021-09-02

## 2021-09-02 RX ORDER — SODIUM CHLORIDE 0.9 % (FLUSH) 0.9 %
5-40 SYRINGE (ML) INJECTION PRN
Status: CANCELLED | OUTPATIENT
Start: 2021-09-02

## 2021-09-07 PROCEDURE — 93244 EXT ECG>48HR<7D REV&INTERPJ: CPT | Performed by: INTERNAL MEDICINE

## 2021-09-09 NOTE — TELEPHONE ENCOUNTER
Spoke with pt. Aware that [facility] does not want her to stop Eliquis prior to the PPM.  Pt verbalized understanding.

## 2021-09-15 DIAGNOSIS — I48.91 ATRIAL FIBRILLATION, UNSPECIFIED TYPE (HCC): ICD-10-CM

## 2021-10-04 ENCOUNTER — TELEPHONE (OUTPATIENT)
Dept: CARDIOLOGY CLINIC | Age: 76
End: 2021-10-04

## 2021-10-04 ENCOUNTER — ANESTHESIA EVENT (OUTPATIENT)
Dept: CARDIAC CATH/INVASIVE PROCEDURES | Age: 76
End: 2021-10-04

## 2021-10-04 NOTE — TELEPHONE ENCOUNTER
Due to urgent inpatient procedure, we are moving her PPM placement on 10/5 from 1430 to 0800. Spoke with pt and daughter, Cassie Holland. Asked that she arrive at [facility] tomorrow and reminded to stay NPO p MN. Advised that all other instructions remain the same. Verbalized understanding.

## 2021-10-05 ENCOUNTER — HOSPITAL ENCOUNTER (OUTPATIENT)
Dept: GENERAL RADIOLOGY | Age: 76
Discharge: HOME | DRG: 243 | End: 2021-10-05
Payer: MEDICARE

## 2021-10-05 ENCOUNTER — ANESTHESIA (OUTPATIENT)
Dept: CARDIAC CATH/INVASIVE PROCEDURES | Age: 76
End: 2021-10-05

## 2021-10-05 ENCOUNTER — PROCEDURE VISIT (OUTPATIENT)
Dept: CARDIOLOGY CLINIC | Age: 76
End: 2021-10-05

## 2021-10-05 ENCOUNTER — HOSPITAL ENCOUNTER (INPATIENT)
Dept: CARDIAC CATH/INVASIVE PROCEDURES | Age: 76
LOS: 2 days | Discharge: HOME | DRG: 243 | End: 2021-10-07
Attending: INTERNAL MEDICINE | Admitting: INTERNAL MEDICINE
Payer: MEDICARE

## 2021-10-05 VITALS — DIASTOLIC BLOOD PRESSURE: 87 MMHG | OXYGEN SATURATION: 98 % | SYSTOLIC BLOOD PRESSURE: 168 MMHG

## 2021-10-05 DIAGNOSIS — I49.5 TACHY-BRADY SYNDROME (HCC): ICD-10-CM

## 2021-10-05 DIAGNOSIS — I48.20 CHRONIC ATRIAL FIBRILLATION (HCC): ICD-10-CM

## 2021-10-05 DIAGNOSIS — Z95.0 CARDIAC PACEMAKER IN SITU: Primary | ICD-10-CM

## 2021-10-05 PROBLEM — I48.91 ATRIAL FIBRILLATION (HCC): Status: ACTIVE | Noted: 2021-10-05

## 2021-10-05 LAB
ANION GAP SERPL CALCULATED.3IONS-SCNC: 6 MMOL/L (ref 3–16)
BUN BLDV-MCNC: 22 MG/DL (ref 7–20)
CALCIUM SERPL-MCNC: 8.8 MG/DL (ref 8.3–10.6)
CHLORIDE BLD-SCNC: 105 MMOL/L (ref 99–110)
CO2: 31 MMOL/L (ref 21–32)
CREAT SERPL-MCNC: 0.8 MG/DL (ref 0.6–1.2)
EKG ATRIAL RATE: 54 BPM
EKG ATRIAL RATE: 60 BPM
EKG ATRIAL RATE: 60 BPM
EKG DIAGNOSIS: NORMAL
EKG P AXIS: 49 DEGREES
EKG P AXIS: 82 DEGREES
EKG P-R INTERVAL: 210 MS
EKG P-R INTERVAL: 278 MS
EKG P-R INTERVAL: 280 MS
EKG Q-T INTERVAL: 422 MS
EKG Q-T INTERVAL: 424 MS
EKG Q-T INTERVAL: 438 MS
EKG QRS DURATION: 106 MS
EKG QRS DURATION: 108 MS
EKG QRS DURATION: 88 MS
EKG QTC CALCULATION (BAZETT): 415 MS
EKG QTC CALCULATION (BAZETT): 422 MS
EKG QTC CALCULATION (BAZETT): 424 MS
EKG R AXIS: -46 DEGREES
EKG R AXIS: -58 DEGREES
EKG R AXIS: -59 DEGREES
EKG T AXIS: -20 DEGREES
EKG T AXIS: -23 DEGREES
EKG T AXIS: 2 DEGREES
EKG VENTRICULAR RATE: 54 BPM
EKG VENTRICULAR RATE: 60 BPM
EKG VENTRICULAR RATE: 60 BPM
GFR AFRICAN AMERICAN: >60
GFR NON-AFRICAN AMERICAN: >60
GLUCOSE BLD-MCNC: 113 MG/DL (ref 70–99)
MAGNESIUM: 2.2 MG/DL (ref 1.8–2.4)
POTASSIUM SERPL-SCNC: 4.2 MMOL/L (ref 3.5–5.1)
SODIUM BLD-SCNC: 142 MMOL/L (ref 136–145)

## 2021-10-05 PROCEDURE — C1898 LEAD, PMKR, OTHER THAN TRANS: HCPCS

## 2021-10-05 PROCEDURE — 93005 ELECTROCARDIOGRAM TRACING: CPT | Performed by: INTERNAL MEDICINE

## 2021-10-05 PROCEDURE — 3700000000 HC ANESTHESIA ATTENDED CARE

## 2021-10-05 PROCEDURE — 2580000003 HC RX 258: Performed by: INTERNAL MEDICINE

## 2021-10-05 PROCEDURE — 93010 ELECTROCARDIOGRAM REPORT: CPT | Performed by: INTERNAL MEDICINE

## 2021-10-05 PROCEDURE — 1200000000 HC SEMI PRIVATE

## 2021-10-05 PROCEDURE — 2500000003 HC RX 250 WO HCPCS

## 2021-10-05 PROCEDURE — B51N1ZZ FLUOROSCOPY OF LEFT UPPER EXTREMITY VEINS USING LOW OSMOLAR CONTRAST: ICD-10-PCS | Performed by: INTERNAL MEDICINE

## 2021-10-05 PROCEDURE — 99024 POSTOP FOLLOW-UP VISIT: CPT | Performed by: INTERNAL MEDICINE

## 2021-10-05 PROCEDURE — 83735 ASSAY OF MAGNESIUM: CPT

## 2021-10-05 PROCEDURE — 2060000000 HC ICU INTERMEDIATE R&B

## 2021-10-05 PROCEDURE — 6360000002 HC RX W HCPCS: Performed by: NURSE ANESTHETIST, CERTIFIED REGISTERED

## 2021-10-05 PROCEDURE — 33208 INSRT HEART PM ATRIAL & VENT: CPT

## 2021-10-05 PROCEDURE — 2580000003 HC RX 258: Performed by: NURSE ANESTHETIST, CERTIFIED REGISTERED

## 2021-10-05 PROCEDURE — 71045 X-RAY EXAM CHEST 1 VIEW: CPT

## 2021-10-05 PROCEDURE — 3700000001 HC ADD 15 MINUTES (ANESTHESIA)

## 2021-10-05 PROCEDURE — 6370000000 HC RX 637 (ALT 250 FOR IP): Performed by: NURSE PRACTITIONER

## 2021-10-05 PROCEDURE — 2580000003 HC RX 258: Performed by: ANESTHESIOLOGY

## 2021-10-05 PROCEDURE — C1894 INTRO/SHEATH, NON-LASER: HCPCS

## 2021-10-05 PROCEDURE — C1785 PMKR, DUAL, RATE-RESP: HCPCS

## 2021-10-05 PROCEDURE — 6360000002 HC RX W HCPCS: Performed by: INTERNAL MEDICINE

## 2021-10-05 PROCEDURE — 93005 ELECTROCARDIOGRAM TRACING: CPT | Performed by: NURSE PRACTITIONER

## 2021-10-05 PROCEDURE — 02HK3JZ INSERTION OF PACEMAKER LEAD INTO RIGHT VENTRICLE, PERCUTANEOUS APPROACH: ICD-10-PCS | Performed by: INTERNAL MEDICINE

## 2021-10-05 PROCEDURE — 33208 INSRT HEART PM ATRIAL & VENT: CPT | Performed by: INTERNAL MEDICINE

## 2021-10-05 PROCEDURE — 6370000000 HC RX 637 (ALT 250 FOR IP): Performed by: INTERNAL MEDICINE

## 2021-10-05 PROCEDURE — 6360000002 HC RX W HCPCS

## 2021-10-05 PROCEDURE — 02H63JZ INSERTION OF PACEMAKER LEAD INTO RIGHT ATRIUM, PERCUTANEOUS APPROACH: ICD-10-PCS | Performed by: INTERNAL MEDICINE

## 2021-10-05 PROCEDURE — 0JH606Z INSERTION OF PACEMAKER, DUAL CHAMBER INTO CHEST SUBCUTANEOUS TISSUE AND FASCIA, OPEN APPROACH: ICD-10-PCS | Performed by: INTERNAL MEDICINE

## 2021-10-05 PROCEDURE — 80048 BASIC METABOLIC PNL TOTAL CA: CPT

## 2021-10-05 RX ORDER — SODIUM CHLORIDE 0.9 % (FLUSH) 0.9 %
5-40 SYRINGE (ML) INJECTION PRN
Status: DISCONTINUED | OUTPATIENT
Start: 2021-10-05 | End: 2021-10-07 | Stop reason: HOSPADM

## 2021-10-05 RX ORDER — DOCUSATE SODIUM 100 MG/1
100 CAPSULE, LIQUID FILLED ORAL 2 TIMES DAILY PRN
Status: DISCONTINUED | OUTPATIENT
Start: 2021-10-05 | End: 2021-10-07 | Stop reason: HOSPADM

## 2021-10-05 RX ORDER — SODIUM CHLORIDE 0.9 % (FLUSH) 0.9 %
10 SYRINGE (ML) INJECTION EVERY 12 HOURS SCHEDULED
Status: DISCONTINUED | OUTPATIENT
Start: 2021-10-05 | End: 2021-10-05

## 2021-10-05 RX ORDER — ASPIRIN 81 MG/1
81 TABLET ORAL NIGHTLY
Status: DISCONTINUED | OUTPATIENT
Start: 2021-10-05 | End: 2021-10-07 | Stop reason: HOSPADM

## 2021-10-05 RX ORDER — SODIUM CHLORIDE, SODIUM LACTATE, POTASSIUM CHLORIDE, CALCIUM CHLORIDE 600; 310; 30; 20 MG/100ML; MG/100ML; MG/100ML; MG/100ML
INJECTION, SOLUTION INTRAVENOUS CONTINUOUS PRN
Status: DISCONTINUED | OUTPATIENT
Start: 2021-10-05 | End: 2021-10-05 | Stop reason: SDUPTHER

## 2021-10-05 RX ORDER — LOSARTAN POTASSIUM AND HYDROCHLOROTHIAZIDE 25; 100 MG/1; MG/1
1 TABLET ORAL DAILY
Status: DISCONTINUED | OUTPATIENT
Start: 2021-10-05 | End: 2021-10-07 | Stop reason: HOSPADM

## 2021-10-05 RX ORDER — PREGABALIN 150 MG/1
150 CAPSULE ORAL 2 TIMES DAILY
Status: DISCONTINUED | OUTPATIENT
Start: 2021-10-05 | End: 2021-10-07 | Stop reason: HOSPADM

## 2021-10-05 RX ORDER — MAGNESIUM OXIDE 400 MG/1
400 TABLET ORAL DAILY
COMMUNITY

## 2021-10-05 RX ORDER — SODIUM CHLORIDE 0.9 % (FLUSH) 0.9 %
10 SYRINGE (ML) INJECTION PRN
Status: DISCONTINUED | OUTPATIENT
Start: 2021-10-05 | End: 2021-10-05

## 2021-10-05 RX ORDER — PHENOL 1.4 %
1 AEROSOL, SPRAY (ML) MUCOUS MEMBRANE DAILY
COMMUNITY

## 2021-10-05 RX ORDER — ACETAMINOPHEN 500 MG
1000 TABLET ORAL EVERY 6 HOURS PRN
Status: DISCONTINUED | OUTPATIENT
Start: 2021-10-05 | End: 2021-10-07 | Stop reason: HOSPADM

## 2021-10-05 RX ORDER — SODIUM CHLORIDE 9 MG/ML
25 INJECTION, SOLUTION INTRAVENOUS PRN
Status: DISCONTINUED | OUTPATIENT
Start: 2021-10-05 | End: 2021-10-07 | Stop reason: HOSPADM

## 2021-10-05 RX ORDER — SOTALOL HYDROCHLORIDE 120 MG/1
120 TABLET ORAL 2 TIMES DAILY
Status: DISCONTINUED | OUTPATIENT
Start: 2021-10-05 | End: 2021-10-07 | Stop reason: HOSPADM

## 2021-10-05 RX ORDER — FUROSEMIDE 20 MG/1
20 TABLET ORAL 2 TIMES DAILY
Status: DISCONTINUED | OUTPATIENT
Start: 2021-10-05 | End: 2021-10-07 | Stop reason: HOSPADM

## 2021-10-05 RX ORDER — ACETAMINOPHEN 325 MG/1
650 TABLET ORAL EVERY 4 HOURS PRN
Status: DISCONTINUED | OUTPATIENT
Start: 2021-10-05 | End: 2021-10-07 | Stop reason: HOSPADM

## 2021-10-05 RX ORDER — TAMOXIFEN CITRATE 20 MG/1
20 TABLET ORAL DAILY
COMMUNITY

## 2021-10-05 RX ORDER — ONDANSETRON 2 MG/ML
INJECTION INTRAMUSCULAR; INTRAVENOUS PRN
Status: DISCONTINUED | OUTPATIENT
Start: 2021-10-05 | End: 2021-10-05 | Stop reason: SDUPTHER

## 2021-10-05 RX ORDER — SODIUM CHLORIDE 9 MG/ML
25 INJECTION, SOLUTION INTRAVENOUS PRN
Status: DISCONTINUED | OUTPATIENT
Start: 2021-10-05 | End: 2021-10-05

## 2021-10-05 RX ORDER — ATORVASTATIN CALCIUM 20 MG/1
20 TABLET, FILM COATED ORAL DAILY
Status: DISCONTINUED | OUTPATIENT
Start: 2021-10-05 | End: 2021-10-07 | Stop reason: HOSPADM

## 2021-10-05 RX ORDER — SODIUM CHLORIDE 9 MG/ML
INJECTION, SOLUTION INTRAVENOUS CONTINUOUS
Status: DISCONTINUED | OUTPATIENT
Start: 2021-10-05 | End: 2021-10-05

## 2021-10-05 RX ORDER — POTASSIUM CHLORIDE 20 MEQ/1
20 TABLET, EXTENDED RELEASE ORAL DAILY
Status: DISCONTINUED | OUTPATIENT
Start: 2021-10-05 | End: 2021-10-07 | Stop reason: HOSPADM

## 2021-10-05 RX ORDER — CEFAZOLIN SODIUM 1 G/3ML
2000 INJECTION, POWDER, FOR SOLUTION INTRAMUSCULAR; INTRAVENOUS ONCE
Status: DISCONTINUED | OUTPATIENT
Start: 2021-10-05 | End: 2021-10-05 | Stop reason: SDUPTHER

## 2021-10-05 RX ORDER — SODIUM CHLORIDE 9 MG/ML
INJECTION, SOLUTION INTRAVENOUS CONTINUOUS
Status: DISCONTINUED | OUTPATIENT
Start: 2021-10-05 | End: 2021-10-07 | Stop reason: HOSPADM

## 2021-10-05 RX ORDER — PROPOFOL 10 MG/ML
INJECTION, EMULSION INTRAVENOUS CONTINUOUS PRN
Status: DISCONTINUED | OUTPATIENT
Start: 2021-10-05 | End: 2021-10-05 | Stop reason: SDUPTHER

## 2021-10-05 RX ORDER — CALCIUM CARBONATE 500(1250)
500 TABLET ORAL DAILY
Status: DISCONTINUED | OUTPATIENT
Start: 2021-10-05 | End: 2021-10-07 | Stop reason: HOSPADM

## 2021-10-05 RX ORDER — SODIUM CHLORIDE 0.9 % (FLUSH) 0.9 %
5-40 SYRINGE (ML) INJECTION EVERY 12 HOURS SCHEDULED
Status: DISCONTINUED | OUTPATIENT
Start: 2021-10-05 | End: 2021-10-07 | Stop reason: HOSPADM

## 2021-10-05 RX ADMIN — PROPOFOL 25 MCG/KG/MIN: 10 INJECTION, EMULSION INTRAVENOUS at 07:57

## 2021-10-05 RX ADMIN — APIXABAN 5 MG: 5 TABLET, FILM COATED ORAL at 10:16

## 2021-10-05 RX ADMIN — PREGABALIN 150 MG: 150 CAPSULE ORAL at 10:30

## 2021-10-05 RX ADMIN — LOSARTAN POTASSIUM AND HYDROCHLOROTHIAZIDE 1 TABLET: 25; 100 TABLET ORAL at 12:14

## 2021-10-05 RX ADMIN — PREGABALIN 225 MG: 75 CAPSULE ORAL at 20:40

## 2021-10-05 RX ADMIN — CALCIUM 500 MG: 500 TABLET ORAL at 10:16

## 2021-10-05 RX ADMIN — FUROSEMIDE 20 MG: 20 TABLET ORAL at 20:40

## 2021-10-05 RX ADMIN — SOTALOL HYDROCHLORIDE 120 MG: 120 TABLET ORAL at 20:41

## 2021-10-05 RX ADMIN — FUROSEMIDE 20 MG: 20 TABLET ORAL at 12:14

## 2021-10-05 RX ADMIN — ONDANSETRON 4 MG: 2 INJECTION INTRAMUSCULAR; INTRAVENOUS at 07:46

## 2021-10-05 RX ADMIN — ASPIRIN 81 MG: 81 TABLET, COATED ORAL at 20:40

## 2021-10-05 RX ADMIN — Medication 10 ML: at 20:42

## 2021-10-05 RX ADMIN — APIXABAN 5 MG: 5 TABLET, FILM COATED ORAL at 20:40

## 2021-10-05 RX ADMIN — POTASSIUM CHLORIDE 20 MEQ: 1500 TABLET, EXTENDED RELEASE ORAL at 10:16

## 2021-10-05 RX ADMIN — SOTALOL HYDROCHLORIDE 120 MG: 120 TABLET ORAL at 12:14

## 2021-10-05 RX ADMIN — Medication 10 ML: at 10:17

## 2021-10-05 RX ADMIN — SODIUM CHLORIDE, SODIUM LACTATE, POTASSIUM CHLORIDE, AND CALCIUM CHLORIDE: .6; .31; .03; .02 INJECTION, SOLUTION INTRAVENOUS at 07:32

## 2021-10-05 RX ADMIN — CEFAZOLIN 2000 MG: 10 INJECTION, POWDER, FOR SOLUTION INTRAVENOUS at 07:45

## 2021-10-05 RX ADMIN — ATORVASTATIN CALCIUM 20 MG: 20 TABLET, FILM COATED ORAL at 10:17

## 2021-10-05 ASSESSMENT — PULMONARY FUNCTION TESTS
PIF_VALUE: 1
PIF_VALUE: 46
PIF_VALUE: 34
PIF_VALUE: 1
PIF_VALUE: 2
PIF_VALUE: 1
PIF_VALUE: 1
PIF_VALUE: 2
PIF_VALUE: 1
PIF_VALUE: 2
PIF_VALUE: 1
PIF_VALUE: 2
PIF_VALUE: 1
PIF_VALUE: 1
PIF_VALUE: 0
PIF_VALUE: 3
PIF_VALUE: 1
PIF_VALUE: 31
PIF_VALUE: 1
PIF_VALUE: 1
PIF_VALUE: 31
PIF_VALUE: 25
PIF_VALUE: 1
PIF_VALUE: 1
PIF_VALUE: 4
PIF_VALUE: 1
PIF_VALUE: 2
PIF_VALUE: 1
PIF_VALUE: 31
PIF_VALUE: 1
PIF_VALUE: 31
PIF_VALUE: 2
PIF_VALUE: 1
PIF_VALUE: 2
PIF_VALUE: 4
PIF_VALUE: 1
PIF_VALUE: 7
PIF_VALUE: 30
PIF_VALUE: 1
PIF_VALUE: 9
PIF_VALUE: 53
PIF_VALUE: 18
PIF_VALUE: 4
PIF_VALUE: 1
PIF_VALUE: 29
PIF_VALUE: 1
PIF_VALUE: 3
PIF_VALUE: 2
PIF_VALUE: 1
PIF_VALUE: 2
PIF_VALUE: 2
PIF_VALUE: 1
PIF_VALUE: 1
PIF_VALUE: 35
PIF_VALUE: 1
PIF_VALUE: 2
PIF_VALUE: 1
PIF_VALUE: 1

## 2021-10-05 ASSESSMENT — PAIN SCALES - GENERAL
PAINLEVEL_OUTOF10: 0

## 2021-10-05 NOTE — ANESTHESIA PRE PROCEDURE
Department of Anesthesiology  Preprocedure Note       Name:  Mynor Guevara   Age:  68 y.o.  :  1945                                          MRN:  [id number]         Date:  10/5/2021      Surgeon: * No surgeons listed *    Procedure: * No procedures listed *    Medications prior to admission:   Prior to Admission medications    Medication Sig Start Date End Date Taking?  Authorizing Provider   potassium chloride (KLOR-CON M20) 20 MEQ extended release tablet TAKE ONE TABLET BY MOUTH DAILY 21   Omar Goodrich MD   pregabalin (LYRICA) 150 MG capsule Take 1 capsule by mouth in the morning and early afternoon 9/1/21 10/1/21  Omar Goodrich MD   pregabalin (LYRICA) 225 MG capsule TAKE ONE CAPSULE BY MOUTH ONCE NIGHTLY 21  Omra Goodrich MD   metoprolol succinate (TOPROL XL) 25 MG extended release tablet Take 1 tablet by mouth 2 times daily 21   Jaron Laughlin APRN - CNP   ELIQUIS 5 MG TABS tablet Take 1 tablet by mouth 2 times daily 21   Gris Queen MD   losartan-hydroCHLOROthiazide (HYZAAR) 100-25 MG per tablet TAKE ONE TABLET BY MOUTH DAILY 21   Omar Goodrich MD   furosemide (LASIX) 20 MG tablet Take 1 tablet by mouth 2 times daily 21   Gris Queen MD   atorvastatin (LIPITOR) 20 MG tablet Take 1 tablet by mouth daily 21   Gris Queen MD   ondansetron (ZOFRAN) 4 MG tablet Take 1 tablet by mouth 3 times daily as needed for Nausea or Vomiting 20   Omar Goodrich MD   acetaminophen (TYLENOL) 500 MG tablet Take 1,000 mg by mouth every 6 hours as needed for Pain or Fever    Historical Provider, MD   Glucosamine-Chondroit-Vit C-Mn (GLUCOSAMINE 1500 COMPLEX PO) Take 1 tablet by mouth 2 times daily     Historical Provider, MD   Docusate Calcium (STOOL SOFTENER PO) Take 1 capsule by mouth 2 times daily     Historical Provider, MD   nitroGLYCERIN (NITROSTAT) 0.4 MG SL tablet Place 0.4 mg under the tongue every 5 minutes as needed for Chest pain Take 1 tablet for chest pain and repeat after 5 min if no relief, call 911 and take another dose 5 min later. Max of 3 doses in 15 min. Historical Provider, MD   aspirin 81 MG EC tablet Take 81 mg by mouth nightly     Historical Provider, MD   Multiple Vitamins-Minerals (CENTRUM SILVER PO) Take by mouth daily     Historical Provider, MD       Current medications:    Current Outpatient Medications   Medication Sig Dispense Refill    potassium chloride (KLOR-CON M20) 20 MEQ extended release tablet TAKE ONE TABLET BY MOUTH DAILY 90 tablet 1    pregabalin (LYRICA) 150 MG capsule Take 1 capsule by mouth in the morning and early afternoon 60 capsule 2    pregabalin (LYRICA) 225 MG capsule TAKE ONE CAPSULE BY MOUTH ONCE NIGHTLY 90 capsule 0    metoprolol succinate (TOPROL XL) 25 MG extended release tablet Take 1 tablet by mouth 2 times daily 60 tablet 5    ELIQUIS 5 MG TABS tablet Take 1 tablet by mouth 2 times daily 180 tablet 3    losartan-hydroCHLOROthiazide (HYZAAR) 100-25 MG per tablet TAKE ONE TABLET BY MOUTH DAILY 90 tablet 1    furosemide (LASIX) 20 MG tablet Take 1 tablet by mouth 2 times daily 180 tablet 3    atorvastatin (LIPITOR) 20 MG tablet Take 1 tablet by mouth daily 90 tablet 3    ondansetron (ZOFRAN) 4 MG tablet Take 1 tablet by mouth 3 times daily as needed for Nausea or Vomiting 20 tablet 0    acetaminophen (TYLENOL) 500 MG tablet Take 1,000 mg by mouth every 6 hours as needed for Pain or Fever      Glucosamine-Chondroit-Vit C-Mn (GLUCOSAMINE 1500 COMPLEX PO) Take 1 tablet by mouth 2 times daily       Docusate Calcium (STOOL SOFTENER PO) Take 1 capsule by mouth 2 times daily       nitroGLYCERIN (NITROSTAT) 0.4 MG SL tablet Place 0.4 mg under the tongue every 5 minutes as needed for Chest pain Take 1 tablet for chest pain and repeat after 5 min if no relief, call 911 and take another dose 5 min later. Max of 3 doses in 15 min.       aspirin 81 MG EC tablet Take 81 mg by mouth nightly       Multiple Vitamins-Minerals Past Medical History:        Diagnosis Date    Arthritis     Atrial fibrillation ([address] 2016    Breast cancer [facility]) 2012    Jorge Stakes    CAD (coronary artery disease)     GERD (gastroesophageal reflux disease)     dx . but has never had an issue. requires no meds    H/O: whooping cough 1945    History of shingles     1 year after recieving vaccine     Hypercholesterolemia 2010    Hypertension 1987    Iron deficiency 2016    PONV (postoperative nausea and vomiting)     Sleep apnea 2018    Vertigo 7    as a child       Past Surgical History:        Procedure Laterality Date    ABDOMINAL HERNIA REPAIR     [address] SURGERY  2019    BACK SURGERY  2009    disk replacement     BREAST LUMPECTOMY Right 2012    cancer    BREAST SURGERY Left     benign tumor left breast     BREAST SURGERY Left ,     cyst in left    CARDIAC CATHETERIZATION  2016    3 Stents-at [facility]  2019    COLONOSCOPY  2015    FOOT SURGERY Left     heal spur    HEMORRHOID SURGERY  2016    3 removed. [facility]    UPPER GASTROINTESTINAL ENDOSCOPY      US BREAST BIOPSY NEEDLE ADDITIONAL RIGHT Right 2021    US BREAST BIOPSY NEEDLE ADDITIONAL RIGHT 2021 [address] [facility] ULTRAS[facility]    US BREAST NEEDLE BIOPSY RIGHT Right 2021    US BREAST NEEDLE BIOPSY RIGHT 2021 [address] [facility] ULTRAS[facility]    VENTRICULAR ABLATION SURGERY  2019       Social History:    Social History     Tobacco Use    Smoking status: Former Smoker     Packs/day: 1.00     Years: 2.00     Pack years: 2.00     Types: Cigarettes     Quit date: 1995     Years since quittin.7    Smokeless tobacco: Never Used   Substance Use Topics    Alcohol use: No                                Counseling given: Not Answered      Vital Signs (Current): There were no vitals filed for this visit. BP Readings from Last 3 Encounters:   08/19/21 130/82   07/09/21 128/66   03/16/21 138/80       NPO Status:                                                                                 BMI:   Wt Readings from Last 3 Encounters:   08/19/21 244 lb 6.4 oz (110.9 kg)   07/09/21 240 lb 12.8 oz (109.2 kg)   03/16/21 232 lb (105.2 kg)     There is no height or weight on file to calculate BMI.    CBC:   Lab Results   Component Value Date    WBC 7.1 09/30/2021    RBC 4.77 09/30/2021    RBC 4.46 06/07/2012    HGB 13.3 09/30/2021    HCT 40.1 09/30/2021    HCT 36.5 06/07/2012    MCV 84.0 09/30/2021    RDW 14.6 09/30/2021     09/30/2021     06/07/2012       CMP:   Lab Results   Component Value Date     09/30/2021    K 4.1 09/30/2021    K 4.2 11/15/2019     09/30/2021    CO2 27 09/30/2021    BUN 21 09/30/2021    CREATININE 0.9 09/30/2021    GFRAA >60 09/30/2021    AGRATIO 1.7 12/15/2020    LABGLOM >60 09/30/2021    LABGLOM 70 11/19/2019    GLUCOSE 87 09/30/2021    GLUCOSE 110 04/11/2012    PROT 6.5 12/15/2020    CALCIUM 8.8 09/30/2021    BILITOT 0.8 12/15/2020    ALKPHOS 105 12/15/2020    AST 23 12/15/2020    ALT 17 12/15/2020       POC Tests: No results for input(s): POCGLU, POCNA, POCK, POCCL, POCBUN, POCHEMO, POCHCT in the last 72 hours.     Coags:   Lab Results   Component Value Date    PROTIME 13.3 09/30/2021    INR 1.17 09/30/2021       HCG (If Applicable): No results found for: PREGTESTUR, PREGSERUM, HCG, HCGQUANT     ABGs: No results found for: PHART, PO2ART, AGH6TOG, WRC8OKA, BEART, S2QZHCMB     Type & Screen (If Applicable):  Lab Results   Component Value Date    LABRH POS 06/07/2018       Drug/Infectious Status (If Applicable):  Lab Results   Component Value Date    HEPCAB NEGATIVE 10/12/2017       COVID-19 Screening (If Applicable): No results found for: COVID19        Anesthesia Evaluation  Patient summary reviewed and Nursing notes reviewed   history of anesthetic complications: PONV.  Airway: Mallampati: II  TM distance: <3 FB   Neck ROM: full  Mouth opening: > = 3 FB Dental: normal exam         Pulmonary:normal exam    (+) sleep apnea: on noncompliant,                             Cardiovascular:  Exercise tolerance: no interval change,   (+) hypertension: mild, CAD:, CABG/stent: no interval change, hyperlipidemia      ECG reviewed  Rhythm: regular  Rate: normal  Echocardiogram reviewed         Beta Blocker:  Dose within 24 Hrs         Neuro/Psych:   Negative Neuro/Psych ROS              GI/Hepatic/Renal:   (+) GERD: well controlled, morbid obesity          Endo/Other:                     Abdominal:   (+) obese,           Vascular: negative vascular ROS. Other Findings:             Anesthesia Plan      MAC and general     ASA 3       Induction: intravenous. Anesthetic plan and risks discussed with patient. Plan discussed with CRNA.     Attending anesthesiologist reviewed and agrees with Preprocedure content              Tiney Schilder, DO   10/5/2021

## 2021-10-05 NOTE — CONSULTS
Clinical Pharmacy Progress Note    Admit date: 10/5/2021     Subjective/Objective:  Pt is a 79yoF with a PMHx that includes HTN, Afib s/p ablation, CHF, breast cancer, iron deficiency, RENE, and neuropathy. Sotalol is being initiated today per EP, as she has had reoccurance of her Afib. Pharmacy has been consulted by KAI HO to assist with monitoring of potassium during sotalol loading. Goal K > 4. Recent Labs     10/05/21  1140      K 4.2      CO2 31   BUN 22*   CREATININE 0.8   GLUCOSE 113*     Magnesium (10/5) = 2.2    Prophylaxis:  VTE: Apixaban   GI: Not indicated      Assessment/Plan:  1) Electrolyte Management:  · Potassium-- Pharmacy to dose KCl IV to keep K+ > 4.0  · K today = 4.2. No further repletion needed. · Please note patient is on oral KCl 20mEq daily. · Will follow daily K and replete as needed. · Magnesium  · Mg today = 2.2  · Please note patient is on MagOx 400mg daily. · Will follow. Thank you, please call with questions.    Chong Wood PharmD., BCPS   10/5/2021 1:17 PM  Wireless: [phone]

## 2021-10-05 NOTE — ANESTHESIA POSTPROCEDURE EVALUATION
Department of Anesthesiology  Postprocedure Note    Patient: Mariaa Vernon  MRN: [id number]  YOB: 1945  Date of evaluation: 10/5/2021  Time:  12:24 PM     Procedure Summary     Date: 10/05/21 Room / Location: [facility] Cath Lab    Anesthesia Start: 0732 Anesthesia Stop: 1371    Procedure: Werner Curry W ANES Diagnosis: Tachy-valerie syndrome ([address]    Scheduled Providers:  Responsible Provider:     Anesthesia Type: MAC, general ASA Status: 3          Anesthesia Type: MAC, general    Mulu Phase I:      Mulu Phase II:      Last vitals: Reviewed and per EMR flowsheets.        Anesthesia Post Evaluation    Patient location during evaluation: bedside  Patient participation: complete - patient participated  Level of consciousness: awake and alert  Pain score: 0  Airway patency: patent  Nausea & Vomiting: no nausea and no vomiting  Complications: no  Cardiovascular status: hemodynamically stable  Respiratory status: acceptable  Hydration status: stable

## 2021-10-05 NOTE — PROCEDURES
[address]     Electrophysiology Procedure Note       Date of Procedure: 10/5/2021  Patient's Name: Mynor Guevara  YOB: 1945   Medical Record Number: [id number]  Procedure Performed by: Yaneli Hatfield MD    Procedures performed:  · Selective venography of left upper extremity. · Insertion of MRI compatible right ventricular pacing lead under fluoroscopy. · Insertion of MRI compatible right atrial lead under fluoroscopy  · Insertion of a MRI compatible dual chamber Pacemaker. · Electronic analysis of lead and device. · Anesthesia: Local and Monitored Anesthesia Care  · Estimated blood loss less than 20 cc    Indication of the procedure: Mynor Guevara is a 68 y.o. female with symptomatic bradycardia. The patient is referred for pacemaker implantation due to tachybradycardia syndrome with symptoms of lightheadedness. After her pacemaker implantation, she is scheduled to have inpatient initiation of sotalol. Details of procedure: The patient was brought to the electrophysiology laboratory in stable condition. The patient was in a fasting and non-sedated state. The risks, benefits and alternatives of the procedure were discussed with the patient. The risks including, but not limited to, the risks of vascular injury, bleeding, infection, device malfunction, lead dislodgement, radiation exposure, injury to cardiac and surrounding structures (including pneumothorax), stroke, myocardial infarction and death were discussed in detail. The patient opted to proceed with the device implantation. Written informed consent was signed and placed in the chart. Prophylactic antibiotic using Ancef 2 g IV was given. The patient was prepped and draped in sterile fashion. A timeout protocol was completed to identify the patient and the procedure being performed. IV sedation was provided with IV Versed and IV Fentanyl.  The patient was monitored continuously with ECG, pulse oximetry, blood pressure monitoring, and direct observation. An incision was made in the left upper pectoral area in a transverse line roughly 1 cm from the clavicle after administration of lidocaine/bupivicaine combination. Using electrocautery and blunt dissection, a pocket was created. Central venous access into the left extrathoracic subclavian vein was obtained using the modified Seldinger technique. After central venous access was obtained, a sheath was placed in the axillary vein. A right ventricular lead was advanced into the RV septal position under fluoroscopic guidance and using a series of curved and straight stylets. The lead was actively fixated. After confirming appropriate function and no diaphragmatic stimulation at maximum output, the sheath was split and removed. The lead was secured to the underlying tissue using suture material.      A new sheath was advanced over a second previously placed wire into the vein. The atrial lead was advanced to the right atrial appendage and actively fixated under fluoroscopic guidance. After confirming appropriate function and no diaphragmatic stimulation at maximum output, the sheath was split and removed. The lead was secured to the underlying tissue using suture. The leads were then connected to the new pulse generator which was then placed into the pocket. Antibiotic solution along with saline flush was used to irrigate the pocket. The pocket was then closed using a 2-0 and 3-0 Vicryl subcutaneous layer and a subcuticular layer using 4-0 Vicryl. The skin was covered with Steri-Strips and dressing. All sponge and needle counts were reported as correct at the end of the procedure. The patient tolerated the procedure well and there were no complications. Patient was transported to the holding area in stable condition. Device and Leads information:          Device was programmed to MVP with lower rate of 60 and upper tracking rate of 130. Impression:    Pre- and post-procedural diagnoses of tachybradycardia syndrome with symptoms of lightheadedness with successful implantation of a Medtronic 2-chamber PPM.     Plan:     The patient will be admitted and have usual post-implant care, including chest x-ray, intravenous antibiotic therapy, and interrogation of the device. She will be initiated on sotalol 120 mg p.o. every 12 hours     Follow-up will be a 1-week wound check with our nurse in the [facility] and a 1-month follow-up with Ms. Neelam Alfredo. Thank you for allowing us to participate in the care of your patient. If you have any questions, please do not hesitate to contact me.     Misael Dillon MD   Cardiac Electrophysiology  [facility]

## 2021-10-05 NOTE — H&P
[address]   Cardiac Electrophysiology H&P  Date: 10/5/2021  Admit Date:  10/5/2021  Reason for admission: Pacemaker implantation sotalol loading  Consult Requesting Physician: No att. providers found     No chief complaint on file. HPI: Delores Tarango is a 68 y.o. female with a past medical history significant for longstanding persistent atrial fibrillation, hypertension, obstructive sleep apnea, obesity, hyperlipidemia, CAD, status post atrial fibrillation ablation in [facility], started on sotalol at the same time, status post cryoablation for atrial fibrillation in December 2018, status post RFA for atrial fibrillation and #2019 advised to, with recurrence of atrial fibrillation post procedure late, and initiated on sotalol with cardioversion on 11/18 2019. Over a period of time, she developed recurrent episodes of bradycardia and her sotalol dose has to be reduced. However, she had recurrence of atrial fibrillation in the setting. 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 Hence, she is here for sotalol loading. 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 but has never had an issue.  requires no meds    H/O: whooping cough 1945    History of shingles     1 year after recieving vaccine     Hypercholesterolemia 2010    Hypertension 1987    Iron deficiency 08/2016    PONV (postoperative nausea and vomiting)     Sleep apnea 06/2018    Vertigo 1957    as a child        Past Surgical History:   Procedure Laterality Date    ABDOMINAL HERNIA REPAIR  2011   [address] SURGERY  11/01/2019    BACK SURGERY  2009    disk replacement     BREAST LUMPECTOMY Right 2012    cancer    BREAST SURGERY Left 2001    benign tumor left breast 2001    BREAST SURGERY Left 1967, 1992    cyst in left    CARDIAC CATHETERIZATION  09/08/2016    3 Stents-at [address]  11/07/2019    COLONOSCOPY  2015    FOOT SURGERY Left 1990    heal spur    HEMORRHOID SURGERY  09/01/2016    3 removed. [facility]    UPPER GASTROINTESTINAL ENDOSCOPY  2006    US BREAST BIOPSY NEEDLE ADDITIONAL RIGHT Right 2/11/2021    US BREAST BIOPSY NEEDLE ADDITIONAL RIGHT 2/11/2021 [address] [facility] ULTRASOUND    US BREAST NEEDLE BIOPSY RIGHT Right 2/11/2021    US BREAST NEEDLE BIOPSY RIGHT 2/11/2021 [address] [facility] ULTRASOUND    VENTRICULAR ABLATION SURGERY  11/01/2019       Allergies   Allergen Reactions    Morphine      vomit    Tape [place] Tape] Itching    Unable To Assess      Any pain med makes her sick, vomit violently  Tolerates fentanly    Vicodin [Hydrocodone-Acetaminophen] Nausea And Vomiting       Social History:  Reviewed. reports that she quit smoking about 26 years ago. Her smoking use included cigarettes. She has a 2.00 pack-year smoking history. She has never used smokeless tobacco. She reports that she does not drink alcohol and does not use drugs. Family History:  Reviewed. family history includes Cancer in her mother, sister, sister, and sister; Diabetes in her mother; Heart Disease in her brother and mother; High Blood Pressure in her father and mother; Stroke in her brother and mother. No premature CAD. Review of System:  All other systems reviewed except for that noted above.  Pertinent negatives and positives are:     Objective      · General: negative for fever, chills   · Ophthalmic ROS: negative for - eye pain or loss of vision  · ENT ROS: negative for - headaches, sore throat   · Respiratory: negative for - cough, sputum  · Cardiovascular: Reviewed in HPI  · Gastrointestinal: negative for - abdominal pain, diarrhea, N/V  · Hematology: negative for - bleeding, blood clots, bruising or jaundice  · Genito-Urinary:  negative for - Dysuria or incontinence  · Musculoskeletal: negative for - Joint swelling, muscle pain  · Neurological: negative for - confusion, dizziness, headaches   · Psychiatric: No anxiety, no depression. · Dermatological: negative for - rash    Physical Examination:  Vitals:    10/05/21 0724   Temp: 98.2 °F (36.8 °C)      No intake or output data in the 24 hours ending 10/05/21 0900  No intake/output data recorded. Wt Readings from Last 3 Encounters:   10/05/21 240 lb (108.9 kg)   21 244 lb 6.4 oz (110.9 kg)   21 240 lb 12.8 oz (109.2 kg)     Temp  Av.2 °F (36.8 °C)  Min: 98.2 °F (36.8 °C)  Max: 98.2 °F (36.8 °C)  BP  Min: 146/76  Max: 208/90  SpO2  Av.1 %  Min: 78 %  Max: 100 %  FiO2   Av.9 %  Min: 21 %  Max: 83 %    · Telemetry: Sinus rhythm   · Constitutional: Alert. Oriented to person, place, and time. No distress. · Head: Normocephalic and atraumatic. · Mouth/Throat: Lips appear moist. Oropharynx is clear and moist.  · Eyes: Conjunctivae normal. EOM are normal.   · Neck: Neck supple. No lymphadenopathy. No rigidity. No JVD present. · Cardiovascular: Normal rate, regular rhythm. Normal S1&S2. Carotid pulse 2+ bilaterally. · Pulmonary/Chest: Bilateral respiratory sounds present. No respiratory accessory muscle use. No wheezes, No rhonchi. · Abdominal: Soft. Normal bowel sounds present. No distension, No tenderness. No splenomegaly. No hernia. · Musculoskeletal: No tenderness. No edema    · Lymphadenopathy: Has no cervical adenopathy. · Neurological: Alert and oriented. Cranial nerve II-XII grossly intact, No gross deficit to touch. · Skin: Skin is warm and dry. No rash, lesions, ulcerations noted. · Psychiatric: No anxiety nor agitation. Labs:  Reviewed. No results for input(s): NA, K, CL, CO2, PHOS, BUN, CREATININE in the last 72 hours. Invalid input(s): CA  No results for input(s): WBC, HGB, HCT, MCV, PLT in the last 72 hours.   No results found for: CKTOTAL, CKMB, CKMBINDEX, TROPONINI  No results found for: BNP  Lab Results   Component Value Date    PROTIME 13.3 09/30/2021    INR 1.17 09/30/2021    INR 1.02 12/04/2018    INR 1.51 09/05/2018     Lab Results   Component Value Date    CHOL 154 12/15/2020    HDL 48 12/15/2020    TRIG 237 12/15/2020       Diagnostic and imaging results reviewed. ECG: Sinus rhythm  Echo: Preserved LV ejection fraction    I independently reviewed the ECG and telemetry. Scheduled Meds:   sodium chloride flush  10 mL IntraVENous 2 times per day    sodium chloride flush  5-40 mL IntraVENous 2 times per day     Continuous Infusions:   sodium chloride      sodium chloride      sodium chloride      sodium chloride       PRN Meds:.sodium chloride flush, sodium chloride, sodium chloride, sodium chloride flush     Assessment:   Patient Active Problem List    Diagnosis Date Noted    Angina pectoris syndrome ([facility] 06/07/2018    Abnormal nuclear stress test 06/07/2018    Obesity (BMI 30-39.9) 10/09/2016    Unstable angina ([facility] 10/08/2016    Breast cancer ([facility] 05/10/2012    Atrial fibrillation ([facility] 10/05/2021    Neuropathy 09/01/2021    Heart failure ([facility] 11/16/2019    S/P ablation of atrial fibrillation 12/04/2018    Chronic atrial fibrillation (HCC)     Vertigo     Hypertension     Diverticulosis of large intestine without hemorrhage 08/24/2016    Second degree hemorrhoids 08/24/2016    Rectal bleeding 08/24/2016    Collagenous colitis 08/24/2016    Iron deficiency 08/01/2016      There are no active hospital problems to display for this patient. Recommendation (s):    1. Persistent atrial fibrillation  2. Hypertension  3. CAD  4. Morbid obesity  5.   Obstructive sleep apnea    Proceed with dual-chamber pacemaker implantation morning  Discussed about the indications, risk and benefits of the procedure  Of the procedure include pneumothorax, pericardial effusion  After this, plan to start her on sotalol 120 mg p.o. every 12 hours  She will be the hospital to monitor her QTC interval due to high risk of torsades in the setting of sotalol and sedation  Monitor electrolytes    Thank you for allowing me to participate in the care of Mariaa Vernon . If you have any questions/comments, please do not hesitate to contact us. Alfonso Flores MD   Cardiac Electrophysiology  [facility]    For any EP related issues after 5 PM, [facility] on call cardiology through .

## 2021-10-05 NOTE — PLAN OF CARE
Patient to room 6315 from cath lab. Sling and swathe in place. She is paced on telemetry. Oriented to room. Call light in reach.

## 2021-10-06 ENCOUNTER — NURSE ONLY (OUTPATIENT)
Dept: CARDIOLOGY CLINIC | Age: 76
End: 2021-10-06
Payer: MEDICARE

## 2021-10-06 DIAGNOSIS — I48.20 CHRONIC ATRIAL FIBRILLATION (HCC): ICD-10-CM

## 2021-10-06 DIAGNOSIS — Z98.890 S/P ABLATION OF ATRIAL FIBRILLATION: ICD-10-CM

## 2021-10-06 DIAGNOSIS — Z86.79 S/P ABLATION OF ATRIAL FIBRILLATION: ICD-10-CM

## 2021-10-06 DIAGNOSIS — I49.5 TACHY-BRADY SYNDROME (HCC): ICD-10-CM

## 2021-10-06 DIAGNOSIS — K64.1 SECOND DEGREE HEMORRHOIDS: ICD-10-CM

## 2021-10-06 DIAGNOSIS — Z95.0 CARDIAC PACEMAKER IN SITU: ICD-10-CM

## 2021-10-06 LAB
ANION GAP SERPL CALCULATED.3IONS-SCNC: 11 MMOL/L (ref 3–16)
BUN BLDV-MCNC: 24 MG/DL (ref 7–20)
CALCIUM SERPL-MCNC: 8.8 MG/DL (ref 8.3–10.6)
CHLORIDE BLD-SCNC: 101 MMOL/L (ref 99–110)
CO2: 27 MMOL/L (ref 21–32)
CREAT SERPL-MCNC: 0.9 MG/DL (ref 0.6–1.2)
EKG ATRIAL RATE: 60 BPM
EKG DIAGNOSIS: NORMAL
EKG P-R INTERVAL: 276 MS
EKG Q-T INTERVAL: 436 MS
EKG QRS DURATION: 102 MS
EKG QTC CALCULATION (BAZETT): 436 MS
EKG R AXIS: -65 DEGREES
EKG T AXIS: -39 DEGREES
EKG VENTRICULAR RATE: 60 BPM
GFR AFRICAN AMERICAN: >60
GFR NON-AFRICAN AMERICAN: >60
GLUCOSE BLD-MCNC: 104 MG/DL (ref 70–99)
MAGNESIUM: 1.9 MG/DL (ref 1.8–2.4)
POTASSIUM SERPL-SCNC: 3.8 MMOL/L (ref 3.5–5.1)
SODIUM BLD-SCNC: 139 MMOL/L (ref 136–145)

## 2021-10-06 PROCEDURE — 2060000000 HC ICU INTERMEDIATE R&B

## 2021-10-06 PROCEDURE — 2580000003 HC RX 258: Performed by: INTERNAL MEDICINE

## 2021-10-06 PROCEDURE — 83735 ASSAY OF MAGNESIUM: CPT

## 2021-10-06 PROCEDURE — 93005 ELECTROCARDIOGRAM TRACING: CPT | Performed by: INTERNAL MEDICINE

## 2021-10-06 PROCEDURE — 99233 SBSQ HOSP IP/OBS HIGH 50: CPT | Performed by: NURSE PRACTITIONER

## 2021-10-06 PROCEDURE — 6370000000 HC RX 637 (ALT 250 FOR IP): Performed by: INTERNAL MEDICINE

## 2021-10-06 PROCEDURE — 80048 BASIC METABOLIC PNL TOTAL CA: CPT

## 2021-10-06 PROCEDURE — 6370000000 HC RX 637 (ALT 250 FOR IP): Performed by: NURSE PRACTITIONER

## 2021-10-06 PROCEDURE — 93010 ELECTROCARDIOGRAM REPORT: CPT | Performed by: INTERNAL MEDICINE

## 2021-10-06 PROCEDURE — 6360000002 HC RX W HCPCS: Performed by: NURSE PRACTITIONER

## 2021-10-06 RX ORDER — MAGNESIUM SULFATE 1 G/100ML
1000 INJECTION INTRAVENOUS ONCE
Status: COMPLETED | OUTPATIENT
Start: 2021-10-06 | End: 2021-10-06

## 2021-10-06 RX ORDER — POTASSIUM CHLORIDE 7.45 MG/ML
10 INJECTION INTRAVENOUS
Status: COMPLETED | OUTPATIENT
Start: 2021-10-06 | End: 2021-10-06

## 2021-10-06 RX ADMIN — APIXABAN 5 MG: 5 TABLET, FILM COATED ORAL at 21:07

## 2021-10-06 RX ADMIN — PREGABALIN 150 MG: 150 CAPSULE ORAL at 13:12

## 2021-10-06 RX ADMIN — POTASSIUM CHLORIDE 10 MEQ: 7.45 INJECTION INTRAVENOUS at 21:06

## 2021-10-06 RX ADMIN — FUROSEMIDE 20 MG: 20 TABLET ORAL at 09:02

## 2021-10-06 RX ADMIN — FUROSEMIDE 20 MG: 20 TABLET ORAL at 21:07

## 2021-10-06 RX ADMIN — POTASSIUM CHLORIDE 20 MEQ: 1500 TABLET, EXTENDED RELEASE ORAL at 09:02

## 2021-10-06 RX ADMIN — PREGABALIN 225 MG: 75 CAPSULE ORAL at 21:07

## 2021-10-06 RX ADMIN — POTASSIUM CHLORIDE 10 MEQ: 7.45 INJECTION INTRAVENOUS at 20:15

## 2021-10-06 RX ADMIN — CALCIUM 500 MG: 500 TABLET ORAL at 09:02

## 2021-10-06 RX ADMIN — APIXABAN 5 MG: 5 TABLET, FILM COATED ORAL at 09:02

## 2021-10-06 RX ADMIN — SOTALOL HYDROCHLORIDE 120 MG: 120 TABLET ORAL at 09:11

## 2021-10-06 RX ADMIN — Medication 5 ML: at 21:08

## 2021-10-06 RX ADMIN — Medication 10 ML: at 09:05

## 2021-10-06 RX ADMIN — MAGNESIUM SULFATE IN DEXTROSE 1000 MG: 10 INJECTION, SOLUTION INTRAVENOUS at 18:11

## 2021-10-06 RX ADMIN — ATORVASTATIN CALCIUM 20 MG: 20 TABLET, FILM COATED ORAL at 09:02

## 2021-10-06 RX ADMIN — PREGABALIN 150 MG: 150 CAPSULE ORAL at 09:02

## 2021-10-06 RX ADMIN — POTASSIUM CHLORIDE 10 MEQ: 7.45 INJECTION INTRAVENOUS at 19:39

## 2021-10-06 RX ADMIN — Medication 400 MG: at 09:11

## 2021-10-06 RX ADMIN — ASPIRIN 81 MG: 81 TABLET, COATED ORAL at 21:07

## 2021-10-06 RX ADMIN — POTASSIUM CHLORIDE 10 MEQ: 7.45 INJECTION INTRAVENOUS at 18:11

## 2021-10-06 RX ADMIN — LOSARTAN POTASSIUM AND HYDROCHLOROTHIAZIDE 1 TABLET: 25; 100 TABLET ORAL at 09:11

## 2021-10-06 RX ADMIN — SOTALOL HYDROCHLORIDE 120 MG: 120 TABLET ORAL at 21:07

## 2021-10-06 ASSESSMENT — PAIN SCALES - GENERAL
PAINLEVEL_OUTOF10: 0

## 2021-10-06 NOTE — PLAN OF CARE
Problem: Falls - Risk of:  Goal: Will remain free from falls  Description: Will remain free from falls  Outcome: Ongoing  Note: Patient at risk for falls. Patient resting quietly in bed. Side rails up x 2. Bed locked in lowest position. Bed alarm on. Bedside table and call light within reach. Patient instructed to call for assistance. Patient verbalized understanding. Will continue to monitor.          Problem: Falls - Risk of:  Goal: Absence of physical injury  Description: Absence of physical injury  Outcome: Ongoing

## 2021-10-06 NOTE — PROGRESS NOTES
Electrophysiology - PROGRESS NOTE    Admit Date: 10/5/2021     Chief Complaint: pAF/AFL, SB     Interval History:   Patient seen and examined and notes reviewed. Patient is being followed for paroxysmal AF/AFL, SB, sotalol loading and PPM placement. Patient with a longstanding history of AF/AFL. She has had a RFA for AFL, then a cryoablation (2018) for AF and a RFA/PVI of AF (11/2019). Had recurrent AF post catheter ablation placed on sotalol. She became bradycardic on the sotalol and the dose decreased to 20 mg twice a day. In July 2021 she was noted to be back in AFL. She was placed on Toprol-XL for rate control. She is symptomatic with fatigue and tiredness. She underwent a dual-chamber Medtronic permanent pacemaker yesterday. Her left chest incision is clean dry and intact. Her chest x-ray shows stable lead placement. Her device check was within normal limits. She was started on sotalol 120 mg twice a day yesterday and her QTC has been stable. She currently is in normal sinus rhythm and is atrially paced on her device.     In: 240 [P.O.:240]  Out: 900    Wt Readings from Last 2 Encounters:   10/05/21 240 lb (108.9 kg)   08/19/21 244 lb 6.4 oz (110.9 kg)       Data:   Scheduled Meds:   Scheduled Meds:   sodium chloride flush  5-40 mL IntraVENous 2 times per day    aspirin  81 mg Oral Nightly    atorvastatin  20 mg Oral Daily    calcium elemental  500 mg Oral Daily    apixaban  5 mg Oral BID    furosemide  20 mg Oral BID    losartan-hydroCHLOROthiazide  1 tablet Oral Daily    magnesium oxide  400 mg Oral Daily    potassium chloride  20 mEq Oral Daily    pregabalin  150 mg Oral BID    pregabalin  225 mg Oral Nightly    sodium chloride flush  5-40 mL IntraVENous 2 times per day    sotalol  120 mg Oral BID     Continuous Infusions:   sodium chloride      sodium chloride      sodium chloride       PRN Meds:.sodium chloride, sodium chloride flush, acetaminophen, docusate sodium, sodium chloride flush, sodium chloride, acetaminophen  Continuous Infusions:   sodium chloride      sodium chloride      sodium chloride         Intake/Output Summary (Last 24 hours) at 10/6/2021 0849  Last data filed at 10/6/2021 0262  Gross per 24 hour   Intake 240 ml   Output 900 ml   Net -660 ml       CBC:   Lab Results   Component Value Date    WBC 7.1 09/30/2021    HGB 13.3 09/30/2021     09/30/2021     06/07/2012     BMP:  Lab Results   Component Value Date     10/06/2021    K 3.8 10/06/2021    K 4.2 11/15/2019     10/06/2021    CO2 27 10/06/2021    BUN 24 10/06/2021    CREATININE 0.9 10/06/2021    GLUCOSE 104 10/06/2021    GLUCOSE 110 04/11/2012     INR:   Lab Results   Component Value Date    INR 1.17 09/30/2021    INR 1.02 12/04/2018    INR 1.51 09/05/2018        CARDIAC LABS  ENZYMES:No results for input(s): CKMB, CKMBINDEX, TROPONINI in the last 72 hours. Invalid input(s): CKTOTAL;3  FASTING LIPID PANEL:  Lab Results   Component Value Date    HDL 48 12/15/2020    LDLCALC 59 12/15/2020    TRIG 237 12/15/2020    TSH 2.260 06/07/2019     LIVER PROFILE:  Lab Results   Component Value Date    AST 23 12/15/2020    AST 22 07/17/2020    ALT 17 12/15/2020    ALT 19 07/17/2020       -----------------------------------------------------------------  Telemetry: Personally reviewed  AP, VS    Objective:   Vitals: BP (!) 155/65   Pulse 60   Temp 98.3 °F (36.8 °C) (Oral)   Resp 16   Ht 5' 4\" (1.626 m)   Wt 240 lb (108.9 kg)   SpO2 95%   BMI 41.20 kg/m²   General appearance: alert, appears stated age and cooperative, No acute distress   Eyes: Conjunctiva and pupils normal and reactive  Skin: Skin color, texture, turgor normal. No rashes or ecchymosis.   Neck: no JVD, supple, symmetrical, trachea midline   Lungs: , no accessory muscle use, no respiratory distress  Heart: RRR  Abdomen: soft, non-tender; bowel sounds normal  Extremities:

## 2021-10-07 VITALS
BODY MASS INDEX: 40.97 KG/M2 | OXYGEN SATURATION: 94 % | SYSTOLIC BLOOD PRESSURE: 167 MMHG | HEIGHT: 64 IN | HEART RATE: 61 BPM | RESPIRATION RATE: 17 BRPM | WEIGHT: 240 LBS | TEMPERATURE: 97.8 F | DIASTOLIC BLOOD PRESSURE: 84 MMHG

## 2021-10-07 LAB
ANION GAP SERPL CALCULATED.3IONS-SCNC: 10 MMOL/L (ref 3–16)
BUN BLDV-MCNC: 24 MG/DL (ref 7–20)
CALCIUM SERPL-MCNC: 8.5 MG/DL (ref 8.3–10.6)
CHLORIDE BLD-SCNC: 103 MMOL/L (ref 99–110)
CO2: 28 MMOL/L (ref 21–32)
CREAT SERPL-MCNC: 0.8 MG/DL (ref 0.6–1.2)
EKG ATRIAL RATE: 312 BPM
EKG ATRIAL RATE: 62 BPM
EKG ATRIAL RATE: 63 BPM
EKG DIAGNOSIS: NORMAL
EKG P AXIS: -13 DEGREES
EKG P AXIS: -16 DEGREES
EKG P AXIS: -27 DEGREES
EKG P-R INTERVAL: 266 MS
EKG P-R INTERVAL: 278 MS
EKG P-R INTERVAL: 284 MS
EKG Q-T INTERVAL: 424 MS
EKG Q-T INTERVAL: 430 MS
EKG Q-T INTERVAL: 450 MS
EKG QRS DURATION: 96 MS
EKG QRS DURATION: 96 MS
EKG QRS DURATION: 98 MS
EKG QTC CALCULATION (BAZETT): 424 MS
EKG QTC CALCULATION (BAZETT): 436 MS
EKG QTC CALCULATION (BAZETT): 460 MS
EKG R AXIS: -56 DEGREES
EKG R AXIS: -61 DEGREES
EKG R AXIS: -63 DEGREES
EKG T AXIS: -35 DEGREES
EKG T AXIS: -39 DEGREES
EKG T AXIS: -44 DEGREES
EKG VENTRICULAR RATE: 60 BPM
EKG VENTRICULAR RATE: 62 BPM
EKG VENTRICULAR RATE: 63 BPM
GFR AFRICAN AMERICAN: >60
GFR NON-AFRICAN AMERICAN: >60
GLUCOSE BLD-MCNC: 102 MG/DL (ref 70–99)
MAGNESIUM: 2.2 MG/DL (ref 1.8–2.4)
POTASSIUM SERPL-SCNC: 3.9 MMOL/L (ref 3.5–5.1)
SODIUM BLD-SCNC: 141 MMOL/L (ref 136–145)

## 2021-10-07 PROCEDURE — 93280 PM DEVICE PROGR EVAL DUAL: CPT | Performed by: INTERNAL MEDICINE

## 2021-10-07 PROCEDURE — 6370000000 HC RX 637 (ALT 250 FOR IP): Performed by: INTERNAL MEDICINE

## 2021-10-07 PROCEDURE — 80048 BASIC METABOLIC PNL TOTAL CA: CPT

## 2021-10-07 PROCEDURE — 36415 COLL VENOUS BLD VENIPUNCTURE: CPT

## 2021-10-07 PROCEDURE — 99239 HOSP IP/OBS DSCHRG MGMT >30: CPT | Performed by: NURSE PRACTITIONER

## 2021-10-07 PROCEDURE — 93005 ELECTROCARDIOGRAM TRACING: CPT | Performed by: INTERNAL MEDICINE

## 2021-10-07 PROCEDURE — 6370000000 HC RX 637 (ALT 250 FOR IP): Performed by: NURSE PRACTITIONER

## 2021-10-07 PROCEDURE — 2580000003 HC RX 258: Performed by: INTERNAL MEDICINE

## 2021-10-07 PROCEDURE — 93010 ELECTROCARDIOGRAM REPORT: CPT | Performed by: INTERNAL MEDICINE

## 2021-10-07 PROCEDURE — 6370000000 HC RX 637 (ALT 250 FOR IP)

## 2021-10-07 PROCEDURE — 83735 ASSAY OF MAGNESIUM: CPT

## 2021-10-07 RX ORDER — AMLODIPINE BESYLATE 5 MG/1
5 TABLET ORAL DAILY
Qty: 30 TABLET | Refills: 3 | Status: SHIPPED | OUTPATIENT
Start: 2021-10-08 | End: 2021-12-03 | Stop reason: SDUPTHER

## 2021-10-07 RX ORDER — POTASSIUM CHLORIDE 20 MEQ/1
20 TABLET, EXTENDED RELEASE ORAL 2 TIMES DAILY
Qty: 180 TABLET | Refills: 3 | Status: SHIPPED | OUTPATIENT
Start: 2021-10-07 | End: 2022-09-30 | Stop reason: SDUPTHER

## 2021-10-07 RX ORDER — AMLODIPINE BESYLATE 5 MG/1
5 TABLET ORAL DAILY
Status: DISCONTINUED | OUTPATIENT
Start: 2021-10-07 | End: 2021-10-07 | Stop reason: HOSPADM

## 2021-10-07 RX ORDER — SOTALOL HYDROCHLORIDE 120 MG/1
120 TABLET ORAL 2 TIMES DAILY
Qty: 60 TABLET | Refills: 3 | Status: SHIPPED | OUTPATIENT
Start: 2021-10-07 | End: 2022-01-21 | Stop reason: SDUPTHER

## 2021-10-07 RX ORDER — POTASSIUM CHLORIDE 20 MEQ/1
20 TABLET, EXTENDED RELEASE ORAL ONCE
Status: COMPLETED | OUTPATIENT
Start: 2021-10-07 | End: 2021-10-07

## 2021-10-07 RX ADMIN — PREGABALIN 150 MG: 150 CAPSULE ORAL at 09:19

## 2021-10-07 RX ADMIN — POTASSIUM CHLORIDE 20 MEQ: 1500 TABLET, EXTENDED RELEASE ORAL at 09:18

## 2021-10-07 RX ADMIN — FUROSEMIDE 20 MG: 20 TABLET ORAL at 09:18

## 2021-10-07 RX ADMIN — Medication 10 ML: at 10:00

## 2021-10-07 RX ADMIN — Medication 400 MG: at 09:22

## 2021-10-07 RX ADMIN — POTASSIUM CHLORIDE 20 MEQ: 1500 TABLET, EXTENDED RELEASE ORAL at 09:20

## 2021-10-07 RX ADMIN — PREGABALIN 150 MG: 150 CAPSULE ORAL at 12:29

## 2021-10-07 RX ADMIN — SOTALOL HYDROCHLORIDE 120 MG: 120 TABLET ORAL at 09:20

## 2021-10-07 RX ADMIN — ATORVASTATIN CALCIUM 20 MG: 20 TABLET, FILM COATED ORAL at 09:19

## 2021-10-07 RX ADMIN — LOSARTAN POTASSIUM AND HYDROCHLOROTHIAZIDE 1 TABLET: 25; 100 TABLET ORAL at 09:21

## 2021-10-07 RX ADMIN — Medication 10 ML: at 09:20

## 2021-10-07 RX ADMIN — CALCIUM 500 MG: 500 TABLET ORAL at 09:19

## 2021-10-07 RX ADMIN — AMLODIPINE BESYLATE 5 MG: 5 TABLET ORAL at 09:19

## 2021-10-07 RX ADMIN — APIXABAN 5 MG: 5 TABLET, FILM COATED ORAL at 09:19

## 2021-10-07 ASSESSMENT — PAIN SCALES - GENERAL
PAINLEVEL_OUTOF10: 0
PAINLEVEL_OUTOF10: 0

## 2021-10-07 NOTE — DISCHARGE SUMMARY
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 woman with a h/o HTN, HLD, RENE not on CPAP, CAD, s/p PCI of the RCA, pAF/AFL, s/p RFA of AFL ([place], [place]), s/p cryo RFA for AF (12/2018), s/p RFA/PVI of AF (11/1/19, [facility]), recurrent pAF post RFA, placed on sotalol w/ a DCCV to NSR (112/18/19), noted to have SB on sotalol and dose decreased, now on 20 mg BID, 1 week CAM (1/8/2021-1/15/2021) showed an average HR 51 (36-88), SB, first-degree AV block, 22 AT episodes with the longest lasting 21 beats in length, less than 1% PAC/PVC burden, no AF, patient admitted for implantation of a dual ch MDT PPM and sotalol loading. Underwent procedure without complications. Monitored on tele - has remained in NSR, AP VS. Pain relieved with prn pain meds and ice. CXR showed stable lead placement. Post op device check showed stable parameters. L chest incision CDI. Reviewed wound care and activity restrictions with patient. Initiated on 120 mg of sotalol BID. QT stable. Electrolytes replaced. To home in stable condition.    .  Consults: none    Significant Diagnostic Studies: labs: see chart and radiology: CXR: normal    Discharge Medications:   Current Discharge Medication List      CONTINUE these medications which have NOT CHANGED    Details   magnesium oxide (MAG-OX) 400 MG tablet Take 400 mg by mouth daily      tamoxifen (NOLVADEX) 20 MG tablet Take 20 mg by mouth daily      calcium carbonate 600 MG TABS tablet Take 1 tablet by mouth daily      potassium chloride (KLOR-CON M20) 20 MEQ extended release tablet TAKE ONE TABLET BY MOUTH DAILY  Qty: 90 tablet, Refills: 1      pregabalin (LYRICA) 225 MG capsule TAKE ONE CAPSULE BY MOUTH ONCE NIGHTLY  Qty: 90 capsule, Refills: 0    Associated Diagnoses: Neuropathy      Sotalol 120 mg po BID       ELIQUIS 5 MG TABS tablet Take 1 tablet by mouth 2 times daily  Qty: 180 tablet, Refills: 3      losartan-hydroCHLOROthiazide (HYZAAR) 100-25 MG per tablet TAKE ONE TABLET BY MOUTH DAILY  Qty: 90 tablet, Refills: 1    Associated Diagnoses: Essential hypertension      furosemide (LASIX) 20 MG tablet Take 1 tablet by mouth 2 times daily  Qty: 180 tablet, Refills: 3      atorvastatin (LIPITOR) 20 MG tablet Take 1 tablet by mouth daily  Qty: 90 tablet, Refills: 3      Glucosamine-Chondroit-Vit C-Mn (GLUCOSAMINE 1500 COMPLEX PO) Take 1 tablet by mouth 2 times daily       Docusate Calcium (STOOL SOFTENER PO) Take 1 capsule by mouth 2 times daily       aspirin 81 MG EC tablet Take 81 mg by mouth nightly       Multiple Vitamins-Minerals (CENTRUM SILVER PO) Take by mouth daily       ondansetron (ZOFRAN) 4 MG tablet Take 1 tablet by mouth 3 times daily as needed for Nausea or Vomiting  Qty: 20 tablet, Refills: 0      acetaminophen (TYLENOL) 500 MG tablet Take 1,000 mg by mouth every 6 hours as needed for Pain or Fever      nitroGLYCERIN (NITROSTAT) 0.4 MG SL tablet Place 0.4 mg under the tongue every 5 minutes as needed for Chest pain Take 1 tablet for chest pain and repeat after 5 min if no relief, call 911 and take another dose 5 min later. Max of 3 doses in 15 min.              Discharge Exam:  Carotid Arteries: normal pulsation bilaterally  Lungs: clear to auscultation without wheezes or rales  Heart: regular rate and rhythm, S1, S2 normal, no murmur, click, rub or gallop  Extremities: negative  Incision: L chest incision CDI    Disposition: home    Patient Instructions: Activity: See discharge instructions,  Diet: cardiac diet. Wound Care: See discharge instructions. Follow-up with device tech at scheduled appointment. Call [phone] with any questions. Signed:  VIC Lewis CNP  10/7/2021  8:13 AM    Greater than 40 minutes total spent on discharge.

## 2021-10-07 NOTE — PROGRESS NOTES
DC PPM implanted 10/5/21. Post op check-Device interrogation by company representative. See interrogation for further details. 10/5/2021 - present (2 days)  The [facility] report under Cardiology tab.

## 2021-10-07 NOTE — DISCHARGE SUMMARY
Discharge order received. Patient being DC home with family. All paperwork explained to patient. She verbalizes understanding and denies any questions. All belongings sent with patient. IV and tele removed.

## 2021-10-08 ENCOUNTER — CARE COORDINATION (OUTPATIENT)
Dept: CASE MANAGEMENT | Age: 76
End: 2021-10-08

## 2021-10-12 ENCOUNTER — NURSE ONLY (OUTPATIENT)
Dept: CARDIOLOGY CLINIC | Age: 76
End: 2021-10-12
Payer: MEDICARE

## 2021-10-12 DIAGNOSIS — I49.5 TACHY-BRADY SYNDROME (HCC): ICD-10-CM

## 2021-10-12 DIAGNOSIS — Z95.0 CARDIAC PACEMAKER IN SITU: ICD-10-CM

## 2021-10-12 DIAGNOSIS — I48.20 CHRONIC ATRIAL FIBRILLATION (HCC): ICD-10-CM

## 2021-10-12 DIAGNOSIS — K64.1 SECOND DEGREE HEMORRHOIDS: ICD-10-CM

## 2021-10-12 PROCEDURE — 93280 PM DEVICE PROGR EVAL DUAL: CPT | Performed by: INTERNAL MEDICINE

## 2021-10-12 NOTE — PROGRESS NOTES
Patient been placed on amlodipine in the hospital.  Review of her blood pressures today show that she has some elevated blood pressure still. She has been complaining of dizziness and lightheadedness if she gets up too quickly. The room does spin. She has taken Antivert in the past with symptomatic relief. She did take a Dramamine which seemed to help. Has not had any syncopal events. She is to continue to log her blood pressures and bring them in when she sees Janina in 1 month.

## 2021-10-14 ENCOUNTER — CARE COORDINATION (OUTPATIENT)
Dept: CASE MANAGEMENT | Age: 76
End: 2021-10-14

## 2021-10-14 NOTE — PROGRESS NOTES
Patient comes in for a 1 week wound check and programming evaluation on their Medtronic dual chamber pacemaker. Patient accompanied by daughter. Dressing removed from left chest device site. Incision is well approximated, CDI. Old drainage noted on steri strips. Reviewed post op wound care and restrictions. Pt informed to call office if she develops any fever, increased swelling, redness or drainage from site. Pt voiced an understanding. Patient is successfully connected to [employer] [facility]. All sensing and pacing parameters are within normal range. Battery life 11.9 years  AP 99.6%.  <0.1%. Underlying Atrially dependent @ 30 bpm in AAI mode. No episodes noted since 10/6/2021  Patient remains on sotalol, eliquis, furosemide  No changes need to be made at this time. Please see interrogation for more detail. Blood pressure taken per NPFW in left lower forearm 136/78. Recently started on amlodipine. See MEDIA tab for BP readings. Patient will follow up in 1 months in office or remotely.

## 2021-10-14 NOTE — CARE COORDINATION
[facility] Transitions Follow Up Call    10/14/2021    Patient: Abhishek Conway  Patient : 1945   MRN: [id number]   Reason for Admission: S/P PPM/ICD  Discharge Date: 10/7/21 RARS: Readmission Risk Score: 14         Spoke with: Daughter/ [address] [facility] Transitions Subsequent and Final Call    Schedule Follow Up Appointment with PCP: Declined  Subsequent and Final Calls  Do you have any ongoing symptoms?: No  Have your medications changed?: No  Do you have any questions related to your medications?: No  Do you currently have any active services?: No  Do you have any needs or concerns that I can assist you with?: No  Identified Barriers: None  Care Transitions Interventions  No Identified Needs  Other Interventions:         Summary  CTN spoke with patients daughter Jose Manuel Nash this afternoon for follow up CTN call. Daughter states patient is doing well, reporting no complaints of any fever, chills, nausea, vomiting, chest pain, SOB or cough. Patient with no congestion, pain, difficulty emptying bladder, LE edema, feeling lightheaded, dizziness, and heart palpitations. Daughter states incision is healing well, no signs of infection noted. CTN encouraged daughter to continue to monitor for any of the above s/s, as well as continuing to monitor incision site for any signs of infection- increased swelling, any redness, pain or drainage, reporting all to MD immediately. No new or changed medications, no others or concerns at this time. CTN provided education on s/s that require medical attention and when to seek medical attention. CTN advised Pt of use urgent care or physicians 24 hr access line if assistance is needed after hours or on the weekend. Pt denies any needs or concerns and is agreeable with additional calls.     Follow Up  Future Appointments   Date Time Provider Jabier Nava   2021  9:15 AM Claudia Mcghee [facility] DEVICE CHECK [place] [facility]   2021  9:45 AM VIC Reddy - CNP Sofia Thompson [facility]   12/3/2021  2:15 PM MD Sofia Lopez [facility]   12/7/2021  8:00 AM SCHEDULE, Stahl Pilling REMOTE TRANSMISSION [place] [facility]   2/1/2022 11:00 AM SCHEDULE, Erlinda Velasquez [address] [facility]   2/1/2022 11:30 AM MD Sofia Maravilla [facility]       Trent Dias RN

## 2021-10-20 ENCOUNTER — CARE COORDINATION (OUTPATIENT)
Dept: CASE MANAGEMENT | Age: 76
End: 2021-10-20

## 2021-10-21 ENCOUNTER — TELEPHONE (OUTPATIENT)
Dept: PHARMACY | Facility: CLINIC | Age: 76
End: 2021-10-21

## 2021-10-21 NOTE — TELEPHONE ENCOUNTER
CLINICAL PHARMACY NOTE  Post-Discharge Transitions of Care (BUSHRA)    Patient appears to have been discharged from The [address] [place] [address],[address]   on 10/7/21. Patient found in Outcomes MTM and is currently eligible for CMR. Please follow-up with patient to review medications.       30 days since discharge = 11/6/21     Corby Eden, Pharmacy    [address]   Department, toll free [phone], option 1

## 2021-10-26 NOTE — TELEPHONE ENCOUNTER
[facility] CLINICAL PHARMACY REVIEW: Post-Discharge Transitions of Care (BUSHRA)  Subjective/Objective: Jun Negron is a 68 y.o. female. Patient was discharged from [facility] on 10/7/21 with a diagnosis of TBS. Patient outreach to review discharge medications and provide medication review and management. Spoke with patient. We also completed a CMR and documented in the outcomes MTM platform    Allergies   Allergen Reactions    Morphine      vomit    Tape Lee Boss Tape] Itching    Unable To Assess      Any pain med makes her sick, vomit violently  Tolerates fentanly    Vicodin [Hydrocodone-Acetaminophen] Nausea And Vomiting       Discharge Medications (as per discharging medication list found in AVS): There are NEW medications for you. START taking them after you leave the hospital:  Medication Sig Dispense Refill    sotalol (BETAPACE) 120 MG tablet Take 1 tablet by mouth 2 times daily 60 tablet 3    amLODIPine (NORVASC) 5 MG tablet Take 1 tablet by mouth daily 30 tablet 3     You told us you were taking these medications at home, but the amount or how often you take this medication has CHANGED:  Medication Sig Dispense Refill    potassium chloride (KLOR-CON M20) 20 MEQ extended release tablet Take 1 tablet by mouth 2 times daily.  180 tablet 3     These are medications you told us you were taking at home, CONTINUE taking them after you leave the hospital:  Current Outpatient Medications    magnesium oxide (MAG-OX) 400 MG tablet Take 400 mg by mouth daily      tamoxifen (NOLVADEX) 20 MG tablet Take 20 mg by mouth daily      calcium carbonate 600 MG TABS tablet Take 1 tablet by mouth daily      pregabalin (LYRICA) 150 MG capsule Take 1 capsule by mouth in the morning and early afternoon 60 capsule 2    pregabalin (LYRICA) 225 MG capsule TAKE ONE CAPSULE BY MOUTH ONCE NIGHTLY 90 capsule 0    ELIQUIS 5 MG TABS tablet Take 1 tablet by mouth 2 times daily 180 tablet 3    losartan-hydroCHLOROthiazide (HYZAAR) 100-25 MG per tablet TAKE ONE TABLET BY MOUTH DAILY 90 tablet 1    furosemide (LASIX) 20 MG tablet Take 1 tablet by mouth 2 times daily 180 tablet 3    atorvastatin (LIPITOR) 20 MG tablet Take 1 tablet by mouth daily 90 tablet 3    acetaminophen (TYLENOL) 500 MG tablet Take 1,000 mg by mouth every 6 hours as needed for Pain or Fever      Glucosamine-Chondroit-Vit C-Mn (GLUCOSAMINE 1500 COMPLEX PO) Take 1 tablet by mouth 2 times daily       Docusate Calcium (STOOL SOFTENER PO) Take 1 capsule by mouth 2 times daily       nitroGLYCERIN (NITROSTAT) 0.4 MG SL tablet Place 0.4 mg under the tongue every 5 minutes as needed for Chest pain Take 1 tablet for chest pain and repeat after 5 min if no relief, call 911 and take another dose 5 min later. Max of 3 doses in 15 min.  aspirin 81 MG EC tablet Take 81 mg by mouth nightly       Multiple Vitamins-Minerals (CENTRUM SILVER PO) Take by mouth daily        These are the medications you have told us you were taking at home, STOP taking them after you leave the hospital:   Metoprolol- confirmed stopped   Ondansetron- confirmed stopped    Additional Medications: Added to med list   Vitamin D 1000units daily   Fish oil    Estimated Creatinine Clearance: 72 mL/min (based on SCr of 0.8 mg/dL). Assessment/Plan:  - Medication reconciliation completed. Patient has filled new medications ordered after this hospital discharge and is taking medications as directed by discharging provider. - Instructions per discharge list provided except per below documentation. Identified medication discrepancies/issues:   · Potassium had 2 sets of directions listed. Confirmed with patient and previous notes that dose was changed to BID form QD  · Patient reported 2 OTC items that she is taking- added to med list  · Medication list updated as appropriate/noted    - Identified Potential Medication Interactions:  The following clinically significant interactions were identified via [employer] Interaction Analysis as category D or higher: Eliquis and ASA. Clinically appropriate. Patient monitoring bleeding risk    - Renal Dosing: No renal adjustments necessary.    - Other medication-related opportunities:   If prescribed ACEi/ARB, adherence: Adherent per reconciled disp report   If prescribed statin, adherence: Adherent per reconciled disp report   If DM or ASCVD hx, taking moderate-intensity statin: moderate intensity   CMR eligible: Yes- complete    - Follow up appointment(s):  · Reminded of upcoming appointment(s)    Future Appointments   Date Time Provider Jabier Nava   11/16/2021  9:15 AM SCHEDULE, Erlinda Velasquez [address] [facility]   11/16/2021  9:45 AM Avie Pore, APRN - CNP [place] [facility]   12/3/2021  2:15 PM MD Sofia Frey [facility]   12/7/2021  8:00 AM SCHEDULE, Fran Huston REMOTE TRANSMISSION [place] [facility]   2/1/2022 11:00 AM SCHEDULE, Erlinda Velasquez [address] [facility]   2/1/2022 11:30 AM MD Sofia Yeager [facility] [place].  Rehana Rice, PharmD, [address] [facility], toll free: [phone]    For Pharmacy Admin Tracking Only     CPA in place:  No   Recommendation Provided To: Patient/Caregiver: 1 via Telephone   Intervention Detail: BUSRHA: Level 3 #: 1   Gap Closed?: Yes    Intervention Accepted By: Patient/Caregiver: 1   Time Spent (min): 45

## 2021-10-26 NOTE — TELEPHONE ENCOUNTER
CLINICAL PHARMACY CONSULTATION: Transition of Care (BUSHRA)  -----------------------------------------------------------------------------------------------  Attempt made to contact pt. Left msg on home/mobile TAD requesting call back at [phone], option 1. ev-social message sent. Will continue to attempt to contact pt as appropriate.     Olu Mercado, Kurt, [facility], [place].   Department, toll free: [phone], option 1

## 2021-10-27 RX ORDER — CHLORAL HYDRATE 500 MG
1000 CAPSULE ORAL DAILY
COMMUNITY

## 2021-10-27 RX ORDER — VITAMIN B COMPLEX
1000 TABLET ORAL DAILY
COMMUNITY

## 2021-11-09 NOTE — PROGRESS NOTES
[facility]   Electrophysiology  Office Visit  Date: 11/16/2021    Chief Complaint   Patient presents with    Atrial Fibrillation    Hypertension    Bradycardia    Tachycardia       Cardiac HX: Tomasa Abrams is a 68 y.o. woman with a h/o HTN, HLD, RENE not on CPAP, CAD, s/p PCI of the RCA, pAF/AFL, s/p RFA of AFL ([address], [place]), s/p cryo RFA for AF (12/2018), s/p RFA/PVI of AF (11/1/19, [facility]), recurrent pAF post RFA, placed on sotalol w/ a DCCV to NSR (112/18/19), noted to have SB on sotalol and dose decreased, now on 20 mg BID, 1 week CAM (1/8/2021-1/15/2021) showed an average HR 51 (36-88), SB, first-degree AV block, 22 AT episodes with the longest lasting 21 beats in length, less than 1% PAC/PVC burden, no AF, sotalol d/c'd  (1/2021) d/t SB,  recurrent AFL (7/2021) w/ s/s of fatigue, s/p dual ch MDT PPM (10/5/21, Dr. Eunice Chase), sotalol loading at 120 mg BID    Interval History/HPI: Patient is here for follow-up for atrial fibrillation, atrial flutter, TBS, pacemaker management, HTN. Patient recently underwent dual-chamber Medtronic PPM (10/5/2021, Dr. Eunice Chase) along with sotalol loading at 120 mg. Today her device check shows AP 99.3%,  0.2%, no arrhythmias noted all other parameters stable, LENORE 11.2 years. Left chest incision is well-healed. She has been taking her sotalol as prescribed, no missed doses. She is on Eliquis, no bleeding noted. Pt denies palpitations, heart racing, dizziness, lightheadedness. No CP, SOB, PND, orthopnea, BLE edema. BP well controlled.       Home medications:   Current Outpatient Medications on File Prior to Visit   Medication Sig Dispense Refill    Omega-3 Fatty Acids (FISH OIL) 1000 MG CAPS Take 1,000 mg by mouth daily      Vitamin D (CHOLECALCIFEROL) 25 MCG (1000 UT) TABS tablet Take 1,000 Units by mouth daily      sotalol (BETAPACE) 120 MG tablet Take 1 tablet by mouth 2 times daily 60 tablet 3    amLODIPine (NORVASC) 5 MG tablet Take 1 tablet by mouth daily 30 tablet 3    potassium chloride (KLOR-CON M20) 20 MEQ extended release tablet Take 1 tablet by mouth 2 times daily TAKE ONE TABLET BY MOUTH DAILY (Patient taking differently: Take 20 mEq by mouth 2 times daily ) 180 tablet 3    magnesium oxide (MAG-OX) 400 MG tablet Take 400 mg by mouth daily      tamoxifen (NOLVADEX) 20 MG tablet Take 20 mg by mouth daily      calcium carbonate 600 MG TABS tablet Take 1 tablet by mouth daily      pregabalin (LYRICA) 225 MG capsule TAKE ONE CAPSULE BY MOUTH ONCE NIGHTLY 90 capsule 0    ELIQUIS 5 MG TABS tablet Take 1 tablet by mouth 2 times daily 180 tablet 3    furosemide (LASIX) 20 MG tablet Take 1 tablet by mouth 2 times daily 180 tablet 3    atorvastatin (LIPITOR) 20 MG tablet Take 1 tablet by mouth daily 90 tablet 3    acetaminophen (TYLENOL) 500 MG tablet Take 1,000 mg by mouth every 6 hours as needed for Pain or Fever      Glucosamine-Chondroit-Vit C-Mn (GLUCOSAMINE 1500 COMPLEX PO) Take 1 tablet by mouth 2 times daily       Docusate Calcium (STOOL SOFTENER PO) Take 1 capsule by mouth 2 times daily       nitroGLYCERIN (NITROSTAT) 0.4 MG SL tablet Place 0.4 mg under the tongue every 5 minutes as needed for Chest pain Take 1 tablet for chest pain and repeat after 5 min if no relief, call 911 and take another dose 5 min later. Max of 3 doses in 15 min.  aspirin 81 MG EC tablet Take 81 mg by mouth nightly       Multiple Vitamins-Minerals (CENTRUM SILVER PO) Take by mouth daily       pregabalin (LYRICA) 150 MG capsule Take 1 capsule by mouth in the morning and early afternoon 60 capsule 2     No current facility-administered medications on file prior to visit. Past Medical History:   Diagnosis Date    Arthritis     Atrial fibrillation ([address] 08/2016    Breast cancer [facility]) 2012    Anay Merchant    CAD (coronary artery disease)     GERD (gastroesophageal reflux disease) 2001    dx 2001. but has never had an issue.  requires no meds    H/O: whooping cough 1945    History of shingles     1 year after recieving vaccine     Hypercholesterolemia 2010    Hypertension 1987    Iron deficiency 08/2016    PONV (postoperative nausea and vomiting)     Sleep apnea 06/2018    Vertigo 1957    as a child        Past Surgical History:   Procedure Laterality Date    ABDOMINAL HERNIA REPAIR  2011   [address] SURGERY  11/01/2019    BACK SURGERY  2009    disk replacement     BREAST LUMPECTOMY Right 2012    cancer    BREAST SURGERY Left 2001    benign tumor left breast 2001    BREAST SURGERY Left 1967, 1992    cyst in left    CARDIAC CATHETERIZATION  09/08/2016    3 Stents-at [address]  11/07/2019    COLONOSCOPY  2015    FOOT SURGERY Left 1990    heal spur    HEMORRHOID SURGERY  09/01/2016    3 removed. [address] [facility]    UPPER GASTROINTESTINAL ENDOSCOPY  2006    US BREAST BIOPSY NEEDLE ADDITIONAL RIGHT Right 2/11/2021    US BREAST BIOPSY NEEDLE ADDITIONAL RIGHT 2/11/2021 [address] [facility] ULTRASOUND    US BREAST NEEDLE BIOPSY RIGHT Right 2/11/2021    US BREAST NEEDLE BIOPSY RIGHT 2/11/2021 [address] [facility] ULTRASOUND    VENTRICULAR ABLATION SURGERY  11/01/2019       Family History:  Reviewed. family history includes Cancer in her mother, sister, sister, and sister; Diabetes in her mother; Heart Disease in her brother and mother; High Blood Pressure in her father and mother; Stroke in her brother and mother. Review of System:    · Constitutional: No fevers, chills. · Eyes: No visual changes or diplopia. No scleral icterus. · ENT: No Headaches. No mouth sores or sore throat. · Cardiovascular: No for chest pain, No for dyspnea on exertion, No for palpitations or No for loss of consciousness. No cough, hemoptysis, No for pleuritic pain, or phlebitis. · Respiratory: No for cough or wheezing. No hematemesis. · Gastrointestinal: No abdominal pain, blood in stools.    · Genitourinary: No dysuria, or hematuria. · Musculoskeletal: No gait disturbance,    · Integumentary: No rash or pruritis. · Neurological: No headache, change in muscle strength, numbness or tingling. · Psychiatric: No anxiety, or depression. · Endocrine: No temperature intolerance. No excessive thirst, fluid intake, or urination. · Hem/Lymph: No abnormal bruising or bleeding, blood clots or swollen lymph nodes. · Allergic/Immunologic: No nasal congestion or hives. Physical Examination:  Vitals:    11/16/21 0934   BP: (!) 146/80   Pulse: 71         Wt Readings from Last 3 Encounters:   11/16/21 244 lb (110.7 kg)   10/05/21 240 lb (108.9 kg)   08/19/21 244 lb 6.4 oz (110.9 kg)       · Constitutional: Oriented. No distress. · Head: Normocephalic and atraumatic. · Mouth/Throat: Oropharynx is clear and moist.   · Eyes: Conjunctivae clear without jaunduice. PERRL. · Neck: Neck supple. No rigidity. No JVD present. · Cardiovascular: Normal rate, regular rhythm, S1&S2. · Pulmonary/Chest: Bilateral respiratory sounds. No wheezes, No rhonchi. · Abdominal: Soft. Bowel sounds present. No distension, No tenderness. · Musculoskeletal: No tenderness. No edema    · Lymphadenopathy: Has no cervical adenopathy. · Neurological: Alert and oriented. Cranial nerve appears intact, No Gross deficit   · Skin: Skin is warm and dry. No rash noted. · Psychiatric: Has a normal mood, affect and behavior     Labs:  Reviewed. No results for input(s): NA, K, CL, CO2, PHOS, BUN, CREATININE in the last 72 hours. Invalid input(s): CA,  TSH  No results for input(s): WBC, HGB, HCT, MCV, PLT in the last 72 hours.   No results found for: CKTOTAL, CKMB, CKMBINDEX, TROPONINI  No results found for: BNP  Lab Results   Component Value Date    PROTIME 13.3 09/30/2021    INR 1.17 09/30/2021    INR 1.02 12/04/2018    INR 1.51 09/05/2018     Lab Results   Component Value Date    CHOL 154 12/15/2020    HDL 48 12/15/2020    TRIG 237 12/15/2020       ECG: Personally reviewed: NSR, HR 71, , , QTc 443    ECHO:  1.8.2021   Summary   Normal left ventricle size and systolic function with an estimated ejection   fraction of 55-60%. Mild concentric left ventricular hypertrophy. No regional wall motion abnormalities are seen. Grade II diastolic dysfunction with elevated LV filling pressures. Global strain: -21.7%   The left atrium is dilated. Mild mitral regurgitation. Trivial aortic regurgitation. Trivial tricuspid regurgitation. The pulmonic valve is not well visualized. Estimated pulmonary artery systolic pressure is normal at 37 mmHg assuming a   right atrial pressure of 3 mmHg. Stress Test: 2.12.2020  Summary   Feliberto Morelos is symmetric isotope uptake at stress and rest. There is no evidence    of myocardial ischemia.    The LVEF is 74% with normal LV wall motion.        This is a low risk cardiac scan. 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 Persistent atrial fibrillation ([address]    2. Typical atrial flutter ([address]    3. Tachy-valerie syndrome (HCC)    4. Cardiac pacemaker in situ    5. Essential hypertension    6.  Coronary artery disease involving native coronary artery of native heart without angina pectoris    7. Chronic diastolic heart failure ([address]          Cardiac HX: Christi Cueva is a 68 y.o. woman with a h/o HTN, HLD, RENE not on CPAP, CAD, s/p PCI of the RCA, pAF/AFL, s/p RFA of AFL ([address], [place]), s/p cryo RFA for AF (12/2018), s/p RFA/PVI of AF (11/1/19, [facility]), recurrent pAF post RFA, placed on sotalol w/ a DCCV to NSR (112/18/19), noted to have SB on sotalol and dose decreased, now on 20 mg BID, 1 week CAM (1/8/2021-1/15/2021) showed an average HR 51 (36-88), SB, first-degree AV block, 22 AT episodes with the longest lasting 21 beats in length, less than 1% PAC/PVC burden, no AF, sotalol d/c'd  (1/2021) d/t SB,  recurrent AFL (7/2021) w/ s/s of fatigue, s/p dual ch MDT PPM (10/5/21, Dr. Denise España), sotalol loading at 120 mg BID. [id number] 5. TSH 2.260 (6.7.2019). Radha Leslie      AF/AFL/SSS, TBS  - In NSR, no AF seen on device  - S/p RFA of AFL, s/p cryo RFA of AF (12/2018), s/p RFA/PVI of AF (11/2019)  - On Eliquis 5mg BID - no s/s bleeding - continue  - On Sotalol 120 mg BID,   - 1 week CAM (1/8/2021-1/15/2021) showed an average HR 51 (36-88), SB, first-degree AV block, 22 AT episodes with the longest lasting 21 beats in length, less than 1% PAC/PVC burden, no AF.  - RENE -encouraged to wear CPAP  - S/p dual ch MDT PPM   - Device check AP 99.3%,  0.2%, no arrhythmias noted, all other parameters stable, LENORE 11.2 years  - L chest incision well-healed  - Reviewed activity instructions with patient  - Advised patient to wait 6 months post PPM implantation for mammogram  - Follow-up with Dr. Denise España in 3 months  - ECG ordered and results personally reviewed      CAD  - No symptoms  - On ASA/statin     HTN  - /80  - On Hyzaar 100-25 mg daily  - On amlodipine 5 mg  - Patient to take BP at home x2 weeks and send into office     Chronic dCHF  - Appears euvolemic  - EF 55-60%  - On lasix 20 mg BID  - On Potassium 20 meq QD     Patient would like all medications to go to [facility]

## 2021-11-16 ENCOUNTER — OFFICE VISIT (OUTPATIENT)
Dept: CARDIOLOGY CLINIC | Age: 76
End: 2021-11-16
Payer: MEDICARE

## 2021-11-16 ENCOUNTER — NURSE ONLY (OUTPATIENT)
Dept: CARDIOLOGY CLINIC | Age: 76
End: 2021-11-16
Payer: MEDICARE

## 2021-11-16 VITALS
BODY MASS INDEX: 41.88 KG/M2 | WEIGHT: 244 LBS | SYSTOLIC BLOOD PRESSURE: 146 MMHG | DIASTOLIC BLOOD PRESSURE: 80 MMHG | HEART RATE: 71 BPM

## 2021-11-16 DIAGNOSIS — Z95.0 CARDIAC PACEMAKER IN SITU: ICD-10-CM

## 2021-11-16 DIAGNOSIS — I49.5 TACHY-BRADY SYNDROME (HCC): ICD-10-CM

## 2021-11-16 DIAGNOSIS — I25.10 CORONARY ARTERY DISEASE INVOLVING NATIVE CORONARY ARTERY OF NATIVE HEART WITHOUT ANGINA PECTORIS: ICD-10-CM

## 2021-11-16 DIAGNOSIS — I48.0 PAROXYSMAL ATRIAL FIBRILLATION (HCC): ICD-10-CM

## 2021-11-16 DIAGNOSIS — I48.19 PERSISTENT ATRIAL FIBRILLATION (HCC): Primary | ICD-10-CM

## 2021-11-16 DIAGNOSIS — I48.20 CHRONIC ATRIAL FIBRILLATION (HCC): ICD-10-CM

## 2021-11-16 DIAGNOSIS — I10 ESSENTIAL HYPERTENSION: ICD-10-CM

## 2021-11-16 DIAGNOSIS — I50.32 CHRONIC DIASTOLIC HEART FAILURE (HCC): ICD-10-CM

## 2021-11-16 DIAGNOSIS — I48.3 TYPICAL ATRIAL FLUTTER (HCC): ICD-10-CM

## 2021-11-16 PROCEDURE — 99214 OFFICE O/P EST MOD 30 MIN: CPT | Performed by: NURSE PRACTITIONER

## 2021-11-16 RX ORDER — LOSARTAN POTASSIUM AND HYDROCHLOROTHIAZIDE 25; 100 MG/1; MG/1
TABLET ORAL
Qty: 90 TABLET | Refills: 3 | Status: SHIPPED | OUTPATIENT
Start: 2021-11-16 | End: 2022-10-10

## 2021-11-16 NOTE — PROGRESS NOTES
Patient comes in for programming evaluation on their Medtronic dual chamber pacemaker. All sensing and pacing parameters are within normal range. Battery life 11.2 years. AP 99.3%.  0.2%. No episodes noted since 10/12/2021  Patient remains on sotalol, mag ox, eliquis, furosemide, asa81 mg. No changes need to be made at this time. Please see interrogation for more detail. Patient will see NPWS today and follow up in 3 months in office or remotely.

## 2021-11-19 PROCEDURE — 93280 PM DEVICE PROGR EVAL DUAL: CPT | Performed by: INTERNAL MEDICINE

## 2021-11-27 DIAGNOSIS — G62.9 NEUROPATHY: ICD-10-CM

## 2021-11-29 RX ORDER — PREGABALIN 225 MG/1
CAPSULE ORAL
Qty: 90 CAPSULE | Refills: 0 | Status: SHIPPED | OUTPATIENT
Start: 2021-11-29 | End: 2022-02-25

## 2021-12-02 NOTE — TELEPHONE ENCOUNTER
Spoke to daughter told her that is correct on the doses she stated she thought so but the pharmacy was giving her a hard time and not wanting to fill it. Explained I will call the pharmacy and talk to them tell him this is correct.

## 2021-12-02 NOTE — TELEPHONE ENCOUNTER
[facility]ed [facility] spoke to lm and she transferred me to a pharmacist spoke to pharmacist gave verbal

## 2021-12-02 NOTE — TELEPHONE ENCOUNTER
She has a RX for pregabalin (LYRICA) 225 MG capsule to take one by month once a day, before it was 150 mg twice a day the pharm and daughter want to know is this right      Please Advise

## 2021-12-02 NOTE — TELEPHONE ENCOUNTER
Felix Gusman, daughter, pharmacy wants confirmation that Hugh Abad takes two different strengths of the   pregabalin (LYRICA) 225 MG capsule    150 morning and afternoon and   225 in evening    Call:    [facility] [address], [address]       Please call pharmacy and Felix Gusman to advise

## 2021-12-03 ENCOUNTER — OFFICE VISIT (OUTPATIENT)
Dept: CARDIOLOGY CLINIC | Age: 76
End: 2021-12-03
Payer: MEDICARE

## 2021-12-03 ENCOUNTER — TELEPHONE (OUTPATIENT)
Dept: INTERNAL MEDICINE CLINIC | Age: 76
End: 2021-12-03

## 2021-12-03 VITALS
HEART RATE: 90 BPM | DIASTOLIC BLOOD PRESSURE: 86 MMHG | OXYGEN SATURATION: 96 % | SYSTOLIC BLOOD PRESSURE: 130 MMHG | BODY MASS INDEX: 41.66 KG/M2 | WEIGHT: 244 LBS | HEIGHT: 64 IN

## 2021-12-03 DIAGNOSIS — E78.5 HYPERLIPIDEMIA, UNSPECIFIED HYPERLIPIDEMIA TYPE: ICD-10-CM

## 2021-12-03 DIAGNOSIS — I48.0 PAROXYSMAL ATRIAL FIBRILLATION (HCC): ICD-10-CM

## 2021-12-03 DIAGNOSIS — G62.9 NEUROPATHY: ICD-10-CM

## 2021-12-03 DIAGNOSIS — I10 HTN (HYPERTENSION), BENIGN: Primary | ICD-10-CM

## 2021-12-03 PROCEDURE — 99214 OFFICE O/P EST MOD 30 MIN: CPT | Performed by: INTERNAL MEDICINE

## 2021-12-03 RX ORDER — AMLODIPINE BESYLATE 5 MG/1
5 TABLET ORAL
Qty: 180 TABLET | Refills: 3 | Status: SHIPPED | OUTPATIENT
Start: 2021-12-03

## 2021-12-03 RX ORDER — PREGABALIN 150 MG/1
CAPSULE ORAL
Qty: 60 CAPSULE | Refills: 2 | Status: SHIPPED | OUTPATIENT
Start: 2021-12-03 | End: 2022-01-07

## 2021-12-03 ASSESSMENT — ENCOUNTER SYMPTOMS
RESPIRATORY NEGATIVE: 1
GASTROINTESTINAL NEGATIVE: 1
EYES NEGATIVE: 1
ALLERGIC/IMMUNOLOGIC NEGATIVE: 1

## 2021-12-03 NOTE — TELEPHONE ENCOUNTER
Patients daughter called and said the one prescription got filled     pregabalin (LYRICA) 225 MG capsule     pregabalin (LYRICA) 150 MG capsule    [facility] [address] [facility], [address] [place] [address] [phone]        Please advise and call

## 2021-12-03 NOTE — PROGRESS NOTES
Subjective:      Patient ID: Jami Rouse is a 68 y.o. female    CC:  Fatigue and slow HR on sotalol. HPI:  Pt has recently moved to [place], and is establishing a new visit with a Cardiologist.    Pt is s/p successful AF-RFA (19) . Procedure performed at [facility]. PMH includes AF, HTN, Heart Failure. Had 3 days of AF. No chest pains and can walk 1-2 flights of stairs w/o severe SOB and no chest pains. Had breast seed implantation and lumpectomy 3/22 and 3/23/21. Has pacer in place. Allergies   Allergen Reactions    Morphine      vomit    Tape Margrett Joan Tape] Itching    Unable To Assess      Any pain med makes her sick, vomit violently  Tolerates fentanly    Vicodin [Hydrocodone-Acetaminophen] Nausea And Vomiting       Social History     Socioeconomic History    Marital status:      Spouse name: None    Number of children: None    Years of education: None    Highest education level: None   Occupational History    Occupation: retired   Tobacco Use    Smoking status: Former Smoker     Packs/day: 1.00     Years: 2.00     Pack years: 2.00     Types: Cigarettes     Quit date: 1995     Years since quittin.9    Smokeless tobacco: Never Used   Vaping Use    Vaping Use: Never used   Substance and Sexual Activity    Alcohol use: No    Drug use: No    Sexual activity: Not Currently   Other Topics Concern    None   Social History Narrative    None     Social Determinants of Health     Financial Resource Strain: Low Risk     Difficulty of Paying Living Expenses: Not hard at all   Food Insecurity: No Food Insecurity    Worried About [address] in the Last Year: Never true    [address] in the Last Year: Never true   Transportation Needs:     Lack of Transportation (Medical): Not on file    Lack of Transportation (Non-Medical):  Not on file   Physical Activity:     Days of Exercise per Week: Not on file    Minutes of Exercise per Session: Not on file   Stress:     Feeling of Stress : Not on file   Social Connections:     Frequency of Communication with Friends and Family: Not on file    Frequency of Social Gatherings with Friends and Family: Not on file    Attends Sabianism Services: Not on file    Active Member of Clubs or Organizations: Not on file    Attends Club or Organization Meetings: Not on file    Marital Status: Not on file   Intimate Partner Violence:     Fear of Current or Ex-Partner: Not on file    Emotionally Abused: Not on file    Physically Abused: Not on file    Sexually Abused: Not on file   Housing Stability:     Unable to Pay for Housing in the Last Year: Not on file    Number of [place] in the Last Year: Not on file    Unstable Housing in the Last Year: Not on file       Family History   Problem Relation Age of Onset    Cancer Mother         breast    High Blood Pressure Mother     Diabetes Mother     Heart Disease Mother         pacemaker    Stroke Mother     High Blood Pressure Father     Cancer Sister         colon    Cancer Sister         breast    Stroke Brother     Heart Disease Brother     Cancer Sister         breast        has a past medical history of Arthritis, Atrial fibrillation ([facility] [address] Breast cancer ([facility] [address] CAD (coronary artery disease), GERD (gastroesophageal reflux disease), H/O: whooping cough, History of shingles, Hypercholesterolemia, Hypertension, Iron deficiency, PONV (postoperative nausea and vomiting), Sleep apnea, and Vertigo. Review of Systems   Constitutional: Negative. HENT: Negative. Eyes: Negative. Respiratory: Negative. Cardiovascular: Negative. Gastrointestinal: Negative. Endocrine: Negative. Genitourinary: Negative. Musculoskeletal: Negative. Skin: Negative. Allergic/Immunologic: Negative. Neurological: Negative. Hematological: Negative. Psychiatric/Behavioral: Negative.         Vitals:    12/03/21 1418   BP: 130/86 Pulse: 90   SpO2: 96%        Echo 11/15/19  Summary   Left ventricle size is normal.   Normal left ventricular wall thickness. Ejection fraction is visually estimated in the range of 55% to 60%. There were no regional wall motion abnormalities. There is a small circumferential pericardial effusion noted. Vitals:    12/03/21 1418   BP: 130/86   Pulse: 90   SpO2: 96%         Objective:   Physical Exam  HENT:      Head: Normocephalic and atraumatic. Mouth/Throat:      Mouth: Mucous membranes are dry. Eyes:      Extraocular Movements: Extraocular movements intact. Pupils: Pupils are equal, round, and reactive to light. Cardiovascular:      Rate and Rhythm: Normal rate and regular rhythm. Pulses: Normal pulses. Heart sounds: Normal heart sounds. Pulmonary:      Effort: Pulmonary effort is normal.      Breath sounds: Normal breath sounds. Abdominal:      General: Abdomen is flat. There is no distension. Palpations: Abdomen is soft. Hernia: A hernia is present. Musculoskeletal:      Cervical back: Normal range of motion and neck supple. Skin:     General: Skin is warm and dry. Capillary Refill: Capillary refill takes 2 to 3 seconds. Neurological:      General: No focal deficit present. Mental Status: She is oriented to person, place, and time. Psychiatric:         Mood and Affect: Mood normal.         Behavior: Behavior normal.         Thought Content:  Thought content normal.         Current Outpatient Medications   Medication Sig Dispense Refill    pregabalin (LYRICA) 225 MG capsule TAKE ONE CAPSULE BY MOUTH ONCE NIGHTLY 90 capsule 0    losartan-hydroCHLOROthiazide (HYZAAR) 100-25 MG per tablet TAKE ONE TABLET BY MOUTH DAILY 90 tablet 3    Omega-3 Fatty Acids (FISH OIL) 1000 MG CAPS Take 1,000 mg by mouth daily      Vitamin D (CHOLECALCIFEROL) 25 MCG (1000 UT) TABS tablet Take 1,000 Units by mouth daily      sotalol (BETAPACE) 120 MG tablet Take 1 tablet by mouth 2 times daily 60 tablet 3    amLODIPine (NORVASC) 5 MG tablet Take 1 tablet by mouth daily 30 tablet 3    potassium chloride (KLOR-CON M20) 20 MEQ extended release tablet Take 1 tablet by mouth 2 times daily TAKE ONE TABLET BY MOUTH DAILY (Patient taking differently: Take 20 mEq by mouth 2 times daily ) 180 tablet 3    magnesium oxide (MAG-OX) 400 MG tablet Take 400 mg by mouth daily      tamoxifen (NOLVADEX) 20 MG tablet Take 20 mg by mouth daily      calcium carbonate 600 MG TABS tablet Take 1 tablet by mouth daily      pregabalin (LYRICA) 150 MG capsule Take 1 capsule by mouth in the morning and early afternoon 60 capsule 2    ELIQUIS 5 MG TABS tablet Take 1 tablet by mouth 2 times daily 180 tablet 3    furosemide (LASIX) 20 MG tablet Take 1 tablet by mouth 2 times daily 180 tablet 3    atorvastatin (LIPITOR) 20 MG tablet Take 1 tablet by mouth daily 90 tablet 3    acetaminophen (TYLENOL) 500 MG tablet Take 1,000 mg by mouth every 6 hours as needed for Pain or Fever      Glucosamine-Chondroit-Vit C-Mn (GLUCOSAMINE 1500 COMPLEX PO) Take 1 tablet by mouth 2 times daily       Docusate Calcium (STOOL SOFTENER PO) Take 1 capsule by mouth 2 times daily       nitroGLYCERIN (NITROSTAT) 0.4 MG SL tablet Place 0.4 mg under the tongue every 5 minutes as needed for Chest pain Take 1 tablet for chest pain and repeat after 5 min if no relief, call 911 and take another dose 5 min later. Max of 3 doses in 15 min.  aspirin 81 MG EC tablet Take 81 mg by mouth nightly       Multiple Vitamins-Minerals (CENTRUM SILVER PO) Take by mouth daily        No current facility-administered medications for this visit. Assessment:       Diagnosis Orders   1. HTN (hypertension), benign     2. Hyperlipidemia, unspecified hyperlipidemia type     3. Paroxysmal atrial fibrillation (HCC)         Plan:      1 AF:    S/p ablation on sotalol and eliquis but in SR but valerie in 45s.   Sotalol was stopped by EP services but now ECG shows Atrial flutter with variable AV block at 100 bpm so add toprol 25 mg daily  2  SOB:  Will check echo and MPI. 3. RENE  Not wearing mask. 4 near syncope:  Not recently but is in atrial flutter. HTN; Increase amlodipine to 5 bid. 5.  bnp [id number] on 11/20/19. No lasix since hospitalization.     had breast surgery with no cardiac complications

## 2021-12-06 PROCEDURE — 93294 REM INTERROG EVL PM/LDLS PM: CPT | Performed by: INTERNAL MEDICINE

## 2021-12-06 PROCEDURE — 93296 REM INTERROG EVL PM/IDS: CPT | Performed by: INTERNAL MEDICINE

## 2021-12-07 ENCOUNTER — NURSE ONLY (OUTPATIENT)
Dept: CARDIOLOGY CLINIC | Age: 76
End: 2021-12-07
Payer: MEDICARE

## 2021-12-07 DIAGNOSIS — I49.5 TACHY-BRADY SYNDROME (HCC): ICD-10-CM

## 2021-12-07 DIAGNOSIS — Z95.0 CARDIAC PACEMAKER IN SITU: ICD-10-CM

## 2021-12-07 NOTE — PROGRESS NOTES
Remote transmission received from patient's dual chamber pacemaker monitor at home. Transmission shows normal sensing and pacing function. No new arrhythmias/events recorded. EP physician will review. See interrogation under cardiology tab in the [address] [place] [address] [address] field for more details. Will continue to monitor remotely.

## 2022-01-07 RX ORDER — ATORVASTATIN CALCIUM 20 MG/1
TABLET, FILM COATED ORAL
Qty: 90 TABLET | Refills: 3 | Status: SHIPPED | OUTPATIENT
Start: 2022-01-07

## 2022-01-07 NOTE — TELEPHONE ENCOUNTER
Requested Prescriptions     Pending Prescriptions Disp Refills    atorvastatin (LIPITOR) 20 MG tablet [Pharmacy Med Name: ATORVASTATIN 20 MG TABLET] 90 tablet 3     Sig: TAKE ONE TABLET BY MOUTH DAILY                  Last Office Visit: 12/3/2021     Next Office Visit: 6/10/2022      Last Labs:10/07/2021

## 2022-01-21 RX ORDER — SOTALOL HYDROCHLORIDE 120 MG/1
120 TABLET ORAL 2 TIMES DAILY
Qty: 60 TABLET | Refills: 3 | Status: SHIPPED | OUTPATIENT
Start: 2022-01-21 | End: 2022-05-20

## 2022-01-21 NOTE — TELEPHONE ENCOUNTER
I Medication Refills:    sotalol (BETAPACE) 120 MG tablet [[id number]]     Order Details  Dose: 120 mg Route: Oral Frequency: 2 TIMES DAILY   Dispense Quantity: 60 tablet Refills: 3          Sig: Take 1 tablet by mouth 2 times daily           [facility] [address], [address]  Vielka Griffithn [phone]    Last Office Visit: 12/03/21    Next Office Visit: 03/29/22    Last Refill: 10/07/21    Last Labs: 10/07/21

## 2022-02-25 DIAGNOSIS — G62.9 NEUROPATHY: ICD-10-CM

## 2022-02-25 RX ORDER — PREGABALIN 225 MG/1
CAPSULE ORAL
Qty: 90 CAPSULE | Refills: 0 | Status: SHIPPED | OUTPATIENT
Start: 2022-02-25 | End: 2022-05-12 | Stop reason: SDUPTHER

## 2022-03-07 PROCEDURE — 93294 REM INTERROG EVL PM/LDLS PM: CPT | Performed by: INTERNAL MEDICINE

## 2022-03-07 PROCEDURE — 93296 REM INTERROG EVL PM/IDS: CPT | Performed by: INTERNAL MEDICINE

## 2022-03-08 ENCOUNTER — NURSE ONLY (OUTPATIENT)
Dept: CARDIOLOGY CLINIC | Age: 77
End: 2022-03-08
Payer: MEDICARE

## 2022-03-08 DIAGNOSIS — I49.5 TACHY-BRADY SYNDROME (HCC): ICD-10-CM

## 2022-03-08 DIAGNOSIS — I48.0 PAROXYSMAL ATRIAL FIBRILLATION (HCC): ICD-10-CM

## 2022-03-08 DIAGNOSIS — Z95.0 CARDIAC PACEMAKER IN SITU: ICD-10-CM

## 2022-03-08 NOTE — PROGRESS NOTES
Remote transmission received from patient's dual chamber pacemaker monitor at home. Transmission shows normal sensing and pacing function. No new arrhythmias/events recorded. EP physician will review. See interrogation under cardiology tab in the [address] [place] [address] [address] field for more details. Will continue to monitor remotely.

## 2022-03-22 NOTE — PROGRESS NOTES
[facility]   Cardiac Consultation    Referring Provider:  Umu Butterfield MD     Chief Complaint   Patient presents with    3 Month Follow-Up     pacer for SSS    Atrial Fibrillation     on sotalol     HPI:  Olu Moyer is a 68 y.o. female with PMH of HTN, HLD, RENE not on CPAP, CAD, s/p PCI of the RCA, pAF/AFL, s/p RFA of AFL ([place], [place]), s/p cryo RFA for AF (12/2018), s/p RFA/PVI of AF (11/1/19, [facility]), recurrent pAF post RFA, placed on sotalol w/ a DCCV to NSR (11/18/19), noted to have SB on sotalol and dose decreased and eventually stopped (1/2021) due to symptomatic bradycardia with LHD. She had recurrence of AF (7/2021), symptomatic with fatigue, s/p dual chamber PPM (10/5/21, Dr. Susan Humphries), placed on sotalol 120 mg BID after implant. Today, she is here for 3 mo f/u post PPM implant. She has been doing well since implant. Denies complaints of palpitations, dizziness, CP, SOB, orthopnea, presyncope, or syncope. Device interrogation today shows normally functioning PPM with stable sensing and pacing thresholds. AP 99.6%,  0.2%, episode of AF on 3/21 lasting 2 hrs 23 min. LENORE at 12.3 yrs. She has concerns about the pacer moving when rolling over on right side. Past Medical History:   has a past medical history of Arthritis, Atrial fibrillation ([address] Breast cancer ([address] CAD (coronary artery disease), GERD (gastroesophageal reflux disease), H/O: whooping cough, History of shingles, Hypercholesterolemia, Hypertension, Iron deficiency, PONV (postoperative nausea and vomiting), Sleep apnea, and Vertigo. Surgical History:   has a past surgical history that includes Abdominal hernia repair (2011); Foot surgery (Left, 1990); Colonoscopy (2015); Upper gastrointestinal endoscopy (2006); Breast surgery (Left, 2001); Breast surgery (Left, 1967, 1992); Breast lumpectomy (Right, 2012); back surgery (2009); Hysterectomy (1994); hernia repair (1995); Hemorrhoid surgery (09/01/2016);  Cardiac catheterization (09/08/2016); Cardioversion (11/07/2019); Atrial ablation surgery (11/01/2019); Ventricular ablation surgery (11/01/2019); US BREAST BIOPSY W LOC DEVICE 1ST LESION RIGHT (Right, 2/11/2021); and US BREAST BIOPSY W LOC DEVICE EACH ADDL LESION RIGHT (Right, 2/11/2021). Social History:   reports that she quit smoking about 27 years ago. Her smoking use included cigarettes. She has a 2.00 pack-year smoking history. She has never used smokeless tobacco. She reports that she does not drink alcohol and does not use drugs. Family History:  family history includes Cancer in her mother, sister, sister, and sister; Diabetes in her mother; Heart Disease in her brother and mother; High Blood Pressure in her father and mother; Stroke in her brother and mother.      Home Medications:  Outpatient Encounter Medications as of 3/29/2022   Medication Sig Dispense Refill    metoprolol succinate (TOPROL XL) 25 MG extended release tablet       pregabalin (LYRICA) 225 MG capsule TAKE ONE CAPSULE BY MOUTH ONCE NIGHTLY 90 capsule 0    sotalol (BETAPACE) 120 MG tablet Take 1 tablet by mouth 2 times daily 60 tablet 3    atorvastatin (LIPITOR) 20 MG tablet TAKE ONE TABLET BY MOUTH DAILY 90 tablet 3    pregabalin (LYRICA) 150 MG capsule TAKE ONE CAPSULE BY MOUTH EVERY MORNING AND EARLY AFTERNOON 60 capsule 2    amLODIPine (NORVASC) 5 MG tablet Take 1 tablet by mouth 2 times daily (before meals) 180 tablet 3    losartan-hydroCHLOROthiazide (HYZAAR) 100-25 MG per tablet TAKE ONE TABLET BY MOUTH DAILY 90 tablet 3    Omega-3 Fatty Acids (FISH OIL) 1000 MG CAPS Take 1,000 mg by mouth daily      Vitamin D (CHOLECALCIFEROL) 25 MCG (1000 UT) TABS tablet Take 1,000 Units by mouth daily      potassium chloride (KLOR-CON M20) 20 MEQ extended release tablet Take 1 tablet by mouth 2 times daily TAKE ONE TABLET BY MOUTH DAILY (Patient taking differently: Take 20 mEq by mouth 2 times daily ) 180 tablet 3    magnesium oxide (MAG-OX) 400 MG tablet Take 400 mg by mouth daily      tamoxifen (NOLVADEX) 20 MG tablet Take 20 mg by mouth daily      calcium carbonate 600 MG TABS tablet Take 1 tablet by mouth daily      ELIQUIS 5 MG TABS tablet Take 1 tablet by mouth 2 times daily 180 tablet 3    furosemide (LASIX) 20 MG tablet Take 1 tablet by mouth 2 times daily 180 tablet 3    acetaminophen (TYLENOL) 500 MG tablet Take 1,000 mg by mouth every 6 hours as needed for Pain or Fever      Glucosamine-Chondroit-Vit C-Mn (GLUCOSAMINE 1500 COMPLEX PO) Take 1 tablet by mouth 2 times daily       Docusate Calcium (STOOL SOFTENER PO) Take 1 capsule by mouth 2 times daily       nitroGLYCERIN (NITROSTAT) 0.4 MG SL tablet Place 0.4 mg under the tongue every 5 minutes as needed for Chest pain Take 1 tablet for chest pain and repeat after 5 min if no relief, call 911 and take another dose 5 min later. Max of 3 doses in 15 min.  aspirin 81 MG EC tablet Take 81 mg by mouth nightly       Multiple Vitamins-Minerals (CENTRUM SILVER PO) Take by mouth daily        No facility-administered encounter medications on file as of 3/29/2022. Allergies:  Morphine, Tape [adhesive tape], Unable to assess, and Vicodin [hydrocodone-acetaminophen]     Review of Systems   Constitutional: Negative for activity change and appetite change. HENT: Negative for facial swelling and neck pain. Eyes: Negative for discharge and itching. Respiratory: Negative for chest tightness and shortness of breath. Cardiovascular: Negative for palpitations. Negative for chest pain. Negative for leg swelling. Gastrointestinal: Negative for abdominal pain and abdominal distention. Genitourinary: Negative. Musculoskeletal: Negative. Skin: Negative for color change and pallor. Neurological: Negative for dizziness, syncope and light-headedness. Hematological: Negative. Psychiatric/Behavioral: Negative for behavioral problems and agitation.     /75 Pulse 69   Wt 254 lb 12.8 oz (115.6 kg)   BMI 43.74 kg/m²     ECG 3/29/22, personally reviewed. APVS 69, QTc 443 ms    Echo 1/8/21  Summary   Normal left ventricle size and systolic function with an estimated ejection fraction of 55-60%. Mild concentric left ventricular hypertrophy. No regional wall motion abnormalities are seen. Grade II diastolic dysfunction with elevated LV filling pressures. Global strain: -21.7%   The left atrium is dilated. Mild mitral regurgitation. Trivial aortic regurgitation. Trivial tricuspid regurgitation. The pulmonic valve is not well visualized. Estimated pulmonary artery systolic pressure is normal at 37 mmHg assuming a right atrial pressure of 3 mmHg. Lexiscan 2/12/20      Summary    There is symmetric isotope uptake at stress and rest. There is no evidence    of myocardial ischemia.    The LVEF is 74% with normal LV wall motion.        This is a low risk cardiac scan. 1 week CAM (1/81/15/21):  SB with average HR 51 (3220), first-degree AV block, 22 AT episodes with the longest lasting 21 beats, < 1% PAC/PVC burden, no AF     Objective:  Physical Exam   Nursing note and vitals reviewed. Constitutional: She appears well-developed and well-nourished. heavy  Head:  atraumatic. Eyes: Right eye exhibits no discharge. Left eye exhibits no discharge. Neck: Neck supple. Cardiovascular: Normal rate, regular rhythm and normal heart sounds. Pulmonary/Chest: Effort normal and breath sounds normal.   Abdominal: Soft. Musculoskeletal: She exhibits no edema. Neurological: She is alert without any gross abnormalities. Skin: Skin is warm and dry. Psychiatric: She has a normal mood and affect. Left pectoral site intact  Her skin is lax thus movement when on right side is expected. She may use short's bra at night to see if improved. There is on pain, just worry. Should not effect pacer or function. EKG: QT normal, atrial paced    Assessment:  1. PAF / AFL - s/p ablation for AFL, s/p AF ablation X2. Currently on Eliquis 5 mg BID and sotalol 120 mg BID with stable QTc.   2. 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 CAD, s/p PCI    4. HTN    5. RENE       Plan:  1. No changes in current medications  2. Follow up with Dr. Alice Liang in June, as already scheduled  3. Continue with remote home transmissions every 3 months  4. Follow up with EP NP in 1 year    I, Dr. Chasity Kirby, personally performed the services described in this documentation as scribed by Luis Appiah RN in my presence, and it is both accurate and complete. The scribes documentation has been prepared under my direction and personally reviewed by me in its entirety. I confirm that the note above accurately reflects all work, treatment, procedures, and medical decision making performed by me. Lesley Lloyd.  Clau HIDALGO

## 2022-03-25 ENCOUNTER — HOSPITAL ENCOUNTER (OUTPATIENT)
Dept: GENERAL RADIOLOGY | Age: 77
Discharge: HOME | End: 2022-03-25
Payer: MEDICARE

## 2022-03-25 ENCOUNTER — OFFICE VISIT (OUTPATIENT)
Dept: INTERNAL MEDICINE CLINIC | Age: 77
End: 2022-03-25
Payer: MEDICARE

## 2022-03-25 ENCOUNTER — HOSPITAL ENCOUNTER (OUTPATIENT)
Age: 77
Discharge: HOME | End: 2022-03-25
Payer: MEDICARE

## 2022-03-25 VITALS
WEIGHT: 258 LBS | SYSTOLIC BLOOD PRESSURE: 130 MMHG | HEIGHT: 64 IN | DIASTOLIC BLOOD PRESSURE: 70 MMHG | BODY MASS INDEX: 44.05 KG/M2

## 2022-03-25 DIAGNOSIS — M25.551 RIGHT HIP PAIN: ICD-10-CM

## 2022-03-25 DIAGNOSIS — I10 PRIMARY HYPERTENSION: ICD-10-CM

## 2022-03-25 DIAGNOSIS — M25.551 RIGHT HIP PAIN: Primary | ICD-10-CM

## 2022-03-25 DIAGNOSIS — G62.9 NEUROPATHY: ICD-10-CM

## 2022-03-25 LAB
A/G RATIO: 1.9 (ref 1.1–2.2)
ALBUMIN SERPL-MCNC: 4.3 G/DL (ref 3.4–5)
ALP BLD-CCNC: 75 U/L (ref 40–129)
ALT SERPL-CCNC: 17 U/L (ref 10–40)
ANION GAP SERPL CALCULATED.3IONS-SCNC: 15 MMOL/L (ref 3–16)
AST SERPL-CCNC: 23 U/L (ref 15–37)
BASOPHILS ABSOLUTE: 0.1 K/UL (ref 0–0.2)
BASOPHILS RELATIVE PERCENT: 1.3 %
BILIRUB SERPL-MCNC: 0.5 MG/DL (ref 0–1)
BUN BLDV-MCNC: 27 MG/DL (ref 7–20)
CALCIUM SERPL-MCNC: 9.6 MG/DL (ref 8.3–10.6)
CHLORIDE BLD-SCNC: 100 MMOL/L (ref 99–110)
CHOLESTEROL, TOTAL: 154 MG/DL (ref 0–199)
CO2: 26 MMOL/L (ref 21–32)
CREAT SERPL-MCNC: 1 MG/DL (ref 0.6–1.2)
EOSINOPHILS ABSOLUTE: 0.1 K/UL (ref 0–0.6)
EOSINOPHILS RELATIVE PERCENT: 2 %
GFR AFRICAN AMERICAN: >60
GFR NON-AFRICAN AMERICAN: 54
GLUCOSE BLD-MCNC: 107 MG/DL (ref 70–99)
HCT VFR BLD CALC: 42 % (ref 36–48)
HDLC SERPL-MCNC: 55 MG/DL (ref 40–60)
HEMOGLOBIN: 14.1 G/DL (ref 12–16)
LDL CHOLESTEROL CALCULATED: 60 MG/DL
LYMPHOCYTES ABSOLUTE: 1.3 K/UL (ref 1–5.1)
LYMPHOCYTES RELATIVE PERCENT: 17.9 %
MCH RBC QN AUTO: 28.7 PG (ref 26–34)
MCHC RBC AUTO-ENTMCNC: 33.5 G/DL (ref 31–36)
MCV RBC AUTO: 85.7 FL (ref 80–100)
MONOCYTES ABSOLUTE: 0.5 K/UL (ref 0–1.3)
MONOCYTES RELATIVE PERCENT: 6.8 %
NEUTROPHILS ABSOLUTE: 5.2 K/UL (ref 1.7–7.7)
NEUTROPHILS RELATIVE PERCENT: 72 %
PDW BLD-RTO: 13.4 % (ref 12.4–15.4)
PLATELET # BLD: 217 K/UL (ref 135–450)
PMV BLD AUTO: 8.4 FL (ref 5–10.5)
POTASSIUM SERPL-SCNC: 4.6 MMOL/L (ref 3.5–5.1)
RBC # BLD: 4.9 M/UL (ref 4–5.2)
SODIUM BLD-SCNC: 141 MMOL/L (ref 136–145)
TOTAL PROTEIN: 6.6 G/DL (ref 6.4–8.2)
TRIGL SERPL-MCNC: 193 MG/DL (ref 0–150)
VLDLC SERPL CALC-MCNC: 39 MG/DL
WBC # BLD: 7.2 K/UL (ref 4–11)

## 2022-03-25 PROCEDURE — 73502 X-RAY EXAM HIP UNI 2-3 VIEWS: CPT

## 2022-03-25 PROCEDURE — 99214 OFFICE O/P EST MOD 30 MIN: CPT | Performed by: INTERNAL MEDICINE

## 2022-03-25 RX ORDER — METOPROLOL SUCCINATE 25 MG/1
TABLET, EXTENDED RELEASE ORAL
COMMUNITY
Start: 2022-01-01

## 2022-03-25 SDOH — ECONOMIC STABILITY: FOOD INSECURITY: WITHIN THE PAST 12 MONTHS, THE FOOD YOU BOUGHT JUST DIDN'T LAST AND YOU DIDN'T HAVE MONEY TO GET MORE.: NEVER TRUE

## 2022-03-25 SDOH — ECONOMIC STABILITY: FOOD INSECURITY: WITHIN THE PAST 12 MONTHS, YOU WORRIED THAT YOUR FOOD WOULD RUN OUT BEFORE YOU GOT MONEY TO BUY MORE.: NEVER TRUE

## 2022-03-25 ASSESSMENT — SOCIAL DETERMINANTS OF HEALTH (SDOH): HOW HARD IS IT FOR YOU TO PAY FOR THE VERY BASICS LIKE FOOD, HOUSING, MEDICAL CARE, AND HEATING?: NOT HARD AT ALL

## 2022-03-25 NOTE — PROGRESS NOTES
Jono Alfonso (:  1945) is a 68 y.o. female,Established patient, here for evaluation of the following chief complaint(s):  Hip Pain (both sides) and Joint Pain (all over right side worse then left )         ASSESSMENT/PLAN:  1. Right hip pain  - suspect trochanteric bursitis. Cannot use NSAIDs, though a small amount of topical diclofenac would not likely absorb enough to cause significant interaction with the eliquis. Can use acetaminophen. Refer to ortho - may benefit from injection of bursa  -     [address] Andrea Morton MD, Orthopedic Surgery, [facility]  -     XR HIP 2-3 VW W PELVIS RIGHT; Future  2. Primary hypertension  - controlled, continue amlodipine 5mg twice daily, furosemide 20mg twice daily, due for blood work  -     Comprehensive Metabolic Panel; Future  -     Lipid Panel; Future  -     CBC with Auto Differential; Future  3. Neuropathy   - controlled, continue pregabalin 150mg morning and midday, 225mg at night    Return in about 6 months (around 2022) for neuropathy, etc.         Subjective   SUBJECTIVE/OBJECTIVE:  HPI    She has been experiencing right hip pain recently, seems to be getting worse. It hurts to press on the outside of the hip. She has a history of bursitis in that hip, has also had a joint injection though it never did help with any of the hip pain. Neuropathy control is much better with current dose of lyrica, about once every couple of weeks she is still up at night with the pain but overall feels control is adequate. No significant side effects. Blood pressure controlled, no side effects from medication. Review of Systems       Objective   Physical Exam  Vitals reviewed. Constitutional:       General: She is not in acute distress. Appearance: Normal appearance. She is well-developed. HENT:      Head: Normocephalic and atraumatic. Pulmonary:      Effort: Pulmonary effort is normal. No respiratory distress.    Musculoskeletal:         General: Tenderness (Right hip over greater trochanter) present. Skin:     General: Skin is warm and dry. Neurological:      Mental Status: She is alert. Psychiatric:         Mood and Affect: Mood normal.         Behavior: Behavior normal.         Thought Content: Thought content normal.         Judgment: Judgment normal.                  An electronic signature was used to authenticate this note.     --Lucy Mota MD

## 2022-03-29 ENCOUNTER — NURSE ONLY (OUTPATIENT)
Dept: CARDIOLOGY CLINIC | Age: 77
End: 2022-03-29
Payer: MEDICARE

## 2022-03-29 ENCOUNTER — OFFICE VISIT (OUTPATIENT)
Dept: CARDIOLOGY CLINIC | Age: 77
End: 2022-03-29
Payer: MEDICARE

## 2022-03-29 VITALS
WEIGHT: 254.8 LBS | DIASTOLIC BLOOD PRESSURE: 75 MMHG | BODY MASS INDEX: 43.74 KG/M2 | HEART RATE: 69 BPM | SYSTOLIC BLOOD PRESSURE: 115 MMHG

## 2022-03-29 DIAGNOSIS — I48.20 CHRONIC ATRIAL FIBRILLATION (HCC): ICD-10-CM

## 2022-03-29 DIAGNOSIS — I49.5 TACHY-BRADY SYNDROME (HCC): ICD-10-CM

## 2022-03-29 DIAGNOSIS — Z95.0 CARDIAC PACEMAKER IN SITU: ICD-10-CM

## 2022-03-29 DIAGNOSIS — I10 ESSENTIAL HYPERTENSION: ICD-10-CM

## 2022-03-29 DIAGNOSIS — I10 HTN (HYPERTENSION), BENIGN: ICD-10-CM

## 2022-03-29 DIAGNOSIS — I48.0 PAROXYSMAL ATRIAL FIBRILLATION (HCC): Primary | ICD-10-CM

## 2022-03-29 DIAGNOSIS — G47.33 OSA (OBSTRUCTIVE SLEEP APNEA): ICD-10-CM

## 2022-03-29 DIAGNOSIS — I49.5 SSS (SICK SINUS SYNDROME) (HCC): ICD-10-CM

## 2022-03-29 DIAGNOSIS — I48.3 TYPICAL ATRIAL FLUTTER (HCC): ICD-10-CM

## 2022-03-29 DIAGNOSIS — I25.10 CORONARY ARTERY DISEASE INVOLVING NATIVE CORONARY ARTERY OF NATIVE HEART WITHOUT ANGINA PECTORIS: ICD-10-CM

## 2022-03-29 PROCEDURE — 99214 OFFICE O/P EST MOD 30 MIN: CPT | Performed by: INTERNAL MEDICINE

## 2022-03-29 PROCEDURE — 93280 PM DEVICE PROGR EVAL DUAL: CPT | Performed by: INTERNAL MEDICINE

## 2022-03-29 NOTE — PROGRESS NOTES
Patient comes in for programming evaluation on their [employer]tronic dual chamber pacemaker. Hx: RFA/PVI of AF 2018, 2019  Last remote 3.8.2022    All sensing and pacing parameters are within normal range. Battery life 12.3 years  Underlying 1 As/Vs @ 30-35 bpm.  Did not perform inhibited test d/t symptomatic. AP 99.6%.  0.2%. Since 11.16.2021:  -1 AT/AF episode on 3.21.2022 x 2 hrs 23 mins. Patient remains on asa 81 mg, eliquis, furosemide, metoprolol, sotalol, mag ox. Turned [employer] alerts on. Please see interrogation for more detail. Patient will see Dr. Joseline Hardy today and follow up in 3 months in office or remotely.

## 2022-04-07 DIAGNOSIS — G62.9 NEUROPATHY: ICD-10-CM

## 2022-04-08 RX ORDER — PREGABALIN 150 MG/1
CAPSULE ORAL
Qty: 60 CAPSULE | Refills: 5 | Status: SHIPPED | OUTPATIENT
Start: 2022-04-08 | End: 2022-09-30 | Stop reason: SDUPTHER

## 2022-04-25 NOTE — TELEPHONE ENCOUNTER
Requested Prescriptions     Pending Prescriptions Disp Refills    furosemide (LASIX) 20 MG tablet [Pharmacy Med Name: FUROSEMIDE 20 MG TABLET] 180 tablet 3     Sig: TAKE ONE TABLET BY MOUTH TWICE A DAY            Last Office Visit: 12/3/2021     Next Office Visit: 6/10/2022    Last labs : [facility] 3/25/2022

## 2022-04-27 RX ORDER — FUROSEMIDE 20 MG/1
TABLET ORAL
Qty: 180 TABLET | Refills: 3 | Status: SHIPPED | OUTPATIENT
Start: 2022-04-27

## 2022-05-12 ENCOUNTER — TELEPHONE (OUTPATIENT)
Dept: INTERNAL MEDICINE CLINIC | Age: 77
End: 2022-05-12

## 2022-05-12 DIAGNOSIS — G62.9 NEUROPATHY: ICD-10-CM

## 2022-05-12 RX ORDER — PREGABALIN 225 MG/1
CAPSULE ORAL
Qty: 90 CAPSULE | Refills: 1 | Status: SHIPPED | OUTPATIENT
Start: 2022-05-12 | End: 2022-05-31

## 2022-05-12 NOTE — TELEPHONE ENCOUNTER
Patient needs refill sent of pregabalin (LYRICA) 225 MG capsule     Please send to   Pharmacy:  [facility] [id number] Stephani Lantigua, [address] - [place] [phone]

## 2022-05-20 NOTE — TELEPHONE ENCOUNTER
Requested Prescriptions     Pending Prescriptions Disp Refills    sotalol (BETAPACE) 120 MG tablet [Pharmacy Med Name: SOTALOL 120 MG TABLET] 60 tablet 3     Sig: TAKE ONE TABLET BY MOUTH TWICE A DAY          Number: 60    Refills: 5    Last Office Visit: 3/29/2022     Next Office Visit: 6/7/2022

## 2022-05-23 RX ORDER — SOTALOL HYDROCHLORIDE 120 MG/1
TABLET ORAL
Qty: 60 TABLET | Refills: 5 | Status: SHIPPED | OUTPATIENT
Start: 2022-05-23

## 2022-05-28 DIAGNOSIS — G62.9 NEUROPATHY: ICD-10-CM

## 2022-05-31 RX ORDER — PREGABALIN 225 MG/1
CAPSULE ORAL
Qty: 90 CAPSULE | Refills: 0 | Status: SHIPPED | OUTPATIENT
Start: 2022-05-31 | End: 2022-09-30

## 2022-06-07 ENCOUNTER — NURSE ONLY (OUTPATIENT)
Dept: CARDIOLOGY CLINIC | Age: 77
End: 2022-06-07
Payer: MEDICARE

## 2022-06-07 DIAGNOSIS — I49.5 TACHY-BRADY SYNDROME (HCC): ICD-10-CM

## 2022-06-07 DIAGNOSIS — Z95.0 CARDIAC PACEMAKER IN SITU: ICD-10-CM

## 2022-06-07 PROCEDURE — 93294 REM INTERROG EVL PM/LDLS PM: CPT | Performed by: INTERNAL MEDICINE

## 2022-06-07 PROCEDURE — 93296 REM INTERROG EVL PM/IDS: CPT | Performed by: INTERNAL MEDICINE

## 2022-06-10 NOTE — PROGRESS NOTES
Remote transmission received from patient's dual chamber pacemaker monitor at home. Transmission shows normal sensing and pacing function. Noted AT/AF, 0.2% burden (sotalol, Toprol XL, Eliquis). EP physician will review. See interrogation under cardiology tab in the [address] [place] [address] [address] field for more details. Will continue to monitor remotely.

## 2022-07-11 RX ORDER — APIXABAN 5 MG/1
TABLET, FILM COATED ORAL
Qty: 180 TABLET | Refills: 3 | Status: SHIPPED | OUTPATIENT
Start: 2022-07-11

## 2022-07-11 NOTE — TELEPHONE ENCOUNTER
Requested Prescriptions     Pending Prescriptions Disp Refills    ELIQUIS 5 MG TABS tablet [Pharmacy Med Name: ELIQUIS 5 MG TABLET] 180 tablet 3     Sig: TAKE ONE TABLET BY MOUTH TWICE A DAY          Number: 180    Refills: 3    Last Office Visit: 12/3/2021     Next Office Visit: 7/20/22

## 2022-07-29 ENCOUNTER — OFFICE VISIT (OUTPATIENT)
Dept: INTERNAL MEDICINE CLINIC | Age: 77
End: 2022-07-29
Payer: MEDICARE

## 2022-07-29 VITALS
HEIGHT: 64 IN | BODY MASS INDEX: 42.51 KG/M2 | WEIGHT: 249 LBS | SYSTOLIC BLOOD PRESSURE: 132 MMHG | DIASTOLIC BLOOD PRESSURE: 76 MMHG

## 2022-07-29 DIAGNOSIS — M54.50 ACUTE RIGHT-SIDED LOW BACK PAIN WITHOUT SCIATICA: Primary | ICD-10-CM

## 2022-07-29 PROCEDURE — 1123F ACP DISCUSS/DSCN MKR DOCD: CPT | Performed by: INTERNAL MEDICINE

## 2022-07-29 PROCEDURE — 99214 OFFICE O/P EST MOD 30 MIN: CPT | Performed by: INTERNAL MEDICINE

## 2022-07-29 RX ORDER — METHYLPREDNISOLONE 4 MG/1
TABLET ORAL
Qty: 1 KIT | Refills: 0 | Status: SHIPPED | OUTPATIENT
Start: 2022-07-29 | End: 2022-08-02 | Stop reason: SDUPTHER

## 2022-07-29 ASSESSMENT — PATIENT HEALTH QUESTIONNAIRE - PHQ9
SUM OF ALL RESPONSES TO PHQ9 QUESTIONS 1 & 2: 0
SUM OF ALL RESPONSES TO PHQ QUESTIONS 1-9: 0
1. LITTLE INTEREST OR PLEASURE IN DOING THINGS: 0
2. FEELING DOWN, DEPRESSED OR HOPELESS: 0

## 2022-07-29 NOTE — PROGRESS NOTES
Iggy Early (:  1945) is a 68 y.o. female,Established patient, here for evaluation of the following chief complaint(s):  Back Pain (Lower back ongoing a week, fine when laying or sitting, but when going to stand up or walking it really hurts, hurts to sit back down after walking)         ASSESSMENT/PLAN:  1. Acute right-sided low back pain without sciatica  -This seems to be more SI joint pain. She is already taking Lyrica, cannot use NSAIDs, will try treating with Medrol Dosepak. Refer to physical therapy  -     Eliud Velasco    Return if symptoms worsen or fail to improve. Subjective   SUBJECTIVE/OBJECTIVE:  HPI    Pain started about a week ago. She feels okay when she is sitting or laying down, but when she gets up or walks it gets worse. Pain is located in the right lower back, does not radiate into the leg. No numbness or tingling that is different from her usual neuropathy symptoms, she has not noted any weakness. Review of Systems       Objective   Physical Exam  Vitals reviewed. Constitutional:       General: She is not in acute distress. Appearance: Normal appearance. She is well-developed. She is obese. HENT:      Head: Normocephalic and atraumatic. Pulmonary:      Effort: Pulmonary effort is normal. No respiratory distress. Musculoskeletal:         General: Tenderness (Right lower back in the area of the SI) present. No swelling or deformity. Skin:     General: Skin is warm and dry. Neurological:      Mental Status: She is alert. Psychiatric:         Behavior: Behavior normal.         Thought Content: Thought content normal.         Judgment: Judgment normal.                An electronic signature was used to authenticate this note.     --Mary De La Paz MD

## 2022-08-03 RX ORDER — METHYLPREDNISOLONE 4 MG/1
TABLET ORAL
Qty: 1 KIT | Refills: 0 | Status: SHIPPED | OUTPATIENT
Start: 2022-08-03 | End: 2022-08-08

## 2022-08-29 ENCOUNTER — OFFICE VISIT (OUTPATIENT)
Dept: CARDIOLOGY CLINIC | Age: 77
End: 2022-08-29
Payer: MEDICARE

## 2022-08-29 VITALS
DIASTOLIC BLOOD PRESSURE: 76 MMHG | SYSTOLIC BLOOD PRESSURE: 128 MMHG | BODY MASS INDEX: 45.14 KG/M2 | HEART RATE: 84 BPM | WEIGHT: 263 LBS

## 2022-08-29 DIAGNOSIS — I10 HTN (HYPERTENSION), BENIGN: ICD-10-CM

## 2022-08-29 DIAGNOSIS — E78.5 HYPERLIPIDEMIA, UNSPECIFIED HYPERLIPIDEMIA TYPE: ICD-10-CM

## 2022-08-29 DIAGNOSIS — I20.0 UNSTABLE ANGINA (HCC): Primary | ICD-10-CM

## 2022-08-29 PROCEDURE — 99214 OFFICE O/P EST MOD 30 MIN: CPT | Performed by: INTERNAL MEDICINE

## 2022-08-29 PROCEDURE — 93000 ELECTROCARDIOGRAM COMPLETE: CPT | Performed by: INTERNAL MEDICINE

## 2022-08-29 PROCEDURE — 1123F ACP DISCUSS/DSCN MKR DOCD: CPT | Performed by: INTERNAL MEDICINE

## 2022-08-29 ASSESSMENT — ENCOUNTER SYMPTOMS
GASTROINTESTINAL NEGATIVE: 1
RESPIRATORY NEGATIVE: 1
ALLERGIC/IMMUNOLOGIC NEGATIVE: 1
EYES NEGATIVE: 1

## 2022-08-29 NOTE — PROGRESS NOTES
Subjective:      Patient ID: Christi Cueva is a 68 y.o. female    CC:  Fatigue and slow HR on sotalol. HPI:  Pt has recently moved to [place], and is establishing a new visit with a Cardiologist.    Pt is s/p successful AF-RFA (19) . Procedure performed at [facility]. PMH includes AF, HTN, Heart Failure. Had 3 days of AF. No chest pains and can walk 1-2 flights of stairs w/o severe SOB and no chest pains. Had breast seed implantation and lumpectomy 3/22 and 3/23/21. Has pacer in place.     Allergies   Allergen Reactions    Morphine      vomit    Tape Mae Hope Tape] Itching    Unable To Assess      Any pain med makes her sick, vomit violently  Tolerates fentanly    Vicodin [Hydrocodone-Acetaminophen] Nausea And Vomiting       Social History     Socioeconomic History    Marital status:      Spouse name: None    Number of children: None    Years of education: None    Highest education level: None   Occupational History    Occupation: retired   Tobacco Use    Smoking status: Former     Packs/day: 1.00     Years: 2.00     Pack years: 2.00     Types: Cigarettes     Quit date: 1995     Years since quittin.6    Smokeless tobacco: Never   Vaping Use    Vaping Use: Never used   Substance and Sexual Activity    Alcohol use: No    Drug use: No    Sexual activity: Not Currently     Social Determinants of Health     Financial Resource Strain: Low Risk     Difficulty of Paying Living Expenses: Not hard at all   Food Insecurity: No Food Insecurity    Worried About Running Out of Food in the Last Year: Never true    Ran Out of Food in the Last Year: Never true       Family History   Problem Relation Age of Onset    Cancer Mother         breast    High Blood Pressure Mother     Diabetes Mother     Heart Disease Mother         pacemaker    Stroke Mother     High Blood Pressure Father     Cancer Sister         colon    Cancer Sister         breast    Stroke Brother     Heart Disease Brother     Cancer Sister         breast        has a past medical history of Arthritis, Atrial fibrillation ([facility] [address] Breast cancer ([facility] [address] CAD (coronary artery disease), GERD (gastroesophageal reflux disease), H/O: whooping cough, History of shingles, Hypercholesterolemia, Hypertension, Iron deficiency, PONV (postoperative nausea and vomiting), Sleep apnea, and Vertigo. Review of Systems   Constitutional: Negative. HENT: Negative. Eyes: Negative. Respiratory: Negative. Cardiovascular: Negative. Gastrointestinal: Negative. Endocrine: Negative. Genitourinary: Negative. Musculoskeletal: Negative. Skin: Negative. Allergic/Immunologic: Negative. Neurological: Negative. Hematological: Negative. Psychiatric/Behavioral: Negative. Vitals:    08/29/22 1532   BP: 128/76   Pulse: 84        Echo 11/15/19  Summary   Left ventricle size is normal.   Normal left ventricular wall thickness. Ejection fraction is visually estimated in the range of 55% to 60%. There were no regional wall motion abnormalities. There is a small circumferential pericardial effusion noted. Vitals:    08/29/22 1532   BP: 128/76   Pulse: 84         Objective:   Physical Exam  HENT:      Head: Normocephalic and atraumatic. Mouth/Throat:      Mouth: Mucous membranes are dry. Eyes:      Extraocular Movements: Extraocular movements intact. Pupils: Pupils are equal, round, and reactive to light. Cardiovascular:      Rate and Rhythm: Normal rate and regular rhythm. Pulses: Normal pulses. Heart sounds: Normal heart sounds. Pulmonary:      Effort: Pulmonary effort is normal.      Breath sounds: Normal breath sounds. Abdominal:      General: Abdomen is flat. There is no distension. Palpations: Abdomen is soft. Hernia: A hernia is present. Musculoskeletal:      Cervical back: Normal range of motion and neck supple. Skin:     General: Skin is warm and dry. Capillary Refill: Capillary refill takes 2 to 3 seconds. Neurological:      General: No focal deficit present. Mental Status: She is oriented to person, place, and time. Psychiatric:         Mood and Affect: Mood normal.         Behavior: Behavior normal.         Thought Content:  Thought content normal.       Current Outpatient Medications   Medication Sig Dispense Refill    ELIQUIS 5 MG TABS tablet TAKE ONE TABLET BY MOUTH TWICE A  tablet 3    pregabalin (LYRICA) 225 MG capsule TAKE ONE CAPSULE BY MOUTH ONCE NIGHTLY 90 capsule 0    sotalol (BETAPACE) 120 MG tablet TAKE ONE TABLET BY MOUTH TWICE A DAY 60 tablet 5    furosemide (LASIX) 20 MG tablet TAKE ONE TABLET BY MOUTH TWICE A  tablet 3    pregabalin (LYRICA) 150 MG capsule TAKE ONE CAPSULE BY MOUTH EVERY MORNING AND TAKE ONE CAPSULE BY MOUTH EVERY EARLY AFTERNOON AS DIRECTED 60 capsule 5    atorvastatin (LIPITOR) 20 MG tablet TAKE ONE TABLET BY MOUTH DAILY 90 tablet 3    amLODIPine (NORVASC) 5 MG tablet Take 1 tablet by mouth 2 times daily (before meals) 180 tablet 3    losartan-hydroCHLOROthiazide (HYZAAR) 100-25 MG per tablet TAKE ONE TABLET BY MOUTH DAILY 90 tablet 3    Omega-3 Fatty Acids (FISH OIL) 1000 MG CAPS Take 1,000 mg by mouth daily      Vitamin D (CHOLECALCIFEROL) 25 MCG (1000 UT) TABS tablet Take 1,000 Units by mouth daily      potassium chloride (KLOR-CON M20) 20 MEQ extended release tablet Take 1 tablet by mouth 2 times daily TAKE ONE TABLET BY MOUTH DAILY (Patient taking differently: Take 20 mEq by mouth 2 times daily) 180 tablet 3    magnesium oxide (MAG-OX) 400 MG tablet Take 400 mg by mouth daily      tamoxifen (NOLVADEX) 20 MG tablet Take 20 mg by mouth daily      calcium carbonate 600 MG TABS tablet Take 1 tablet by mouth daily      acetaminophen (TYLENOL) 500 MG tablet Take 1,000 mg by mouth every 6 hours as needed for Pain or Fever      Glucosamine-Chondroit-Vit C-Mn (GLUCOSAMINE 1500 COMPLEX PO) Take 1 tablet by mouth 2 times daily       Docusate Calcium (STOOL SOFTENER PO) Take 1 capsule by mouth 2 times daily       nitroGLYCERIN (NITROSTAT) 0.4 MG SL tablet Place 0.4 mg under the tongue every 5 minutes as needed for Chest pain Take 1 tablet for chest pain and repeat after 5 min if no relief, call 911 and take another dose 5 min later. Max of 3 doses in 15 min. Multiple Vitamins-Minerals (CENTRUM SILVER PO) Take by mouth daily       metoprolol succinate (TOPROL XL) 25 MG extended release tablet  (Patient not taking: Reported on 8/29/2022)      aspirin 81 MG EC tablet Take 81 mg by mouth nightly        No current facility-administered medications for this visit. Assessment:       Diagnosis Orders   1. Unstable angina (HCC)  EKG 12 Lead      2. HTN (hypertension), benign        3. Hyperlipidemia, unspecified hyperlipidemia type              Plan:      1 AF:  A paced. On ecg.  2  SOB:  Will check echo and MPI. 3. RENE  Not wearing mask. 4 near syncope:  Not recently but is in atrial flutter. HTN; Increase amlodipine to 5 bid. 5.  bnp [id number] on 11/20/19. No lasix since hospitalization.     had breast surgery with no cardiac complications

## 2022-09-06 ENCOUNTER — NURSE ONLY (OUTPATIENT)
Dept: CARDIOLOGY CLINIC | Age: 77
End: 2022-09-06
Payer: MEDICARE

## 2022-09-06 DIAGNOSIS — Z95.0 CARDIAC PACEMAKER IN SITU: Primary | ICD-10-CM

## 2022-09-06 DIAGNOSIS — I49.5 TACHY-BRADY SYNDROME (HCC): ICD-10-CM

## 2022-09-06 PROCEDURE — 93294 REM INTERROG EVL PM/LDLS PM: CPT | Performed by: INTERNAL MEDICINE

## 2022-09-06 PROCEDURE — 93296 REM INTERROG EVL PM/IDS: CPT | Performed by: INTERNAL MEDICINE

## 2022-09-07 NOTE — PROGRESS NOTES
Remote transmission received from patient's dual chamber pacemaker monitor at home. Transmission shows normal sensing and pacing function. Noted AT/AF, 2.5% burden (sotalol, Toprol XL, Eliquis). EP physician will review. See interrogation under cardiology tab in the [address] [place] [address] [address] field for more details. Will continue to monitor remotely. (End of 91-day monitoring period 9/6/22).

## 2022-09-30 ENCOUNTER — OFFICE VISIT (OUTPATIENT)
Dept: INTERNAL MEDICINE CLINIC | Age: 77
End: 2022-09-30
Payer: MEDICARE

## 2022-09-30 VITALS
SYSTOLIC BLOOD PRESSURE: 110 MMHG | HEART RATE: 80 BPM | TEMPERATURE: 98.1 F | BODY MASS INDEX: 42.51 KG/M2 | HEIGHT: 64 IN | DIASTOLIC BLOOD PRESSURE: 72 MMHG | OXYGEN SATURATION: 97 % | WEIGHT: 249 LBS

## 2022-09-30 DIAGNOSIS — I10 PRIMARY HYPERTENSION: ICD-10-CM

## 2022-09-30 DIAGNOSIS — G62.9 NEUROPATHY: ICD-10-CM

## 2022-09-30 DIAGNOSIS — Z00.00 MEDICARE ANNUAL WELLNESS VISIT, SUBSEQUENT: Primary | ICD-10-CM

## 2022-09-30 DIAGNOSIS — I48.20 CHRONIC ATRIAL FIBRILLATION (HCC): ICD-10-CM

## 2022-09-30 DIAGNOSIS — Z17.0 MALIGNANT NEOPLASM OF LOWER-OUTER QUADRANT OF RIGHT BREAST OF FEMALE, ESTROGEN RECEPTOR POSITIVE (HCC): ICD-10-CM

## 2022-09-30 DIAGNOSIS — C50.511 MALIGNANT NEOPLASM OF LOWER-OUTER QUADRANT OF RIGHT BREAST OF FEMALE, ESTROGEN RECEPTOR POSITIVE (HCC): ICD-10-CM

## 2022-09-30 PROCEDURE — G0439 PPPS, SUBSEQ VISIT: HCPCS | Performed by: INTERNAL MEDICINE

## 2022-09-30 PROCEDURE — 1123F ACP DISCUSS/DSCN MKR DOCD: CPT | Performed by: INTERNAL MEDICINE

## 2022-09-30 RX ORDER — POTASSIUM CHLORIDE 20 MEQ/1
20 TABLET, EXTENDED RELEASE ORAL 2 TIMES DAILY
Qty: 180 TABLET | Refills: 3 | Status: SHIPPED | OUTPATIENT
Start: 2022-09-30

## 2022-09-30 RX ORDER — PREGABALIN 225 MG/1
225 CAPSULE ORAL NIGHTLY
COMMUNITY

## 2022-09-30 RX ORDER — PREGABALIN 150 MG/1
CAPSULE ORAL
Qty: 180 CAPSULE | Refills: 1 | Status: SHIPPED | OUTPATIENT
Start: 2022-09-30 | End: 2022-10-03

## 2022-09-30 RX ORDER — POTASSIUM CHLORIDE 20 MEQ/1
20 TABLET, EXTENDED RELEASE ORAL 2 TIMES DAILY
Qty: 60 TABLET | Refills: 5 | Status: SHIPPED | OUTPATIENT
Start: 2022-09-30 | End: 2022-09-30 | Stop reason: SDUPTHER

## 2022-09-30 SDOH — HEALTH STABILITY: PHYSICAL HEALTH: ON AVERAGE, HOW MANY MINUTES DO YOU ENGAGE IN EXERCISE AT THIS LEVEL?: 0 MIN

## 2022-09-30 SDOH — HEALTH STABILITY: PHYSICAL HEALTH: ON AVERAGE, HOW MANY DAYS PER WEEK DO YOU ENGAGE IN MODERATE TO STRENUOUS EXERCISE (LIKE A BRISK WALK)?: 0 DAYS

## 2022-09-30 ASSESSMENT — LIFESTYLE VARIABLES
HOW OFTEN DO YOU HAVE A DRINK CONTAINING ALCOHOL: 1
HOW OFTEN DO YOU HAVE A DRINK CONTAINING ALCOHOL: NEVER
HOW OFTEN DO YOU HAVE SIX OR MORE DRINKS ON ONE OCCASION: 1
HOW MANY STANDARD DRINKS CONTAINING ALCOHOL DO YOU HAVE ON A TYPICAL DAY: PATIENT DOES NOT DRINK
HOW MANY STANDARD DRINKS CONTAINING ALCOHOL DO YOU HAVE ON A TYPICAL DAY: 0

## 2022-09-30 ASSESSMENT — PATIENT HEALTH QUESTIONNAIRE - PHQ9
SUM OF ALL RESPONSES TO PHQ QUESTIONS 1-9: 0
2. FEELING DOWN, DEPRESSED OR HOPELESS: 0
SUM OF ALL RESPONSES TO PHQ QUESTIONS 1-9: 0
SUM OF ALL RESPONSES TO PHQ9 QUESTIONS 1 & 2: 0
1. LITTLE INTEREST OR PLEASURE IN DOING THINGS: 0
SUM OF ALL RESPONSES TO PHQ QUESTIONS 1-9: 0
SUM OF ALL RESPONSES TO PHQ QUESTIONS 1-9: 0

## 2022-09-30 NOTE — TELEPHONE ENCOUNTER
[facility] Medication Refills:    Medication: Klor-Con 20    Dosage:  Take one tablet by mouth twice a day    Number: 60    Refills: 5    Last Office Visit: 08/29/2022    Next Office Visit: 02/10/2023    Last Labs: 03/25/2022 CMP, Lipid Panel, CBC

## 2022-09-30 NOTE — PROGRESS NOTES
Medicare Annual Wellness Visit    Rohan Calvo is here for Medicare AWV    Assessment & Plan   Medicare annual wellness visit, subsequent  Chronic atrial fibrillation ([facility] [address]   -Controlled, has pacemaker, sees cardiology, monitor  Neuropathy  -Okay to try topical CBD. Continue Lyrica at current dose, OARRS reviewed, no concerns  -     pregabalin (LYRICA) 150 MG capsule; TAKE ONE CAPSULE BY MOUTH EVERY MORNING AND TAKE ONE CAPSULE BY MOUTH EVERY EARLY AFTERNOON AS DIRECTED, Disp-180 capsule, R-1Normal  Primary hypertension  -Controlled, continue amlodipine 5 mg twice daily, losartan-HCTZ 100-25 mg daily  -     Comprehensive Metabolic Panel; Future  -     Lipid Panel; Future  -     CBC with Auto Differential; Future  Malignant neoplasm of lower-outer quadrant of right breast of female, estrogen receptor positive ([facility] [address]   -Yearly mammogram will be due again in April, she sees breast surgeon    Recommendations for Preventive Services Due: see orders and patient instructions/AVS.  Recommended screening schedule for the next 5-10 years is provided to the patient in written form: see Patient Instructions/AVS.     Return in 3 months (on 12/30/2022) for neuropathy, a fib. Subjective   The following acute and/or chronic problems were also addressed today:    Overall she has been doing well. Pain continues to be an issue, there are neuropathy pain will wake her up at about 2:00 in the morning. She started using topical CBD and wants to make sure that that is okay. Lyrica still does help. No issues with A. fib, that has all been stable. Patient's complete Health Risk Assessment and screening values have been reviewed and are found in Flowsheets. The following problems were reviewed today and where indicated follow up appointments were made and/or referrals ordered.     Positive Risk Factor Screenings with Interventions:    Fall Risk:  Do you feel unsteady or are you worried about falling? : (!) yes  2 or more falls in past year?: no  Fall with injury in past year?: no   Fall Risk Interventions:    Home safety tips provided            General Health and ACP:  General  In general, how would you say your health is?: Good  In the past 7 days, have you experienced any of the following: New or Increased Pain, New or Increased Fatigue, Loneliness, Social Isolation, Stress or Anger?: (!) Yes  Select all that apply: (!) New or Increased Pain  Do you get the social and emotional support that you need?: Yes  Do you have a Living Will?: Yes    Advance Directives       Power of  Living Will ACP-Advance Directive ACP-Power of     Not on File Filed on 12/06/18 Filed Not on File          General Health Risk Interventions:  Pain issues: We will keep Lyrica at same dose since it has maxed out now, she started using topical CBD and can continue to do so  No Living Will: ACP documents already completed- patient asked to provide copy to the office    Health Habits/Nutrition:  Physical Activity: Inactive    Days of Exercise per Week: 0 days    Minutes of Exercise per Session: 0 min     Have you lost any weight without trying in the past 3 months?: No  Body mass index: (!) 42.74  Have you seen the dentist within the past year?: (!) No  Health Habits/Nutrition Interventions:  Inadequate physical activity: Limited by pain, water exercise may be the only really reasonable option             Objective   Vitals:    09/30/22 1152   BP: 110/72   Pulse: 80   Temp: 98.1 °F (36.7 °C)   SpO2: 97%   Weight: 249 lb (112.9 kg)   Height: 5' 4\" (1.626 m)      Body mass index is 42.74 kg/m². Physical Exam  Vitals reviewed. Constitutional:       General: She is not in acute distress. Appearance: Normal appearance. She is well-developed. HENT:      Head: Normocephalic and atraumatic. Cardiovascular:      Rate and Rhythm: Normal rate and regular rhythm. Heart sounds: Normal heart sounds.    Pulmonary:      Effort: Pulmonary effort is normal. No respiratory distress. Breath sounds: Normal breath sounds. Musculoskeletal:      Right lower leg: No edema. Left lower leg: No edema. Skin:     General: Skin is warm and dry. Neurological:      Mental Status: She is alert. Psychiatric:         Mood and Affect: Mood normal.         Behavior: Behavior normal.         Thought Content: Thought content normal.         Judgment: Judgment normal.             Allergies   Allergen Reactions    Morphine      vomit    Tape Latricia Dick Tape] Itching    Unable To Assess      Any pain med makes her sick, vomit violently  Tolerates fentanly    Vicodin [Hydrocodone-Acetaminophen] Nausea And Vomiting     Prior to Visit Medications    Medication Sig Taking? Authorizing Provider   pregabalin (LYRICA) 225 MG capsule Take 225 mg by mouth at bedtime.  Yes Historical Provider, MD   potassium chloride (KLOR-CON M20) 20 MEQ extended release tablet Take 1 tablet by mouth 2 times daily Yes Beatrix Gosselin, MD   pregabalin (LYRICA) 150 MG capsule TAKE ONE CAPSULE BY MOUTH EVERY MORNING AND TAKE ONE CAPSULE BY MOUTH EVERY EARLY AFTERNOON AS DIRECTED Yes Beatrix Gosselin, MD   ELIQUIS 5 MG TABS tablet TAKE ONE TABLET BY MOUTH TWICE A DAY Yes Eleni Duckworth MD   sotalol (BETAPACE) 120 MG tablet TAKE ONE TABLET BY MOUTH TWICE A DAY Yes VIC Irizarry CNP   furosemide (LASIX) 20 MG tablet TAKE ONE TABLET BY MOUTH TWICE A DAY Yes Eleni Duckworth MD   atorvastatin (LIPITOR) 20 MG tablet TAKE ONE TABLET BY MOUTH DAILY Yes Eleni Duckworth MD   amLODIPine (NORVASC) 5 MG tablet Take 1 tablet by mouth 2 times daily (before meals) Yes Eleni Duckworth MD   losartan-hydroCHLOROthiazide (HYZAAR) 100-25 MG per tablet TAKE ONE TABLET BY MOUTH DAILY Yes VIC Irizarry CNP   Omega-3 Fatty Acids (FISH OIL) 1000 MG CAPS Take 1,000 mg by mouth daily Yes Historical Provider, MD   Vitamin D (CHOLECALCIFEROL) 25 MCG (1000 UT) TABS tablet Take 1,000 Units by mouth daily Yes Historical Provider, MD   magnesium oxide (MAG-OX) 400 MG tablet Take 400 mg by mouth daily Yes Historical Provider, MD   tamoxifen (NOLVADEX) 20 MG tablet Take 20 mg by mouth daily Yes Historical Provider, MD   calcium carbonate 600 MG TABS tablet Take 1 tablet by mouth daily Yes Historical Provider, MD   acetaminophen (TYLENOL) 500 MG tablet Take 1,000 mg by mouth every 6 hours as needed for Pain or Fever Yes Historical Provider, MD   Glucosamine-Chondroit-Vit C-Mn (GLUCOSAMINE 1500 COMPLEX PO) Take 1 tablet by mouth 2 times daily  Yes Historical Provider, MD   Docusate Calcium (STOOL SOFTENER PO) Take 1 capsule by mouth 2 times daily  Yes Historical Provider, MD   nitroGLYCERIN (NITROSTAT) 0.4 MG SL tablet Place 0.4 mg under the tongue every 5 minutes as needed for Chest pain Take 1 tablet for chest pain and repeat after 5 min if no relief, call 911 and take another dose 5 min later. Max of 3 doses in 15 min.  Yes Historical Provider, MD   aspirin 81 MG EC tablet Take 81 mg by mouth nightly  Yes Historical Provider, MD   Multiple Vitamins-Minerals (CENTRUM SILVER PO) Take by mouth daily  Yes Historical Provider, MD   metoprolol succinate (TOPROL XL) 25 MG extended release tablet   Historical Provider, MD [place] (Including outside providers/suppliers regularly involved in providing care):   Patient Care Team:  Kevin Junior MD as PCP - General (Internal Medicine)  Kevin Junior MD as P[facility]  Italo Gilmore MD as Consulting Physician (Cardiology)     Reviewed and updated this visit:  Tobacco  Allergies  Meds  Med Hx  Surg Hx  Soc Hx  Fam Hx

## 2022-09-30 NOTE — PATIENT INSTRUCTIONS
Personalized Preventive Plan for Delores Mock - 9/30/2022  Medicare offers a range of preventive health benefits. Some of the tests and screenings are paid in full while other may be subject to a deductible, co-insurance, and/or copay. Some of these benefits include a comprehensive review of your medical history including lifestyle, illnesses that may run in your family, and various assessments and screenings as appropriate. After reviewing your medical record and screening and assessments performed today your provider may have ordered immunizations, labs, imaging, and/or referrals for you. A list of these orders (if applicable) as well as your Preventive Care list are included within your After Visit Summary for your review. Other Preventive Recommendations:    A preventive eye exam performed by an eye specialist is recommended every 1-2 years to screen for glaucoma; cataracts, macular degeneration, and other eye disorders. A preventive dental visit is recommended every 6 months. Try to get at least 150 minutes of exercise per week or 10,000 steps per day on a pedometer . Order or download the FREE \"Exercise & Physical Activity: Your Everyday Guide\" from [employer]. Call 4-689.361.9298 or search [employer] online. You need 6309-9340 mg of calcium and 9411-4212 IU of vitamin D per day. It is possible to meet your calcium requirement with diet alone, but a vitamin D supplement is usually necessary to meet this goal.  When exposed to the sun, use a sunscreen that protects against both UVA and UVB radiation with an SPF of 30 or greater. Reapply every 2 to 3 hours or after sweating, drying off with a towel, or swimming. Always wear a seat belt when traveling in a car. Always wear a helmet when riding a bicycle or motorcycle.

## 2022-10-03 DIAGNOSIS — G62.9 NEUROPATHY: ICD-10-CM

## 2022-10-03 RX ORDER — PREGABALIN 150 MG/1
CAPSULE ORAL
Qty: 180 CAPSULE | Refills: 1 | Status: SHIPPED | OUTPATIENT
Start: 2022-10-03 | End: 2023-04-01

## 2022-10-08 DIAGNOSIS — I10 ESSENTIAL HYPERTENSION: ICD-10-CM

## 2022-10-10 RX ORDER — LOSARTAN POTASSIUM AND HYDROCHLOROTHIAZIDE 25; 100 MG/1; MG/1
TABLET ORAL
Qty: 90 TABLET | Refills: 3 | Status: SHIPPED | OUTPATIENT
Start: 2022-10-10

## 2022-10-10 NOTE — TELEPHONE ENCOUNTER
Requested Prescriptions     Pending Prescriptions Disp Refills    losartan-hydroCHLOROthiazide (HYZAAR) 100-25 MG per tablet [Pharmacy Med Name: LOSARTAN-HCTZ 100-25 MG TAB] 90 tablet 3     Sig: TAKE ONE TABLET BY MOUTH DAILY              Last Office Visit: [phone]    Next Office Visit: 02.10.2023

## 2022-11-11 ENCOUNTER — TELEPHONE (OUTPATIENT)
Dept: CARDIOLOGY CLINIC | Age: 77
End: 2022-11-11

## 2022-11-11 RX ORDER — AMLODIPINE BESYLATE 5 MG/1
5 TABLET ORAL
Qty: 180 TABLET | Refills: 3 | Status: SHIPPED | OUTPATIENT
Start: 2022-11-11

## 2022-11-11 RX ORDER — SOTALOL HYDROCHLORIDE 120 MG/1
TABLET ORAL
Qty: 60 TABLET | Refills: 5 | Status: SHIPPED | OUTPATIENT
Start: 2022-11-11

## 2022-11-11 NOTE — TELEPHONE ENCOUNTER
Patient needs refills on the following medications:    sotalol (BETAPACE) 120 MG tablet [[id number]]     Order Details  Dose, Route, Frequency: As Directed   Dispense Quantity: 60 tablet Refills: 5          Sig: TAKE ONE TABLET BY MOUTH TWICE A DAY     amLODIPine (NORVASC) 5 MG tablet [[id number]]     Order Details  Dose: 5 mg Route: Oral Frequency: 2 TIMES DAILY BEFORE MEALS   Dispense Quantity: 180 tablet Refills: 3          Sig: Take 1 tablet by mouth 2 times daily (before meals)     [facility] [id number] [place], [place] - [address] - P [phone] Lyndsay Ulloa [phone]   [address] [place], [place] [address] [address]   Phone:  [phone]  Fax:  [phone]    Lov 8/29/22  Nov 2/10/23

## 2022-11-22 DIAGNOSIS — G62.9 NEUROPATHY: Primary | ICD-10-CM

## 2022-11-22 RX ORDER — PREGABALIN 225 MG/1
CAPSULE ORAL
Qty: 90 CAPSULE | Refills: 1 | Status: SHIPPED | OUTPATIENT
Start: 2022-11-22 | End: 2023-05-21

## 2022-12-13 ENCOUNTER — NURSE ONLY (OUTPATIENT)
Dept: CARDIOLOGY CLINIC | Age: 77
End: 2022-12-13
Payer: MEDICARE

## 2022-12-13 DIAGNOSIS — I49.5 TACHY-BRADY SYNDROME (HCC): ICD-10-CM

## 2022-12-13 DIAGNOSIS — Z95.0 CARDIAC PACEMAKER IN SITU: Primary | ICD-10-CM

## 2022-12-13 PROCEDURE — 93296 REM INTERROG EVL PM/IDS: CPT | Performed by: INTERNAL MEDICINE

## 2022-12-13 PROCEDURE — 93294 REM INTERROG EVL PM/LDLS PM: CPT | Performed by: INTERNAL MEDICINE

## 2022-12-22 NOTE — PROGRESS NOTES
Remote transmission received from patient's dual chamber pacemaker monitor at home. Transmission shows normal sensing and pacing function. No arrhythmias/ or events. EP physician will review. See interrogation under cardiology tab in the [address] [place] [address] [address] field for more details. Will continue to monitor remotely.     (End of 91-day monitoring period 12/13/22)

## 2023-01-04 RX ORDER — ATORVASTATIN CALCIUM 20 MG/1
TABLET, FILM COATED ORAL
Qty: 90 TABLET | Refills: 3 | Status: SHIPPED | OUTPATIENT
Start: 2023-01-04

## 2023-01-04 NOTE — TELEPHONE ENCOUNTER
Requested Prescriptions     Pending Prescriptions Disp Refills    atorvastatin (LIPITOR) 20 MG tablet [Pharmacy Med Name: ATORVASTATIN 20 MG TABLET] 90 tablet 3     Sig: TAKE ONE TABLET BY MOUTH DAILY          Last Office Visit: 8/29/2022     Next Office Visit: 2/10/2023

## 2023-02-10 ENCOUNTER — NURSE ONLY (OUTPATIENT)
Dept: CARDIOLOGY CLINIC | Age: 78
End: 2023-02-10
Payer: MEDICARE

## 2023-02-10 ENCOUNTER — OFFICE VISIT (OUTPATIENT)
Dept: CARDIOLOGY CLINIC | Age: 78
End: 2023-02-10
Payer: MEDICARE

## 2023-02-10 VITALS
HEART RATE: 88 BPM | DIASTOLIC BLOOD PRESSURE: 64 MMHG | BODY MASS INDEX: 41.33 KG/M2 | WEIGHT: 240.8 LBS | SYSTOLIC BLOOD PRESSURE: 118 MMHG

## 2023-02-10 DIAGNOSIS — I20.9 ANGINA PECTORIS SYNDROME (HCC): ICD-10-CM

## 2023-02-10 DIAGNOSIS — E78.5 HYPERLIPIDEMIA, UNSPECIFIED HYPERLIPIDEMIA TYPE: ICD-10-CM

## 2023-02-10 DIAGNOSIS — I50.32 CHRONIC DIASTOLIC HEART FAILURE (HCC): ICD-10-CM

## 2023-02-10 DIAGNOSIS — Z95.0 CARDIAC PACEMAKER IN SITU: Primary | ICD-10-CM

## 2023-02-10 DIAGNOSIS — E66.01 OBESITY, CLASS III, BMI 40-49.9 (MORBID OBESITY) (HCC): ICD-10-CM

## 2023-02-10 DIAGNOSIS — I49.5 TACHY-BRADY SYNDROME (HCC): ICD-10-CM

## 2023-02-10 DIAGNOSIS — I48.0 PAROXYSMAL ATRIAL FIBRILLATION (HCC): ICD-10-CM

## 2023-02-10 DIAGNOSIS — I10 PRIMARY HYPERTENSION: Primary | ICD-10-CM

## 2023-02-10 DIAGNOSIS — I10 HTN (HYPERTENSION), BENIGN: ICD-10-CM

## 2023-02-10 DIAGNOSIS — Z86.79 S/P ABLATION OF ATRIAL FIBRILLATION: ICD-10-CM

## 2023-02-10 DIAGNOSIS — I48.20 CHRONIC ATRIAL FIBRILLATION (HCC): ICD-10-CM

## 2023-02-10 DIAGNOSIS — Z98.890 S/P ABLATION OF ATRIAL FIBRILLATION: ICD-10-CM

## 2023-02-10 DIAGNOSIS — I50.9 HEART FAILURE, UNSPECIFIED HF CHRONICITY, UNSPECIFIED HEART FAILURE TYPE (HCC): ICD-10-CM

## 2023-02-10 PROCEDURE — 93000 ELECTROCARDIOGRAM COMPLETE: CPT | Performed by: INTERNAL MEDICINE

## 2023-02-10 PROCEDURE — 1123F ACP DISCUSS/DSCN MKR DOCD: CPT | Performed by: INTERNAL MEDICINE

## 2023-02-10 PROCEDURE — 3074F SYST BP LT 130 MM HG: CPT | Performed by: INTERNAL MEDICINE

## 2023-02-10 PROCEDURE — 3078F DIAST BP <80 MM HG: CPT | Performed by: INTERNAL MEDICINE

## 2023-02-10 PROCEDURE — 99214 OFFICE O/P EST MOD 30 MIN: CPT | Performed by: INTERNAL MEDICINE

## 2023-02-10 PROCEDURE — 93280 PM DEVICE PROGR EVAL DUAL: CPT | Performed by: INTERNAL MEDICINE

## 2023-02-10 ASSESSMENT — ENCOUNTER SYMPTOMS
ALLERGIC/IMMUNOLOGIC NEGATIVE: 1
GASTROINTESTINAL NEGATIVE: 1
RESPIRATORY NEGATIVE: 1
EYES NEGATIVE: 1

## 2023-02-10 NOTE — PROGRESS NOTES
Add on device check per BISHOP to ensure device is functioning properly. All sensing and pacing parameters appear unchanged since last in office check on 03.29.2022. Last remote on 12.13.2022. 11.6 years estimated until LENORE/RRT. AP 99.1%.  0.5%. (MVP On)  Underlying predominantly dependent in atrium, AAI 35 bpm.   No new episodes noted. Per med list, patient remains on asa 81 mg, eliquis, furosemide, mag ox, sotalol, metoprolol. Time and add'l carelink alerts adjusted. Please see interrogation for more detail. Patient seen by BISHOP today. Will follow up as scheduled.

## 2023-02-10 NOTE — PROGRESS NOTES
Subjective:      Patient ID: Elizabeth Andre is a 68 y.o. female    CC:  Fatigue and slow HR on sotalol. HPI:  Pt has recently moved to [place], and is establishing a new visit with a Cardiologist.    Pt is s/p successful AF-RFA (19) . Procedure performed at [facility]. PMH includes AF, HTN, Heart Failure. Had 3 days of AF. Has more energy lately and did laundry which daughter says is a little unusual.  Had breast seed implantation and lumpectomy 3/22 and 3/23/21. Has pacer in place. Pt is lightheaded with standing. Long AV delay on ECG. Interrogate PPM today.   No chest pain    Allergies   Allergen Reactions    Morphine      vomit    Tape Birdena Dials Tape] Itching    Unable To Assess      Any pain med makes her sick, vomit violently  Tolerates fentanly    Vicodin [Hydrocodone-Acetaminophen] Nausea And Vomiting       Social History     Socioeconomic History    Marital status:      Spouse name: None    Number of children: None    Years of education: None    Highest education level: None   Occupational History    Occupation: retired   Tobacco Use    Smoking status: Former     Packs/day: 1.00     Years: 2.00     Pack years: 2.00     Types: Cigarettes     Quit date: 1995     Years since quittin.1    Smokeless tobacco: Never   Vaping Use    Vaping Use: Never used   Substance and Sexual Activity    Alcohol use: No    Drug use: No    Sexual activity: Not Currently     Social Determinants of Health     Financial Resource Strain: Low Risk     Difficulty of Paying Living Expenses: Not hard at all   Food Insecurity: No Food Insecurity    Worried About Running Out of Food in the Last Year: Never true    Ran Out of Food in the Last Year: Never true   Physical Activity: Inactive    Days of Exercise per Week: 0 days    Minutes of Exercise per Session: 0 min       Family History   Problem Relation Age of Onset    Cancer Mother         breast    High Blood Pressure Mother Diabetes Mother     Heart Disease Mother         pacemaker    Stroke Mother     High Blood Pressure Father     Cancer Sister         colon    Cancer Sister         breast    Stroke Brother     Heart Disease Brother     Cancer Sister         breast        has a past medical history of Arthritis, Atrial fibrillation ([address] Breast cancer ([address] CAD (coronary artery disease), GERD (gastroesophageal reflux disease), H/O: whooping cough, History of shingles, Hypercholesterolemia, Hypertension, Iron deficiency, PONV (postoperative nausea and vomiting), Sleep apnea, and Vertigo. Review of Systems   Constitutional: Negative. HENT: Negative. Eyes: Negative. Respiratory: Negative. Cardiovascular: Negative. Gastrointestinal: Negative. Endocrine: Negative. Genitourinary: Negative. Musculoskeletal: Negative. Skin: Negative. Allergic/Immunologic: Negative. Neurological: Negative. Hematological: Negative. Psychiatric/Behavioral: Negative. Vitals:    02/10/23 1439   BP: 118/64   Pulse: 88        Echo 11/15/19  Summary   Left ventricle size is normal.   Normal left ventricular wall thickness. Ejection fraction is visually estimated in the range of 55% to 60%. There were no regional wall motion abnormalities. There is a small circumferential pericardial effusion noted. Vitals:    02/10/23 1439   BP: 118/64   Pulse: 88         Objective:   Physical Exam  HENT:      Head: Normocephalic and atraumatic. Mouth/Throat:      Mouth: Mucous membranes are dry. Eyes:      Extraocular Movements: Extraocular movements intact. Pupils: Pupils are equal, round, and reactive to light. Cardiovascular:      Rate and Rhythm: Normal rate and regular rhythm. Pulses: Normal pulses. Heart sounds: Normal heart sounds. Pulmonary:      Effort: Pulmonary effort is normal.      Breath sounds: Normal breath sounds. Abdominal:      General: Abdomen is flat.  There is no distension. Palpations: Abdomen is soft. Hernia: A hernia is present. Musculoskeletal:      Cervical back: Normal range of motion and neck supple. Skin:     General: Skin is warm and dry. Capillary Refill: Capillary refill takes 2 to 3 seconds. Neurological:      General: No focal deficit present. Mental Status: She is oriented to person, place, and time. Psychiatric:         Mood and Affect: Mood normal.         Behavior: Behavior normal.         Thought Content:  Thought content normal.       Current Outpatient Medications   Medication Sig Dispense Refill    atorvastatin (LIPITOR) 20 MG tablet TAKE ONE TABLET BY MOUTH DAILY 90 tablet 3    pregabalin (LYRICA) 225 MG capsule TAKE ONE CAPSULE BY MOUTH ONCE NIGHTLY 90 capsule 1    sotalol (BETAPACE) 120 MG tablet TAKE ONE TABLET BY MOUTH TWICE A DAY 60 tablet 5    amLODIPine (NORVASC) 5 MG tablet Take 1 tablet by mouth 2 times daily (before meals) 180 tablet 3    losartan-hydroCHLOROthiazide (HYZAAR) 100-25 MG per tablet TAKE ONE TABLET BY MOUTH DAILY 90 tablet 3    pregabalin (LYRICA) 150 MG capsule TAKE ONE CAPSULE BY MOUTH EVERY MORNING AND TAKE ONE CAPSULE BY MOUTH IN THE EARLY AFTERNOON AS DIRECTED 180 capsule 1    potassium chloride (KLOR-CON M20) 20 MEQ extended release tablet Take 1 tablet by mouth 2 times daily 180 tablet 3    ELIQUIS 5 MG TABS tablet TAKE ONE TABLET BY MOUTH TWICE A  tablet 3    furosemide (LASIX) 20 MG tablet TAKE ONE TABLET BY MOUTH TWICE A  tablet 3    metoprolol succinate (TOPROL XL) 25 MG extended release tablet       Omega-3 Fatty Acids (FISH OIL) 1000 MG CAPS Take 1,000 mg by mouth daily      Vitamin D (CHOLECALCIFEROL) 25 MCG (1000 UT) TABS tablet Take 1,000 Units by mouth daily      magnesium oxide (MAG-OX) 400 MG tablet Take 400 mg by mouth daily      tamoxifen (NOLVADEX) 20 MG tablet Take 20 mg by mouth daily      calcium carbonate 600 MG TABS tablet Take 1 tablet by mouth daily acetaminophen (TYLENOL) 500 MG tablet Take 1,000 mg by mouth every 6 hours as needed for Pain or Fever      Glucosamine-Chondroit-Vit C-Mn (GLUCOSAMINE 1500 COMPLEX PO) Take 1 tablet by mouth 2 times daily       Docusate Calcium (STOOL SOFTENER PO) Take 1 capsule by mouth 2 times daily       nitroGLYCERIN (NITROSTAT) 0.4 MG SL tablet Place 0.4 mg under the tongue every 5 minutes as needed for Chest pain Take 1 tablet for chest pain and repeat after 5 min if no relief, call 911 and take another dose 5 min later. Max of 3 doses in 15 min. aspirin 81 MG EC tablet Take 81 mg by mouth nightly       Multiple Vitamins-Minerals (CENTRUM SILVER PO) Take by mouth daily        No current facility-administered medications for this visit. Assessment:       Diagnosis Orders   1. Primary hypertension  EKG 12 Lead      2. Paroxysmal atrial fibrillation (HCC)        3. Obesity, Class III, BMI 40-49.9 (morbid obesity) ([address]        4. Chronic diastolic heart failure ([address]        5. Angina pectoris syndrome ([address]        6. HTN (hypertension), benign        7. Hyperlipidemia, unspecified hyperlipidemia type                Plan:      1 AF:  A paced. On ecg. Interrogate PPM today. Takes sotalol with normal qTc.  2  SOB:  Will check echo and MPI. 3. RENE  Not wearing mask. 4 near syncope:  Not recently but is in atrial flutter. HTN; Increase amlodipine to 5 bid. 5.  bnp [id number] on 11/20/19. No lasix since hospitalization.     had breast surgery with no cardiac complications

## 2023-02-22 DIAGNOSIS — G62.9 NEUROPATHY: ICD-10-CM

## 2023-02-22 RX ORDER — PREGABALIN 225 MG/1
CAPSULE ORAL
Qty: 90 CAPSULE | Refills: 0 | Status: SHIPPED | OUTPATIENT
Start: 2023-02-22 | End: 2023-08-21

## 2023-03-03 NOTE — PROGRESS NOTES
[facility]   Electrophysiology  Office Visit  Date: 3/14/2023    Chief Complaint   Patient presents with    Atrial Fibrillation    Bradycardia    Tachycardia    Atrial Flutter    Coronary Artery Disease    Hypertension    Congestive Heart Failure     Cardiac HX: Natasha Duane is a 68 y.o. woman with a h/o HTN, HLD, RENE not on CPAP, CAD, s/p PCI of the RCA, pAF/AFL, s/p RFA of AFL ([place], [place]), s/p cryo RFA for AF (12/2018), s/p RFA/PVI of AF (11/1/19, [facility]), recurrent pAF post RFA, placed on sotalol w/ a DCCV to NSR (11/18/19), noted to have SB on sotalol and dose decreased, now on 20 mg BID, 1 week CAM (1/8/2021-1/15/2021) showed an average HR 51 (36-88), SB, first-degree AV block, 22 AT episodes with the longest lasting 21 beats in length, less than 1% PAC/PVC burden, no AF, s/p dual ch MDT PPM (10/7/2021, Dr. Steven Zaman), s/p sotalol loading. Interval History/HPI: Patient is here for follow-up for paroxysmal AF/AFL, SSS and PPM management. Patient with a longstanding history of AF/AFL. She underwent a cryoablation for atrial fibrillation in December 2018. She then had an RFA/PVI of AF in 2019 at [facility]. She developed recurrent AF post RFA. She was placed on sotalol and underwent a DCCV to NSR in 2019. She became bradycardic on the sotalol and the dose was decreased. She was ultimately on 20 mg twice a day. She wore an outpatient CAM that showed a heart rate range of 36 to 88 bpm.  She underwent a dual-chamber MDT PPM placement on 10/7/2021 and postplacement was reloaded with sotalol. She currently is on 120 mg of sotalol BID. Her QTc today is 487. She presents in AP, VS. No AF on device. She has not felt any breakthrough of her atrial fibrillation. She denies any heart racing, palpitations or irregular heartbeats. She does have a CPAP however states that she cannot wear it. She is not interested in going back to the sleep physician to try alternative methods.   Review of her device today shows 99% AP, less than 0.1% , no arrhythmias, other parameters stable. Her left chest healed well. Her main complaint today is that she gets lightheaded going from sitting to standing. She had increased her fluid intake from 1 bottle of water daily to up to 4 bottles of water daily. She does drink caffeinated diet drinks including [employer] [facility]. We did discuss caffeine and the relationship into dehydration. She also admits that she is not very active and sits a lot. We did discuss exercising her legs along with wearing support hose in order to help with that sensation of lightheadedness when she is standing. She is on furosemide and hydrochlorothiazide daily. Denies any chest pain, shortness of breath, PND, orthopnea or lower extremity edema.       Home medications:   Current Outpatient Medications on File Prior to Visit   Medication Sig Dispense Refill    pregabalin (LYRICA) 225 MG capsule TAKE ONE CAPSULE BY MOUTH ONCE NIGHTLY 90 capsule 0    atorvastatin (LIPITOR) 20 MG tablet TAKE ONE TABLET BY MOUTH DAILY 90 tablet 3    amLODIPine (NORVASC) 5 MG tablet Take 1 tablet by mouth 2 times daily (before meals) 180 tablet 3    losartan-hydroCHLOROthiazide (HYZAAR) 100-25 MG per tablet TAKE ONE TABLET BY MOUTH DAILY 90 tablet 3    pregabalin (LYRICA) 150 MG capsule TAKE ONE CAPSULE BY MOUTH EVERY MORNING AND TAKE ONE CAPSULE BY MOUTH IN THE EARLY AFTERNOON AS DIRECTED 180 capsule 1    potassium chloride (KLOR-CON M20) 20 MEQ extended release tablet Take 1 tablet by mouth 2 times daily 180 tablet 3    ELIQUIS 5 MG TABS tablet TAKE ONE TABLET BY MOUTH TWICE A  tablet 3    furosemide (LASIX) 20 MG tablet TAKE ONE TABLET BY MOUTH TWICE A  tablet 3    Omega-3 Fatty Acids (FISH OIL) 1000 MG CAPS Take 1,000 mg by mouth daily      Vitamin D (CHOLECALCIFEROL) 25 MCG (1000 UT) TABS tablet Take 1,000 Units by mouth daily      magnesium oxide (MAG-OX) 400 MG tablet Take 400 mg by mouth daily      tamoxifen (NOLVADEX) 20 MG tablet Take 20 mg by mouth daily      calcium carbonate 600 MG TABS tablet Take 1 tablet by mouth daily      acetaminophen (TYLENOL) 500 MG tablet Take 1,000 mg by mouth every 6 hours as needed for Pain or Fever      Glucosamine-Chondroit-Vit C-Mn (GLUCOSAMINE 1500 COMPLEX PO) Take 1 tablet by mouth 2 times daily       Docusate Calcium (STOOL SOFTENER PO) Take 1 capsule by mouth 2 times daily       nitroGLYCERIN (NITROSTAT) 0.4 MG SL tablet Place 0.4 mg under the tongue every 5 minutes as needed for Chest pain Take 1 tablet for chest pain and repeat after 5 min if no relief, call 911 and take another dose 5 min later. Max of 3 doses in 15 min. aspirin 81 MG EC tablet Take 81 mg by mouth nightly       Multiple Vitamins-Minerals (CENTRUM SILVER PO) Take by mouth daily        No current facility-administered medications on file prior to visit. Past Medical History:   Diagnosis Date    Arthritis     Atrial fibrillation ([facility] 08/2016    Breast cancer ([facility] 2012    Ángel Rousseau    CAD (coronary artery disease)     GERD (gastroesophageal reflux disease) 2001    dx 2001. but has never had an issue. requires no meds    H/O: whooping cough 1945    History of shingles     1 year after recieving vaccine     Hypercholesterolemia 2010    Hypertension 1987    Iron deficiency 08/2016    PONV (postoperative nausea and vomiting)     Sleep apnea 06/2018    Vertigo 1957    as a child        Past Surgical History:   Procedure Laterality Date    ABDOMINAL HERNIA REPAIR  2011    ATRIAL ABLATION SURGERY  11/01/2019    BACK SURGERY  2009    disk replacement     BREAST LUMPECTOMY Right 2012    cancer    BREAST SURGERY Left 2001    benign tumor left breast 2001    BREAST SURGERY Left 1967, 1992    cyst in left    CARDIAC CATHETERIZATION  09/08/2016    3 St[facility]    CARDIOVERSION  11/07/2019    COLONOSCOPY  2015    FOOT SURGERY Left 1990    heal spur    HEMORRHOID SURGERY  09/01/2016    3 removed. HERNIA REPAIR  1995    HYSTERECTOMY (CERVIX STATUS UNKNOWN)  1994    UPPER GASTROINTESTINAL ENDOSCOPY  2006    US BREAST BIOPSY NEEDLE ADDITIONAL RIGHT Right 2/11/2021    US BREAST BIOPSY NEEDLE ADDITIONAL RIGHT 2/11/2021 [facility] ULTRASOUND    US BREAST BIOPSY W LOC DEVICE 1ST LESION RIGHT Right 2/11/2021    US BREAST NEEDLE BIOPSY RIGHT 2/11/2021 [facility] ULTRASOUND    VENTRICULAR ABLATION SURGERY  11/01/2019       Allergies   Allergen Reactions    Morphine      vomit    Tape [Adhesive Tape] Itching    Unable To Assess      Any pain med makes her sick, vomit violently  Tolerates fentanly    Vicodin [Hydrocodone-Acetaminophen] Nausea And Vomiting       Social History:  Reviewed. reports that she quit smoking about 28 years ago. Her smoking use included cigarettes. She has a 2.00 pack-year smoking history. She has never used smokeless tobacco. She reports that she does not drink alcohol and does not use drugs. Family History:  Reviewed. family history includes Cancer in her mother, sister, sister, and sister; Diabetes in her mother; Heart Disease in her brother and mother; High Blood Pressure in her father and mother; Stroke in her brother and mother. Review of System:    Constitutional: No fevers, chills. Eyes: No visual changes or diplopia. No scleral icterus. ENT: No Headaches. No mouth sores or sore throat. Cardiovascular: No for chest pain, No for dyspnea on exertion, No for palpitations or No for loss of consciousness. No cough, hemoptysis, No for pleuritic pain, or phlebitis. Respiratory: No for cough or wheezing. No hematemesis. Gastrointestinal: No abdominal pain, blood in stools. Genitourinary: No dysuria, or hematuria. Musculoskeletal: No gait disturbance,    Integumentary: No rash or pruritis. Neurological: No headache, change in muscle strength, numbness or tingling. Psychiatric: No anxiety, or depression. Endocrine: No temperature intolerance.  No excessive thirst, fluid intake, or urination. Hem/Lymph: No abnormal bruising or bleeding, blood clots or swollen lymph nodes. Allergic/Immunologic: No nasal congestion or hives. Physical Examination:  There were no vitals filed for this visit. Wt Readings from Last 3 Encounters:   02/10/23 240 lb 12.8 oz (109.2 kg)   09/30/22 249 lb (112.9 kg)   08/29/22 263 lb (119.3 kg)       Constitutional: Oriented. No distress. Head: Normocephalic and atraumatic. Mouth/Throat: Oropharynx is clear and moist.   Eyes: Conjunctivae clear without jaunduice. PERRL. Neck: Neck supple. No rigidity. No JVD present. Cardiovascular: Normal rate, regular rhythm, S1&S2. Pulmonary/Chest: Bilateral respiratory sounds. No wheezes, No rhonchi. Abdominal: Soft. Bowel sounds present. No distension, No tenderness. Musculoskeletal: No tenderness. No edema    Lymphadenopathy: Has no cervical adenopathy. Neurological: Alert and oriented. Cranial nerve appears intact, No Gross deficit   Skin: Skin is warm and dry. No rash noted. Psychiatric: Has a normal mood, affect and behavior     Labs:  Reviewed. No results for input(s): NA, K, CL, CO2, PHOS, BUN, CREATININE, CA in the last 72 hours. Invalid input(s):  TSH  No results for input(s): WBC, HGB, HCT, MCV, PLT in the last 72 hours. No results found for: CKTOTAL, CKMB, CKMBINDEX, TROPONINI  No results found for: BNP  Lab Results   Component Value Date/Time    PROTIME 13.3 09/30/2021 11:19 AM    INR 1.17 09/30/2021 11:19 AM    INR 1.02 12/04/2018 06:25 AM    INR 1.51 09/05/2018 06:37 AM     Lab Results   Component Value Date/Time    CHOL 154 03/25/2022 11:32 AM    HDL 55 03/25/2022 11:32 AM    TRIG 193 03/25/2022 11:32 AM       ECG: Personally reviewed: AP, VS, HR 70, , , QTc 487    ECHO:  1/8/2021  Summary  Normal left ventricle size and systolic function with an estimated ejection fraction of 55-60%. Mild concentric left ventricular hypertrophy.  No regional wall motion abnormalities are seen. Grade II diastolic dysfunction with elevated LV filling pressures. Global strain: -21.7% The left atrium is dilated. Mild mitral regurgitation. Trivial aortic regurgitation. Trivial tricuspid regurgitation. The pulmonic valve is not well visualized. Estimated pulmonary artery systolic pressure is normal at 37 mmHg assuming a right atrial pressure of 3 mmHg. Stress Test: 2/12/2020  Summary    There is symmetric isotope uptake at stress and rest. There is no evidence    of myocardial ischemia. The LVEF is 74% with normal LV wall motion. This is a low risk cardiac scan. 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 Paroxysmal atrial fibrillation (HCC)    2. Paroxysmal atrial flutter ([address]    3. Encounter for monitoring sotalol therapy    4. On continuous oral anticoagulation    5. SSS (sick sinus syndrome) (HCC)    6. 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 Pacemaker    8. Coronary artery disease involving native coronary artery of native heart without angina pectoris    9. Benign essential HTN    10.  Chronic diastolic CHF (congestive heart failure) ([address]         Cardiac HX: Jared Izaguirre is a 68 y.o. woman with a h/o HTN, HLD, RENE not on CPAP, CAD, s/p PCI of the RCA, pAF/AFL, s/p RFA of AFL ([facility] [place]), s/p cryo RFA for AF (12/2018), s/p RFA/PVI of AF (11/1/19, [facility]), recurrent pAF post RFA, placed on sotalol w/ a DCCV to NSR (112/18/19), noted to have SB on sotalol and dose decreased, now on 20 mg BID, 1 week CAM (1/8/2021-1/15/2021) showed an average HR 51 (36-88), SB, first-degree AV block, 22 AT episodes with the longest lasting 21 beats in length, less than 1% PAC/PVC burden, no AF, sotalol d/c'd  (1/2021) d/t SB,  recurrent AFL (7/2021) w/ s/s of fatigue. [id number] 5. TSH 2.260 (6.7.2019). AF/AFL  - In NSR - (AP, VS)  - S/p RFA of AFL, s/p cryo RFA of AF (12/2018), s/p RFA/PVI of AF (11/2019)  - On Eliquis 5mg BID - no s/s bleeding - continue  - On sotalol 120 mg BID - has not missed any doses   - QTc 487 (LBBB)  - RENE - unable to wear CPAP, not interested in pursuing f/u    SSS/TBS  - S/p dual ch MDT PPM   - Device check today shows 99% AP, less than 0.1% , no arrhythmias, other parameters stable  - ECG ordered and results personally reviewed      CAD  - No symptoms  - On ASA/statin  - Follows with Dr. Luis Farrell     HTN  - BP well controlled in the office today  - On Hyzaar 100-25 mg daily  - On amlodipine 5 mg BID     Chronic dCHF  - Appears euvolemic  - EF 55-60%  - On lasix 20 mg BID  - On Potassium 20 meq QD  - Reviewed last labs - stable     EF of 71-74%  No systolic HF  ASA and Statin for CAD  Anticoagulation for AF   No tobacco use. All questions and concerns were addressed to the patient/family. Alternatives to my treatment were discussed. The note was completed using EMR. Every effort was made to ensure accuracy; however, inadvertent computerized transcription errors may be present. Patient received education regarding their diagnosis, treatment and medications while in the office today.       Melissa Ayala 81       I  have spent 41 minutes in care of the patient including direct face to face time, chart preparation, reviewing diagnostic testing, other provider notes and coordinating patient care.

## 2023-03-13 NOTE — PROGRESS NOTES
Patient comes in for a programming evaluation on their Medtronic dual chamber pacemaker. Last remote on 12.13.2022. All sensing and pacing parameters appear unchanged since last in office check on 02.10.23 (BISHOP OV). 10.9 years estimated until LENORE/RRT. Underlying AsVs, sinus valerie 40s. AP 99.9%.  <0.1%. (MVP On)  Patient remains on asa 81 mg, eliquis, furosemide, mag ox, sotalol, metoprolol. Time adjusted. Please see interrogation for more detail. Patient will see NPFW today. We will continue to monitor remotely.

## 2023-03-14 ENCOUNTER — NURSE ONLY (OUTPATIENT)
Dept: CARDIOLOGY CLINIC | Age: 78
End: 2023-03-14
Payer: MEDICARE

## 2023-03-14 ENCOUNTER — OFFICE VISIT (OUTPATIENT)
Dept: CARDIOLOGY CLINIC | Age: 78
End: 2023-03-14
Payer: MEDICARE

## 2023-03-14 DIAGNOSIS — Z86.79 S/P ABLATION OF ATRIAL FIBRILLATION: ICD-10-CM

## 2023-03-14 DIAGNOSIS — Z98.890 S/P ABLATION OF ATRIAL FIBRILLATION: ICD-10-CM

## 2023-03-14 DIAGNOSIS — I49.5 TACHY-BRADY SYNDROME (HCC): ICD-10-CM

## 2023-03-14 DIAGNOSIS — I48.0 PAROXYSMAL ATRIAL FIBRILLATION (HCC): ICD-10-CM

## 2023-03-14 DIAGNOSIS — I48.0 PAROXYSMAL ATRIAL FIBRILLATION (HCC): Primary | ICD-10-CM

## 2023-03-14 DIAGNOSIS — I48.20 CHRONIC ATRIAL FIBRILLATION (HCC): ICD-10-CM

## 2023-03-14 DIAGNOSIS — Z95.0 CARDIAC PACEMAKER IN SITU: Primary | ICD-10-CM

## 2023-03-14 DIAGNOSIS — Z51.81 ENCOUNTER FOR MONITORING SOTALOL THERAPY: ICD-10-CM

## 2023-03-14 DIAGNOSIS — I25.10 CORONARY ARTERY DISEASE INVOLVING NATIVE CORONARY ARTERY OF NATIVE HEART WITHOUT ANGINA PECTORIS: ICD-10-CM

## 2023-03-14 DIAGNOSIS — I50.32 CHRONIC DIASTOLIC CHF (CONGESTIVE HEART FAILURE) (HCC): ICD-10-CM

## 2023-03-14 DIAGNOSIS — I10 BENIGN ESSENTIAL HTN: ICD-10-CM

## 2023-03-14 DIAGNOSIS — Z95.0 PACEMAKER: ICD-10-CM

## 2023-03-14 DIAGNOSIS — Z79.01 ON CONTINUOUS ORAL ANTICOAGULATION: ICD-10-CM

## 2023-03-14 DIAGNOSIS — I49.5 SSS (SICK SINUS SYNDROME) (HCC): ICD-10-CM

## 2023-03-14 DIAGNOSIS — Z79.899 ENCOUNTER FOR MONITORING SOTALOL THERAPY: ICD-10-CM

## 2023-03-14 DIAGNOSIS — I48.92 PAROXYSMAL ATRIAL FLUTTER (HCC): ICD-10-CM

## 2023-03-14 PROCEDURE — 93280 PM DEVICE PROGR EVAL DUAL: CPT | Performed by: INTERNAL MEDICINE

## 2023-03-14 PROCEDURE — 99215 OFFICE O/P EST HI 40 MIN: CPT | Performed by: NURSE PRACTITIONER

## 2023-03-14 PROCEDURE — 1123F ACP DISCUSS/DSCN MKR DOCD: CPT | Performed by: NURSE PRACTITIONER

## 2023-03-14 RX ORDER — SOTALOL HYDROCHLORIDE 120 MG/1
TABLET ORAL
Qty: 180 TABLET | Refills: 3 | Status: SHIPPED | OUTPATIENT
Start: 2023-03-14

## 2023-03-14 RX ORDER — METOPROLOL SUCCINATE 25 MG/1
25 TABLET, EXTENDED RELEASE ORAL DAILY
Qty: 90 TABLET | Refills: 3 | Status: SHIPPED | OUTPATIENT
Start: 2023-03-14

## 2023-04-18 ENCOUNTER — NURSE ONLY (OUTPATIENT)
Dept: CARDIOLOGY CLINIC | Age: 78
End: 2023-04-18

## 2023-04-18 DIAGNOSIS — I49.5 TACHY-BRADY SYNDROME (HCC): ICD-10-CM

## 2023-04-18 DIAGNOSIS — Z95.0 CARDIAC PACEMAKER IN SITU: Primary | ICD-10-CM

## 2023-05-01 RX ORDER — FUROSEMIDE 20 MG/1
TABLET ORAL
Qty: 180 TABLET | Refills: 3 | Status: SHIPPED | OUTPATIENT
Start: 2023-05-01

## 2023-05-01 NOTE — TELEPHONE ENCOUNTER
Requested Prescriptions     Pending Prescriptions Disp Refills    furosemide (LASIX) 20 MG tablet [Pharmacy Med Name: FUROSEMIDE 20 MG TABLET] 180 tablet 3     Sig: TAKE ONE TABLET BY MOUTH TWICE A DAY            Last Office Visit: 3/14/2023     Next Office Visit: 6/9/2023         Last Labs: [phone]

## 2023-05-04 ENCOUNTER — TELEPHONE (OUTPATIENT)
Dept: INTERNAL MEDICINE CLINIC | Age: 78
End: 2023-05-04

## 2023-05-04 DIAGNOSIS — G62.9 NEUROPATHY: ICD-10-CM

## 2023-05-04 RX ORDER — PREGABALIN 150 MG/1
CAPSULE ORAL
Qty: 180 CAPSULE | Refills: 1 | Status: SHIPPED | OUTPATIENT
Start: 2023-05-04 | End: 2023-10-31

## 2023-05-04 RX ORDER — PREGABALIN 225 MG/1
225 CAPSULE ORAL NIGHTLY
Qty: 90 CAPSULE | Refills: 1 | Status: CANCELLED | OUTPATIENT
Start: 2023-05-04 | End: 2023-10-31

## 2023-05-04 NOTE — TELEPHONE ENCOUNTER
Rx Request  -- Pt would like a larger qty if possible    pregabalin (LYRICA) 150 MG capsule     Nicolas Lockhart [id number] [address], [address] [facility] [phone]   [facility] [place] [facility]Ret[facility] [id number]   Phone:  [phone]  Fax:  [phone]

## 2023-05-31 ENCOUNTER — TELEPHONE (OUTPATIENT)
Dept: INTERNAL MEDICINE CLINIC | Age: 78
End: 2023-05-31

## 2023-05-31 RX ORDER — TIZANIDINE 2 MG/1
2 TABLET ORAL 3 TIMES DAILY PRN
Qty: 30 TABLET | Refills: 0 | Status: SHIPPED | OUTPATIENT
Start: 2023-05-31

## 2023-05-31 NOTE — TELEPHONE ENCOUNTER
She would like to try to see if it helps.              Lynda Flowers [id number] - [facility], [address] [place], [place] [address]   Phone:  [phone]  Fax:  [phone]

## 2023-05-31 NOTE — TELEPHONE ENCOUNTER
Having neck pain, does not see ortho until next Thursday. Daughter is wanting to know if you can give her something for pain until she can get in to see them? She has tried tylenol and it is not helping.

## 2023-05-31 NOTE — TELEPHONE ENCOUNTER
Can try a muscle relaxant like tizanidine - can cause some sleepiness but if she wants to try I can send it

## 2023-06-09 ENCOUNTER — OFFICE VISIT (OUTPATIENT)
Dept: CARDIOLOGY CLINIC | Age: 78
End: 2023-06-09
Payer: MEDICARE

## 2023-06-09 VITALS
HEART RATE: 82 BPM | WEIGHT: 239.8 LBS | BODY MASS INDEX: 41.16 KG/M2 | DIASTOLIC BLOOD PRESSURE: 72 MMHG | SYSTOLIC BLOOD PRESSURE: 128 MMHG

## 2023-06-09 DIAGNOSIS — I10 HTN (HYPERTENSION), BENIGN: ICD-10-CM

## 2023-06-09 DIAGNOSIS — I48.0 PAROXYSMAL ATRIAL FIBRILLATION (HCC): ICD-10-CM

## 2023-06-09 DIAGNOSIS — E78.5 HYPERLIPIDEMIA, UNSPECIFIED HYPERLIPIDEMIA TYPE: Primary | ICD-10-CM

## 2023-06-09 PROCEDURE — 3074F SYST BP LT 130 MM HG: CPT | Performed by: INTERNAL MEDICINE

## 2023-06-09 PROCEDURE — 1123F ACP DISCUSS/DSCN MKR DOCD: CPT | Performed by: INTERNAL MEDICINE

## 2023-06-09 PROCEDURE — 99214 OFFICE O/P EST MOD 30 MIN: CPT | Performed by: INTERNAL MEDICINE

## 2023-06-09 PROCEDURE — 3078F DIAST BP <80 MM HG: CPT | Performed by: INTERNAL MEDICINE

## 2023-06-09 RX ORDER — METHYLPREDNISOLONE 4 MG/1
TABLET ORAL
COMMUNITY
Start: 2023-06-06

## 2023-06-09 NOTE — PROGRESS NOTES
Subjective:      Patient ID: Molly Shukla is a 66 y.o. female    CC:  Fatigue and slow HR on sotalol. HPI:  Pt has recently moved to [place], and is establishing a new visit with a Cardiologist.    Pt is s/p successful AF-RFA (19) . Procedure performed at [facility]. PMH includes AF, HTN, Heart Failure. Had 3 days of AF. Has more energy lately and did laundry which daughter says is a little unusual.  Had breast seed implantation and lumpectomy 3/22 and 3/23/21. Has pacer in place. Pt is lightheaded with standing. Long AV delay on ECG. Interrogate PPM today.   No chest pain    Allergies   Allergen Reactions    Morphine      vomit    Tape Coral Johanna Tape] Itching    Unable To Assess      Any pain med makes her sick, vomit violently  Tolerates fentanly    Vicodin [Hydrocodone-Acetaminophen] Nausea And Vomiting       Social History     Socioeconomic History    Marital status:      Spouse name: None    Number of children: None    Years of education: None    Highest education level: None   Occupational History    Occupation: retired   Tobacco Use    Smoking status: Former     Packs/day: 1.00     Years: 2.00     Pack years: 2.00     Types: Cigarettes     Quit date: 1995     Years since quittin.4    Smokeless tobacco: Never   Vaping Use    Vaping Use: Never used   Substance and Sexual Activity    Alcohol use: No    Drug use: No    Sexual activity: Not Currently     Social Determinants of Health     Physical Activity: Inactive    Days of Exercise per Week: 0 days    Minutes of Exercise per Session: 0 min       Family History   Problem Relation Age of Onset    Cancer Mother         breast    High Blood Pressure Mother     Diabetes Mother     Heart Disease Mother         pacemaker    Stroke Mother     High Blood Pressure Father     Cancer Sister         colon    Cancer Sister         breast    Stroke Brother     Heart Disease Brother     Cancer Sister         breast

## 2023-07-18 ENCOUNTER — TELEPHONE (OUTPATIENT)
Dept: CARDIOLOGY CLINIC | Age: 78
End: 2023-07-18

## 2023-07-21 ENCOUNTER — TELEPHONE (OUTPATIENT)
Dept: CARDIOLOGY CLINIC | Age: 78
End: 2023-07-21

## 2023-07-21 NOTE — TELEPHONE ENCOUNTER
Patient's daughter Rena Fink called and inquired if a letter was completed for a cardiac clearance. She was informed there wasn't one. Rena Fink wants a call back when the letter is completed. She stated she needs the letter by Tuesday. Call back [phone]. CARDIAC CLEARANCE     What type of procedure are you having? Myelogram    Which physician is performing your procedure? Dr. Tedd Cowden    When is your procedure scheduled for? 7/28/23    Where are you having this procedure? [place]    Are you taking Blood Thinners? yes   ELIQUIS 5 MG TABS tablet   aspirin 81 MG EC tablet   If so what? (Name/dose/frequesncy)    Does the surgeon want you to stop your blood thinner? Yes  If so for how long?  3-5 days    Phone Number and Contact Name for Physicians office: Dr. Tedd Cowden    Fax number to send information: put in [facility]

## 2023-07-28 ENCOUNTER — HOSPITAL ENCOUNTER (OUTPATIENT)
Dept: CT IMAGING | Age: 78
Discharge: HOME | End: 2023-07-28
Payer: MEDICARE

## 2023-07-28 ENCOUNTER — HOSPITAL ENCOUNTER (OUTPATIENT)
Dept: GENERAL RADIOLOGY | Age: 78
Discharge: HOME | End: 2023-07-28
Payer: MEDICARE

## 2023-07-28 VITALS
TEMPERATURE: 96.9 F | HEART RATE: 62 BPM | DIASTOLIC BLOOD PRESSURE: 85 MMHG | OXYGEN SATURATION: 98 % | SYSTOLIC BLOOD PRESSURE: 140 MMHG | RESPIRATION RATE: 16 BRPM

## 2023-07-28 DIAGNOSIS — G95.29 OTHER CORD COMPRESSION (HCC): ICD-10-CM

## 2023-07-28 PROCEDURE — 72129 CT CHEST SPINE W/DYE: CPT

## 2023-07-28 PROCEDURE — 72126 CT NECK SPINE W/DYE: CPT

## 2023-07-28 PROCEDURE — 62303 MYELOGRAPHY LUMBAR INJECTION: CPT

## 2023-07-28 PROCEDURE — 62302 MYELOGRAPHY LUMBAR INJECTION: CPT

## 2023-07-28 PROCEDURE — 6360000004 HC RX CONTRAST MEDICATION: Performed by: NEUROLOGICAL SURGERY

## 2023-07-28 PROCEDURE — 2500000003 HC RX 250 WO HCPCS: Performed by: NEUROLOGICAL SURGERY

## 2023-07-28 RX ADMIN — IOHEXOL 15 ML: 300 INJECTION, SOLUTION INTRAVENOUS at 09:17

## 2023-07-28 RX ADMIN — LIDOCAINE HYDROCHLORIDE 5 ML: 10 INJECTION, SOLUTION EPIDURAL; INFILTRATION; INTRACAUDAL at 09:18

## 2023-07-28 ASSESSMENT — PAIN SCALES - GENERAL
PAINLEVEL_OUTOF10: 0
PAINLEVEL_OUTOF10: 0

## 2023-07-28 NOTE — BRIEF OP NOTE
The procedure including risks, benefits, and alternatives was discussed at length with the patient and all questions were answered. Informed consent to proceed with the procedure was given.        PROCEDURE, FINDINGS, AND POSTOPERATIVE DIAGNOSIS:      Lumber puncture for CT myelogram     : Jonathan Calvillo   BLOOD LOSS : Minimal  SPECIMENS : none  COMPLICATIONS : None      Jonathan Calvillo

## 2023-07-28 NOTE — FLOWSHEET NOTE
Pt rotated quarter turn every 10 minutes without difficulty. Pt tolerated well. Discharge instructions given to pt and daughter by Perla White RN. Transported to have myelogram via stretcher by transport.

## 2023-07-28 NOTE — PROGRESS NOTES
Arrived to [facility] without complaints. Aware of need to rotate every 10 min, site clean and dry with bandaid in place. Pt denies pain, states only mild discomfort at injection site.

## 2023-08-27 DIAGNOSIS — G62.9 NEUROPATHY: ICD-10-CM

## 2023-08-29 RX ORDER — PREGABALIN 225 MG/1
CAPSULE ORAL
Qty: 90 CAPSULE | Refills: 0 | Status: SHIPPED | OUTPATIENT
Start: 2023-08-29 | End: 2023-11-27

## 2023-08-31 PROCEDURE — 93294 REM INTERROG EVL PM/LDLS PM: CPT | Performed by: INTERNAL MEDICINE

## 2023-08-31 PROCEDURE — 93296 REM INTERROG EVL PM/IDS: CPT | Performed by: INTERNAL MEDICINE

## 2023-09-27 DIAGNOSIS — I10 ESSENTIAL HYPERTENSION: ICD-10-CM

## 2023-09-27 NOTE — TELEPHONE ENCOUNTER
----- Message from Nydia Dixon sent at 9/27/2023 11:37 AM EDT -----  Subject: Refill Request    QUESTIONS  Name of Medication? losartan-hydroCHLOROthiazide (HYZAAR) 100-25 MG per   tablet  Patient-reported dosage and instructions? 100-25MG taken 1 tablet daily  How many days do you have left? Unknown  Preferred Pharmacy? [facility] [id number]  Pharmacy phone number (if available)? [phone]  Additional Information for Provider? Pt gave daughter, Blane Prieto, verbal   HIPAA consent to request these refills. Caller thinks Pt has about 10 or   11 weeks left of this Rx, but will need this refill ordered before next   apt with PCP on 12/15 at 2pm. Caller does not want Pt to run out.  ---------------------------------------------------------------------------  --------------,  Name of Medication? amLODIPine (NORVASC) 5 MG tablet  Patient-reported dosage and instructions? 5MG b.i.d. How many days do you have left? Unknown  Preferred Pharmacy? [facility] [id number]  Pharmacy phone number (if available)? [phone]  Additional Information for Provider? Caller thinks Pt has about 10 or 11   weeks left of this Rx, but will need this refill ordered before next apt   with PCP on 12/15 at 2pm. Caller does not want Pt to run out.  ---------------------------------------------------------------------------  --------------  CALL BACK INFO  What is the best way for the office to contact you? OK to respond with   electronic message via [employer] (only for patients who have   registered [employer] account)  Preferred Call Back Phone Number? [phone]  ---------------------------------------------------------------------------  --------------  SCRIPT ANSWERS  Relationship to Patient?  Self

## 2023-09-28 RX ORDER — AMLODIPINE BESYLATE 5 MG/1
5 TABLET ORAL
Qty: 180 TABLET | Refills: 0 | Status: SHIPPED | OUTPATIENT
Start: 2023-09-28

## 2023-09-28 RX ORDER — LOSARTAN POTASSIUM AND HYDROCHLOROTHIAZIDE 25; 100 MG/1; MG/1
1 TABLET ORAL DAILY
Qty: 90 TABLET | Refills: 0 | Status: SHIPPED | OUTPATIENT
Start: 2023-09-28

## 2023-10-20 DIAGNOSIS — G62.9 NEUROPATHY: ICD-10-CM

## 2023-10-20 RX ORDER — PREGABALIN 150 MG/1
CAPSULE ORAL
Qty: 180 CAPSULE | OUTPATIENT
Start: 2023-10-20

## 2023-10-24 DIAGNOSIS — G62.9 NEUROPATHY: ICD-10-CM

## 2023-10-24 RX ORDER — POTASSIUM CHLORIDE 20 MEQ/1
20 TABLET, EXTENDED RELEASE ORAL 2 TIMES DAILY
Qty: 180 TABLET | Refills: 0 | Status: SHIPPED | OUTPATIENT
Start: 2023-10-24

## 2023-10-24 RX ORDER — PREGABALIN 150 MG/1
CAPSULE ORAL
Qty: 180 CAPSULE | Refills: 0 | Status: SHIPPED | OUTPATIENT
Start: 2023-10-24 | End: 2024-04-21

## 2023-10-24 RX ORDER — PREGABALIN 225 MG/1
225 CAPSULE ORAL NIGHTLY
Qty: 90 CAPSULE | Refills: 0 | Status: SHIPPED | OUTPATIENT
Start: 2023-10-24 | End: 2024-01-22

## 2023-10-24 NOTE — TELEPHONE ENCOUNTER
Tristan Souzadelma is requesting a refill on medications listed below for the patient and states she has a appointment on 12/15/23 with Dr. Sarthak Danielle and they leave for vacation on November 1, 2023.     Please call and advise [phone]      pregabalin (LYRICA) 225 MG capsule [[id number]]    pregabalin (LYRICA) 150 MG capsule [[id number]]    potassium chloride (KLOR-CON M20) 20 MEQ extended release tablet [[id number]]    Lucas Luna [id number] [facility] [address] [place] [address]

## 2023-10-25 NOTE — PROGRESS NOTES
Diastolic dysfunction of the left ventricle. The MCOT, echocardiogram, stress test, and coronary angiography/PCI were reviewed by myself and used for my plan of care. Thank you for allowing me to participate in the care of Marcie Malagon. All questions and concerns were addressed to the patient/family. Alternatives to my treatment were discussed. Kate Garrido RN, am scribing for and in the presence of Dr. Leydi Rogers. 10/26/23 3:15 PM  Rosario Cheatham RN    I, Mandy Lu MD, personally performed the services prescribed in this documentation as scribed in my presence and it is both accurate and complete.          Mandy Lu MD  Cardiac Electrophysiology  [address] [place] [address]

## 2023-10-26 ENCOUNTER — NURSE ONLY (OUTPATIENT)
Dept: CARDIOLOGY CLINIC | Age: 78
End: 2023-10-26
Payer: COMMERCIAL

## 2023-10-26 ENCOUNTER — OFFICE VISIT (OUTPATIENT)
Dept: CARDIOLOGY CLINIC | Age: 78
End: 2023-10-26

## 2023-10-26 VITALS
SYSTOLIC BLOOD PRESSURE: 116 MMHG | DIASTOLIC BLOOD PRESSURE: 74 MMHG | BODY MASS INDEX: 43.6 KG/M2 | WEIGHT: 254 LBS | HEART RATE: 80 BPM

## 2023-10-26 DIAGNOSIS — I10 PRIMARY HYPERTENSION: Primary | ICD-10-CM

## 2023-10-26 DIAGNOSIS — I48.20 CHRONIC ATRIAL FIBRILLATION (HCC): ICD-10-CM

## 2023-10-26 DIAGNOSIS — Z95.0 CARDIAC PACEMAKER IN SITU: Primary | ICD-10-CM

## 2023-10-26 DIAGNOSIS — Z95.0 CARDIAC PACEMAKER IN SITU: ICD-10-CM

## 2023-10-26 DIAGNOSIS — I49.5 TACHY-BRADY SYNDROME (HCC): ICD-10-CM

## 2023-10-26 DIAGNOSIS — I48.0 PAROXYSMAL ATRIAL FIBRILLATION (HCC): ICD-10-CM

## 2023-10-26 PROCEDURE — 93280 PM DEVICE PROGR EVAL DUAL: CPT | Performed by: INTERNAL MEDICINE

## 2023-10-28 DIAGNOSIS — G62.9 NEUROPATHY: ICD-10-CM

## 2023-10-30 RX ORDER — PREGABALIN 150 MG/1
CAPSULE ORAL
Qty: 180 CAPSULE | OUTPATIENT
Start: 2023-10-30

## 2023-11-08 RX ORDER — AMLODIPINE BESYLATE 5 MG/1
TABLET ORAL
Qty: 180 TABLET | Refills: 0 | Status: SHIPPED | OUTPATIENT
Start: 2023-11-08

## 2023-11-08 RX ORDER — POTASSIUM CHLORIDE 1500 MG/1
20 TABLET, EXTENDED RELEASE ORAL 2 TIMES DAILY
Qty: 180 TABLET | Refills: 0 | OUTPATIENT
Start: 2023-11-08

## 2023-11-08 NOTE — TELEPHONE ENCOUNTER
Requested Prescriptions     Pending Prescriptions Disp Refills    amLODIPine (NORVASC) 5 MG tablet [Pharmacy Med Name: amLODIPine BESYLATE 5 MG TAB] 180 tablet 0     Sig: TAKE ONE TABLET BY MOUTH TWICE A DAY BEFORE MEALS          Last Office Visit: 10/26/2023     Next Office Visit: 12/22/2023

## 2023-11-30 PROCEDURE — 93294 REM INTERROG EVL PM/LDLS PM: CPT | Performed by: INTERNAL MEDICINE

## 2023-11-30 PROCEDURE — 93296 REM INTERROG EVL PM/IDS: CPT | Performed by: INTERNAL MEDICINE

## 2023-12-15 ENCOUNTER — OFFICE VISIT (OUTPATIENT)
Dept: INTERNAL MEDICINE CLINIC | Age: 78
End: 2023-12-15
Payer: COMMERCIAL

## 2023-12-15 VITALS
BODY MASS INDEX: 43.54 KG/M2 | SYSTOLIC BLOOD PRESSURE: 128 MMHG | WEIGHT: 255 LBS | HEIGHT: 64 IN | DIASTOLIC BLOOD PRESSURE: 68 MMHG

## 2023-12-15 DIAGNOSIS — Z17.0 MALIGNANT NEOPLASM OF LOWER-OUTER QUADRANT OF RIGHT BREAST OF FEMALE, ESTROGEN RECEPTOR POSITIVE (HCC): ICD-10-CM

## 2023-12-15 DIAGNOSIS — G62.9 NEUROPATHY: ICD-10-CM

## 2023-12-15 DIAGNOSIS — Z00.00 MEDICARE ANNUAL WELLNESS VISIT, SUBSEQUENT: Primary | ICD-10-CM

## 2023-12-15 DIAGNOSIS — C50.511 MALIGNANT NEOPLASM OF LOWER-OUTER QUADRANT OF RIGHT BREAST OF FEMALE, ESTROGEN RECEPTOR POSITIVE (HCC): ICD-10-CM

## 2023-12-15 DIAGNOSIS — I10 ESSENTIAL HYPERTENSION: ICD-10-CM

## 2023-12-15 DIAGNOSIS — I10 PRIMARY HYPERTENSION: ICD-10-CM

## 2023-12-15 DIAGNOSIS — I48.20 CHRONIC ATRIAL FIBRILLATION (HCC): ICD-10-CM

## 2023-12-15 PROBLEM — I48.91 ATRIAL FIBRILLATION (HCC): Status: RESOLVED | Noted: 2021-10-05 | Resolved: 2023-12-15

## 2023-12-15 PROCEDURE — 3078F DIAST BP <80 MM HG: CPT | Performed by: INTERNAL MEDICINE

## 2023-12-15 PROCEDURE — 3074F SYST BP LT 130 MM HG: CPT | Performed by: INTERNAL MEDICINE

## 2023-12-15 PROCEDURE — 1123F ACP DISCUSS/DSCN MKR DOCD: CPT | Performed by: INTERNAL MEDICINE

## 2023-12-15 PROCEDURE — G0439 PPPS, SUBSEQ VISIT: HCPCS | Performed by: INTERNAL MEDICINE

## 2023-12-15 RX ORDER — LOSARTAN POTASSIUM AND HYDROCHLOROTHIAZIDE 25; 100 MG/1; MG/1
1 TABLET ORAL DAILY
Qty: 90 TABLET | Refills: 3 | Status: SHIPPED | OUTPATIENT
Start: 2023-12-15

## 2023-12-15 SDOH — ECONOMIC STABILITY: HOUSING INSECURITY
IN THE LAST 12 MONTHS, WAS THERE A TIME WHEN YOU DID NOT HAVE A STEADY PLACE TO SLEEP OR SLEPT IN A SHELTER (INCLUDING NOW)?: NO

## 2023-12-15 SDOH — ECONOMIC STABILITY: FOOD INSECURITY: WITHIN THE PAST 12 MONTHS, THE FOOD YOU BOUGHT JUST DIDN'T LAST AND YOU DIDN'T HAVE MONEY TO GET MORE.: NEVER TRUE

## 2023-12-15 SDOH — ECONOMIC STABILITY: INCOME INSECURITY: HOW HARD IS IT FOR YOU TO PAY FOR THE VERY BASICS LIKE FOOD, HOUSING, MEDICAL CARE, AND HEATING?: NOT HARD AT ALL

## 2023-12-15 SDOH — ECONOMIC STABILITY: FOOD INSECURITY: WITHIN THE PAST 12 MONTHS, YOU WORRIED THAT YOUR FOOD WOULD RUN OUT BEFORE YOU GOT MONEY TO BUY MORE.: NEVER TRUE

## 2023-12-15 SDOH — ECONOMIC STABILITY: TRANSPORTATION INSECURITY
IN THE PAST 12 MONTHS, HAS LACK OF TRANSPORTATION KEPT YOU FROM MEETINGS, WORK, OR FROM GETTING THINGS NEEDED FOR DAILY LIVING?: NO

## 2023-12-15 SDOH — HEALTH STABILITY: PHYSICAL HEALTH: ON AVERAGE, HOW MANY DAYS PER WEEK DO YOU ENGAGE IN MODERATE TO STRENUOUS EXERCISE (LIKE A BRISK WALK)?: 0 DAYS

## 2023-12-15 ASSESSMENT — LIFESTYLE VARIABLES
HOW MANY STANDARD DRINKS CONTAINING ALCOHOL DO YOU HAVE ON A TYPICAL DAY: 0
HOW OFTEN DO YOU HAVE A DRINK CONTAINING ALCOHOL: NEVER
HOW MANY STANDARD DRINKS CONTAINING ALCOHOL DO YOU HAVE ON A TYPICAL DAY: PATIENT DOES NOT DRINK
HOW OFTEN DO YOU HAVE SIX OR MORE DRINKS ON ONE OCCASION: 1
HOW OFTEN DO YOU HAVE A DRINK CONTAINING ALCOHOL: 1

## 2023-12-15 ASSESSMENT — PATIENT HEALTH QUESTIONNAIRE - PHQ9
SUM OF ALL RESPONSES TO PHQ QUESTIONS 1-9: 0
SUM OF ALL RESPONSES TO PHQ QUESTIONS 1-9: 0
1. LITTLE INTEREST OR PLEASURE IN DOING THINGS: 0
SUM OF ALL RESPONSES TO PHQ9 QUESTIONS 1 & 2: 0
2. FEELING DOWN, DEPRESSED OR HOPELESS: 0
SUM OF ALL RESPONSES TO PHQ QUESTIONS 1-9: 0
SUM OF ALL RESPONSES TO PHQ QUESTIONS 1-9: 0

## 2023-12-15 NOTE — PATIENT INSTRUCTIONS
adapted under license by [facility] ([facility]). If you have questions about a medical condition or this instruction, always ask your healthcare professional. [address] any warranty or liability for your use of this information. Personalized Preventive Plan for Qasim Rebollar - 12/15/2023  Medicare offers a range of preventive health benefits. Some of the tests and screenings are paid in full while other may be subject to a deductible, co-insurance, and/or copay. Some of these benefits include a comprehensive review of your medical history including lifestyle, illnesses that may run in your family, and various assessments and screenings as appropriate. After reviewing your medical record and screening and assessments performed today your provider may have ordered immunizations, labs, imaging, and/or referrals for you. A list of these orders (if applicable) as well as your Preventive Care list are included within your After Visit Summary for your review. Other Preventive Recommendations:    A preventive eye exam performed by an eye specialist is recommended every 1-2 years to screen for glaucoma; cataracts, macular degeneration, and other eye disorders. A preventive dental visit is recommended every 6 months. Try to get at least 150 minutes of exercise per week or 10,000 steps per day on a pedometer . Order or download the FREE \"Exercise & Physical Activity: Your Everyday Guide\" from [employer]. Call 3-464.408.1402 or search [employer] online. You need 7613-9662 mg of calcium and 1347-1122 IU of vitamin D per day. It is possible to meet your calcium requirement with diet alone, but a vitamin D supplement is usually necessary to meet this goal.  When exposed to the sun, use a sunscreen that protects against both UVA and UVB radiation with an SPF of 30 or greater.  Reapply every 2 to 3 hours or after sweating, drying off with a towel, or

## 2023-12-15 NOTE — PROGRESS NOTES
leg: No edema. Skin:     General: Skin is warm and dry. Neurological:      Mental Status: She is alert. Psychiatric:         Behavior: Behavior normal.         Thought Content: Thought content normal.         Judgment: Judgment normal.               Allergies   Allergen Reactions    Morphine      vomit    Tape [place] Tape] Itching    Unable To Assess      Any pain med makes her sick, vomit violently  Tolerates fentanly    Vicodin [Hydrocodone-Acetaminophen] Nausea And Vomiting     Prior to Visit Medications    Medication Sig Taking? Authorizing Provider   losartan-hydroCHLOROthiazide (HYZAAR) 100-25 MG per tablet Take 1 tablet by mouth daily Yes Navarro Lake MD   amLODIPine (NORVASC) 5 MG tablet TAKE ONE TABLET BY MOUTH TWICE A DAY BEFORE MEALS Yes VIC Sumner CNP   pregabalin (LYRICA) 150 MG capsule TAKE ONE CAPSULE BY MOUTH EVERY MORNING AND TAKE ONE CAPSULE BY MOUTH IN THE EARLY AFTERNOON AS DIRECTED Yes Navarro Lake MD   pregabalin (LYRICA) 225 MG capsule Take 1 capsule by mouth nightly for 90 days.  Yes Austin Man MD   potassium chloride (KLOR-CON M20) 20 MEQ extended release tablet Take 1 tablet by mouth 2 times daily Yes Navarro Lake MD   ELIQUIS 5 MG TABS tablet TAKE ONE TABLET BY MOUTH TWICE A DAY Yes VIC Sumner CNP   furosemide (LASIX) 20 MG tablet TAKE ONE TABLET BY MOUTH TWICE A DAY Yes Devon Trent MD   sotalol (BETAPACE) 120 MG tablet TAKE ONE TABLET BY MOUTH TWICE A DAY Yes Rena Severance, APRN - CNP   atorvastatin (LIPITOR) 20 MG tablet TAKE ONE TABLET BY MOUTH DAILY Yes Devon Trent MD   Omega-3 Fatty Acids (FISH OIL) 1000 MG CAPS Take 1 capsule by mouth daily Yes Jesus Alberto Pena MD   Vitamin D (CHOLECALCIFEROL) 25 MCG (1000 UT) TABS tablet Take 1 tablet by mouth daily Yes Jesus Alberto Pena MD   magnesium oxide (MAG-OX) 400 MG tablet Take 1 tablet by mouth daily Yes Jesus Alberto Pena MD   tamoxifen (NOLVADEX) 20 MG tablet Take 1 tablet

## 2023-12-27 RX ORDER — ATORVASTATIN CALCIUM 20 MG/1
TABLET, FILM COATED ORAL
Qty: 90 TABLET | Refills: 3 | Status: SHIPPED | OUTPATIENT
Start: 2023-12-27

## 2023-12-27 NOTE — TELEPHONE ENCOUNTER
Requested Prescriptions     Pending Prescriptions Disp Refills    atorvastatin (LIPITOR) 20 MG tablet [Pharmacy Med Name: ATORVASTATIN 20 MG TABLET] 90 tablet 3     Sig: TAKE ONE TABLET BY MOUTH DAILY        Last Office Visit: 10/26/2023     Next Office Visit: 4/16/2024

## 2024-01-08 RX ORDER — POTASSIUM CHLORIDE 1500 MG/1
20 TABLET, EXTENDED RELEASE ORAL 2 TIMES DAILY
Qty: 180 TABLET | Refills: 1 | Status: SHIPPED | OUTPATIENT
Start: 2024-01-08

## 2024-01-12 ENCOUNTER — OFFICE VISIT (OUTPATIENT)
Dept: CARDIOLOGY CLINIC | Age: 79
End: 2024-01-12
Payer: COMMERCIAL

## 2024-01-12 ENCOUNTER — TELEPHONE (OUTPATIENT)
Dept: CARDIOLOGY CLINIC | Age: 79
End: 2024-01-12

## 2024-01-12 VITALS
HEART RATE: 82 BPM | WEIGHT: 255 LBS | SYSTOLIC BLOOD PRESSURE: 132 MMHG | BODY MASS INDEX: 43.77 KG/M2 | DIASTOLIC BLOOD PRESSURE: 74 MMHG

## 2024-01-12 DIAGNOSIS — E66.01 OBESITY, CLASS III, BMI 40-49.9 (MORBID OBESITY) (HCC): ICD-10-CM

## 2024-01-12 DIAGNOSIS — I50.32 CHRONIC DIASTOLIC CHF (CONGESTIVE HEART FAILURE) (HCC): ICD-10-CM

## 2024-01-12 DIAGNOSIS — I48.20 CHRONIC ATRIAL FIBRILLATION (HCC): ICD-10-CM

## 2024-01-12 DIAGNOSIS — E78.5 HYPERLIPIDEMIA, UNSPECIFIED HYPERLIPIDEMIA TYPE: Primary | ICD-10-CM

## 2024-01-12 DIAGNOSIS — I20.9 ANGINA PECTORIS SYNDROME (HCC): ICD-10-CM

## 2024-01-12 PROCEDURE — 3078F DIAST BP <80 MM HG: CPT | Performed by: INTERNAL MEDICINE

## 2024-01-12 PROCEDURE — 99214 OFFICE O/P EST MOD 30 MIN: CPT | Performed by: INTERNAL MEDICINE

## 2024-01-12 PROCEDURE — 1123F ACP DISCUSS/DSCN MKR DOCD: CPT | Performed by: INTERNAL MEDICINE

## 2024-01-12 PROCEDURE — 3075F SYST BP GE 130 - 139MM HG: CPT | Performed by: INTERNAL MEDICINE

## 2024-01-12 ASSESSMENT — ENCOUNTER SYMPTOMS
GASTROINTESTINAL NEGATIVE: 1
EYES NEGATIVE: 1
RESPIRATORY NEGATIVE: 1
ALLERGIC/IMMUNOLOGIC NEGATIVE: 1

## 2024-01-12 NOTE — TELEPHONE ENCOUNTER
Per Dr Gregory, [facility], in next week, with Anesthesia with [employer] Representative at time of [facility].      External Cardioversion    Date of Procedure:     Time of Arrival:    Cardiologist performing procedure: Dr. Gregory, [facility]     Arrive at [facility] through the main entrance.  Check in at the Outpatient Diagnostic desk on the 1st floor.    Do not eat or drink anything after midnight the night before the procedure.      You may brush your teeth and rinse the morning of the procedure.    Do NOT stop taking aspirin, Plavix, coumadin, Eliquis, Xarelto, or Pradaxa (blood thinners).  It is very important to not miss any doses of Eliquis leading up to the cardioversion.  Please call our office if you accidentally miss a dose of your blood thinner.    Take all your other routine medications the morning of the procedure with the following exceptions:  If you are taking diabetic medications, please HOLD on the day of the procedure (including insulin).  If you take Lantus insulin, take HALF of your usual dose the night before.  If you take a water pill, please HOLD on the day of the procedure.    Do not put on any lotion, powder, or deodorant the morning of the procedure.    Please bring a list of your medications to the hospital with you.    You must have someone available to drive you home. It is recommended that you do not drive for 24 hours after the procedure. You will also need someone with you at home the night of the procedure.    If you are unable to make this appointment, [facility], at [phone].

## 2024-01-12 NOTE — PROGRESS NOTES
Subjective:      Patient ID: Diana Jin is a 78 y.o. female    CC:  Fatigue and slow HR on sotalol.    HPI:  Pt has recently moved to [place], and is establishing a new visit with a Cardiologist.    Pt is s/p successful AF-RFA (19) . Procedure performed at [facility]. PMH includes AF, HTN, Heart Failure. Had 3 days of AF.  Has more energy lately and did laundry which daughter says is a little unusual.  Had breast seed implantation and lumpectomy 3/22 and 3/23/21.  Has pacer in place.  Pt is lightheaded with standing.  Long AV delay on ECG.   Interrogate PPM today.  No chest pain    24  pt has neck pain and therapy thought she had torn the muscle since May 2023.  No assoc'd heart pain or jaw pain.  No xs SOB  feels tired.      Allergies   Allergen Reactions    Morphine      vomit    Tape [Adhesive Tape] Itching    Unable To Assess      Any pain med makes her sick, vomit violently  Tolerates fentanly    Vicodin [Hydrocodone-Acetaminophen] Nausea And Vomiting       Social History     Socioeconomic History    Marital status:      Spouse name: None    Number of children: None    Years of education: None    Highest education level: None   Occupational History    Occupation: retired   Tobacco Use    Smoking status: Former     Current packs/day: 0.00     Average packs/day: 1 pack/day for 2.0 years (2.0 ttl pk-yrs)     Types: Cigarettes     Start date: 1993     Quit date: 1995     Years since quittin.0    Smokeless tobacco: Never   Vaping Use    Vaping Use: Never used   Substance and Sexual Activity    Alcohol use: No    Drug use: No    Sexual activity: Not Currently     Social Determinants of Health     Financial Resource Strain: Low Risk  (12/15/2023)    Overall Financial Resource Strain (CARDIA)     Difficulty of Paying Living Expenses: Not hard at all   Food Insecurity: No Food Insecurity (12/15/2023)    Hunger Vital Sign     Worried About Running Out of Food

## 2024-01-12 NOTE — TELEPHONE ENCOUNTER
Procedure:  CV  Doctor:  Dr. Gregory  Date:  1/17/24  Time:  2pm  Arrival:  12:30pm  Reps:  [employer]  Anesthesia:  Yes      Spoke with patient's daughter.

## 2024-01-12 NOTE — PATIENT INSTRUCTIONS
External Cardioversion    Date of Procedure:     Time of Arrival:    Cardiologist performing procedure: Dr. Gregory, [facility]     Arrive at [facility] through the main entrance.  Check in at the Outpatient Diagnostic desk on the 1st floor.    Do not eat or drink anything after midnight the night before the procedure.      You may brush your teeth and rinse the morning of the procedure.    Do NOT stop taking aspirin, Plavix, coumadin, Eliquis, Xarelto, or Pradaxa (blood thinners).  It is very important to not miss any doses of Eliquis leading up to the cardioversion.  Please call our office if you accidentally miss a dose of your blood thinner.    Take all your other routine medications the morning of the procedure with the following exceptions:  If you are taking diabetic medications, please HOLD on the day of the procedure (including insulin).  If you take Lantus insulin, take HALF of your usual dose the night before.  If you take a water pill, please HOLD on the day of the procedure.    Do not put on any lotion, powder, or deodorant the morning of the procedure.    Please bring a list of your medications to the hospital with you.    You must have someone available to drive you home. It is recommended that you do not drive for 24 hours after the procedure. You will also need someone with you at home the night of the procedure.    If you are unable to make this appointment, please call [facility], at [phone].

## 2024-01-12 NOTE — TELEPHONE ENCOUNTER
----- Message from Gill Zavala RN sent at 1/12/2024  1:54 PM EST -----  Please see above; DCCV AT  WITH DR ALMAZAN WITH ANESTHESIA WITH [employer] REPRESENTATIVE AT TIME OF DCCV. THANK YOU

## 2024-01-15 NOTE — TELEPHONE ENCOUNTER
Spoke with patient's daughter, patient is feeling better and checked her apple watch and is no longer in afib. Asked her to send in remote device check to review tracings. Will send, if no longer in afib then we can cancel her DCCV and schedule a follow up with Dr. Gregory.

## 2024-01-15 NOTE — TELEPHONE ENCOUNTER
Pt's daughter called back due to the fact that the pt is no longer in Afib. She is not sure how they should proceed.    Leanne  [phone]

## 2024-01-15 NOTE — TELEPHONE ENCOUNTER
Thank you.  Awaiting on transmission.  We have not receive as of 01:15 pm.  If they need assistance, please have them contact [employer] [phone].    Thanks

## 2024-01-16 NOTE — TELEPHONE ENCOUNTER
We have not received transmission as of 01.16.2024 @ 10:46 am.  Please have pt contact [employer] for assistance [phone].

## 2024-01-17 ENCOUNTER — HOSPITAL ENCOUNTER (OUTPATIENT)
Dept: CARDIAC CATH/INVASIVE PROCEDURES | Age: 79
Discharge: HOME | End: 2024-01-17

## 2024-01-21 DIAGNOSIS — G62.9 NEUROPATHY: ICD-10-CM

## 2024-01-22 RX ORDER — PREGABALIN 150 MG/1
CAPSULE ORAL
Qty: 180 CAPSULE | Refills: 1 | Status: SHIPPED | OUTPATIENT
Start: 2024-01-22 | End: 2024-02-22

## 2024-02-03 ENCOUNTER — APPOINTMENT (OUTPATIENT)
Dept: GENERAL RADIOLOGY | Age: 79
DRG: 683 | End: 2024-02-03
Payer: COMMERCIAL

## 2024-02-03 ENCOUNTER — HOSPITAL ENCOUNTER (EMERGENCY)
Age: 79
Discharge: HOME | DRG: 683 | End: 2024-02-03
Attending: EMERGENCY MEDICINE
Payer: COMMERCIAL

## 2024-02-03 VITALS
HEIGHT: 64 IN | HEART RATE: 75 BPM | TEMPERATURE: 99.1 F | DIASTOLIC BLOOD PRESSURE: 70 MMHG | OXYGEN SATURATION: 93 % | BODY MASS INDEX: 44.13 KG/M2 | SYSTOLIC BLOOD PRESSURE: 169 MMHG | WEIGHT: 258.5 LBS | RESPIRATION RATE: 18 BRPM

## 2024-02-03 DIAGNOSIS — J98.01 BRONCHOSPASM: Primary | ICD-10-CM

## 2024-02-03 DIAGNOSIS — T78.40XA ALLERGIC REACTION, INITIAL ENCOUNTER: ICD-10-CM

## 2024-02-03 LAB
ANION GAP SERPL CALCULATED.3IONS-SCNC: 11 MMOL/L (ref 3–16)
BASE EXCESS BLDV CALC-SCNC: 4.8 MMOL/L (ref -2–3)
BASOPHILS # BLD: 0.1 K/UL (ref 0–0.2)
BASOPHILS NFR BLD: 0.6 %
BUN SERPL-MCNC: 23 MG/DL (ref 7–20)
CALCIUM SERPL-MCNC: 9.3 MG/DL (ref 8.3–10.6)
CHLORIDE SERPL-SCNC: 103 MMOL/L (ref 99–110)
CO2 BLDV-SCNC: 32 MMOL/L
CO2 SERPL-SCNC: 25 MMOL/L (ref 21–32)
COHGB MFR BLDV: 1.4 % (ref 0–1.5)
CREAT SERPL-MCNC: 1.1 MG/DL (ref 0.6–1.2)
DEPRECATED RDW RBC AUTO: 14 % (ref 12.4–15.4)
DO-HGB MFR BLDV: 45.6 %
EKG ATRIAL RATE: 69 BPM
EKG DIAGNOSIS: NORMAL
EKG P AXIS: 195 DEGREES
EKG P-R INTERVAL: 248 MS
EKG Q-T INTERVAL: 402 MS
EKG QRS DURATION: 100 MS
EKG QTC CALCULATION (BAZETT): 430 MS
EKG R AXIS: -62 DEGREES
EKG T AXIS: -13 DEGREES
EKG VENTRICULAR RATE: 69 BPM
EOSINOPHIL # BLD: 0.2 K/UL (ref 0–0.6)
EOSINOPHIL NFR BLD: 1.4 %
FLUAV RNA RESP QL NAA+PROBE: NOT DETECTED
FLUBV RNA RESP QL NAA+PROBE: NOT DETECTED
GFR SERPLBLD CREATININE-BSD FMLA CKD-EPI: 51 ML/MIN/{1.73_M2}
GLUCOSE SERPL-MCNC: 137 MG/DL (ref 70–99)
HCO3 BLDV-SCNC: 30.1 MMOL/L (ref 24–28)
HCT VFR BLD AUTO: 37.8 % (ref 36–48)
HGB BLD-MCNC: 12.7 G/DL (ref 12–16)
LYMPHOCYTES # BLD: 0.6 K/UL (ref 1–5.1)
LYMPHOCYTES NFR BLD: 4.6 %
MCH RBC QN AUTO: 28.9 PG (ref 26–34)
MCHC RBC AUTO-ENTMCNC: 33.7 G/DL (ref 31–36)
MCV RBC AUTO: 85.8 FL (ref 80–100)
METHGB MFR BLDV: 0.3 % (ref 0–1.5)
MONOCYTES # BLD: 0.6 K/UL (ref 0–1.3)
MONOCYTES NFR BLD: 4.6 %
NEUTROPHILS # BLD: 11.4 K/UL (ref 1.7–7.7)
NEUTROPHILS NFR BLD: 88.8 %
NT-PROBNP SERPL-MCNC: 792 PG/ML (ref 0–449)
PCO2 BLDV: 46.3 MMHG (ref 41–51)
PH BLDV: 7.42 [PH] (ref 7.35–7.45)
PLATELET # BLD AUTO: 210 K/UL (ref 135–450)
PMV BLD AUTO: 8.7 FL (ref 5–10.5)
PO2 BLDV: <30 MMHG (ref 25–40)
POTASSIUM SERPL-SCNC: 4.7 MMOL/L (ref 3.5–5.1)
RBC # BLD AUTO: 4.41 M/UL (ref 4–5.2)
SAO2 % BLDV: 54 %
SARS-COV-2 RNA RESP QL NAA+PROBE: NOT DETECTED
SODIUM SERPL-SCNC: 139 MMOL/L (ref 136–145)
WBC # BLD AUTO: 12.8 K/UL (ref 4–11)

## 2024-02-03 PROCEDURE — 87636 SARSCOV2 & INF A&B AMP PRB: CPT

## 2024-02-03 PROCEDURE — 96372 THER/PROPH/DIAG INJ SC/IM: CPT

## 2024-02-03 PROCEDURE — 6360000002 HC RX W HCPCS: Performed by: EMERGENCY MEDICINE

## 2024-02-03 PROCEDURE — 99285 EMERGENCY DEPT VISIT HI MDM: CPT

## 2024-02-03 PROCEDURE — 71046 X-RAY EXAM CHEST 2 VIEWS: CPT

## 2024-02-03 PROCEDURE — 83880 ASSAY OF NATRIURETIC PEPTIDE: CPT

## 2024-02-03 PROCEDURE — 6370000000 HC RX 637 (ALT 250 FOR IP): Performed by: EMERGENCY MEDICINE

## 2024-02-03 PROCEDURE — 94640 AIRWAY INHALATION TREATMENT: CPT

## 2024-02-03 PROCEDURE — 85025 COMPLETE CBC W/AUTO DIFF WBC: CPT

## 2024-02-03 PROCEDURE — 80048 BASIC METABOLIC PNL TOTAL CA: CPT

## 2024-02-03 PROCEDURE — 93005 ELECTROCARDIOGRAM TRACING: CPT | Performed by: EMERGENCY MEDICINE

## 2024-02-03 PROCEDURE — 82803 BLOOD GASES ANY COMBINATION: CPT

## 2024-02-03 RX ORDER — DIPHENHYDRAMINE HCL 25 MG
25 TABLET ORAL ONCE
Status: COMPLETED | OUTPATIENT
Start: 2024-02-03 | End: 2024-02-03

## 2024-02-03 RX ORDER — DEXAMETHASONE SODIUM PHOSPHATE 10 MG/ML
10 INJECTION, SOLUTION INTRAMUSCULAR; INTRAVENOUS ONCE
Status: COMPLETED | OUTPATIENT
Start: 2024-02-03 | End: 2024-02-03

## 2024-02-03 RX ORDER — ALBUTEROL SULFATE 2.5 MG/3ML
2.5 SOLUTION RESPIRATORY (INHALATION) ONCE
Status: COMPLETED | OUTPATIENT
Start: 2024-02-03 | End: 2024-02-03

## 2024-02-03 RX ORDER — ALBUTEROL SULFATE 90 UG/1
2 AEROSOL, METERED RESPIRATORY (INHALATION) 4 TIMES DAILY PRN
Qty: 18 G | Refills: 0 | Status: SHIPPED | OUTPATIENT
Start: 2024-02-03

## 2024-02-03 RX ADMIN — ALBUTEROL SULFATE 2.5 MG: 2.5 SOLUTION RESPIRATORY (INHALATION) at 04:22

## 2024-02-03 RX ADMIN — DEXAMETHASONE SODIUM PHOSPHATE 10 MG: 10 INJECTION INTRAMUSCULAR; INTRAVENOUS at 05:22

## 2024-02-03 RX ADMIN — DIPHENHYDRAMINE HYDROCHLORIDE 25 MG: 25 TABLET ORAL at 04:59

## 2024-02-03 ASSESSMENT — ENCOUNTER SYMPTOMS
COUGH: 0
SHORTNESS OF BREATH: 1
DIARRHEA: 0
VOMITING: 0
ABDOMINAL PAIN: 0
NAUSEA: 0

## 2024-02-03 ASSESSMENT — PAIN - FUNCTIONAL ASSESSMENT: PAIN_FUNCTIONAL_ASSESSMENT: NONE - DENIES PAIN

## 2024-02-03 NOTE — ED NOTES
Pt discharged from ED in stable condition. Discharge instruction explain, all question answered. Prescription given. Pt walked to lobby independently.       Daiana Sr, RN  02/03/24 0595

## 2024-02-03 NOTE — ED PROVIDER NOTES
THE [facility]  EMERGENCY DEPARTMENT ENCOUNTER          ATTENDING PHYSICIAN NOTE       Date of evaluation: 2/3/2024    Chief Complaint     Shortness of Breath      History of Present Illness     Diana Jin is a 78 y.o. female with a past medical history of chronic A-fib, hypertension, congestive heart failure, pacemaker, and breast cancer who presents with complaints of shortness of breath that developed when she was going up the steps to go to bed tonight.  She normally does not get short of breath but she acutely got winded and could hear herself wheezing.  She simultaneously got the chills.  She was concerned she may be having an allergic reaction to the RSV shot that she received this afternoon.  Initial oxygen saturation per EMS was 90% and she was placed on oxygen and felt much improved.  She feels a little bit winded now that she is off O2.  She denies productive cough or fever.  She did not think this was congestive heart failure because she has not had any swelling.  She has no chest pain.    ASSESSMENT / PLAN  (MEDICAL DECISION MAKING)     INITIAL VITALS: BP: (!) 164/91, Temp: 99.1 °F (37.3 °C), Pulse: 70, Respirations: 18, SpO2: 93 %      Diana Jin is a 78 y.o. female with a past medical history of chronic A-fib, hypertension, congestive heart failure, pacemaker, and breast cancer here with shortness of breath after climbing her steps at home several hours following administration of an RSV vaccination.  The patient has never had this vaccine before.  She was fine during the afternoon but then when she went to go upstairs she got winded.  She has not felt winded like this before except with a congestive heart failure exacerbation.  Her chest x-ray here is clear and her EKG shows a paced rhythm with no acute changes.  Lab workup demonstrates slight leukocytosis and a BNP of 792 which is actually very low for her.  VBG is normal.  The patient was given a nebulizer treatment and this did  improve her breathing and her lungs were clear following administration.  She was ambulated initially and had some difficulty but after the nebulizer she did not develop any shortness of breath with ambulation but did feel tired and fatigued.  She was thought to have some bronchospasm possibly related to the administration of the vaccination and was given Benadryl and a single dose of dexamethasone.  We discussed observation in the hospital versus discharge home and given that this does not appear to be pneumonia or CHF exacerbation, the patient states that she would feel comfortable going home and prefers to go home rather than being observed in the hospital, especially since she did get improvement in her breathing after administration of the nebulizer.  The patient has no other accompanying symptoms to suggest anaphylaxis and does have a daughter that uses an inhaler and she can instruct her how to use it if she needs it any further but I suspect that if this is related to the vaccination, the symptoms should be relatively short-lived.  She may always return if she has worsening difficulty breathing.      Medical Decision Making  Amount and/or Complexity of Data Reviewed  Labs: ordered.  Radiology: ordered.  ECG/medicine tests: ordered.    Risk  OTC drugs.  Prescription drug management.        Clinical Impression     1. Bronchospasm    2. Allergic reaction, initial encounter        Disposition     PATIENT REFERRED TO:  Ursula Lake MD  [address]  [facility] [id number] [phone]      if not improving    [facility] Emergency Department  [address] [facility] [id number] [phone]    If symptoms worsen      DISCHARGE MEDICATIONS:  Discharge Medication List as of 2/3/2024  5:17 AM        START taking these medications    Details   albuterol sulfate HFA (VENTOLIN HFA) 108 (90 Base) MCG/ACT inhaler Inhale 2 puffs into the lungs 4 times daily as needed for Wheezing, Disp-18 g,

## 2024-02-03 NOTE — ED TRIAGE NOTES
Patient arrived in the ER with the c/o sob. Patient states that she got a RSV shot yesterday around 1:00 pm. Patient stated that she though she was having a allergic reaction to the shot.

## 2024-02-03 NOTE — ED NOTES
Patient ambulate in hallway with walker. Patient oxygen saturation stayed between 91% and 92% on room air. On the way back to room back states that she started to feel sob.      Daiana Sr RN  02/03/24 0402

## 2024-02-05 ENCOUNTER — APPOINTMENT (OUTPATIENT)
Dept: GENERAL RADIOLOGY | Age: 79
DRG: 683 | End: 2024-02-05
Payer: COMMERCIAL

## 2024-02-05 ENCOUNTER — HOSPITAL ENCOUNTER (INPATIENT)
Age: 79
LOS: 2 days | Discharge: HOME | DRG: 683 | End: 2024-02-07
Attending: EMERGENCY MEDICINE | Admitting: HOSPITALIST
Payer: COMMERCIAL

## 2024-02-05 DIAGNOSIS — E86.0 DEHYDRATION: ICD-10-CM

## 2024-02-05 DIAGNOSIS — N30.00 ACUTE CYSTITIS WITHOUT HEMATURIA: ICD-10-CM

## 2024-02-05 DIAGNOSIS — R53.1 GENERALIZED WEAKNESS: Primary | ICD-10-CM

## 2024-02-05 PROBLEM — N39.0 UTI (URINARY TRACT INFECTION): Status: ACTIVE | Noted: 2024-02-05

## 2024-02-05 LAB
ANION GAP SERPL CALCULATED.3IONS-SCNC: 12 MMOL/L (ref 3–16)
BACTERIA URNS QL MICRO: ABNORMAL /HPF
BASOPHILS # BLD: 0 K/UL (ref 0–0.2)
BASOPHILS NFR BLD: 0 %
BILIRUB UR QL STRIP.AUTO: NEGATIVE
BUN SERPL-MCNC: 32 MG/DL (ref 7–20)
CALCIUM SERPL-MCNC: 9 MG/DL (ref 8.3–10.6)
CHLORIDE SERPL-SCNC: 100 MMOL/L (ref 99–110)
CLARITY UR: CLEAR
CO2 SERPL-SCNC: 27 MMOL/L (ref 21–32)
COLOR UR: YELLOW
CREAT SERPL-MCNC: 1.7 MG/DL (ref 0.6–1.2)
DEPRECATED RDW RBC AUTO: 14.8 % (ref 12.4–15.4)
EKG ATRIAL RATE: 61 BPM
EKG DIAGNOSIS: NORMAL
EKG P-R INTERVAL: 242 MS
EKG Q-T INTERVAL: 448 MS
EKG QRS DURATION: 96 MS
EKG QTC CALCULATION (BAZETT): 450 MS
EKG R AXIS: -45 DEGREES
EKG T AXIS: -43 DEGREES
EKG VENTRICULAR RATE: 61 BPM
EOSINOPHIL # BLD: 0 K/UL (ref 0–0.6)
EOSINOPHIL NFR BLD: 0 %
EPI CELLS #/AREA URNS HPF: ABNORMAL /HPF (ref 0–5)
FLUAV RNA RESP QL NAA+PROBE: NOT DETECTED
FLUBV RNA RESP QL NAA+PROBE: NOT DETECTED
GFR SERPLBLD CREATININE-BSD FMLA CKD-EPI: 30 ML/MIN/{1.73_M2}
GLUCOSE SERPL-MCNC: 142 MG/DL (ref 70–99)
GLUCOSE UR STRIP.AUTO-MCNC: NEGATIVE MG/DL
HCT VFR BLD AUTO: 36.2 % (ref 36–48)
HGB BLD-MCNC: 12.2 G/DL (ref 12–16)
HGB UR QL STRIP.AUTO: ABNORMAL
KETONES UR STRIP.AUTO-MCNC: NEGATIVE MG/DL
LEUKOCYTE ESTERASE UR QL STRIP.AUTO: ABNORMAL
LYMPHOCYTES # BLD: 0.8 K/UL (ref 1–5.1)
LYMPHOCYTES NFR BLD: 8 %
MCH RBC QN AUTO: 29.2 PG (ref 26–34)
MCHC RBC AUTO-ENTMCNC: 33.8 G/DL (ref 31–36)
MCV RBC AUTO: 86.4 FL (ref 80–100)
MONOCYTES # BLD: 1.1 K/UL (ref 0–1.3)
MONOCYTES NFR BLD: 11 %
NEUTROPHILS # BLD: 8 K/UL (ref 1.7–7.7)
NEUTROPHILS NFR BLD: 78 %
NEUTS BAND NFR BLD MANUAL: 3 % (ref 0–7)
NITRITE UR QL STRIP.AUTO: POSITIVE
NT-PROBNP SERPL-MCNC: ABNORMAL PG/ML (ref 0–449)
PH UR STRIP.AUTO: 6.5 [PH] (ref 5–8)
PLATELET # BLD AUTO: 153 K/UL (ref 135–450)
PLATELET BLD QL SMEAR: ADEQUATE
PMV BLD AUTO: 9 FL (ref 5–10.5)
POTASSIUM SERPL-SCNC: 4.4 MMOL/L (ref 3.5–5.1)
PROT UR STRIP.AUTO-MCNC: 100 MG/DL
RBC # BLD AUTO: 4.19 M/UL (ref 4–5.2)
RBC #/AREA URNS HPF: ABNORMAL /HPF (ref 0–4)
RBC MORPH BLD: NORMAL
SARS-COV-2 RNA RESP QL NAA+PROBE: NOT DETECTED
SLIDE REVIEW: ABNORMAL
SODIUM SERPL-SCNC: 139 MMOL/L (ref 136–145)
SP GR UR STRIP.AUTO: 1.02 (ref 1–1.03)
TROPONIN, HIGH SENSITIVITY: 55 NG/L (ref 0–14)
TROPONIN, HIGH SENSITIVITY: 71 NG/L (ref 0–14)
UA DIPSTICK W REFLEX MICRO PNL UR: YES
URN SPEC COLLECT METH UR: ABNORMAL
UROBILINOGEN UR STRIP-ACNC: 1 E.U./DL
WBC # BLD AUTO: 9.9 K/UL (ref 4–11)
WBC #/AREA URNS HPF: ABNORMAL /HPF (ref 0–5)

## 2024-02-05 PROCEDURE — 87636 SARSCOV2 & INF A&B AMP PRB: CPT

## 2024-02-05 PROCEDURE — 2580000003 HC RX 258: Performed by: EMERGENCY MEDICINE

## 2024-02-05 PROCEDURE — 93005 ELECTROCARDIOGRAM TRACING: CPT | Performed by: EMERGENCY MEDICINE

## 2024-02-05 PROCEDURE — 6360000002 HC RX W HCPCS: Performed by: EMERGENCY MEDICINE

## 2024-02-05 PROCEDURE — 81001 URINALYSIS AUTO W/SCOPE: CPT

## 2024-02-05 PROCEDURE — 83880 ASSAY OF NATRIURETIC PEPTIDE: CPT

## 2024-02-05 PROCEDURE — 96365 THER/PROPH/DIAG IV INF INIT: CPT

## 2024-02-05 PROCEDURE — 71045 X-RAY EXAM CHEST 1 VIEW: CPT

## 2024-02-05 PROCEDURE — 99222 1ST HOSP IP/OBS MODERATE 55: CPT | Performed by: HOSPITALIST

## 2024-02-05 PROCEDURE — 83605 ASSAY OF LACTIC ACID: CPT

## 2024-02-05 PROCEDURE — 87086 URINE CULTURE/COLONY COUNT: CPT

## 2024-02-05 PROCEDURE — 6370000000 HC RX 637 (ALT 250 FOR IP): Performed by: EMERGENCY MEDICINE

## 2024-02-05 PROCEDURE — 87631 RESP VIRUS 3-5 TARGETS: CPT

## 2024-02-05 PROCEDURE — 87088 URINE BACTERIA CULTURE: CPT

## 2024-02-05 PROCEDURE — 94640 AIRWAY INHALATION TREATMENT: CPT

## 2024-02-05 PROCEDURE — 84484 ASSAY OF TROPONIN QUANT: CPT

## 2024-02-05 PROCEDURE — 85025 COMPLETE CBC W/AUTO DIFF WBC: CPT

## 2024-02-05 PROCEDURE — 99285 EMERGENCY DEPT VISIT HI MDM: CPT

## 2024-02-05 PROCEDURE — 87186 SC STD MICRODIL/AGAR DIL: CPT

## 2024-02-05 PROCEDURE — 2580000003 HC RX 258

## 2024-02-05 PROCEDURE — 80048 BASIC METABOLIC PNL TOTAL CA: CPT

## 2024-02-05 PROCEDURE — 1200000000 HC SEMI PRIVATE

## 2024-02-05 PROCEDURE — 94761 N-INVAS EAR/PLS OXIMETRY MLT: CPT

## 2024-02-05 PROCEDURE — 36415 COLL VENOUS BLD VENIPUNCTURE: CPT

## 2024-02-05 RX ORDER — ONDANSETRON 2 MG/ML
4 INJECTION INTRAMUSCULAR; INTRAVENOUS EVERY 6 HOURS PRN
Status: DISCONTINUED | OUTPATIENT
Start: 2024-02-05 | End: 2024-02-07 | Stop reason: HOSPADM

## 2024-02-05 RX ORDER — ALBUTEROL SULFATE 2.5 MG/3ML
2.5 SOLUTION RESPIRATORY (INHALATION) EVERY 4 HOURS PRN
Status: DISCONTINUED | OUTPATIENT
Start: 2024-02-05 | End: 2024-02-07 | Stop reason: HOSPADM

## 2024-02-05 RX ORDER — ACETAMINOPHEN 650 MG/1
650 SUPPOSITORY RECTAL EVERY 6 HOURS PRN
Status: DISCONTINUED | OUTPATIENT
Start: 2024-02-05 | End: 2024-02-07 | Stop reason: HOSPADM

## 2024-02-05 RX ORDER — SODIUM CHLORIDE 0.9 % (FLUSH) 0.9 %
5-40 SYRINGE (ML) INJECTION EVERY 12 HOURS SCHEDULED
Status: DISCONTINUED | OUTPATIENT
Start: 2024-02-05 | End: 2024-02-07 | Stop reason: HOSPADM

## 2024-02-05 RX ORDER — ASPIRIN 81 MG/1
81 TABLET ORAL NIGHTLY
Status: DISCONTINUED | OUTPATIENT
Start: 2024-02-05 | End: 2024-02-07 | Stop reason: HOSPADM

## 2024-02-05 RX ORDER — IPRATROPIUM BROMIDE AND ALBUTEROL SULFATE 2.5; .5 MG/3ML; MG/3ML
1 SOLUTION RESPIRATORY (INHALATION) ONCE
Status: COMPLETED | OUTPATIENT
Start: 2024-02-05 | End: 2024-02-05

## 2024-02-05 RX ORDER — POLYETHYLENE GLYCOL 3350 17 G/17G
17 POWDER, FOR SOLUTION ORAL DAILY PRN
Status: DISCONTINUED | OUTPATIENT
Start: 2024-02-05 | End: 2024-02-07 | Stop reason: HOSPADM

## 2024-02-05 RX ORDER — ONDANSETRON 4 MG/1
4 TABLET, ORALLY DISINTEGRATING ORAL EVERY 8 HOURS PRN
Status: DISCONTINUED | OUTPATIENT
Start: 2024-02-05 | End: 2024-02-07 | Stop reason: HOSPADM

## 2024-02-05 RX ORDER — SODIUM CHLORIDE 9 MG/ML
INJECTION, SOLUTION INTRAVENOUS CONTINUOUS
Status: DISCONTINUED | OUTPATIENT
Start: 2024-02-05 | End: 2024-02-06

## 2024-02-05 RX ORDER — TAMOXIFEN CITRATE 10 MG/1
20 TABLET ORAL DAILY
Status: DISCONTINUED | OUTPATIENT
Start: 2024-02-06 | End: 2024-02-07 | Stop reason: HOSPADM

## 2024-02-05 RX ORDER — ATORVASTATIN CALCIUM 20 MG/1
20 TABLET, FILM COATED ORAL DAILY
Status: DISCONTINUED | OUTPATIENT
Start: 2024-02-06 | End: 2024-02-07 | Stop reason: HOSPADM

## 2024-02-05 RX ORDER — PREGABALIN 50 MG/1
150 CAPSULE ORAL 2 TIMES DAILY
Status: CANCELLED | OUTPATIENT
Start: 2024-02-05

## 2024-02-05 RX ORDER — AMLODIPINE BESYLATE 5 MG/1
5 TABLET ORAL 2 TIMES DAILY
Status: DISCONTINUED | OUTPATIENT
Start: 2024-02-06 | End: 2024-02-07 | Stop reason: HOSPADM

## 2024-02-05 RX ORDER — SODIUM CHLORIDE 9 MG/ML
INJECTION, SOLUTION INTRAVENOUS PRN
Status: DISCONTINUED | OUTPATIENT
Start: 2024-02-05 | End: 2024-02-07 | Stop reason: HOSPADM

## 2024-02-05 RX ORDER — SODIUM CHLORIDE 0.9 % (FLUSH) 0.9 %
5-40 SYRINGE (ML) INJECTION PRN
Status: DISCONTINUED | OUTPATIENT
Start: 2024-02-05 | End: 2024-02-07 | Stop reason: HOSPADM

## 2024-02-05 RX ORDER — ACETAMINOPHEN 325 MG/1
650 TABLET ORAL EVERY 6 HOURS PRN
Status: DISCONTINUED | OUTPATIENT
Start: 2024-02-05 | End: 2024-02-07 | Stop reason: HOSPADM

## 2024-02-05 RX ORDER — SOTALOL HYDROCHLORIDE 80 MG/1
120 TABLET ORAL EVERY 12 HOURS SCHEDULED
Status: DISCONTINUED | OUTPATIENT
Start: 2024-02-05 | End: 2024-02-07 | Stop reason: HOSPADM

## 2024-02-05 RX ADMIN — SODIUM CHLORIDE: 9 INJECTION, SOLUTION INTRAVENOUS at 23:04

## 2024-02-05 RX ADMIN — SODIUM CHLORIDE, PRESERVATIVE FREE 10 ML: 5 INJECTION INTRAVENOUS at 23:00

## 2024-02-05 RX ADMIN — CEFTRIAXONE 1000 MG: 1 INJECTION, POWDER, FOR SOLUTION INTRAMUSCULAR; INTRAVENOUS at 16:43

## 2024-02-05 RX ADMIN — IPRATROPIUM BROMIDE AND ALBUTEROL SULFATE 1 DOSE: 2.5; .5 SOLUTION RESPIRATORY (INHALATION) at 16:49

## 2024-02-05 ASSESSMENT — PAIN - FUNCTIONAL ASSESSMENT: PAIN_FUNCTIONAL_ASSESSMENT: NONE - DENIES PAIN

## 2024-02-05 NOTE — ED PROVIDER NOTES
THE [facility]  EMERGENCY DEPARTMENT ENCOUNTER          Attending Physician Note     Date of evaluation: 2/5/2024    Chief Complaint     Fatigue (Pt to ED with complaint of fatigue, weakness, no appetite, and some mild confusion since Friday night. Pt states she had the RSV shot on Friday and ever since then has been feeling bad. Pt states she also stumbled and fell on Saturday. Denies hitting her head or injuring anything. Pt states she just wants to sleep all the time. )      History of Present Illness     Diana Jin is a 78 y.o. female states she got RSV vaccine on Friday, felt poorly all weekend long, ended up in the ER Saturday morning.  Got a breathing treatment, steroid injection and went home feeling better however has progressively gotten weaker since then.  Increasing tiredness, increasing fatigue, generalized weakness.  Family states she also gently went to the ground today due to weakness, not really a fall but then could not get back up.  No fevers noted by family or patient.      Review of Systems     Pertinent positive and negative findings as documented above in HPI, otherwise all other systems were reviewed and negative     Past Medical, Surgical, Family, and Social History     Medical History:   Past Medical History:   Diagnosis Date    Arthritis     Atrial fibrillation (HCC) 08/2016    Breast cancer (HCC) 2012    Dr.Sara Elias    CAD (coronary artery disease)     CHF (congestive heart failure) (HCC)     GERD (gastroesophageal reflux disease) 2001    dx 2001. but has never had an issue. requires no meds    H/O: whooping cough 1945    Heart failure (HCC) 11/16/2019    History of shingles     1 year after recieving vaccine     Hypercholesterolemia 2010    Hypertension 1987    Iron deficiency 08/2016    PONV (postoperative nausea and vomiting)     Sleep apnea 06/2018    Vertigo 1957    as a child       Surgical History:   Past Surgical History:   Procedure Laterality Date    ABDOMINAL

## 2024-02-06 PROBLEM — R53.1 GENERALIZED WEAKNESS: Status: ACTIVE | Noted: 2024-02-06

## 2024-02-06 PROBLEM — N17.9 AKI (ACUTE KIDNEY INJURY) (HCC): Status: ACTIVE | Noted: 2024-02-06

## 2024-02-06 PROBLEM — E86.0 DEHYDRATION: Status: ACTIVE | Noted: 2024-02-06

## 2024-02-06 PROBLEM — E66.01 MORBID OBESITY (HCC): Status: ACTIVE | Noted: 2024-02-06

## 2024-02-06 LAB
ALBUMIN SERPL-MCNC: 2.7 G/DL (ref 3.4–5)
ANION GAP SERPL CALCULATED.3IONS-SCNC: 12 MMOL/L (ref 3–16)
BASOPHILS # BLD: 0 K/UL (ref 0–0.2)
BASOPHILS NFR BLD: 0.1 %
BUN SERPL-MCNC: 30 MG/DL (ref 7–20)
CALCIUM SERPL-MCNC: 8.6 MG/DL (ref 8.3–10.6)
CHLORIDE SERPL-SCNC: 102 MMOL/L (ref 99–110)
CO2 SERPL-SCNC: 24 MMOL/L (ref 21–32)
CREAT SERPL-MCNC: 1.5 MG/DL (ref 0.6–1.2)
DEPRECATED RDW RBC AUTO: 14.2 % (ref 12.4–15.4)
EOSINOPHIL # BLD: 0 K/UL (ref 0–0.6)
EOSINOPHIL NFR BLD: 0.2 %
GFR SERPLBLD CREATININE-BSD FMLA CKD-EPI: 35 ML/MIN/{1.73_M2}
GLUCOSE SERPL-MCNC: 130 MG/DL (ref 70–99)
HCT VFR BLD AUTO: 32.4 % (ref 36–48)
HGB BLD-MCNC: 11.2 G/DL (ref 12–16)
LACTATE BLDV-SCNC: 1.6 MMOL/L (ref 0.4–2)
LYMPHOCYTES # BLD: 0.6 K/UL (ref 1–5.1)
LYMPHOCYTES NFR BLD: 7.7 %
MAGNESIUM SERPL-MCNC: 2.6 MG/DL (ref 1.8–2.4)
MCH RBC QN AUTO: 29.2 PG (ref 26–34)
MCHC RBC AUTO-ENTMCNC: 34.5 G/DL (ref 31–36)
MCV RBC AUTO: 84.7 FL (ref 80–100)
MONOCYTES # BLD: 0.7 K/UL (ref 0–1.3)
MONOCYTES NFR BLD: 8.7 %
NEUTROPHILS # BLD: 6.4 K/UL (ref 1.7–7.7)
NEUTROPHILS NFR BLD: 83.3 %
PHOSPHATE SERPL-MCNC: 3.6 MG/DL (ref 2.5–4.9)
PLATELET # BLD AUTO: 126 K/UL (ref 135–450)
PMV BLD AUTO: 9.1 FL (ref 5–10.5)
POTASSIUM SERPL-SCNC: 3.8 MMOL/L (ref 3.5–5.1)
RBC # BLD AUTO: 3.83 M/UL (ref 4–5.2)
SODIUM SERPL-SCNC: 138 MMOL/L (ref 136–145)
TROPONIN, HIGH SENSITIVITY: 57 NG/L (ref 0–14)
WBC # BLD AUTO: 7.6 K/UL (ref 4–11)

## 2024-02-06 PROCEDURE — 2700000000 HC OXYGEN THERAPY PER DAY

## 2024-02-06 PROCEDURE — 2580000003 HC RX 258

## 2024-02-06 PROCEDURE — 94640 AIRWAY INHALATION TREATMENT: CPT

## 2024-02-06 PROCEDURE — 83735 ASSAY OF MAGNESIUM: CPT

## 2024-02-06 PROCEDURE — 94761 N-INVAS EAR/PLS OXIMETRY MLT: CPT

## 2024-02-06 PROCEDURE — 6360000002 HC RX W HCPCS

## 2024-02-06 PROCEDURE — 85025 COMPLETE CBC W/AUTO DIFF WBC: CPT

## 2024-02-06 PROCEDURE — 99232 SBSQ HOSP IP/OBS MODERATE 35: CPT | Performed by: HOSPITALIST

## 2024-02-06 PROCEDURE — 36415 COLL VENOUS BLD VENIPUNCTURE: CPT

## 2024-02-06 PROCEDURE — 1200000000 HC SEMI PRIVATE

## 2024-02-06 PROCEDURE — 80069 RENAL FUNCTION PANEL: CPT

## 2024-02-06 PROCEDURE — 6370000000 HC RX 637 (ALT 250 FOR IP)

## 2024-02-06 PROCEDURE — 2580000003 HC RX 258: Performed by: STUDENT IN AN ORGANIZED HEALTH CARE EDUCATION/TRAINING PROGRAM

## 2024-02-06 RX ORDER — SODIUM CHLORIDE, SODIUM LACTATE, POTASSIUM CHLORIDE, CALCIUM CHLORIDE 600; 310; 30; 20 MG/100ML; MG/100ML; MG/100ML; MG/100ML
INJECTION, SOLUTION INTRAVENOUS CONTINUOUS
Status: DISCONTINUED | OUTPATIENT
Start: 2024-02-06 | End: 2024-02-07

## 2024-02-06 RX ADMIN — SOTALOL HYDROCHLORIDE 120 MG: 80 TABLET ORAL at 10:24

## 2024-02-06 RX ADMIN — APIXABAN 5 MG: 5 TABLET, FILM COATED ORAL at 00:11

## 2024-02-06 RX ADMIN — ASPIRIN 81 MG: 81 TABLET, COATED ORAL at 20:52

## 2024-02-06 RX ADMIN — APIXABAN 5 MG: 5 TABLET, FILM COATED ORAL at 20:52

## 2024-02-06 RX ADMIN — ALBUTEROL SULFATE 2.5 MG: 2.5 SOLUTION RESPIRATORY (INHALATION) at 12:37

## 2024-02-06 RX ADMIN — AMLODIPINE BESYLATE 5 MG: 5 TABLET ORAL at 10:24

## 2024-02-06 RX ADMIN — APIXABAN 5 MG: 5 TABLET, FILM COATED ORAL at 10:24

## 2024-02-06 RX ADMIN — ASPIRIN 81 MG: 81 TABLET, COATED ORAL at 00:11

## 2024-02-06 RX ADMIN — AMLODIPINE BESYLATE 5 MG: 5 TABLET ORAL at 20:52

## 2024-02-06 RX ADMIN — SOTALOL HYDROCHLORIDE 120 MG: 80 TABLET ORAL at 20:52

## 2024-02-06 RX ADMIN — ATORVASTATIN CALCIUM 20 MG: 20 TABLET, FILM COATED ORAL at 10:24

## 2024-02-06 RX ADMIN — SODIUM CHLORIDE, POTASSIUM CHLORIDE, SODIUM LACTATE AND CALCIUM CHLORIDE: 600; 310; 30; 20 INJECTION, SOLUTION INTRAVENOUS at 10:31

## 2024-02-06 RX ADMIN — SODIUM CHLORIDE, PRESERVATIVE FREE 10 ML: 5 INJECTION INTRAVENOUS at 10:24

## 2024-02-06 RX ADMIN — SOTALOL HYDROCHLORIDE 120 MG: 80 TABLET ORAL at 00:10

## 2024-02-06 RX ADMIN — CEFTRIAXONE 1000 MG: 1 INJECTION, POWDER, FOR SOLUTION INTRAMUSCULAR; INTRAVENOUS at 16:40

## 2024-02-06 RX ADMIN — TAMOXIFEN CITRATE 20 MG: 10 TABLET ORAL at 10:26

## 2024-02-06 NOTE — CARE COORDINATION
CM  following for  d/c planning:    Case Management Assessment  Initial Evaluation    Date/Time of Evaluation: 2/6/2024 4:23 PM  Assessment Completed by: Lisa Jhaveri RN    If patient is discharged prior to next notation, then this note serves as note for discharge by case management.    Patient Name: Diana Jin                   YOB: 1945  Diagnosis: Dehydration [E86.0]  UTI (urinary tract infection) [N39.0]  Generalized weakness [R53.1]  Acute cystitis without hematuria [N30.00]                   Date / Time: 2/5/2024  3:54 PM    Patient Admission Status: Inpatient   Readmission Risk (Low < 19, Mod (19-27), High > 27): Readmission Risk Score: 15.6    Current PCP: Ursula Lake MD  PCP verified by CM? Yes    Chart Reviewed: Yes      History Provided by: Patient  Patient Orientation: Alert and Oriented    Patient Cognition: Alert    Hospitalization in the last 30 days (Readmission):  No    If yes, Readmission Assessment in CM Navigator will be completed.    Advance Directives:      Code Status: Full Code   Patient's Primary Decision Maker is: Legal Next of Kin    Primary Decision Maker: Susan Craft - Child - [phone]    Secondary Decision Maker: Geoff Humphries - Child - [phone]    Discharge Planning:    Patient lives with: Children Type of Home: House  Primary Care Giver: Self  Patient Support Systems include: Children   Current Financial resources: Medicare  Current community resources: None  Current services prior to admission: Durable Medical Equipment (cane)            Current DME:              Type of Home Care services:  None    ADLS  Prior functional level: Independent in ADLs/IADLs  Current functional level: Independent in ADLs/IADLs    PT AM-PAC:   /24  OT AM-PAC:   /24    Family can provide assistance at DC: Yes  Would you like Case Management to discuss the discharge plan with any other family members/significant others, and if so, who? Yes  Plans to Return to Present Housing:

## 2024-02-06 NOTE — PLAN OF CARE
Problem: Discharge Planning  Goal: Discharge to home or other facility with appropriate resources  2/6/2024 1553 by Maya Mayers, RN  Note: She plans to return home with her daughter at discharge.      Problem: Skin/Tissue Integrity  Goal: Absence of new skin breakdown  Description: 1.  Monitor for areas of redness and/or skin breakdown  2.  Assess vascular access sites hourly  3.  Every 4-6 hours minimum:  Change oxygen saturation probe site  4.  Every 4-6 hours:  If on nasal continuous positive airway pressure, respiratory therapy assess nares and determine need for appliance change or resting period.  2/6/2024 1553 by Maya Mayers, RN  Note: Skin is intact. She turns and repositions on her own.      Problem: Safety - Adult  Goal: Free from fall injury  Note: She is a fall risk. Bed alarm on while in bed, and chair alarm on while up in chair. Call light in reach. Daughter at bedside.      Problem: Chronic Conditions and Co-morbidities  Goal: Patient's chronic conditions and co-morbidity symptoms are monitored and maintained or improved  Note: Cr 1.7.  Patient being treated for dehydration. IVF at 75/hr.

## 2024-02-06 NOTE — ED NOTES
DINNER TRAY ORDERED FOR PTBrooke Carlos RN  02/05/24 1357    
PURE WICK IN PLACE.      Brooke Ritter, RN  02/05/24 1927    
Verbal Response: Oriented  Best Motor Response: Obeys commands  [place] Coma Scale Score: 15  Active LDA's:   Peripheral IV 02/05/24 Left Antecubital (Active)   Site Assessment Clean, dry & intact 02/05/24 1426   Line Status Blood return noted 02/05/24 1426   Dressing Status New dressing applied 02/05/24 1426     PO Status: Regular  Pertinent or High Risk Medications/Drips: no   If Yes, please provide details:   Pending Blood Product Administration: no       You may also review the ED PT Care Timeline found under the Summary Nursing Index tab.    Recommendation    Pending orders see sign and held  Plan for Discharge (if known):   Additional Comments:    If any further questions, please call Sending RN at call ed .     Electronically signed by: Electronically signed by Brooke Ritter RN on 2/5/2024 at 6:21 PM      Brooke Ritter RN  02/05/24 2240    
[Hydrocodone-Acetaminophen] Nausea And Vomiting          Kenia Brown MD  02/05/24 6259

## 2024-02-07 VITALS
HEIGHT: 64 IN | HEART RATE: 61 BPM | WEIGHT: 252.43 LBS | OXYGEN SATURATION: 95 % | SYSTOLIC BLOOD PRESSURE: 155 MMHG | BODY MASS INDEX: 43.1 KG/M2 | DIASTOLIC BLOOD PRESSURE: 78 MMHG | RESPIRATION RATE: 18 BRPM | TEMPERATURE: 98.3 F

## 2024-02-07 LAB
ALBUMIN SERPL-MCNC: 3.1 G/DL (ref 3.4–5)
ANION GAP SERPL CALCULATED.3IONS-SCNC: 11 MMOL/L (ref 3–16)
BACTERIA UR CULT: ABNORMAL
BASOPHILS # BLD: 0 K/UL (ref 0–0.2)
BASOPHILS NFR BLD: 0.3 %
BUN SERPL-MCNC: 26 MG/DL (ref 7–20)
CALCIUM SERPL-MCNC: 9 MG/DL (ref 8.3–10.6)
CHLORIDE SERPL-SCNC: 101 MMOL/L (ref 99–110)
CO2 SERPL-SCNC: 26 MMOL/L (ref 21–32)
CREAT SERPL-MCNC: 1.4 MG/DL (ref 0.6–1.2)
DEPRECATED RDW RBC AUTO: 14.2 % (ref 12.4–15.4)
EOSINOPHIL # BLD: 0.2 K/UL (ref 0–0.6)
EOSINOPHIL NFR BLD: 2.3 %
GFR SERPLBLD CREATININE-BSD FMLA CKD-EPI: 38 ML/MIN/{1.73_M2}
GLUCOSE SERPL-MCNC: 121 MG/DL (ref 70–99)
HCT VFR BLD AUTO: 32.8 % (ref 36–48)
HGB BLD-MCNC: 11.1 G/DL (ref 12–16)
LYMPHOCYTES # BLD: 0.5 K/UL (ref 1–5.1)
LYMPHOCYTES NFR BLD: 7.3 %
MAGNESIUM SERPL-MCNC: 2.6 MG/DL (ref 1.8–2.4)
MCH RBC QN AUTO: 28.8 PG (ref 26–34)
MCHC RBC AUTO-ENTMCNC: 34 G/DL (ref 31–36)
MCV RBC AUTO: 84.9 FL (ref 80–100)
MONOCYTES # BLD: 0.6 K/UL (ref 0–1.3)
MONOCYTES NFR BLD: 9.2 %
NEUTROPHILS # BLD: 5.7 K/UL (ref 1.7–7.7)
NEUTROPHILS NFR BLD: 80.9 %
ORGANISM: ABNORMAL
PHOSPHATE SERPL-MCNC: 3 MG/DL (ref 2.5–4.9)
PLATELET # BLD AUTO: 123 K/UL (ref 135–450)
PMV BLD AUTO: 9 FL (ref 5–10.5)
POTASSIUM SERPL-SCNC: 3.9 MMOL/L (ref 3.5–5.1)
RBC # BLD AUTO: 3.86 M/UL (ref 4–5.2)
SODIUM SERPL-SCNC: 138 MMOL/L (ref 136–145)
WBC # BLD AUTO: 7 K/UL (ref 4–11)

## 2024-02-07 PROCEDURE — 80069 RENAL FUNCTION PANEL: CPT

## 2024-02-07 PROCEDURE — 97116 GAIT TRAINING THERAPY: CPT

## 2024-02-07 PROCEDURE — 6370000000 HC RX 637 (ALT 250 FOR IP): Performed by: STUDENT IN AN ORGANIZED HEALTH CARE EDUCATION/TRAINING PROGRAM

## 2024-02-07 PROCEDURE — 2580000003 HC RX 258

## 2024-02-07 PROCEDURE — 6370000000 HC RX 637 (ALT 250 FOR IP)

## 2024-02-07 PROCEDURE — 97162 PT EVAL MOD COMPLEX 30 MIN: CPT

## 2024-02-07 PROCEDURE — 97535 SELF CARE MNGMENT TRAINING: CPT

## 2024-02-07 PROCEDURE — 99239 HOSP IP/OBS DSCHRG MGMT >30: CPT | Performed by: HOSPITALIST

## 2024-02-07 PROCEDURE — 83735 ASSAY OF MAGNESIUM: CPT

## 2024-02-07 PROCEDURE — 2580000003 HC RX 258: Performed by: STUDENT IN AN ORGANIZED HEALTH CARE EDUCATION/TRAINING PROGRAM

## 2024-02-07 PROCEDURE — 97530 THERAPEUTIC ACTIVITIES: CPT

## 2024-02-07 PROCEDURE — 97166 OT EVAL MOD COMPLEX 45 MIN: CPT

## 2024-02-07 PROCEDURE — 36415 COLL VENOUS BLD VENIPUNCTURE: CPT

## 2024-02-07 PROCEDURE — 85025 COMPLETE CBC W/AUTO DIFF WBC: CPT

## 2024-02-07 RX ORDER — CIPROFLOXACIN 250 MG/1
250 TABLET, FILM COATED ORAL EVERY 12 HOURS SCHEDULED
Status: DISCONTINUED | OUTPATIENT
Start: 2024-02-07 | End: 2024-02-07 | Stop reason: HOSPADM

## 2024-02-07 RX ORDER — CIPROFLOXACIN 250 MG/1
250 TABLET, FILM COATED ORAL EVERY 12 HOURS SCHEDULED
Qty: 9 TABLET | Refills: 0 | Status: SHIPPED | OUTPATIENT
Start: 2024-02-07 | End: 2024-02-12

## 2024-02-07 RX ADMIN — APIXABAN 5 MG: 5 TABLET, FILM COATED ORAL at 09:33

## 2024-02-07 RX ADMIN — SOTALOL HYDROCHLORIDE 120 MG: 80 TABLET ORAL at 09:33

## 2024-02-07 RX ADMIN — ATORVASTATIN CALCIUM 20 MG: 20 TABLET, FILM COATED ORAL at 09:33

## 2024-02-07 RX ADMIN — SODIUM CHLORIDE, PRESERVATIVE FREE 10 ML: 5 INJECTION INTRAVENOUS at 09:44

## 2024-02-07 RX ADMIN — AMLODIPINE BESYLATE 5 MG: 5 TABLET ORAL at 09:33

## 2024-02-07 RX ADMIN — TAMOXIFEN CITRATE 20 MG: 10 TABLET ORAL at 09:33

## 2024-02-07 RX ADMIN — SODIUM CHLORIDE, POTASSIUM CHLORIDE, SODIUM LACTATE AND CALCIUM CHLORIDE: 600; 310; 30; 20 INJECTION, SOLUTION INTRAVENOUS at 00:23

## 2024-02-07 RX ADMIN — CIPROFLOXACIN 250 MG: 250 TABLET, FILM COATED ORAL at 09:33

## 2024-02-07 NOTE — PROGRESS NOTES
Internal Medicine Progress Note    Date: 2/6/2024  Patient: Diana Jin  [facility] Day: 1      CC: Fatigue (Pt to ED with complaint of fatigue, weakness, no appetite, and some mild confusion since Friday night. Pt states she had the RSV shot on Friday and ever since then has been feeling bad. Pt states she also stumbled and fell on Saturday. Denies hitting her head or injuring anything. Pt states she just wants to sleep all the time. )      Interval Hx   Patient had an episode of desaturation at SpO2 of 87%. No recurrent episodes while on NC 2 L. Fluids were held for possible CHF and fluid overload, but resumed this morning after evaluation revealed no concern for CHF. Patient seen and evaluated at bedside with no acute complaints. Denies being fever, SOB, swelling, chest pain, confusion, dysuria, or increased frequency.    Currently on NC 2L with 96% SpO2, stable otherwise. Labs reviewed, abnormal for:  BUN 30  Cr 1.5 (down from 1.7 yesterday)  eGFR 35  Mg 2.6  Albumin 2.7      HPI: \"Diana Jin is a 78 y.o. female, presented with fatigue. Patient has a PMHx of chronic A-fib, hypertension, congestive heart failure, pacemaker, and breast cancer.  Patient has a recent history of having an RSV shot a couple of days ago and she has been feeling fatigued, and has had shallow breathing.  She was given breathing treatments as well as dexamethasone and she felt that she was improving.  She later went back home but started feeling fatigued, had \"shallow breathing\", slightly confused, congested, and has been sleeping a lot as well as has been eating and drinking less since too.     At the ED, vitals were stable and she was saturating at 93% on RA.  Labs were pertinent for creatinine 1.7 with a baseline of 7.9, proBNP of 12,291 where 2 days ago which was 792.  Troponin 71->55.  UA showed bacteria +4, WBC 10-20, LE small, nitrite positive, protein 100.  CXR showed enlarged cardiac silhouette, small left pleural effusion 
    Internal Medicine Progress Note    Date: 2/7/2024  Patient: Diana Jin  [facility] Day: 2      CC: Fatigue (Pt to ED with complaint of fatigue, weakness, no appetite, and some mild confusion since Friday night. Pt states she had the RSV shot on Friday and ever since then has been feeling bad. Pt states she also stumbled and fell on Saturday. Denies hitting her head or injuring anything. Pt states she just wants to sleep all the time. )      Interval Hx   Patient seen and evaluated at bedside with no acute complaints. She reports feeling much better and more alert. Was able to walk up and down the luna with a walker. Denies being fever, SOB, swelling, chest pain, confusion, dysuria, or increased urinary frequency.    Currently on NC 2L with 97% SpO2, stable otherwise. No new labs since yesterday.    HPI: \"Diana Jin is a 78 y.o. female, presented with fatigue. Patient has a PMHx of chronic A-fib, hypertension, congestive heart failure, pacemaker, and breast cancer.  Patient has a recent history of having an RSV shot a couple of days ago and she has been feeling fatigued, and has had shallow breathing.  She was given breathing treatments as well as dexamethasone and she felt that she was improving.  She later went back home but started feeling fatigued, had \"shallow breathing\", slightly confused, congested, and has been sleeping a lot as well as has been eating and drinking less since too.     At the ED, vitals were stable and she was saturating at 93% on RA.  Labs were pertinent for creatinine 1.7 with a baseline of 7.9, proBNP of 12,291 where 2 days ago which was 792.  Troponin 71->55.  UA showed bacteria +4, WBC 10-20, LE small, nitrite positive, protein 100.  CXR showed enlarged cardiac silhouette, small left pleural effusion and mild pulmonary vascular congestion. EKG showed inverted T waves on inferior leads.     She will be admitted to [facility] to further manage her symptoms.\"    - per H&P  Objective 
..4 Eyes Skin Assessment     NAME:  Diana Jin  YOB: 1945  MEDICAL RECORD NUMBER:  [id number]    The patient is being assessed for  Admission    I agree that at least one RN has performed a thorough Head to Toe Skin Assessment on the patient. ALL assessment sites listed below have been assessed.      Areas assessed by both nurses:    Head, Face, Ears, Shoulders, Back, Chest, Arms, Elbows, Hands, Sacrum. Buttock, Coccyx, Ischium, and Legs. Feet and Heels        Does the Patient have a Wound? No noted wound(s)       Db Prevention initiated by RN: Yes  Wound Care Orders initiated by RN: No    Pressure Injury (Stage 3,4, Unstageable, DTI, NWPT, and Complex wounds) if present, place Wound referral order by RN under : No    New Ostomies, if present place, Ostomy referral order under : No     Nurse 1 eSignature: Electronically signed by Jess Dobbs RN on 2/6/24 at 3:08 AM EST    **SHARE this note so that the co-signing nurse can place an eSignature**    Nurse 2 eSignature: Electronically signed by Shadi Saeed RN on 2/6/24 at 6:58 AM EST      
Pt brought up to 6302 from ED.  Pt was oriented to staff, unit, and procedures.  Family at bedside and plan of care reviewed.  Vitals and assessment completed.  Pt placed on tele according to order.  Bed alarm on and call light reviewed with pt.  All questions and needs addressed at this time.  
Pt. Given and explained d/c paperwork. Pt. Explained medications changes and verbalized understanding. Pt.s telemetry taken off and IV taken out with no complications. PT/OT assisted pt. With getting dressed and sitting in the chair. Pt. Notified daughter of d/c. Pt. Was helped into wheelchair and taken to main lobby where her daughter awaited to transport her home. Pt. And daughter very satisfied with care.   
assessment today however limited 2/2 fatigue/dec'd endurance levels. Pt demo'd the ability to complete basic functional transfers and ambulate short distances with SBA<>supervision wtih a walker. She demo's no overt LOB however endorses mild unsteadiness and fatigue with activity. Education provided on energy conservation techniques to utilize as well as current recommend of use of walker for mobility purposes. Pt v.u/in agreement and plans to return home with family support. Recommend HHPT services to assist with the transition back to her living environment.  Treatment Diagnosis: dec'd mobility and dec'd act piedad  Therapy Prognosis: Good  Decision Making: Medium Complexity  Barriers to Learning: n/a  Requires PT Follow-Up: Yes  Activity Tolerance  Activity Tolerance: Patient limited by fatigue;Patient limited by endurance     Plan   Physical Therapy Plan  General Plan:  (2-5 times per week)  Current Treatment Recommendations: Strengthening, Home exercise program, Gait training, Balance training, Functional mobility training, Stair training, Safety education & training, Therapeutic activities, Transfer training, Patient/Caregiver education & training, Equipment evaluation, education, & procurement, Endurance training  Safety Devices  Type of Devices: Call light within reach, Chair alarm in place, Left in chair, All fall risk precautions in place, Patient at risk for falls  Restraints  Restraints Initially in Place: No     Restrictions  Position Activity Restriction  Other position/activity restrictions: up with assist     Subjective   General  Chart Reviewed: Yes  Patient assessed for rehabilitation services?: Yes  Additional Pertinent Hx: 77 y/o F presents with generalized fatigue and weakness following recent RSV vaccination. PMH:chronic A-fib, hypertension, congestive heart failure, pacemaker, and breast cancer.  Response To Previous Treatment: Not applicable  Family / Caregiver Present: No  Referring 
    Objective             Observation/Palpation  Posture: Fair  Safety Devices  Type of Devices: Call light within reach;Chair alarm in place;Left in chair;All fall risk precautions in place;Patient at risk for falls  Restraints  Restraints Initially in Place: No     Toilet Transfers  Toilet - Technique: Ambulating (with rolling walker and SBA/S to/from bathroom)  Equipment Used: Standard toilet (grab bar)  Toilet Transfer: Supervision  AROM:  (R UE =WFL L shoulder flex=90, elbow to hand=WFL)  Strength: Within functional limits  ADL  Grooming: Independent  UE Dressing: Independent  LE Dressing: Supervision  Toileting: Supervision  Functional Mobility: Stand by assistance;Supervision (functional mobility with rolling walker and SBA/S)  Skin Care: Bath wipes     Activity Tolerance  Activity Tolerance: Patient limited by fatigue;Patient limited by endurance  Bed mobility  Supine to Sit: Modified independent (head of bed elevated)  Scooting: Independent  Transfers  Sit to stand: Supervision  Stand to sit: Supervision  Vision  Vision: Impaired  Vision Exceptions: Wears glasses at all times  Hearing  Hearing: Within functional limits  Cognition  Overall Cognitive Status: WNL  Orientation  Overall Orientation Status: Within Normal Limits                  Education Given To: Patient  Education Provided: Role of Therapy;Plan of Care  Education Method: Demonstration;Verbal  Barriers to Learning: None;Cognition  Education Outcome: Verbalized understanding                        G-Code     OutComes Score                                                  AM-PAC - ADL  AM-PAC Daily Activity - Inpatient   How much help is needed for putting on and taking off regular lower body clothing?: None  How much help is needed for bathing (which includes washing, rinsing, drying)?: A Little  How much help is needed for toileting (which includes using toilet, bedpan, or urinal)?: None  How much help is needed for putting on and taking off

## 2024-02-07 NOTE — DISCHARGE SUMMARY
INTERNAL MEDICINE DEPARTMENT AT THE [facility]  DISCHARGE SUMMARY    Patient ID: Diana Jin                                             Discharge Date: 2/7/2024   Patient's PCP: Ursula Lake MD                                          Discharge Physician: Geoff Johnson MD  Admit Date: 2/5/2024   Admitting Physician: Geoff Johnson MD    PROBLEMS DURING HOSPITALIZATION:  Present on Admission:   UTI (urinary tract infection)   Paroxysmal atrial fibrillation (HCC)   Generalized weakness   TAMEKA (acute kidney injury) (HCC)   Dehydration   Morbid obesity (HCC)      DISCHARGE DIAGNOSES:    Fatigue from RSV shot  UTI  Hypertension  CHF  History of Atrial fibrillation    Hospital Course:  Fatigue from RSV Shot: Patient came in with severe fatigue, generalized weakness, decreased appetite, mild confusion, and mild SOB that began the same day she received an RSV vaccination. Workup included CXR which was negative for any acute changes or underlying interstitial lung disease. Also obtained a molecular viral panel, which is still pending. She was treated with fluid resuscitation and 2 albuterol puffs QID.     UTI: Urine cultures obtained during initial workup came back positive for pan-sensitive E. Coli. Urinalysis showed bacteria +4, WBCs 10-20, LE small, nitrite positive, and protien 100. She was initially treated with rocephin 1000 mg QD and switched to ciprofloxacin 250 mg BID to be completed on 2/11/24.    TAMEKA: Patient presented with an elevated creatinine of 1.7 with an estimated baseline on 0.9 possibly secondary to decreased PO intake. She was treated with fluid resuscitation and monitored renal function panels daily. On day of discharge her creatinine was down to 1.4.    HPI:  \"Diana Jin is a 78 y.o. female, presented with fatigue. Patient has a PMHx of chronic A-fib, hypertension, congestive heart failure, pacemaker, and breast cancer.  Patient has a recent history of having an RSV shot a couple of days ago

## 2024-02-07 NOTE — PLAN OF CARE
Problem: Discharge Planning  Goal: Discharge to home or other facility with appropriate resources  2/7/2024 1157 by Lexie Herrmann RN  Outcome: Adequate for Discharge  2/6/2024 2329 by Jess Dobbs RN  Outcome: Progressing     Problem: Skin/Tissue Integrity  Goal: Absence of new skin breakdown  Description: 1.  Monitor for areas of redness and/or skin breakdown  2.  Assess vascular access sites hourly  3.  Every 4-6 hours minimum:  Change oxygen saturation probe site  4.  Every 4-6 hours:  If on nasal continuous positive airway pressure, respiratory therapy assess nares and determine need for appliance change or resting period.  2/7/2024 1157 by Lexie Herrmann, ADONIS  Outcome: Adequate for Discharge  2/6/2024 2329 by Jess Dobbs RN  Outcome: Progressing     Problem: ABCDS Injury Assessment  Goal: Absence of physical injury  2/7/2024 1157 by Lexie Herrmann, ADONIS  Outcome: Adequate for Discharge  2/6/2024 2329 by Jess Dobbs RN  Outcome: Progressing     Problem: Safety - Adult  Goal: Free from fall injury  Outcome: Adequate for Discharge     Problem: Chronic Conditions and Co-morbidities  Goal: Patient's chronic conditions and co-morbidity symptoms are monitored and maintained or improved  Outcome: Adequate for Discharge

## 2024-02-07 NOTE — PLAN OF CARE
Problem: Discharge Planning  Goal: Discharge to home or other facility with appropriate resources  2/6/2024 2329 by Jess Dobbs, RN  Outcome: Progressing     Problem: Skin/Tissue Integrity  Goal: Absence of new skin breakdown  Description: 1.  Monitor for areas of redness and/or skin breakdown  2.  Assess vascular access sites hourly  3.  Every 4-6 hours minimum:  Change oxygen saturation probe site  4.  Every 4-6 hours:  If on nasal continuous positive airway pressure, respiratory therapy assess nares and determine need for appliance change or resting period.  2/6/2024 2329 by Jess Dobbs, RN  Outcome: Progressing     Problem: ABCDS Injury Assessment  Goal: Absence of physical injury  Outcome: Progressing

## 2024-02-07 NOTE — DISCHARGE INSTRUCTIONS
Please take the antibiotic Ciprofloxacin 250 mg twice daily until the course is completed for your UTI. Do not take with dairy products or calcium-fortified juices with this medication.  Please follow up with your primary care doctor Dr Ursula Lake within two weeks. Please have her review your outpatient blood pressure medications.

## 2024-02-08 ENCOUNTER — CARE COORDINATION (OUTPATIENT)
Dept: CASE MANAGEMENT | Age: 79
End: 2024-02-08

## 2024-02-08 NOTE — CARE COORDINATION
[facility] Transitions Initial Follow Up Call    Call within 2 business days of discharge: Yes    Patient:  SHE MOJICA   Patient :  1945  MRN:  [id number]    Reason for Admission:  UTI, TAMEKA, Dehydration  Discharge Date:  24   RARS:       Transitions of Care Initial Call    Was this an external facility discharge?     Discharge Facility: [facility]      Challenges to be reviewed by the provider   Additional needs identified to be addressed with provider:    no    AMB CC Provider Discharge Needs: none            Non-face-to-face services provided:    1ST CTC attempt to reach Pt regarding recent hospital discharge.  CTC left voice recording with call back number requesting a call back.    Follow up appointments:    Future Appointments   Date Time Provider Department Center   2024  2:30 PM SCHEDULE, AGA DEVICE CHECK [place] [facility]   2024  3:00 PM Consuelo Dawn APRN - CNP [place] [facility]   2024  1:00 PM Ursula Lake MD [facility] [address] [place] - [place]     Thank You,    Kathrine Bangura RN  Care Transition Coordinator  Contact Number:[phone]

## 2024-02-09 ENCOUNTER — CARE COORDINATION (OUTPATIENT)
Dept: CASE MANAGEMENT | Age: 79
End: 2024-02-09

## 2024-02-09 LAB
FLUAV RNA SPEC QL NAA+PROBE: NOT DETECTED
FLUBV RNA SPEC QL NAA+PROBE: NOT DETECTED
RSV RNA SPEC QL NAA+PROBE: NOT DETECTED
RSV SOURCE: NORMAL

## 2024-02-09 NOTE — CARE COORDINATION
[facility] Transitions Initial Follow Up Call    Call within 2 business days of discharge: Yes    Patient:  SHE MOJICA   Patient :  1945  MRN:  [id number]    Reason for Admission: UTI, TAMEKA, Dehydration,   Discharge Date:  24   RARS:       Transitions of Care Initial Call    Was this an external facility discharge?     Discharge Facility: [facility]      Challenges to be reviewed by the provider   Additional needs identified to be addressed with provider:    no    AMB CC Provider Discharge Needs: none            Non-face-to-face services provided:    2ND CTC attempt to reach Pt regarding recent hospital discharge.  CTC left voice recording with call back number requesting a call back.   CTN will close out CTN episode at this time.    Follow up appointments:    Future Appointments   Date Time Provider Department Center   2024  2:30 PM SCHEDULE, AGA DEVICE CHECK [place] [facility]   2024  3:00 PM Consuelo Dawn APRN - CNP [place] [facility]   2024  1:00 PM Ursula Lake MD [address] [place] - [place]     Thank You,    Kathrine Bangura RN  Care Transition Coordinator  Contact Number:[phone]

## 2024-03-01 ENCOUNTER — OFFICE VISIT (OUTPATIENT)
Dept: INTERNAL MEDICINE CLINIC | Age: 79
End: 2024-03-01
Payer: COMMERCIAL

## 2024-03-01 VITALS
SYSTOLIC BLOOD PRESSURE: 122 MMHG | BODY MASS INDEX: 42.51 KG/M2 | HEIGHT: 64 IN | OXYGEN SATURATION: 97 % | TEMPERATURE: 97.5 F | DIASTOLIC BLOOD PRESSURE: 70 MMHG | WEIGHT: 249 LBS

## 2024-03-01 DIAGNOSIS — I48.20 CHRONIC ATRIAL FIBRILLATION (HCC): ICD-10-CM

## 2024-03-01 DIAGNOSIS — G62.9 NEUROPATHY: ICD-10-CM

## 2024-03-01 DIAGNOSIS — Z09 HOSPITAL DISCHARGE FOLLOW-UP: Primary | ICD-10-CM

## 2024-03-01 DIAGNOSIS — Z00.00 ANNUAL PHYSICAL EXAM: ICD-10-CM

## 2024-03-01 DIAGNOSIS — I50.30 DIASTOLIC HEART FAILURE, UNSPECIFIED HF CHRONICITY (HCC): ICD-10-CM

## 2024-03-01 DIAGNOSIS — N17.9 AKI (ACUTE KIDNEY INJURY) (HCC): ICD-10-CM

## 2024-03-01 DIAGNOSIS — I10 PRIMARY HYPERTENSION: ICD-10-CM

## 2024-03-01 DIAGNOSIS — Z86.39 PERSONAL HISTORY OF OTHER ENDOCRINE, NUTRITIONAL AND METABOLIC DISEASE: ICD-10-CM

## 2024-03-01 DIAGNOSIS — E66.9 OBESITY (BMI 30-39.9): ICD-10-CM

## 2024-03-01 PROCEDURE — 1111F DSCHRG MED/CURRENT MED MERGE: CPT | Performed by: STUDENT IN AN ORGANIZED HEALTH CARE EDUCATION/TRAINING PROGRAM

## 2024-03-01 PROCEDURE — 3074F SYST BP LT 130 MM HG: CPT | Performed by: STUDENT IN AN ORGANIZED HEALTH CARE EDUCATION/TRAINING PROGRAM

## 2024-03-01 PROCEDURE — 3078F DIAST BP <80 MM HG: CPT | Performed by: STUDENT IN AN ORGANIZED HEALTH CARE EDUCATION/TRAINING PROGRAM

## 2024-03-01 PROCEDURE — 1123F ACP DISCUSS/DSCN MKR DOCD: CPT | Performed by: STUDENT IN AN ORGANIZED HEALTH CARE EDUCATION/TRAINING PROGRAM

## 2024-03-01 PROCEDURE — 99214 OFFICE O/P EST MOD 30 MIN: CPT | Performed by: STUDENT IN AN ORGANIZED HEALTH CARE EDUCATION/TRAINING PROGRAM

## 2024-03-01 RX ORDER — LOSARTAN POTASSIUM AND HYDROCHLOROTHIAZIDE 25; 100 MG/1; MG/1
1 TABLET ORAL DAILY
COMMUNITY

## 2024-03-01 RX ORDER — PREGABALIN 225 MG/1
225 CAPSULE ORAL NIGHTLY
Qty: 90 CAPSULE | Refills: 0 | Status: SHIPPED | OUTPATIENT
Start: 2024-03-01 | End: 2024-05-30

## 2024-03-01 RX ORDER — PREGABALIN 225 MG/1
225 CAPSULE ORAL NIGHTLY
COMMUNITY
End: 2024-03-01 | Stop reason: SDUPTHER

## 2024-03-01 ASSESSMENT — PATIENT HEALTH QUESTIONNAIRE - PHQ9
SUM OF ALL RESPONSES TO PHQ QUESTIONS 1-9: 0
1. LITTLE INTEREST OR PLEASURE IN DOING THINGS: 0
SUM OF ALL RESPONSES TO PHQ9 QUESTIONS 1 & 2: 0
2. FEELING DOWN, DEPRESSED OR HOPELESS: 0

## 2024-03-01 ASSESSMENT — ENCOUNTER SYMPTOMS
VOMITING: 0
DIARRHEA: 0
EYE PAIN: 0
SINUS PAIN: 0
CHEST TIGHTNESS: 0
SHORTNESS OF BREATH: 0
CONSTIPATION: 0
ABDOMINAL PAIN: 0

## 2024-03-01 NOTE — ASSESSMENT & PLAN NOTE
Patient with recent hospitalization for UTI, TAMEKA.  She feels back to her baseline.  - Will recheck renal function

## 2024-03-01 NOTE — ASSESSMENT & PLAN NOTE
Recent admission with TAMEKA.  Baseline creatinine around 1.  Patient was likely dehydrated at the time.  - Recheck BMP

## 2024-03-01 NOTE — ASSESSMENT & PLAN NOTE
This problem is stable.  She has a history of diastolic heart failure.  Follows with Dr. Gregory.  - Continue Lasix and Hyzaar

## 2024-03-01 NOTE — ASSESSMENT & PLAN NOTE
Patient's weight has been stable.  Does not exercise or restrict her diet in any way.  - Discussed using a walker or other safe assistive devices to enable more physical activity  - Patient was given reading materials about starting a weight loss plan and the DASH diet

## 2024-03-01 NOTE — ASSESSMENT & PLAN NOTE
Blood pressure is well-controlled on current regimen.  - Continue Hyzaar, amlodipine, Lasix  - Discussed DASH diet and exercise regimen

## 2024-03-01 NOTE — PROGRESS NOTES
3/1/2024    Diana Jin (:  1945) is a 78 y.o. female, here for evaluation of the following medical concerns:    Chief Complaint   Patient presents with    Follow-Up from Hospital        HPI      Patient presented to ED 2/3 with the shakes, thought she was dying, says she couldn't breathe within 12 hours after getting RSV shot. They gave her steroid, inhaler, breathing tx and was discharged home.    Then  they returned to the ED when she started to become confused and very fatigued. She couldn't stay awake. Patient was admitted to the hospital 2024 to 2024 for mild shortness of breath, mild confusion, severe fatigue, and generalized weakness. She was treated for fatigue likely secondary to RSV shot, UTI, TAMEKA.  She was treated for UTI with Rocephin which was switched to Cipro.  She was treated with fluid resuscitation for her TAMEKA, with her creatinine down to 1.4 on the day of discharge.      Review of Systems   Constitutional:  Negative for fatigue and unexpected weight change.   HENT:  Negative for hearing loss and sinus pain.    Eyes:  Negative for pain.   Respiratory:  Negative for chest tightness and shortness of breath.    Cardiovascular:  Negative for chest pain and palpitations.   Gastrointestinal:  Negative for abdominal pain, constipation, diarrhea and vomiting.   Genitourinary:  Negative for difficulty urinating and vaginal bleeding.   Musculoskeletal:  Negative for myalgias.   Neurological:  Negative for dizziness and weakness.   Psychiatric/Behavioral:  Negative for dysphoric mood.        Prior to Visit Medications    Medication Sig Taking? Authorizing Provider   pregabalin (LYRICA) 225 MG capsule Take 1 capsule by mouth at bedtime for 90 days. Max Daily Amount: 225 mg Yes Rekha Nieto MD   losartan-hydroCHLOROthiazide (HYZAAR) 100-25 MG per tablet Take 1 tablet by mouth daily Yes ProviderJesus Alberto MD   albuterol sulfate HFA (VENTOLIN HFA) 108 (90 Base) MCG/ACT inhaler

## 2024-03-05 DIAGNOSIS — I10 PRIMARY HYPERTENSION: ICD-10-CM

## 2024-03-05 DIAGNOSIS — N17.9 AKI (ACUTE KIDNEY INJURY) (HCC): ICD-10-CM

## 2024-03-05 DIAGNOSIS — Z86.39 PERSONAL HISTORY OF OTHER ENDOCRINE, NUTRITIONAL AND METABOLIC DISEASE: ICD-10-CM

## 2024-03-05 DIAGNOSIS — I50.30 DIASTOLIC HEART FAILURE, UNSPECIFIED HF CHRONICITY (HCC): ICD-10-CM

## 2024-03-05 DIAGNOSIS — Z00.00 ANNUAL PHYSICAL EXAM: ICD-10-CM

## 2024-03-05 LAB
25(OH)D3 SERPL-MCNC: 54.7 NG/ML
ALBUMIN SERPL-MCNC: 3.8 G/DL (ref 3.4–5)
ALBUMIN/GLOB SERPL: 1.7 {RATIO} (ref 1.1–2.2)
ALP SERPL-CCNC: 61 U/L (ref 40–129)
ALT SERPL-CCNC: 15 U/L (ref 10–40)
ANION GAP SERPL CALCULATED.3IONS-SCNC: 13 MMOL/L (ref 3–16)
AST SERPL-CCNC: 20 U/L (ref 15–37)
BASOPHILS # BLD: 0.1 K/UL (ref 0–0.2)
BASOPHILS NFR BLD: 2.3 %
BILIRUB SERPL-MCNC: 0.4 MG/DL (ref 0–1)
BUN SERPL-MCNC: 26 MG/DL (ref 7–20)
CALCIUM SERPL-MCNC: 9.3 MG/DL (ref 8.3–10.6)
CHLORIDE SERPL-SCNC: 103 MMOL/L (ref 99–110)
CHOLEST SERPL-MCNC: 147 MG/DL (ref 0–199)
CO2 SERPL-SCNC: 28 MMOL/L (ref 21–32)
CREAT SERPL-MCNC: 1.4 MG/DL (ref 0.6–1.2)
CREAT UR-MCNC: 249.1 MG/DL (ref 28–259)
DEPRECATED RDW RBC AUTO: 14.9 % (ref 12.4–15.4)
EOSINOPHIL # BLD: 0.2 K/UL (ref 0–0.6)
EOSINOPHIL NFR BLD: 3 %
GFR SERPLBLD CREATININE-BSD FMLA CKD-EPI: 38 ML/MIN/{1.73_M2}
GLUCOSE SERPL-MCNC: 114 MG/DL (ref 70–99)
HCT VFR BLD AUTO: 35.1 % (ref 36–48)
HDLC SERPL-MCNC: 44 MG/DL (ref 40–60)
HGB BLD-MCNC: 12.1 G/DL (ref 12–16)
LDL CHOLESTEROL CALCULATED: 55 MG/DL
LYMPHOCYTES # BLD: 1.5 K/UL (ref 1–5.1)
LYMPHOCYTES NFR BLD: 24.1 %
MCH RBC QN AUTO: 29.6 PG (ref 26–34)
MCHC RBC AUTO-ENTMCNC: 34.4 G/DL (ref 31–36)
MCV RBC AUTO: 85.9 FL (ref 80–100)
MICROALBUMIN UR DL<=1MG/L-MCNC: 9.9 MG/DL
MICROALBUMIN/CREAT UR: 39.7 MG/G (ref 0–30)
MONOCYTES # BLD: 0.5 K/UL (ref 0–1.3)
MONOCYTES NFR BLD: 7.6 %
NEUTROPHILS # BLD: 4 K/UL (ref 1.7–7.7)
NEUTROPHILS NFR BLD: 63 %
PLATELET # BLD AUTO: 175 K/UL (ref 135–450)
PMV BLD AUTO: 8.9 FL (ref 5–10.5)
POTASSIUM SERPL-SCNC: 4.1 MMOL/L (ref 3.5–5.1)
PROT SERPL-MCNC: 6.1 G/DL (ref 6.4–8.2)
RBC # BLD AUTO: 4.08 M/UL (ref 4–5.2)
SODIUM SERPL-SCNC: 144 MMOL/L (ref 136–145)
TRIGL SERPL-MCNC: 242 MG/DL (ref 0–150)
TSH SERPL DL<=0.005 MIU/L-ACNC: 3.78 UIU/ML (ref 0.27–4.2)
VLDLC SERPL CALC-MCNC: 48 MG/DL
WBC # BLD AUTO: 6.4 K/UL (ref 4–11)

## 2024-03-05 PROCEDURE — 93294 REM INTERROG EVL PM/LDLS PM: CPT | Performed by: INTERNAL MEDICINE

## 2024-03-05 PROCEDURE — 93296 REM INTERROG EVL PM/IDS: CPT | Performed by: INTERNAL MEDICINE

## 2024-03-06 DIAGNOSIS — N17.9 AKI (ACUTE KIDNEY INJURY) (HCC): Primary | ICD-10-CM

## 2024-03-06 PROBLEM — N39.0 UTI (URINARY TRACT INFECTION): Status: RESOLVED | Noted: 2024-02-05 | Resolved: 2024-03-06

## 2024-03-06 LAB
EST. AVERAGE GLUCOSE BLD GHB EST-MCNC: 108.3 MG/DL
HBA1C MFR BLD: 5.4 %

## 2024-03-07 PROBLEM — E86.0 DEHYDRATION: Status: RESOLVED | Noted: 2024-02-06 | Resolved: 2024-03-07

## 2024-03-11 DIAGNOSIS — I48.0 PAROXYSMAL ATRIAL FIBRILLATION (HCC): ICD-10-CM

## 2024-03-11 DIAGNOSIS — I48.92 PAROXYSMAL ATRIAL FLUTTER (HCC): ICD-10-CM

## 2024-03-11 RX ORDER — SOTALOL HYDROCHLORIDE 120 MG/1
TABLET ORAL
Qty: 180 TABLET | Refills: 3 | Status: SHIPPED | OUTPATIENT
Start: 2024-03-11

## 2024-03-11 NOTE — TELEPHONE ENCOUNTER
Requested Prescriptions     Pending Prescriptions Disp Refills    sotalol (BETAPACE) 120 MG tablet 180 tablet 3     Sig: TAKE ONE TABLET BY MOUTH TWICE A DAY       Last Office Visit: 10/26/2023     Next Office Visit: 4/16/2024

## 2024-03-12 ENCOUNTER — PATIENT MESSAGE (OUTPATIENT)
Dept: INTERNAL MEDICINE CLINIC | Age: 79
End: 2024-03-12

## 2024-03-12 DIAGNOSIS — N17.9 AKI (ACUTE KIDNEY INJURY) (HCC): Primary | ICD-10-CM

## 2024-03-19 ENCOUNTER — CARE COORDINATION (OUTPATIENT)
Dept: CARE COORDINATION | Age: 79
End: 2024-03-19

## 2024-03-19 ENCOUNTER — TELEPHONE (OUTPATIENT)
Dept: CARDIOLOGY CLINIC | Age: 79
End: 2024-03-19

## 2024-03-19 NOTE — CARE COORDINATION
SUKHJINDER/RN received in basket Jonas sanchez Amy Cundiff, Katerina Chu, Katerina Guerra  This patient was referred by PCP for RPM/ACM enrollment. Please outreach to patient.    Thanks  Marni SLAUGHTER/RN attempted outreach to patient. No answer, HIPPA compliant voice mail left with contact information and requesting a return call.    Care Coordination Program opened at this time. ACM added to TX team. Next outreach in just a few days.    Katerina PATINO, RN  Ambulatory Care Manager  [email or web address]  [phone]

## 2024-03-22 NOTE — PROGRESS NOTES
admit that in February she had gotten a vaccination for the RSV and developed increased shortness of breath later that evening and had to go to the emergency room.  They did put her on steroids and 2 days later she ended up back in the hospital with a UTI.  She states that she was sick for quite some time she was she has not had any episodes of atrial fibrillation since April 1 and that episode lasted less than 2 hours.  She denies heart racing, palpitations or irregular heartbeats.  Her blood pressure is well-controlled in the office today.  She is dealing with a lot of back and neck pain and this has been ongoing.  She also has been dealing with neuropathy in her feet.  She had been changed from tamoxifen to anastrozole and felt that this worsened her neuropathy and she has since discontinued it.  She is due to follow-up with the oncologist to have a discussion.  She denies any chest pain, shortness of breath, PND, orthopnea or lower extremity edema.      Home medications:   Current Outpatient Medications on File Prior to Visit   Medication Sig Dispense Refill    ondansetron (ZOFRAN) 8 MG tablet as needed for Nausea or Vomiting      losartan-hydroCHLOROthiazide (HYZAAR) 100-25 MG per tablet TAKE 1 TABLET BY MOUTH DAILY 90 tablet 2    sotalol (BETAPACE) 120 MG tablet TAKE ONE TABLET BY MOUTH TWICE A  tablet 3    pregabalin (LYRICA) 225 MG capsule Take 1 capsule by mouth at bedtime for 90 days. Max Daily Amount: 225 mg (Patient taking differently: Take 1 capsule by mouth daily.) 90 capsule 0    pregabalin (LYRICA) 150 MG capsule TAKE ONE CAPSULE BY MOUTH EVERY MORNING AND TAKE 1 CAPSULE BY MOUTH DAILY IN THE EARLY AFTERNOON AS DIRECTED (Patient taking differently: in the morning and at bedtime. TAKE ONE CAPSULE BY MOUTH EVERY MORNING AND TAKE 1 CAPSULE BY MOUTH DAILY IN THE EARLY AFTERNOON AS DIRECTED) 180 capsule 1    KLOR-CON M20 20 MEQ extended release tablet TAKE 1 TABLET BY MOUTH TWICE A DAY (Patient

## 2024-03-25 ENCOUNTER — TELEPHONE (OUTPATIENT)
Dept: CARDIOLOGY CLINIC | Age: 79
End: 2024-03-25

## 2024-03-31 PROBLEM — Z00.00 ANNUAL PHYSICAL EXAM: Status: RESOLVED | Noted: 2024-03-01 | Resolved: 2024-03-31

## 2024-03-31 PROBLEM — Z09 HOSPITAL DISCHARGE FOLLOW-UP: Status: RESOLVED | Noted: 2024-03-01 | Resolved: 2024-03-31

## 2024-04-02 ENCOUNTER — CARE COORDINATION (OUTPATIENT)
Dept: CARE COORDINATION | Age: 79
End: 2024-04-02

## 2024-04-02 NOTE — CARE COORDINATION
ACM/RN made outreach today. Spoke to pt's daughter Susan. Pt lives with dtr. Reviewed upcoming apts, Dtr V/U. Daughter stating Care Coordination not required at this time. CC Program concluded, SUKHJINDER removed from TX team. All questions answered, no other concerns.     Katerina CHENN, RN  Ambulatory Care Manager  [email or web address]  [phone]

## 2024-04-09 RX ORDER — LOSARTAN POTASSIUM AND HYDROCHLOROTHIAZIDE 25; 100 MG/1; MG/1
1 TABLET ORAL DAILY
Qty: 90 TABLET | Refills: 2 | Status: SHIPPED | OUTPATIENT
Start: 2024-04-09

## 2024-04-15 ENCOUNTER — TELEPHONE (OUTPATIENT)
Dept: CARDIOLOGY CLINIC | Age: 79
End: 2024-04-15

## 2024-04-16 ENCOUNTER — OFFICE VISIT (OUTPATIENT)
Dept: CARDIOLOGY CLINIC | Age: 79
End: 2024-04-16
Payer: COMMERCIAL

## 2024-04-16 ENCOUNTER — NURSE ONLY (OUTPATIENT)
Dept: CARDIOLOGY CLINIC | Age: 79
End: 2024-04-16

## 2024-04-16 VITALS
SYSTOLIC BLOOD PRESSURE: 106 MMHG | BODY MASS INDEX: 43.8 KG/M2 | DIASTOLIC BLOOD PRESSURE: 64 MMHG | HEART RATE: 64 BPM | WEIGHT: 255.2 LBS

## 2024-04-16 DIAGNOSIS — I48.0 PAROXYSMAL ATRIAL FIBRILLATION (HCC): Primary | ICD-10-CM

## 2024-04-16 DIAGNOSIS — I49.5 SSS (SICK SINUS SYNDROME) (HCC): ICD-10-CM

## 2024-04-16 DIAGNOSIS — Z95.0 PACEMAKER: ICD-10-CM

## 2024-04-16 DIAGNOSIS — Z79.01 ON CONTINUOUS ORAL ANTICOAGULATION: ICD-10-CM

## 2024-04-16 DIAGNOSIS — I25.10 CORONARY ARTERY DISEASE INVOLVING NATIVE CORONARY ARTERY OF NATIVE HEART WITHOUT ANGINA PECTORIS: ICD-10-CM

## 2024-04-16 DIAGNOSIS — I49.5 TACHY-BRADY SYNDROME (HCC): ICD-10-CM

## 2024-04-16 DIAGNOSIS — Z51.81 ENCOUNTER FOR MONITORING SOTALOL THERAPY: ICD-10-CM

## 2024-04-16 DIAGNOSIS — Z79.899 ENCOUNTER FOR MONITORING SOTALOL THERAPY: ICD-10-CM

## 2024-04-16 DIAGNOSIS — I47.19 ATRIAL TACHYCARDIA (HCC): ICD-10-CM

## 2024-04-16 DIAGNOSIS — I48.0 PAROXYSMAL ATRIAL FIBRILLATION (HCC): ICD-10-CM

## 2024-04-16 DIAGNOSIS — I48.92 PAROXYSMAL ATRIAL FLUTTER (HCC): ICD-10-CM

## 2024-04-16 DIAGNOSIS — Z95.0 CARDIAC PACEMAKER IN SITU: Primary | ICD-10-CM

## 2024-04-16 PROCEDURE — 99215 OFFICE O/P EST HI 40 MIN: CPT | Performed by: NURSE PRACTITIONER

## 2024-04-16 PROCEDURE — 93000 ELECTROCARDIOGRAM COMPLETE: CPT | Performed by: NURSE PRACTITIONER

## 2024-04-16 PROCEDURE — 3074F SYST BP LT 130 MM HG: CPT | Performed by: NURSE PRACTITIONER

## 2024-04-16 PROCEDURE — 1123F ACP DISCUSS/DSCN MKR DOCD: CPT | Performed by: NURSE PRACTITIONER

## 2024-04-16 PROCEDURE — 3078F DIAST BP <80 MM HG: CPT | Performed by: NURSE PRACTITIONER

## 2024-04-16 RX ORDER — ONDANSETRON HYDROCHLORIDE 8 MG/1
TABLET, FILM COATED ORAL PRN
COMMUNITY
Start: 2024-04-09

## 2024-04-22 RX ORDER — AMLODIPINE BESYLATE 5 MG/1
10 TABLET ORAL
Qty: 180 TABLET | Refills: 2 | Status: SHIPPED | OUTPATIENT
Start: 2024-04-22

## 2024-04-25 RX ORDER — FUROSEMIDE 20 MG/1
TABLET ORAL
Qty: 180 TABLET | Refills: 2 | Status: SHIPPED | OUTPATIENT
Start: 2024-04-25

## 2024-05-14 ENCOUNTER — PATIENT MESSAGE (OUTPATIENT)
Dept: INTERNAL MEDICINE CLINIC | Age: 79
End: 2024-05-14

## 2024-05-14 DIAGNOSIS — G89.29 CHRONIC UPPER BACK PAIN: Primary | ICD-10-CM

## 2024-05-14 DIAGNOSIS — M54.9 CHRONIC UPPER BACK PAIN: Primary | ICD-10-CM

## 2024-05-14 NOTE — TELEPHONE ENCOUNTER
From: Diana Jin  To: Dr. Ursula Lake  Sent: 5/14/2024 1:31 PM EDT  Subject: pain management    Fredis Vergara:  This is Jaymie's daughter Susan Craft. She is really struggling with her pain in her upper back area. She is not getting around very well and is sitting in her chair most of the day and just getting up to go to the bathroom. I know you mentioned a physiatrist but with her health plan I can't find one close by. [facility] pain management will evaluate to possibly see her if she has a referral with her test results which I would be happy to send over to them if you would do a referral. phone [phone] and fax [phone]. I am at a loss and feel so bad for her.  Thank you for your time regarding this. If you would prefer to talk about this you can call me at [phone] or set up an appointment for her.   Sincerely,  Susan Craft

## 2024-05-17 ENCOUNTER — HOSPITAL ENCOUNTER (OUTPATIENT)
Age: 79
End: 2024-05-17
Payer: COMMERCIAL

## 2024-05-17 ENCOUNTER — HOSPITAL ENCOUNTER (OUTPATIENT)
Dept: WOMENS IMAGING | Age: 79
Discharge: HOME | End: 2024-05-17
Payer: COMMERCIAL

## 2024-05-17 VITALS — BODY MASS INDEX: 43.54 KG/M2 | WEIGHT: 255 LBS | HEIGHT: 64 IN

## 2024-05-17 VITALS
DIASTOLIC BLOOD PRESSURE: 64 MMHG | BODY MASS INDEX: 43.54 KG/M2 | WEIGHT: 255 LBS | SYSTOLIC BLOOD PRESSURE: 106 MMHG | HEIGHT: 64 IN

## 2024-05-17 DIAGNOSIS — G62.9 NEUROPATHY: ICD-10-CM

## 2024-05-17 DIAGNOSIS — Z12.31 SCREENING MAMMOGRAM FOR BREAST CANCER: ICD-10-CM

## 2024-05-17 DIAGNOSIS — I48.0 PAROXYSMAL ATRIAL FIBRILLATION (HCC): ICD-10-CM

## 2024-05-17 LAB
ECHO AO ASC DIAM: 3.2 CM
ECHO AO ASCENDING AORTA INDEX: 1.47 CM/M2
ECHO AO ROOT DIAM: 2.6 CM
ECHO AO ROOT INDEX: 1.2 CM/M2
ECHO AV AREA PEAK VELOCITY: 2.6 CM2
ECHO AV AREA VTI: 2.6 CM2
ECHO AV AREA/BSA PEAK VELOCITY: 1.2 CM2/M2
ECHO AV AREA/BSA VTI: 1.2 CM2/M2
ECHO AV MEAN GRADIENT: 3 MMHG
ECHO AV MEAN VELOCITY: 0.8 M/S
ECHO AV PEAK GRADIENT: 6 MMHG
ECHO AV PEAK VELOCITY: 1.3 M/S
ECHO AV VELOCITY RATIO: 0.69
ECHO AV VTI: 28.2 CM
ECHO BSA: 2.29 M2
ECHO EST RA PRESSURE: 8 MMHG
ECHO IVC EXP: 1.7 CM
ECHO IVC INSP: 1.5 CM
ECHO LA AREA 2C: 25.5 CM2
ECHO LA AREA 4C: 23.6 CM2
ECHO LA MAJOR AXIS: 6.3 CM
ECHO LA MINOR AXIS: 6.4 CM
ECHO LA VOL BP: 75 ML (ref 22–52)
ECHO LA VOL MOD A2C: 81 ML (ref 22–52)
ECHO LA VOL MOD A4C: 69 ML (ref 22–52)
ECHO LA VOL/BSA BIPLANE: 35 ML/M2 (ref 16–34)
ECHO LA VOLUME INDEX MOD A2C: 37 ML/M2 (ref 16–34)
ECHO LA VOLUME INDEX MOD A4C: 32 ML/M2 (ref 16–34)
ECHO LV E' LATERAL VELOCITY: 10 CM/S
ECHO LV E' SEPTAL VELOCITY: 5 CM/S
ECHO LV EDV A2C: 87 ML
ECHO LV EDV A4C: 96 ML
ECHO LV EDV INDEX A4C: 44 ML/M2
ECHO LV EDV NDEX A2C: 40 ML/M2
ECHO LV EJECTION FRACTION A2C: 61 %
ECHO LV EJECTION FRACTION A4C: 59 %
ECHO LV EJECTION FRACTION BIPLANE: 59 % (ref 55–100)
ECHO LV ESV A2C: 34 ML
ECHO LV ESV A4C: 39 ML
ECHO LV ESV INDEX A2C: 16 ML/M2
ECHO LV ESV INDEX A4C: 18 ML/M2
ECHO LV FRACTIONAL SHORTENING: 32 % (ref 28–44)
ECHO LV INTERNAL DIMENSION DIASTOLE INDEX: 2.3 CM/M2
ECHO LV INTERNAL DIMENSION DIASTOLIC: 5 CM (ref 3.9–5.3)
ECHO LV INTERNAL DIMENSION SYSTOLIC INDEX: 1.57 CM/M2
ECHO LV INTERNAL DIMENSION SYSTOLIC: 3.4 CM
ECHO LV IVSD: 1.1 CM (ref 0.6–0.9)
ECHO LV MASS 2D: 220.3 G (ref 67–162)
ECHO LV MASS INDEX 2D: 101.5 G/M2 (ref 43–95)
ECHO LV POSTERIOR WALL DIASTOLIC: 1.2 CM (ref 0.6–0.9)
ECHO LV RELATIVE WALL THICKNESS RATIO: 0.48
ECHO LVOT AREA: 3.5 CM2
ECHO LVOT AV VTI INDEX: 0.74
ECHO LVOT DIAM: 2.1 CM
ECHO LVOT MEAN GRADIENT: 2 MMHG
ECHO LVOT PEAK GRADIENT: 4 MMHG
ECHO LVOT PEAK VELOCITY: 0.9 M/S
ECHO LVOT STROKE VOLUME INDEX: 33.2 ML/M2
ECHO LVOT SV: 72 ML
ECHO LVOT VTI: 20.8 CM
ECHO MV A VELOCITY: 0.51 M/S
ECHO MV AREA VTI: 2.2 CM2
ECHO MV E DECELERATION TIME (DT): 151 MS
ECHO MV E VELOCITY: 0.98 M/S
ECHO MV E/A RATIO: 1.92
ECHO MV E/E' LATERAL: 9.8
ECHO MV E/E' RATIO (AVERAGED): 14.7
ECHO MV E/E' SEPTAL: 19.6
ECHO MV LVOT VTI INDEX: 1.57
ECHO MV MAX VELOCITY: 1 M/S
ECHO MV MEAN GRADIENT: 2 MMHG
ECHO MV MEAN VELOCITY: 0.6 M/S
ECHO MV PEAK GRADIENT: 4 MMHG
ECHO MV REGURGITANT PEAK GRADIENT: 92 MMHG
ECHO MV REGURGITANT PEAK VELOCITY: 4.8 M/S
ECHO MV VTI: 32.7 CM
ECHO PV ACCELERATION TIME (AT): 103 MS
ECHO PV MAX VELOCITY: 1 M/S
ECHO PV MEAN GRADIENT: 2 MMHG
ECHO PV MEAN VELOCITY: 0.7 M/S
ECHO PV PEAK GRADIENT: 4 MMHG
ECHO PV VTI: 21.2 CM
ECHO RA AREA 4C: 20.2 CM2
ECHO RA END SYSTOLIC VOLUME APICAL 4 CHAMBER INDEX BSA: 24 ML/M2
ECHO RA VOLUME: 51 ML
ECHO RIGHT VENTRICULAR SYSTOLIC PRESSURE (RVSP): 38 MMHG
ECHO RV BASAL DIMENSION: 3.8 CM
ECHO RV FREE WALL PEAK S': 8 CM/S
ECHO RV LONGITUDINAL DIMENSION: 7.4 CM
ECHO RV MID DIMENSION: 3.2 CM
ECHO RV TAPSE: 1.7 CM (ref 1.7–?)
ECHO TV REGURGITANT MAX VELOCITY: 2.76 M/S
ECHO TV REGURGITANT PEAK GRADIENT: 30 MMHG

## 2024-05-17 PROCEDURE — 93306 TTE W/DOPPLER COMPLETE: CPT

## 2024-05-17 PROCEDURE — 93306 TTE W/DOPPLER COMPLETE: CPT | Performed by: INTERNAL MEDICINE

## 2024-05-17 PROCEDURE — 77063 BREAST TOMOSYNTHESIS BI: CPT

## 2024-05-20 RX ORDER — PREGABALIN 225 MG/1
225 CAPSULE ORAL NIGHTLY
Qty: 90 CAPSULE | Refills: 1 | Status: SHIPPED | OUTPATIENT
Start: 2024-05-20 | End: 2024-11-16

## 2024-06-12 ENCOUNTER — HOSPITAL ENCOUNTER (OUTPATIENT)
Dept: MAMMOGRAPHY | Age: 79
Discharge: HOME | End: 2024-06-12
Payer: COMMERCIAL

## 2024-06-12 ENCOUNTER — HOSPITAL ENCOUNTER (OUTPATIENT)
Dept: ULTRASOUND IMAGING | Age: 79
Discharge: HOME | End: 2024-06-12
Payer: COMMERCIAL

## 2024-06-12 DIAGNOSIS — N17.9 ACUTE KIDNEY INJURY (HCC): ICD-10-CM

## 2024-06-12 DIAGNOSIS — R92.8 ABNORMAL MAMMOGRAM OF LEFT BREAST: ICD-10-CM

## 2024-06-12 PROCEDURE — G0279 TOMOSYNTHESIS, MAMMO: HCPCS

## 2024-06-12 PROCEDURE — 76770 US EXAM ABDO BACK WALL COMP: CPT

## 2024-06-18 SDOH — HEALTH STABILITY: PHYSICAL HEALTH: ON AVERAGE, HOW MANY DAYS PER WEEK DO YOU ENGAGE IN MODERATE TO STRENUOUS EXERCISE (LIKE A BRISK WALK)?: 0 DAYS

## 2024-06-18 ASSESSMENT — PATIENT HEALTH QUESTIONNAIRE - PHQ9
SUM OF ALL RESPONSES TO PHQ QUESTIONS 1-9: 14
6. FEELING BAD ABOUT YOURSELF - OR THAT YOU ARE A FAILURE OR HAVE LET YOURSELF OR YOUR FAMILY DOWN: NOT AT ALL
9. THOUGHTS THAT YOU WOULD BE BETTER OFF DEAD, OR OF HURTING YOURSELF: NOT AT ALL
SUM OF ALL RESPONSES TO PHQ QUESTIONS 1-9: 14
4. FEELING TIRED OR HAVING LITTLE ENERGY: NEARLY EVERY DAY
3. TROUBLE FALLING OR STAYING ASLEEP: NEARLY EVERY DAY
SUM OF ALL RESPONSES TO PHQ9 QUESTIONS 1 & 2: 6
10. IF YOU CHECKED OFF ANY PROBLEMS, HOW DIFFICULT HAVE THESE PROBLEMS MADE IT FOR YOU TO DO YOUR WORK, TAKE CARE OF THINGS AT HOME, OR GET ALONG WITH OTHER PEOPLE: NOT DIFFICULT AT ALL
7. TROUBLE CONCENTRATING ON THINGS, SUCH AS READING THE NEWSPAPER OR WATCHING TELEVISION: NOT AT ALL
SUM OF ALL RESPONSES TO PHQ QUESTIONS 1-9: 14
SUM OF ALL RESPONSES TO PHQ QUESTIONS 1-9: 14
8. MOVING OR SPEAKING SO SLOWLY THAT OTHER PEOPLE COULD HAVE NOTICED. OR THE OPPOSITE, BEING SO FIGETY OR RESTLESS THAT YOU HAVE BEEN MOVING AROUND A LOT MORE THAN USUAL: NOT AT ALL
5. POOR APPETITE OR OVEREATING: MORE THAN HALF THE DAYS
2. FEELING DOWN, DEPRESSED OR HOPELESS: NEARLY EVERY DAY

## 2024-06-18 ASSESSMENT — LIFESTYLE VARIABLES
HOW OFTEN DO YOU HAVE A DRINK CONTAINING ALCOHOL: 1
HOW OFTEN DO YOU HAVE SIX OR MORE DRINKS ON ONE OCCASION: 1
HOW MANY STANDARD DRINKS CONTAINING ALCOHOL DO YOU HAVE ON A TYPICAL DAY: 0
HOW OFTEN DO YOU HAVE A DRINK CONTAINING ALCOHOL: NEVER
HOW MANY STANDARD DRINKS CONTAINING ALCOHOL DO YOU HAVE ON A TYPICAL DAY: PATIENT DOES NOT DRINK

## 2024-06-21 ENCOUNTER — OFFICE VISIT (OUTPATIENT)
Dept: INTERNAL MEDICINE CLINIC | Age: 79
End: 2024-06-21
Payer: COMMERCIAL

## 2024-06-21 VITALS
HEIGHT: 64 IN | SYSTOLIC BLOOD PRESSURE: 128 MMHG | WEIGHT: 263 LBS | DIASTOLIC BLOOD PRESSURE: 70 MMHG | BODY MASS INDEX: 44.9 KG/M2

## 2024-06-21 DIAGNOSIS — Z17.0 MALIGNANT NEOPLASM OF LOWER-OUTER QUADRANT OF RIGHT BREAST OF FEMALE, ESTROGEN RECEPTOR POSITIVE (HCC): ICD-10-CM

## 2024-06-21 DIAGNOSIS — Z63.4 RECENT BEREAVEMENT: ICD-10-CM

## 2024-06-21 DIAGNOSIS — N17.9 AKI (ACUTE KIDNEY INJURY) (HCC): ICD-10-CM

## 2024-06-21 DIAGNOSIS — I48.0 PAROXYSMAL ATRIAL FIBRILLATION (HCC): ICD-10-CM

## 2024-06-21 DIAGNOSIS — C50.511 MALIGNANT NEOPLASM OF LOWER-OUTER QUADRANT OF RIGHT BREAST OF FEMALE, ESTROGEN RECEPTOR POSITIVE (HCC): ICD-10-CM

## 2024-06-21 DIAGNOSIS — Z00.00 MEDICARE ANNUAL WELLNESS VISIT, SUBSEQUENT: Primary | ICD-10-CM

## 2024-06-21 DIAGNOSIS — G62.9 NEUROPATHY: ICD-10-CM

## 2024-06-21 PROCEDURE — 3074F SYST BP LT 130 MM HG: CPT | Performed by: INTERNAL MEDICINE

## 2024-06-21 PROCEDURE — 3078F DIAST BP <80 MM HG: CPT | Performed by: INTERNAL MEDICINE

## 2024-06-21 PROCEDURE — G0439 PPPS, SUBSEQ VISIT: HCPCS | Performed by: INTERNAL MEDICINE

## 2024-06-21 PROCEDURE — 1123F ACP DISCUSS/DSCN MKR DOCD: CPT | Performed by: INTERNAL MEDICINE

## 2024-06-21 RX ORDER — LETROZOLE 2.5 MG/1
2.5 TABLET, FILM COATED ORAL DAILY
COMMUNITY
Start: 2024-04-25

## 2024-06-21 RX ORDER — DULOXETIN HYDROCHLORIDE 30 MG/1
30 CAPSULE, DELAYED RELEASE ORAL DAILY
Qty: 30 CAPSULE | Refills: 2 | Status: SHIPPED | OUTPATIENT
Start: 2024-06-21

## 2024-06-21 RX ORDER — PREGABALIN 150 MG/1
CAPSULE ORAL
Qty: 180 CAPSULE | Refills: 1 | Status: SHIPPED | OUTPATIENT
Start: 2024-08-17 | End: 2025-02-17

## 2024-06-21 SDOH — SOCIAL STABILITY - SOCIAL INSECURITY: DISSAPEARANCE AND DEATH OF FAMILY MEMBER: Z63.4

## 2024-06-21 NOTE — PATIENT INSTRUCTIONS
and 6495-1746 IU of vitamin D per day. It is possible to meet your calcium requirement with diet alone, but a vitamin D supplement is usually necessary to meet this goal.  When exposed to the sun, use a sunscreen that protects against both UVA and UVB radiation with an SPF of 30 or greater. Reapply every 2 to 3 hours or after sweating, drying off with a towel, or swimming.  Always wear a seat belt when traveling in a car. Always wear a helmet when riding a bicycle or motorcycle.

## 2024-06-21 NOTE — PROGRESS NOTES
Vitamin D (CHOLECALCIFEROL) 25 MCG (1000 UT) TABS tablet Take 1 tablet by mouth daily Yes Jesus Alberto Pena MD   magnesium oxide (MAG-OX) 400 MG tablet Take 1 tablet by mouth daily Yes Jesus Alberto Pena MD   calcium carbonate 600 MG TABS tablet Take 1 tablet by mouth daily Yes Jesus Alberto Pena MD   acetaminophen (TYLENOL) 500 MG tablet Take 2 tablets by mouth every 6 hours as needed for Pain or Fever Yes Jesus Alberto Pena MD   Glucosamine-Chondroit-Vit C-Mn (GLUCOSAMINE 1500 COMPLEX PO) Take 1 tablet by mouth 2 times daily  Yes Jesus Alberto Pena MD   Docusate Calcium (STOOL SOFTENER PO) Take 1 capsule by mouth 2 times daily  Yes Jesus Alberto Pena MD   aspirin 81 MG EC tablet Take 1 tablet by mouth nightly Yes Jesus Alberto Pena MD   Multiple Vitamins-Minerals (CENTRUM SILVER PO) Take by mouth daily  Yes Jesus Alberto Pena MD       CareTeam (Including outside providers/suppliers regularly involved in providing care):   Patient Care Team:  Ursula Lake MD as PCP - General (Internal Medicine)  Ursula Lake MD as PCP - Empaneled Provider  Corbin Gregory MD as Consulting Physician (Cardiology)     Reviewed and updated this visit:  Tobacco  Allergies  Meds  Med Hx  Surg Hx  Soc Hx  Fam Hx

## 2024-07-03 RX ORDER — APIXABAN 5 MG/1
5 TABLET, FILM COATED ORAL 2 TIMES DAILY
Qty: 180 TABLET | Refills: 3 | Status: SHIPPED | OUTPATIENT
Start: 2024-07-03

## 2024-07-03 NOTE — TELEPHONE ENCOUNTER
Requested Prescriptions     Pending Prescriptions Disp Refills    ELIQUIS 5 MG TABS tablet 180 tablet 3     Sig: Take 1 tablet by mouth 2 times daily            Checked Correct Pharmacy: Yes    Any changes since last refill? No       Last Office Visit: 4/16/2024     Next Office Visit: 10/24/2024

## 2024-07-18 SDOH — HEALTH STABILITY: PHYSICAL HEALTH: ON AVERAGE, HOW MANY DAYS PER WEEK DO YOU ENGAGE IN MODERATE TO STRENUOUS EXERCISE (LIKE A BRISK WALK)?: 0 DAYS

## 2024-07-18 SDOH — HEALTH STABILITY: PHYSICAL HEALTH: ON AVERAGE, HOW MANY MINUTES DO YOU ENGAGE IN EXERCISE AT THIS LEVEL?: 0 MIN

## 2024-07-19 ENCOUNTER — OFFICE VISIT (OUTPATIENT)
Dept: PRIMARY CARE CLINIC | Age: 79
End: 2024-07-19
Payer: COMMERCIAL

## 2024-07-19 VITALS
SYSTOLIC BLOOD PRESSURE: 112 MMHG | TEMPERATURE: 98 F | OXYGEN SATURATION: 95 % | HEIGHT: 64 IN | DIASTOLIC BLOOD PRESSURE: 80 MMHG | RESPIRATION RATE: 16 BRPM | BODY MASS INDEX: 43.64 KG/M2 | WEIGHT: 255.6 LBS | HEART RATE: 84 BPM

## 2024-07-19 DIAGNOSIS — G62.9 NEUROPATHY: Primary | ICD-10-CM

## 2024-07-19 DIAGNOSIS — G47.33 OBSTRUCTIVE SLEEP APNEA SYNDROME: ICD-10-CM

## 2024-07-19 DIAGNOSIS — M85.80 OSTEOPENIA, UNSPECIFIED LOCATION: ICD-10-CM

## 2024-07-19 DIAGNOSIS — C50.411 MALIGNANT NEOPLASM OF UPPER-OUTER QUADRANT OF RIGHT BREAST IN FEMALE, ESTROGEN RECEPTOR POSITIVE (HCC): ICD-10-CM

## 2024-07-19 DIAGNOSIS — I48.11 LONGSTANDING PERSISTENT ATRIAL FIBRILLATION (HCC): ICD-10-CM

## 2024-07-19 DIAGNOSIS — I20.9 ANGINA PECTORIS SYNDROME (HCC): ICD-10-CM

## 2024-07-19 DIAGNOSIS — I50.32 CHRONIC DIASTOLIC HEART FAILURE (HCC): ICD-10-CM

## 2024-07-19 DIAGNOSIS — I49.5 TACHY-BRADY SYNDROME (HCC): ICD-10-CM

## 2024-07-19 DIAGNOSIS — Z17.0 MALIGNANT NEOPLASM OF UPPER-OUTER QUADRANT OF RIGHT BREAST IN FEMALE, ESTROGEN RECEPTOR POSITIVE (HCC): ICD-10-CM

## 2024-07-19 DIAGNOSIS — N18.31 STAGE 3A CHRONIC KIDNEY DISEASE (HCC): ICD-10-CM

## 2024-07-19 PROBLEM — M48.02 SPINAL STENOSIS OF CERVICAL REGION: Status: ACTIVE | Noted: 2023-07-06

## 2024-07-19 PROBLEM — E88.810 METABOLIC SYNDROME: Status: ACTIVE | Noted: 2024-07-19

## 2024-07-19 PROBLEM — K43.2 VENTRAL INCISIONAL HERNIA: Status: ACTIVE | Noted: 2020-03-08

## 2024-07-19 PROBLEM — R80.9 MICROALBUMINURIA: Status: ACTIVE | Noted: 2024-06-10

## 2024-07-19 PROBLEM — N17.9 AKI (ACUTE KIDNEY INJURY) (HCC): Status: RESOLVED | Noted: 2024-02-06 | Resolved: 2024-07-19

## 2024-07-19 PROBLEM — I50.9 CHF (CONGESTIVE HEART FAILURE) (HCC): Status: ACTIVE | Noted: 2024-07-19

## 2024-07-19 PROBLEM — M54.6 PAIN IN THORACIC SPINE: Status: ACTIVE | Noted: 2023-07-06

## 2024-07-19 PROBLEM — R53.1 GENERALIZED WEAKNESS: Status: RESOLVED | Noted: 2024-02-06 | Resolved: 2024-07-19

## 2024-07-19 PROBLEM — M79.7 FIBROMYOSITIS: Status: ACTIVE | Noted: 2023-08-31

## 2024-07-19 PROCEDURE — 99214 OFFICE O/P EST MOD 30 MIN: CPT | Performed by: FAMILY MEDICINE

## 2024-07-19 PROCEDURE — 3079F DIAST BP 80-89 MM HG: CPT | Performed by: FAMILY MEDICINE

## 2024-07-19 PROCEDURE — 1123F ACP DISCUSS/DSCN MKR DOCD: CPT | Performed by: FAMILY MEDICINE

## 2024-07-19 PROCEDURE — 3074F SYST BP LT 130 MM HG: CPT | Performed by: FAMILY MEDICINE

## 2024-07-19 RX ORDER — VITAMIN B COMPLEX
1000 TABLET ORAL DAILY
Qty: 90 TABLET | Refills: 0 | Status: SHIPPED | OUTPATIENT
Start: 2024-07-19

## 2024-07-19 RX ORDER — APIXABAN 5 MG/1
5 TABLET, FILM COATED ORAL 2 TIMES DAILY
Qty: 56 TABLET | Refills: 0 | Status: SHIPPED | COMMUNITY
Start: 2024-07-19

## 2024-07-19 SDOH — ECONOMIC STABILITY: FOOD INSECURITY: WITHIN THE PAST 12 MONTHS, YOU WORRIED THAT YOUR FOOD WOULD RUN OUT BEFORE YOU GOT MONEY TO BUY MORE.: NEVER TRUE

## 2024-07-19 SDOH — ECONOMIC STABILITY: INCOME INSECURITY: HOW HARD IS IT FOR YOU TO PAY FOR THE VERY BASICS LIKE FOOD, HOUSING, MEDICAL CARE, AND HEATING?: NOT HARD AT ALL

## 2024-07-19 SDOH — ECONOMIC STABILITY: FOOD INSECURITY: WITHIN THE PAST 12 MONTHS, THE FOOD YOU BOUGHT JUST DIDN'T LAST AND YOU DIDN'T HAVE MONEY TO GET MORE.: NEVER TRUE

## 2024-07-19 ASSESSMENT — ENCOUNTER SYMPTOMS
SHORTNESS OF BREATH: 0
COUGH: 0

## 2024-07-19 NOTE — PROGRESS NOTES
[facility]  2. Osteopenia, unspecified location  Assessment & Plan:  Well controlled. Rx sent in for Vit D dose.  Orders:  -     Vitamin D (CHOLECALCIFEROL) 25 MCG (1000 UT) TABS tablet; Take 1 tablet by mouth daily, Disp-90 tablet, R-0Labeling may look different.  25 mcg=1000 Units. Please double check dosages.Normal  3. Stage 3a chronic kidney disease (HCC)  Assessment & Plan:  Well controlled and following with nephrology. Renally dosed meds. Defer to them on this issue.  4. Tachy-valerie syndrome (HCC)  Assessment & Plan:  Well controlled. Following with cardiology and needs new referral as her cardiologist is leaving. Referral provided. Defer to cardiology on this.  5. Obstructive sleep apnea syndrome  Assessment & Plan:  Poorly controlled, she did not tolerate CPAP and prefers not to use. Counseled today. Offered Sleep Medicine referral for alternatives, she declined today - can always call me if she changes her mind.  6. Malignant neoplasm of upper-outer quadrant of right breast in female, estrogen receptor positive (HCC)  Assessment & Plan:  Well controlled and follows with oncology for this. Defer to them on this.  7. Chronic diastolic heart failure (HCC)  Assessment & Plan:  Well controlled. Following with cardiology and needs new referral as her cardiologist is leaving. Referral provided. Defer to cardiology on this.  Orders:  -     Uday Lynch MD, Cardiology, [facility]  8. Longstanding persistent atrial fibrillation (HCC)  Assessment & Plan:  Well controlled. Following with cardiology and needs new referral as her cardiologist is leaving. Referral provided. Defer to cardiology on this. Samples provided of her Eliquis today.  Orders:  -     ELIQUIS 5 MG TABS tablet; Take 1 tablet by mouth 2 times daily, Disp-56 tablet, R-0, DAWSample  9. Angina pectoris syndrome (HCC)  Assessment & Plan:  Well controlled. Following with cardiology and needs new referral as her

## 2024-07-19 NOTE — PATIENT INSTRUCTIONS
The National Suicide Prevention Lifeline is a [facility]-based suicide prevention network of 161 crisis centers that provides a 24/7, toll-free hotline available to anyone in suicidal crisis or emotional distress. The number is: 1-353.316.5507.     Patient education: Serotonin syndrome (The Basics)  Written by the doctors and editors at [facility]    What is serotonin syndrome?    Serotonin syndrome is a problem that can happen after taking certain medicines. It is uncommon but can be serious or even deadly if it does happen.    Serotonin syndrome causes symptoms such as:    ?Feeling anxious, restless, or confused    ?Sweating    ?Muscle spasms or muscles that cannot relax normally    ?Very fast back-and-forth eye movements    ?Shaking or trembling    ?Fever    ?A fast heartbeat    ?Vomiting    ?Diarrhea    What causes serotonin syndrome?    Serotonin syndrome can happen to people after they take certain medicines or combinations of medicines. It can also happen with certain herbal products and street drugs. There are many medicines and drugs that can lead to serotonin syndrome. In general, they increase a chemical in the brain and body called \"serotonin.\" Serotonin syndrome happens when the levels of serotonin in your brain get too high.    Examples of the medicines and drugs that can cause serotonin syndrome include:    ?Some medicines used to treat depression, such as selective serotonin reuptake inhibitors (\"SSRIs\")    ?Some medicines used to treat Parkinson disease, such as selegiline (sample brand names: Eldepryl, Emsam) and rasagiline (brand name: Azilect)    ?Some pain relievers, such as meperidine (sample brand name: Demerol), tramadol (sample brand name: Ultram), fentanyl, and cyclobenzaprine (sample brand name: Flexeril)    ?Saint Catarino's wort (an herbal medicine sometimes used for depression)    ?Medicines used to treat migraine headaches, called \"triptans\"    Some medicines used to treat attention

## 2024-07-19 NOTE — ASSESSMENT & PLAN NOTE
Poorly controlled, she did not tolerate CPAP and prefers not to use. Counseled today. Offered Sleep Medicine referral for alternatives, she declined today - can always call me if she changes her mind.

## 2024-07-19 NOTE — ASSESSMENT & PLAN NOTE
Well controlled. Following with cardiology and needs new referral as her cardiologist is leaving. Referral provided. Defer to cardiology on this. Samples provided of her Eliquis today.

## 2024-07-19 NOTE — ASSESSMENT & PLAN NOTE
Well controlled. Following with cardiology and needs new referral as her cardiologist is leaving. Referral provided. Defer to cardiology on this.

## 2024-07-19 NOTE — ASSESSMENT & PLAN NOTE
Poorly controlled, given renal status and almost max on medications I prescribe, referral to pain medicine provided today. Counseled on pain management expectations as well. Declined PT referral - has done this in the past.

## 2024-07-24 ENCOUNTER — PATIENT MESSAGE (OUTPATIENT)
Dept: PRIMARY CARE CLINIC | Age: 79
End: 2024-07-24

## 2024-07-25 ENCOUNTER — TELEPHONE (OUTPATIENT)
Dept: PRIMARY CARE CLINIC | Age: 79
End: 2024-07-25

## 2024-07-25 DIAGNOSIS — E87.6 HYPOKALEMIA: Primary | ICD-10-CM

## 2024-07-25 RX ORDER — POTASSIUM CHLORIDE 20 MEQ/1
20 TABLET, EXTENDED RELEASE ORAL 2 TIMES DAILY
Qty: 180 TABLET | Refills: 0 | Status: SHIPPED | OUTPATIENT
Start: 2024-07-25 | End: 2024-10-23

## 2024-07-25 NOTE — TELEPHONE ENCOUNTER
KLOR-CON M20 20 MEQ extended release tablet      Pt requests a 90 day supply    LOV  7/19/24  NOV  1/10/25      ----- Message from Diana Jin sent at 7/24/2024  6:33 PM EDT -----  Regarding: refills  Contact: [phone]  Would it be possible to get a refill on my Klor-Con M20  90 day refill at a time.     Thank you,  Jaymie Jin

## 2024-07-26 ENCOUNTER — TELEPHONE (OUTPATIENT)
Dept: PRIMARY CARE CLINIC | Age: 79
End: 2024-07-26

## 2024-07-26 NOTE — TELEPHONE ENCOUNTER
Pharmacist from [facility] called to clarify pt's Lyrica dose.  Pharmacist sts that pt has been taking 225mg at bedtime which was prescribed by Dr Ursula Lake.  Pt had received a 90 day supply in May with 1 refill.  Pharmacist sts that they also received a rx for Lyrica for 150mg with sig to take 1 in the morning and 1 in the afternoon.  Pharmacist sts that on the 225mg rx, it sts max dose allowed is 225mg.  Pharmacist is requesting for clarification to ensure pt should be taking both scripts or how pt should be taking them.      Phone # is [phone]

## 2024-08-01 NOTE — TELEPHONE ENCOUNTER
Called and spoke with pt's daughter, Susan, (Ok per HIPAA form).  Susan sts that pt has not been able to get in to see Stefany Busby yet.  I advised Susan that I will reach out to Stefany's office for an update on the referral.    I called Stefany Busby's office and spoke with Bebe to see what the status is for the referral.  Bebe sts that Stefany has been out of the office but pt's information is on Stefany's desk to be reviewed.  Once Stefany can review the notes and give the approval to schedule, they will reach out to pt to schedule her.  Bebe sts it may take about 2-3 weeks.    I called and spoke with pt's daughter, Susan, and gave the information that Bebe provided to her.  Susan acknowledged understanding.  I advised Susan to reach out to our office with any further questions or concerns.    Pt's daughter, Susan, also wanted to let Dr Silver know that pt saw the nephrologist yesterday and was told that she does have chronic kidney failure.

## 2024-08-05 NOTE — PROGRESS NOTES
12/27/23  Yes Consuelo Dawn APRN - CNP   magnesium oxide (MAG-OX) 400 MG tablet Take 1 tablet by mouth daily   Yes Jesus Alberto Pena MD   calcium carbonate 600 MG TABS tablet Take 1 tablet by mouth daily   Yes Jesus Alberto Pena MD   Glucosamine-Chondroit-Vit C-Mn (GLUCOSAMINE 1500 COMPLEX PO) Take 1 tablet by mouth 2 times daily    Yes Jesus Alberto Pena MD   Docusate Calcium (STOOL SOFTENER PO) Take 1 capsule by mouth 2 times daily    Yes Jesus Alberto Pena MD   Multiple Vitamins-Minerals (CENTRUM SILVER PO) Take by mouth daily    Yes Jesus Alberto Pena MD   DULoxetine (CYMBALTA) 30 MG extended release capsule Take 1 capsule by mouth daily 6/21/24   Ursula Lake MD   acetaminophen (TYLENOL) 500 MG tablet Take 2 tablets by mouth every 6 hours as needed for Pain or Fever    Jesus Alberto Pena MD   aspirin 81 MG EC tablet Take 1 tablet by mouth nightly    Jesus Alberto Pena MD          Vitals:    08/09/24 1057   BP: 134/80   Pulse: 76   SpO2: 96%   Weight: 113.9 kg (251 lb)       BP Readings from Last 3 Encounters:   08/09/24 134/80   07/19/24 112/80   06/21/24 128/70     Wt Readings from Last 3 Encounters:   08/09/24 113.9 kg (251 lb)   07/19/24 115.9 kg (255 lb 9.6 oz)   06/21/24 119.3 kg (263 lb)       Physical Exam  CONSTITUTIONAL: awake, alert, cooperative, no apparent distress, Oriented x 3  HEENT/NECK : Normal. No JVD. No thyromegaly. No bruit  LUNGS: Good respiratory effort. . On auscultation bilateral equal air entry. Normal breath sounds  CARDIOVASCULAR: No left parasternal heave., Regular rate and rhythm, normal S1 and S2, no S3 or S4, and no murmur or rub noted.  ABDOMEN: Soft, nontender, Bowel sounds present. No masses or tenderness.  SKIN: Warm and dry. no jaundice and no petechiae  EXTREMITIES: No lower extremity edema. No cyanosis or clubbing.  NEUROLOGICAL : No gross focal neurological deficit  MUSCULOSKELETAL: Muscle tone: normal. No tenderness  GENITAL/URINARY: not

## 2024-08-09 ENCOUNTER — OFFICE VISIT (OUTPATIENT)
Dept: CARDIOLOGY CLINIC | Age: 79
End: 2024-08-09
Payer: COMMERCIAL

## 2024-08-09 VITALS
SYSTOLIC BLOOD PRESSURE: 134 MMHG | DIASTOLIC BLOOD PRESSURE: 80 MMHG | WEIGHT: 251 LBS | HEART RATE: 76 BPM | OXYGEN SATURATION: 96 % | BODY MASS INDEX: 43.08 KG/M2

## 2024-08-09 DIAGNOSIS — I49.5 TACHY-BRADY SYNDROME (HCC): ICD-10-CM

## 2024-08-09 DIAGNOSIS — I48.11 LONGSTANDING PERSISTENT ATRIAL FIBRILLATION (HCC): Primary | ICD-10-CM

## 2024-08-09 PROCEDURE — 93000 ELECTROCARDIOGRAM COMPLETE: CPT | Performed by: INTERNAL MEDICINE

## 2024-08-09 PROCEDURE — 1123F ACP DISCUSS/DSCN MKR DOCD: CPT | Performed by: INTERNAL MEDICINE

## 2024-08-09 PROCEDURE — 3079F DIAST BP 80-89 MM HG: CPT | Performed by: INTERNAL MEDICINE

## 2024-08-09 PROCEDURE — 99214 OFFICE O/P EST MOD 30 MIN: CPT | Performed by: INTERNAL MEDICINE

## 2024-08-09 PROCEDURE — 3075F SYST BP GE 130 - 139MM HG: CPT | Performed by: INTERNAL MEDICINE

## 2024-08-09 NOTE — PATIENT INSTRUCTIONS
No changes to medications today   Minimum 6 cups, no more than 8-10 cups of ALL fluid daily   Monitor signs of bleeding   Follow up with me in 6 months       Thank you for choosing [facility] for your cardiac care.    During your visit today, we reviewed and confirmed your cardiac medications along with  medication prescribed by your other healthcare team members. Please be sure to discuss any  changes to medication with your providers.    Please bring a list of ALL medications (or the bottles) with you to EVERY appointment.  Also include vitamins and over-the-counter medications.    If you need refills for any cardiac medications, please call your pharmacy and they will reach out to us electronically.    Did your provider order testing today? If yes, then you will receive your results in three  possible ways. You can receive a [employer] message, a phone call, or letter in the mail. Please  note, if you are an active [employer] user, some of your testing will be available within 1-2 days.    Finally, please know that it is good for your heart to exercise and follow a healthy, low-fat diet  as advised by your physician and health care providers.    If you are experiencing a medical emergency, please call 911 immediately.    It's easy to register for a [employer] account if you don't already have one. With a [employer]  account you can manage your health record, view test results, schedule appointments and  more.     Dr. Power's clinical staff can be reached at the following phone number: [phone]       
normal...

## 2024-09-06 DIAGNOSIS — M54.6 PAIN IN THORACIC SPINE: Primary | ICD-10-CM

## 2024-09-06 RX ORDER — DULOXETIN HYDROCHLORIDE 30 MG/1
30 CAPSULE, DELAYED RELEASE ORAL DAILY
Qty: 30 CAPSULE | Refills: 0 | Status: SHIPPED | OUTPATIENT
Start: 2024-09-06 | End: 2024-09-06

## 2024-09-06 RX ORDER — DULOXETIN HYDROCHLORIDE 30 MG/1
30 CAPSULE, DELAYED RELEASE ORAL DAILY
Qty: 90 CAPSULE | Refills: 0 | Status: SHIPPED | OUTPATIENT
Start: 2024-09-06

## 2024-09-06 RX ORDER — DULOXETIN HYDROCHLORIDE 30 MG/1
30 CAPSULE, DELAYED RELEASE ORAL DAILY
Qty: 90 CAPSULE | Refills: 0 | Status: SHIPPED | OUTPATIENT
Start: 2024-09-06 | End: 2024-09-06

## 2024-09-06 RX ORDER — DULOXETIN HYDROCHLORIDE 30 MG/1
30 CAPSULE, DELAYED RELEASE ORAL DAILY
Qty: 30 CAPSULE | Refills: 2 | Status: CANCELLED | OUTPATIENT
Start: 2024-09-06

## 2024-09-09 ENCOUNTER — TELEPHONE (OUTPATIENT)
Dept: CARDIOLOGY CLINIC | Age: 79
End: 2024-09-09

## 2024-09-09 DIAGNOSIS — I48.11 LONGSTANDING PERSISTENT ATRIAL FIBRILLATION (HCC): ICD-10-CM

## 2024-09-09 RX ORDER — APIXABAN 5 MG/1
5 TABLET, FILM COATED ORAL 2 TIMES DAILY
Qty: 90 TABLET | Refills: 3 | Status: SHIPPED | OUTPATIENT
Start: 2024-09-09

## 2024-09-24 ENCOUNTER — OFFICE VISIT (OUTPATIENT)
Dept: PAIN MANAGEMENT | Age: 79
End: 2024-09-24
Payer: COMMERCIAL

## 2024-09-24 VITALS
SYSTOLIC BLOOD PRESSURE: 111 MMHG | BODY MASS INDEX: 42.85 KG/M2 | WEIGHT: 251 LBS | OXYGEN SATURATION: 96 % | HEART RATE: 80 BPM | HEIGHT: 64 IN | DIASTOLIC BLOOD PRESSURE: 72 MMHG

## 2024-09-24 DIAGNOSIS — M48.062 SPINAL STENOSIS OF LUMBAR REGION WITH NEUROGENIC CLAUDICATION: Primary | ICD-10-CM

## 2024-09-24 DIAGNOSIS — M96.1 FAILED BACK SURGICAL SYNDROME: ICD-10-CM

## 2024-09-24 DIAGNOSIS — Z51.81 ENCOUNTER FOR THERAPEUTIC DRUG MONITORING: ICD-10-CM

## 2024-09-24 DIAGNOSIS — R11.0 CHRONIC NAUSEA: ICD-10-CM

## 2024-09-24 DIAGNOSIS — M47.817 LUMBOSACRAL SPONDYLOSIS WITHOUT MYELOPATHY: ICD-10-CM

## 2024-09-24 DIAGNOSIS — G89.4 CHRONIC PAIN SYNDROME: ICD-10-CM

## 2024-09-24 DIAGNOSIS — G63 POLYNEUROPATHY ASSOCIATED WITH UNDERLYING DISEASE (HCC): ICD-10-CM

## 2024-09-24 PROCEDURE — 3074F SYST BP LT 130 MM HG: CPT | Performed by: NURSE PRACTITIONER

## 2024-09-24 PROCEDURE — 1123F ACP DISCUSS/DSCN MKR DOCD: CPT | Performed by: NURSE PRACTITIONER

## 2024-09-24 PROCEDURE — 3078F DIAST BP <80 MM HG: CPT | Performed by: NURSE PRACTITIONER

## 2024-09-24 PROCEDURE — 99204 OFFICE O/P NEW MOD 45 MIN: CPT | Performed by: NURSE PRACTITIONER

## 2024-09-24 RX ORDER — ONDANSETRON 4 MG/1
4 TABLET, FILM COATED ORAL NIGHTLY PRN
Qty: 28 TABLET | Refills: 0 | Status: SHIPPED | OUTPATIENT
Start: 2024-09-24 | End: 2024-10-22

## 2024-09-24 RX ORDER — HYDROCODONE BITARTRATE AND ACETAMINOPHEN 5; 325 MG/1; MG/1
1 TABLET ORAL 2 TIMES DAILY PRN
Qty: 14 TABLET | Refills: 0 | Status: SHIPPED | OUTPATIENT
Start: 2024-09-24 | End: 2024-10-01

## 2024-09-24 RX ORDER — HYDROCODONE BITARTRATE AND ACETAMINOPHEN 5; 325 MG/1; MG/1
1 TABLET ORAL 2 TIMES DAILY PRN
Qty: 42 TABLET | Refills: 0 | Status: SHIPPED | OUTPATIENT
Start: 2024-09-30 | End: 2024-10-21

## 2024-09-24 NOTE — PROGRESS NOTES
HISTORY OF PRESENT ILLNESS:  Ms. Jin is a 79 y.o. female presents for consultation at the request of Dr. Silver. Her presenting problems are RLE, low back and pamela feet pain. She hasalso been evaluated by cardiology, PCP, nephrology. Onset of pain began about 5 years ago.   She rates the pain 8/10 and describes it as sharp, shooting, numbness, tingling. Pain is greater in her low back and right LE than other body regions.  Pain is made worse by: nothing, no specific reason. Activities that have been limited by pain that she otherwise tolerated well are normal activities, mobility is limited, sleep.  Alternative therapies she haspreviously attempted are pain medication, muscle relaxant, oral steroids, local injections, ice packs, walker, epidural steroid injections. Current treatment regimen has helped relieve about 40% of the pain. Relieving factors of pain include pain medication. Shedenies side effects from the current pain regimen. Patient reports mood is well and sleep patterns are Poor. Patient deniesneurological bowel or bladder. Patient denies misusing/abusing her narcotic pain medications or using any illegal drugs. she admits to morning stiffness, fatigue, and denies headaches.     Patient reports a history of a lumbar fusion L4-S1.  Also a history of breast cancer and was treated with tamoxifen after other medications failed.  Creatinine arlene and is now no longer on tamoxifen.  She will discuss that with oncology.  History of pacemaker for A-fib and continues on Eliquis.  She complains of bilateral lower extremity feet numbness the Lyrica does help this some.  She is taking 150 mg capsule twice a day with 225 mg capsule at bedtime.  Neuropathy pain is worse without the Lyrica.  Also on Cymbalta 30 mg daily denies side effects.  She is compliant with cardiology, nephrology, oncology follow-ups.    ROS:  Review of Systems   Constitutional:  Positive for fatigue. Negative for unexpected weight change.   HENT:

## 2024-10-07 PROBLEM — G63 POLYNEUROPATHY ASSOCIATED WITH UNDERLYING DISEASE (HCC): Status: ACTIVE | Noted: 2024-10-07

## 2024-10-07 PROBLEM — M47.817 LUMBOSACRAL SPONDYLOSIS WITHOUT MYELOPATHY: Status: ACTIVE | Noted: 2024-10-07

## 2024-10-07 PROBLEM — G89.4 CHRONIC PAIN SYNDROME: Status: ACTIVE | Noted: 2024-10-07

## 2024-10-07 PROBLEM — Z51.81 ENCOUNTER FOR THERAPEUTIC DRUG MONITORING: Status: ACTIVE | Noted: 2024-10-07

## 2024-10-07 PROBLEM — M48.062 SPINAL STENOSIS OF LUMBAR REGION WITH NEUROGENIC CLAUDICATION: Status: ACTIVE | Noted: 2024-10-07

## 2024-10-07 PROBLEM — R11.0 CHRONIC NAUSEA: Status: ACTIVE | Noted: 2024-10-07

## 2024-10-07 PROBLEM — M96.1 FAILED BACK SURGICAL SYNDROME: Status: ACTIVE | Noted: 2024-10-07

## 2024-10-07 ASSESSMENT — ENCOUNTER SYMPTOMS
BACK PAIN: 1
ABDOMINAL PAIN: 0
WHEEZING: 0
SHORTNESS OF BREATH: 0

## 2024-10-16 DIAGNOSIS — E87.6 HYPOKALEMIA: ICD-10-CM

## 2024-10-16 RX ORDER — POTASSIUM CHLORIDE 1500 MG/1
20 TABLET, EXTENDED RELEASE ORAL 2 TIMES DAILY
Qty: 180 TABLET | Refills: 0 | Status: SHIPPED | OUTPATIENT
Start: 2024-10-16

## 2024-10-16 NOTE — TELEPHONE ENCOUNTER
Medication:   Requested Prescriptions     Pending Prescriptions Disp Refills    KLOR-CON M20 20 MEQ extended release tablet [Pharmacy Med Name: KLOR-CON M20 TABLET] 180 tablet 0     Sig: TAKE 1 TABLET BY MOUTH 2 TIMES A DAY     Last Filled:  07/25/24    Last appt: 7/19/2024   Next appt: 1/10/2025    Last OARRS:        No data to display

## 2024-10-16 NOTE — PROGRESS NOTES
[facility]   Cardiac Electrophysiology Follow Up  Date: 10/24/2024    Chief Complaint:   Chief Complaint   Patient presents with    6 Month Follow-Up      HPI: Diana Jin is a 79 y.o. female with PMH significant for HTN, RENE, HLD, CAD, and PAF / AFL, s/p AFL ablation ([place], [place]) and started on sotalol at this time, s/p cryoablation for AF (12/2018), s/p RFA/PVI of AF (11/2019, [facility]), recurrence of AF post-procedure and initiated on sotalol with DCCV (11/2019), sotalol steadily decreased due to bradycardia, s/p dual chamber MDT PPM (10/7/21) for TBS, s/p sotalol loading.    Interval History:   Today, she is here for 6 mo f/u, presenting in SR with stable QTc. She lost her daughter about 4 months ago and she continues to grieve. She recalls that at the time of AF, seen on device (10/20/24), she was having an emotional conversion with her granddaughter. At that time, she was not aware that she was in AF.    All sensing and pacing parameters appear unchanged since last in office check on 04.16.2023. 7.1 years estimated until LENORE/RRT. Underlying AsVs, SB, 38 bpm AAI. AP 89.2%.  5.9%. PVC 0.5/hr. 11 AT/AF episodes noted with 10.9% burden. Recent on 10.20.2024. Longest x >/=24 hrs. Avg V rates appear stable.     Assessment:   1.  Longstanding persistent AF/AFL, s/p AFL ablation, s/p cryoablation (12/2018), s/p RFA/PVI of AF x2 (11/2019), on Toprol, sotalol, Eliquis  2.  HTN, stable on losartan/HCTZ, amlodipine  3.  RENE, noncomplaint with CPAP  4.  HLD, on statin  5.  CAD, on statin, BB, follows Dr. Power  6.  TBS, s/p dual chamber PPM   7.  Obesity, morbid    Plan  No changes to current medications or with device settings  Continue with remote home transmission every 3 months  Continue to f/u with Dr. Power as scheduled  Follow up with EP NP in 6 months    Atrial Fibrillation:    BMI    :   Body mass index is 43.94 kg/m².    Duration   :   Longstanding    Symptoms    :  fatigue, palpitations,

## 2024-10-22 ENCOUNTER — OFFICE VISIT (OUTPATIENT)
Dept: PAIN MANAGEMENT | Age: 79
End: 2024-10-22
Payer: COMMERCIAL

## 2024-10-22 VITALS
HEART RATE: 78 BPM | DIASTOLIC BLOOD PRESSURE: 73 MMHG | BODY MASS INDEX: 43.94 KG/M2 | OXYGEN SATURATION: 97 % | SYSTOLIC BLOOD PRESSURE: 132 MMHG | WEIGHT: 256 LBS

## 2024-10-22 DIAGNOSIS — M47.817 LUMBOSACRAL SPONDYLOSIS WITHOUT MYELOPATHY: Primary | ICD-10-CM

## 2024-10-22 DIAGNOSIS — R11.0 CHRONIC NAUSEA: ICD-10-CM

## 2024-10-22 DIAGNOSIS — T40.2X5A THERAPEUTIC OPIOID-INDUCED CONSTIPATION (OIC): ICD-10-CM

## 2024-10-22 DIAGNOSIS — M48.062 SPINAL STENOSIS OF LUMBAR REGION WITH NEUROGENIC CLAUDICATION: ICD-10-CM

## 2024-10-22 DIAGNOSIS — M96.1 FAILED BACK SURGICAL SYNDROME: ICD-10-CM

## 2024-10-22 DIAGNOSIS — Z51.81 ENCOUNTER FOR THERAPEUTIC DRUG MONITORING: ICD-10-CM

## 2024-10-22 DIAGNOSIS — K59.03 THERAPEUTIC OPIOID-INDUCED CONSTIPATION (OIC): ICD-10-CM

## 2024-10-22 DIAGNOSIS — G89.4 CHRONIC PAIN SYNDROME: ICD-10-CM

## 2024-10-22 DIAGNOSIS — G63 POLYNEUROPATHY ASSOCIATED WITH UNDERLYING DISEASE (HCC): ICD-10-CM

## 2024-10-22 PROCEDURE — 3075F SYST BP GE 130 - 139MM HG: CPT | Performed by: NURSE PRACTITIONER

## 2024-10-22 PROCEDURE — 1123F ACP DISCUSS/DSCN MKR DOCD: CPT | Performed by: NURSE PRACTITIONER

## 2024-10-22 PROCEDURE — 99214 OFFICE O/P EST MOD 30 MIN: CPT | Performed by: NURSE PRACTITIONER

## 2024-10-22 PROCEDURE — 3078F DIAST BP <80 MM HG: CPT | Performed by: NURSE PRACTITIONER

## 2024-10-22 RX ORDER — POLYETHYLENE GLYCOL 3350 17 G/17G
17 POWDER, FOR SOLUTION ORAL DAILY
Qty: 1530 G | Refills: 1 | Status: SHIPPED | OUTPATIENT
Start: 2024-10-22 | End: 2025-04-20

## 2024-10-22 RX ORDER — ONDANSETRON 4 MG/1
4 TABLET, FILM COATED ORAL NIGHTLY PRN
Qty: 28 TABLET | Refills: 0 | Status: SHIPPED | OUTPATIENT
Start: 2024-10-22 | End: 2024-11-19

## 2024-10-22 RX ORDER — HYDROCODONE BITARTRATE AND ACETAMINOPHEN 5; 325 MG/1; MG/1
1 TABLET ORAL 2 TIMES DAILY PRN
Qty: 56 TABLET | Refills: 0 | Status: SHIPPED | OUTPATIENT
Start: 2024-10-22 | End: 2024-11-19

## 2024-10-22 NOTE — PROGRESS NOTES
Diana Jin  1945  [id number]      HISTORY OF PRESENT ILLNESS: Ms. Jin is a 79 y.o. female returns for a follow up visit for pain management  She has a diagnosis of   1. Lumbosacral spondylosis without myelopathy    2. Chronic pain syndrome    3. Encounter for therapeutic drug monitoring    4. Spinal stenosis of lumbar region with neurogenic claudication    5. Failed back surgical syndrome    6. Chronic nausea    7. Polyneuropathy associated with underlying disease (HCC)    8. Therapeutic opioid-induced constipation (OIC)    .      New Medications since Last Office visit have been reviewed with patient.     As per Information Obtained from the PADT (Patient Assessment and Documentation Tool)    She complains of pain in the lower back, right foot. She rates the pain 10/10 and describes it as sharp, aching, numbness, pins and needles. Current treatment regimen has helped relieve about 100% of the pain since beginning treatment plan.  She denies any side effects from the current pain regimen. Patient reports that since implementation of their treatment plan; their physical functioning is unchanged, family/social relationships are unchanged, mood is unchanged sleep patterns are better, and that the overall functioning is better.  Patient denies/admits that any of the above have changed since last office visit. Patient denies misusing/abusing her narcotic pain medications or using any illegal drugs.      Upon obtaining medical history from Ms. Jin    ALLERGIES: Patients list of allergies were reviewed     MEDICATIONS: Ms. Jin list of medications were reviewed.Her current medications are   Outpatient Medications Prior to Visit   Medication Sig Dispense Refill    KLOR-CON M20 20 MEQ extended release tablet TAKE 1 TABLET BY MOUTH 2 TIMES A  tablet 0    ELIQUIS 5 MG TABS tablet Take 1 tablet by mouth 2 times daily 90 tablet 3    DULoxetine (CYMBALTA) 30 MG extended release capsule Take 1 capsule by mouth daily

## 2024-10-23 ENCOUNTER — TELEPHONE (OUTPATIENT)
Dept: PRIMARY CARE CLINIC | Age: 79
End: 2024-10-23

## 2024-10-23 NOTE — TELEPHONE ENCOUNTER
[facility] called stating that they would like to start pt on Tamoxifen but since pt is taking Cymbalta, it would cause it to not be as effective as they would like.  [facility] would like pt to discontinue Cymbalta.  [facility] sts they spoke with Stefany Busby's office and was informed that they do not manage this medication and that [facility] would have to contact the PCP to authorize and educate pt on how to d/c Cymbalta.  [facility] would like this addressed so pt can start the Tamoxifen for the breast cancer.      If any questions, can call [phone]

## 2024-10-24 ENCOUNTER — TELEPHONE (OUTPATIENT)
Dept: PAIN MANAGEMENT | Age: 79
End: 2024-10-24

## 2024-10-24 ENCOUNTER — OFFICE VISIT (OUTPATIENT)
Dept: CARDIOLOGY CLINIC | Age: 79
End: 2024-10-24

## 2024-10-24 ENCOUNTER — NURSE ONLY (OUTPATIENT)
Dept: CARDIOLOGY CLINIC | Age: 79
End: 2024-10-24

## 2024-10-24 VITALS
BODY MASS INDEX: 43.94 KG/M2 | SYSTOLIC BLOOD PRESSURE: 122 MMHG | DIASTOLIC BLOOD PRESSURE: 80 MMHG | WEIGHT: 256 LBS | HEART RATE: 118 BPM

## 2024-10-24 DIAGNOSIS — I48.11 LONGSTANDING PERSISTENT ATRIAL FIBRILLATION (HCC): Primary | ICD-10-CM

## 2024-10-24 DIAGNOSIS — I48.0 PAROXYSMAL ATRIAL FIBRILLATION (HCC): ICD-10-CM

## 2024-10-24 DIAGNOSIS — E78.5 HYPERLIPIDEMIA, UNSPECIFIED HYPERLIPIDEMIA TYPE: ICD-10-CM

## 2024-10-24 DIAGNOSIS — Z95.0 CARDIAC PACEMAKER IN SITU: Primary | ICD-10-CM

## 2024-10-24 DIAGNOSIS — I10 PRIMARY HYPERTENSION: ICD-10-CM

## 2024-10-24 DIAGNOSIS — I49.5 TACHY-BRADY SYNDROME (HCC): ICD-10-CM

## 2024-10-24 DIAGNOSIS — Z51.81 ENCOUNTER FOR MONITORING SOTALOL THERAPY: ICD-10-CM

## 2024-10-24 DIAGNOSIS — E66.01 OBESITY, CLASS III, BMI 40-49.9 (MORBID OBESITY): ICD-10-CM

## 2024-10-24 DIAGNOSIS — Z79.01 ON CONTINUOUS ORAL ANTICOAGULATION: ICD-10-CM

## 2024-10-24 DIAGNOSIS — I25.10 CORONARY ARTERY DISEASE INVOLVING NATIVE CORONARY ARTERY OF NATIVE HEART WITHOUT ANGINA PECTORIS: ICD-10-CM

## 2024-10-24 DIAGNOSIS — Z79.899 ENCOUNTER FOR MONITORING SOTALOL THERAPY: ICD-10-CM

## 2024-10-24 DIAGNOSIS — Z95.0 CARDIAC PACEMAKER IN SITU: ICD-10-CM

## 2024-10-24 DIAGNOSIS — I50.32 CHRONIC DIASTOLIC HEART FAILURE (HCC): ICD-10-CM

## 2024-10-24 NOTE — TELEPHONE ENCOUNTER
[facility], Dr Kasie Silver, Called wanting to speak to Stefany about this patient. They asked is she could call the dr's cell phone to speak with her.     Cell: [phone]

## 2024-10-25 NOTE — TELEPHONE ENCOUNTER
Phone number on file is pt's daughter Susan.  (Daughter is on [employer]IP[employer]) LMOM for Susan that the appt for 11/1/24 with Dr Silver can be cancelled for Stefany Busby will be reaching out to pt with instructions on how to wean off of Cymbalta and will do the follow up with this.

## 2024-11-04 ENCOUNTER — TELEPHONE (OUTPATIENT)
Dept: PRIMARY CARE CLINIC | Age: 79
End: 2024-11-04

## 2024-11-04 NOTE — TELEPHONE ENCOUNTER
Extensive conversation with patient's daughter Susan. Advised that they should have a long conversation with oncology and pain and decide if in fact she wants to do Tamoxifen what alternatives for pain can be found. Palliative care referral offered, they will consider this. Also can add Lexapro for mood BUT this does not affect pain and in fact tearfulness may be pain AND mood - hard to say, they will let me know and we can schedule for this depending on what happens with the Cymbalta - not doing well stopping this medication cold turkey per her daugher. Copy to Stefany Busby as FYI.

## 2024-11-05 ENCOUNTER — TELEPHONE (OUTPATIENT)
Dept: PAIN MANAGEMENT | Age: 79
End: 2024-11-05

## 2024-11-05 DIAGNOSIS — M54.6 PAIN IN THORACIC SPINE: ICD-10-CM

## 2024-11-05 RX ORDER — DULOXETIN HYDROCHLORIDE 30 MG/1
30 CAPSULE, DELAYED RELEASE ORAL DAILY
Qty: 90 CAPSULE | Refills: 0 | Status: SHIPPED | OUTPATIENT
Start: 2024-11-05

## 2024-11-05 NOTE — TELEPHONE ENCOUNTER
Pt stopped taking Cymbalta and was only taking Tamoxifan and is in a lot of pain. Hydrocodone also not helping with pain. Pt would like to go back on Cymbalta and is not concerned with taking Tamoxifan.    [facility]  - [address]  [phone]

## 2024-11-26 DIAGNOSIS — I10 PRIMARY HYPERTENSION: Primary | ICD-10-CM

## 2024-11-26 RX ORDER — LOSARTAN POTASSIUM AND HYDROCHLOROTHIAZIDE 25; 100 MG/1; MG/1
1 TABLET ORAL DAILY
Qty: 90 TABLET | Refills: 0 | Status: SHIPPED | OUTPATIENT
Start: 2024-11-26

## 2024-11-27 DIAGNOSIS — R11.0 CHRONIC NAUSEA: ICD-10-CM

## 2024-11-27 RX ORDER — ONDANSETRON 4 MG/1
TABLET, FILM COATED ORAL
Qty: 28 TABLET | Refills: 0 | OUTPATIENT
Start: 2024-11-27

## 2024-12-01 DIAGNOSIS — R11.0 CHRONIC NAUSEA: ICD-10-CM

## 2024-12-02 ENCOUNTER — TELEPHONE (OUTPATIENT)
Dept: PRIMARY CARE CLINIC | Age: 79
End: 2024-12-02

## 2024-12-02 RX ORDER — ONDANSETRON 4 MG/1
TABLET, FILM COATED ORAL
Qty: 28 TABLET | Refills: 0 | OUTPATIENT
Start: 2024-12-02

## 2024-12-02 NOTE — TELEPHONE ENCOUNTER
Late entry for 11/27/2024    Communicated with the patient's daughter's daughter. She stated she would contact the patient's pain management for her Lyrica. Advised to let me know if anything was needed on my end and she stated she would.

## 2024-12-05 ENCOUNTER — HOSPITAL ENCOUNTER (OUTPATIENT)
Dept: WOMENS IMAGING | Age: 79
Discharge: HOME | End: 2024-12-05
Payer: COMMERCIAL

## 2024-12-05 VITALS — WEIGHT: 256 LBS | BODY MASS INDEX: 43.71 KG/M2 | HEIGHT: 64 IN

## 2024-12-05 DIAGNOSIS — R92.8 ABNORMAL MAMMOGRAM OF LEFT BREAST: ICD-10-CM

## 2024-12-05 PROCEDURE — G0279 TOMOSYNTHESIS, MAMMO: HCPCS

## 2024-12-08 DIAGNOSIS — R11.0 CHRONIC NAUSEA: ICD-10-CM

## 2024-12-10 LAB
ALBUMIN: 4.1 G/DL
BUN / CREAT RATIO: NORMAL
BUN BLDV-MCNC: 30 MG/DL
CALCIUM SERPL-MCNC: 10 MG/DL
CHLORIDE BLD-SCNC: 99 MMOL/L
CO2: 33 MMOL/L
CREAT SERPL-MCNC: 1.26 MG/DL
GFR, ESTIMATED: NORMAL
GLUCOSE: 113
PHOSPHORUS: 3.9 MG/DL
POTASSIUM SERPL-SCNC: 4.2 MMOL/L
SODIUM BLD-SCNC: 145 MMOL/L

## 2024-12-17 ENCOUNTER — OFFICE VISIT (OUTPATIENT)
Dept: PAIN MANAGEMENT | Age: 79
End: 2024-12-17
Payer: COMMERCIAL

## 2024-12-17 VITALS
BODY MASS INDEX: 43.08 KG/M2 | SYSTOLIC BLOOD PRESSURE: 114 MMHG | OXYGEN SATURATION: 95 % | WEIGHT: 251 LBS | HEART RATE: 86 BPM | DIASTOLIC BLOOD PRESSURE: 73 MMHG

## 2024-12-17 DIAGNOSIS — K59.03 THERAPEUTIC OPIOID-INDUCED CONSTIPATION (OIC): ICD-10-CM

## 2024-12-17 DIAGNOSIS — Z51.81 ENCOUNTER FOR THERAPEUTIC DRUG MONITORING: ICD-10-CM

## 2024-12-17 DIAGNOSIS — M48.062 SPINAL STENOSIS OF LUMBAR REGION WITH NEUROGENIC CLAUDICATION: Primary | ICD-10-CM

## 2024-12-17 DIAGNOSIS — T40.2X5A THERAPEUTIC OPIOID-INDUCED CONSTIPATION (OIC): ICD-10-CM

## 2024-12-17 DIAGNOSIS — M54.6 PAIN IN THORACIC SPINE: ICD-10-CM

## 2024-12-17 DIAGNOSIS — M47.817 LUMBOSACRAL SPONDYLOSIS WITHOUT MYELOPATHY: ICD-10-CM

## 2024-12-17 DIAGNOSIS — G63 POLYNEUROPATHY ASSOCIATED WITH UNDERLYING DISEASE (HCC): ICD-10-CM

## 2024-12-17 DIAGNOSIS — R11.0 CHRONIC NAUSEA: ICD-10-CM

## 2024-12-17 DIAGNOSIS — M96.1 FAILED BACK SURGICAL SYNDROME: ICD-10-CM

## 2024-12-17 DIAGNOSIS — G89.4 CHRONIC PAIN SYNDROME: ICD-10-CM

## 2024-12-17 DIAGNOSIS — G62.9 NEUROPATHY: ICD-10-CM

## 2024-12-17 PROCEDURE — 1159F MED LIST DOCD IN RCRD: CPT | Performed by: NURSE PRACTITIONER

## 2024-12-17 PROCEDURE — 3078F DIAST BP <80 MM HG: CPT | Performed by: NURSE PRACTITIONER

## 2024-12-17 PROCEDURE — 1160F RVW MEDS BY RX/DR IN RCRD: CPT | Performed by: NURSE PRACTITIONER

## 2024-12-17 PROCEDURE — 99214 OFFICE O/P EST MOD 30 MIN: CPT | Performed by: NURSE PRACTITIONER

## 2024-12-17 PROCEDURE — 3074F SYST BP LT 130 MM HG: CPT | Performed by: NURSE PRACTITIONER

## 2024-12-17 PROCEDURE — 1123F ACP DISCUSS/DSCN MKR DOCD: CPT | Performed by: NURSE PRACTITIONER

## 2024-12-17 RX ORDER — PREGABALIN 150 MG/1
CAPSULE ORAL
Qty: 180 CAPSULE | Refills: 0 | Status: SHIPPED | OUTPATIENT
Start: 2024-12-17 | End: 2025-06-19

## 2024-12-17 RX ORDER — PREGABALIN 225 MG/1
225 CAPSULE ORAL NIGHTLY
Qty: 90 CAPSULE | Refills: 0 | Status: SHIPPED | OUTPATIENT
Start: 2024-12-17 | End: 2025-06-15

## 2024-12-17 RX ORDER — DULOXETIN HYDROCHLORIDE 30 MG/1
30 CAPSULE, DELAYED RELEASE ORAL DAILY
Qty: 90 CAPSULE | Refills: 0 | Status: SHIPPED | OUTPATIENT
Start: 2024-12-17

## 2024-12-17 RX ORDER — HYDROCODONE BITARTRATE AND ACETAMINOPHEN 5; 325 MG/1; MG/1
1 TABLET ORAL EVERY 6 HOURS PRN
Qty: 112 TABLET | Refills: 0 | Status: SHIPPED | OUTPATIENT
Start: 2024-12-17 | End: 2025-01-14

## 2024-12-17 NOTE — PROGRESS NOTES
DAILY 90 tablet 3    magnesium oxide (MAG-OX) 400 MG tablet Take 1 tablet by mouth daily      calcium carbonate 600 MG TABS tablet Take 1 tablet by mouth daily      acetaminophen (TYLENOL) 500 MG tablet Take 2 tablets by mouth every 6 hours as needed for Pain or Fever      Glucosamine-Chondroit-Vit C-Mn (GLUCOSAMINE 1500 COMPLEX PO) Take 1 tablet by mouth 2 times daily       Docusate Calcium (STOOL SOFTENER PO) Take 1 capsule by mouth 2 times daily       aspirin 81 MG EC tablet Take 1 tablet by mouth nightly      Multiple Vitamins-Minerals (CENTRUM SILVER PO) Take by mouth daily        No current facility-administered medications for this visit.     I will continue her current medication regimen  which is part of the above treatment schedule. It has been helping with Ms. Jin's chronic  medical problems which for this visit include:   The primary encounter diagnosis was Spinal stenosis of lumbar region with neurogenic claudication. Diagnoses of Chronic nausea, Pain in thoracic spine, Chronic pain syndrome, Encounter for therapeutic drug monitoring, Failed back surgical syndrome, Lumbosacral spondylosis without myelopathy, Polyneuropathy associated with underlying disease (HCC), and Therapeutic opioid-induced constipation (OIC) were also pertinent to this visit.   Risks and benefits of the medications and other alternative treatments  including no treatment were discussed with the patient.The common side effects of these medications were also explained to the patient.  Informed verbal consent was obtained.   Goals of current treatment regimen include improvement in pain, restoration of functioning- with focus on improvement in physical performance, general activity, work or disability,emotional distress, health care utilization and  decreased medication consumption. Will continue to monitor progress towards achieving/maintaining therapeutic goals with special emphasis on  1. Improvement in perceived interfernce  of

## 2024-12-18 DIAGNOSIS — R11.0 CHRONIC NAUSEA: ICD-10-CM

## 2024-12-18 RX ORDER — ONDANSETRON 4 MG/1
TABLET, FILM COATED ORAL
Qty: 28 TABLET | Refills: 0 | OUTPATIENT
Start: 2024-12-18

## 2024-12-20 ENCOUNTER — TELEPHONE (OUTPATIENT)
Dept: PAIN MANAGEMENT | Age: 79
End: 2024-12-20

## 2024-12-20 DIAGNOSIS — R11.0 CHRONIC NAUSEA: ICD-10-CM

## 2024-12-20 RX ORDER — ONDANSETRON 4 MG/1
TABLET, FILM COATED ORAL
Qty: 28 TABLET | Refills: 0 | OUTPATIENT
Start: 2024-12-20

## 2024-12-20 RX ORDER — ONDANSETRON 4 MG/1
4 TABLET, FILM COATED ORAL NIGHTLY PRN
Qty: 28 TABLET | Refills: 3 | Status: SHIPPED | OUTPATIENT
Start: 2024-12-20 | End: 2025-04-11

## 2024-12-20 NOTE — TELEPHONE ENCOUNTER
Who's asking for the refill request:    Pt's daughter    Reason for refill request:    Out     Why is patient out of medication?:      Name of medication:     Zofran 4 mg     [facility]     Phone number:       Last refill:     10/22/24    Next appointment:     1/10/25      Patient confirmed the above pharmacy has their medication in stock

## 2025-01-09 SDOH — HEALTH STABILITY: PHYSICAL HEALTH: ON AVERAGE, HOW MANY MINUTES DO YOU ENGAGE IN EXERCISE AT THIS LEVEL?: 0 MIN

## 2025-01-09 SDOH — HEALTH STABILITY: PHYSICAL HEALTH: ON AVERAGE, HOW MANY DAYS PER WEEK DO YOU ENGAGE IN MODERATE TO STRENUOUS EXERCISE (LIKE A BRISK WALK)?: 0 DAYS

## 2025-01-09 ASSESSMENT — LIFESTYLE VARIABLES
HOW OFTEN DO YOU HAVE SIX OR MORE DRINKS ON ONE OCCASION: 1
HOW OFTEN DO YOU HAVE A DRINK CONTAINING ALCOHOL: NEVER
HOW MANY STANDARD DRINKS CONTAINING ALCOHOL DO YOU HAVE ON A TYPICAL DAY: 0
HOW OFTEN DO YOU HAVE A DRINK CONTAINING ALCOHOL: 1
HOW MANY STANDARD DRINKS CONTAINING ALCOHOL DO YOU HAVE ON A TYPICAL DAY: PATIENT DOES NOT DRINK

## 2025-01-09 ASSESSMENT — PATIENT HEALTH QUESTIONNAIRE - PHQ9
SUM OF ALL RESPONSES TO PHQ QUESTIONS 1-9: 0
SUM OF ALL RESPONSES TO PHQ QUESTIONS 1-9: 0
2. FEELING DOWN, DEPRESSED OR HOPELESS: NOT AT ALL
SUM OF ALL RESPONSES TO PHQ QUESTIONS 1-9: 0
SUM OF ALL RESPONSES TO PHQ QUESTIONS 1-9: 0
SUM OF ALL RESPONSES TO PHQ9 QUESTIONS 1 & 2: 0
1. LITTLE INTEREST OR PLEASURE IN DOING THINGS: NOT AT ALL

## 2025-01-10 ENCOUNTER — OFFICE VISIT (OUTPATIENT)
Dept: PRIMARY CARE CLINIC | Age: 80
End: 2025-01-10
Payer: MEDICARE

## 2025-01-10 VITALS
TEMPERATURE: 98.6 F | BODY MASS INDEX: 42.85 KG/M2 | RESPIRATION RATE: 18 BRPM | OXYGEN SATURATION: 98 % | HEART RATE: 82 BPM | WEIGHT: 251 LBS | HEIGHT: 64 IN | DIASTOLIC BLOOD PRESSURE: 76 MMHG | SYSTOLIC BLOOD PRESSURE: 118 MMHG

## 2025-01-10 DIAGNOSIS — I50.32 CHRONIC DIASTOLIC HEART FAILURE (HCC): ICD-10-CM

## 2025-01-10 DIAGNOSIS — I10 PRIMARY HYPERTENSION: Primary | ICD-10-CM

## 2025-01-10 DIAGNOSIS — I48.11 LONGSTANDING PERSISTENT ATRIAL FIBRILLATION (HCC): ICD-10-CM

## 2025-01-10 DIAGNOSIS — C50.411 MALIGNANT NEOPLASM OF UPPER-OUTER QUADRANT OF RIGHT BREAST IN FEMALE, ESTROGEN RECEPTOR POSITIVE (HCC): ICD-10-CM

## 2025-01-10 DIAGNOSIS — N18.31 STAGE 3A CHRONIC KIDNEY DISEASE (HCC): ICD-10-CM

## 2025-01-10 DIAGNOSIS — I49.5 TACHY-BRADY SYNDROME (HCC): ICD-10-CM

## 2025-01-10 DIAGNOSIS — Z17.0 MALIGNANT NEOPLASM OF UPPER-OUTER QUADRANT OF RIGHT BREAST IN FEMALE, ESTROGEN RECEPTOR POSITIVE (HCC): ICD-10-CM

## 2025-01-10 DIAGNOSIS — G63 POLYNEUROPATHY ASSOCIATED WITH UNDERLYING DISEASE (HCC): ICD-10-CM

## 2025-01-10 PROBLEM — R94.39 ABNORMAL NUCLEAR STRESS TEST: Status: RESOLVED | Noted: 2018-06-07 | Resolved: 2025-01-10

## 2025-01-10 PROBLEM — I20.9 ANGINA PECTORIS SYNDROME (HCC): Status: RESOLVED | Noted: 2018-06-07 | Resolved: 2025-01-10

## 2025-01-10 PROCEDURE — G8427 DOCREV CUR MEDS BY ELIG CLIN: HCPCS | Performed by: FAMILY MEDICINE

## 2025-01-10 PROCEDURE — 1036F TOBACCO NON-USER: CPT | Performed by: FAMILY MEDICINE

## 2025-01-10 PROCEDURE — G8417 CALC BMI ABV UP PARAM F/U: HCPCS | Performed by: FAMILY MEDICINE

## 2025-01-10 PROCEDURE — 1123F ACP DISCUSS/DSCN MKR DOCD: CPT | Performed by: FAMILY MEDICINE

## 2025-01-10 PROCEDURE — 99214 OFFICE O/P EST MOD 30 MIN: CPT | Performed by: FAMILY MEDICINE

## 2025-01-10 PROCEDURE — G8399 PT W/DXA RESULTS DOCUMENT: HCPCS | Performed by: FAMILY MEDICINE

## 2025-01-10 PROCEDURE — 1090F PRES/ABSN URINE INCON ASSESS: CPT | Performed by: FAMILY MEDICINE

## 2025-01-10 PROCEDURE — 3078F DIAST BP <80 MM HG: CPT | Performed by: FAMILY MEDICINE

## 2025-01-10 PROCEDURE — 1160F RVW MEDS BY RX/DR IN RCRD: CPT | Performed by: FAMILY MEDICINE

## 2025-01-10 PROCEDURE — 1159F MED LIST DOCD IN RCRD: CPT | Performed by: FAMILY MEDICINE

## 2025-01-10 PROCEDURE — 3074F SYST BP LT 130 MM HG: CPT | Performed by: FAMILY MEDICINE

## 2025-01-10 SDOH — ECONOMIC STABILITY: FOOD INSECURITY: WITHIN THE PAST 12 MONTHS, YOU WORRIED THAT YOUR FOOD WOULD RUN OUT BEFORE YOU GOT MONEY TO BUY MORE.: NEVER TRUE

## 2025-01-10 SDOH — ECONOMIC STABILITY: FOOD INSECURITY: WITHIN THE PAST 12 MONTHS, THE FOOD YOU BOUGHT JUST DIDN'T LAST AND YOU DIDN'T HAVE MONEY TO GET MORE.: NEVER TRUE

## 2025-01-10 ASSESSMENT — ENCOUNTER SYMPTOMS
ABDOMINAL PAIN: 0
COLOR CHANGE: 0
DIARRHEA: 0
COUGH: 0
RHINORRHEA: 0
SHORTNESS OF BREATH: 0
VOMITING: 0
BLOOD IN STOOL: 0
NAUSEA: 0

## 2025-01-10 ASSESSMENT — LIFESTYLE VARIABLES
HOW MANY STANDARD DRINKS CONTAINING ALCOHOL DO YOU HAVE ON A TYPICAL DAY: PATIENT DOES NOT DRINK
HOW OFTEN DO YOU HAVE A DRINK CONTAINING ALCOHOL: NEVER

## 2025-01-10 NOTE — ASSESSMENT & PLAN NOTE
Well controlled and following with nephrology. Renally dosed meds. Hydration encouraged to increase if able (while considering heart failure as well) possibly to continue as is or to 3 bottles per day if able.

## 2025-01-10 NOTE — PROGRESS NOTES
normal. No respiratory distress.      Breath sounds: Normal breath sounds. No stridor. No wheezing, rhonchi or rales.   Chest:      Chest wall: No tenderness.   Skin:     General: Skin is warm and dry.      Capillary Refill: Capillary refill takes less than 2 seconds.   Neurological:      Mental Status: She is alert.   Psychiatric:         Mood and Affect: Mood normal.         Behavior: Behavior normal.       Body mass index is 43.06 kg/m².    Labs:  No results found for this or any previous visit (from the past 672 hour(s)).    Imaging:  No orders to display     ASSESSMENT & PLAN:    Diana Jin is a 79 y.o. year old female here for Hypertension. The following is a summary of the plan made at this visit:    1. Primary hypertension  Assessment & Plan:  Well controlled, possibly too well. We discussed considering amlodipine removal - she will consider and chat with her cardiologist about this. Other than dizziness which may be 2/2 orthostatic hypotension with hydration encouraged where able, she is asymptomatic. Call back/ED precautions discussed.    Blood Pressure Goal:  [ ] < 130/80 (ACC/AHA)  [x] < 140/90 (JNC-8 for age < 60, or CKD, or DM) OR  [x] < 150/90 (JNC-8 for age > 60) to balance risks of falling  2. Chronic diastolic heart failure (HCC)  Assessment & Plan:  Well controlled. Following with cardiology.  Defer to cardiology on this.  3. Tachy-valerie syndrome (HCC)  Assessment & Plan:  Well controlled. Following with cardiology.  Defer to cardiology on this.  4. Malignant neoplasm of upper-outer quadrant of right breast in female, estrogen receptor positive (HCC)  Assessment & Plan:  Well controlled and follows with oncology for this. Defer to them on this.  5. Stage 3a chronic kidney disease (HCC)  Assessment & Plan:  Well controlled and following with nephrology. Renally dosed meds. Hydration encouraged to increase if able (while considering heart failure as well) possibly to continue as is or to 3

## 2025-01-10 NOTE — PATIENT INSTRUCTIONS
If you drink soda, consider a soda that has Stevia as the sweetener (and in fact, Stevia if a sweetener is needed to be added to food/drink). Examples of these sodas are Zevia or Olipop.

## 2025-01-10 NOTE — ASSESSMENT & PLAN NOTE
Well controlled, possibly too well. We discussed considering amlodipine removal - she will consider and chat with her cardiologist about this. Other than dizziness which may be 2/2 orthostatic hypotension with hydration encouraged where able, she is asymptomatic. Call back/ED precautions discussed.    Blood Pressure Goal:  [ ] < 130/80 (ACC/AHA)  [x] < 140/90 (JNC-8 for age < 60, or CKD, or DM) OR  [x] < 150/90 (JNC-8 for age > 60) to balance risks of falling

## 2025-01-11 DIAGNOSIS — E87.6 HYPOKALEMIA: ICD-10-CM

## 2025-01-13 ENCOUNTER — TELEPHONE (OUTPATIENT)
Dept: PAIN MANAGEMENT | Age: 80
End: 2025-01-13

## 2025-01-13 RX ORDER — POTASSIUM CHLORIDE 1500 MG/1
20 TABLET, EXTENDED RELEASE ORAL 2 TIMES DAILY
Qty: 180 TABLET | Refills: 0 | Status: SHIPPED | OUTPATIENT
Start: 2025-01-13

## 2025-01-21 RX ORDER — AMLODIPINE BESYLATE 5 MG/1
5 TABLET ORAL
Qty: 180 TABLET | Refills: 2 | OUTPATIENT
Start: 2025-01-21

## 2025-01-21 RX ORDER — FUROSEMIDE 20 MG/1
20 TABLET ORAL 2 TIMES DAILY
Qty: 180 TABLET | Refills: 2 | OUTPATIENT
Start: 2025-01-21

## 2025-01-21 NOTE — TELEPHONE ENCOUNTER
Requested Prescriptions     Pending Prescriptions Disp Refills    amLODIPine (NORVASC) 5 MG tablet 180 tablet 2     Sig: Take 1 tablet by mouth 2 times daily (before meals)            Checked Correct Pharmacy: Yes    Any changes since last refill? No     Number: 180  Refills: 2    Last OV: 10/24/2024 Provider: KARYN    Next OV: 2/21/2025 Provider: BRADFORD    Last Labs:   CBC:   Lab Results   Component Value Date    WBC 6.4 03/05/2024    HGB 12.1 03/05/2024    HCT 35.1 (L) 03/05/2024    MCV 85.9 03/05/2024     03/05/2024     CMP:   Lab Results   Component Value Date     12/10/2024    K 4.2 12/10/2024    CL 99 12/10/2024    CO2 33 12/10/2024    BUN 30 12/10/2024    CREATININE 1.26 12/10/2024    GLUCOSE 113 12/10/2024    CALCIUM 10 12/10/2024    BILITOT 0.4 03/05/2024    ALKPHOS 61 03/05/2024    AST 20 03/05/2024    ALT 15 03/05/2024    LABGLOM 38 (A) 03/05/2024    GFRAA >60 03/25/2022    AGRATIO 1.7 03/05/2024    GLOB 2.4 12/15/2020          Lipid:   Lab Results   Component Value Date    CHOL 154 03/25/2022    TRIG 193 (H) 03/25/2022    HDL 44 03/05/2024    LDL 55 03/05/2024    VLDL 48 03/05/2024    CHOLHDLRATIO 4.0 03/04/2017     TSH:   Lab Results   Component Value Date    TSH 3.78 03/05/2024

## 2025-01-24 RX ORDER — FUROSEMIDE 20 MG/1
20 TABLET ORAL 2 TIMES DAILY
Qty: 180 TABLET | Refills: 2 | Status: SHIPPED | OUTPATIENT
Start: 2025-01-24

## 2025-01-24 RX ORDER — AMLODIPINE BESYLATE 5 MG/1
5 TABLET ORAL
Qty: 180 TABLET | Refills: 2 | Status: SHIPPED | OUTPATIENT
Start: 2025-01-24

## 2025-01-24 RX ORDER — ATORVASTATIN CALCIUM 20 MG/1
20 TABLET, FILM COATED ORAL DAILY
Qty: 90 TABLET | Refills: 3 | Status: SHIPPED | OUTPATIENT
Start: 2025-01-24

## 2025-01-24 NOTE — TELEPHONE ENCOUNTER
Rx refill request. Pt is completely out     Medication  atorvastatin (LIPITOR) 20 MG tablet [[id number]]  atorvastatin (LIPITOR) 20 MG tablet [[id number]]    Order Details  Dose, Route, Frequency: As Directed   Dispense Quantity: 90 tablet Refills: 3          Sig: TAKE ONE TABLET BY MOUTH DAILY       amLODIPine (NORVASC) 5 MG tablet [[id number]]    Order Details    Dose: 10 mg Route: Oral Frequency: 2 TIMES DAILY BEFORE MEALS   Dispense Quantity: 180 tablet Refills: 2          Sig: TAKE 1 TABLET BY MOUTH TWICE A DAY BEFORE MEALS             furosemide (LASIX) 20 MG tablet [[id number]]    Order Details  Dose, Route, Frequency: As Directed   Dispense Quantity: 180 tablet Refills: 2          Sig: TAKE ONE TABLET BY MOUTH TWICE A DAY     sotalol (BETAPACE) 120 MG tablet [[id number]]    Order Details  Dose, Route, Frequency: As Directed   Dispense Quantity: 180 tablet Refills: 3          Sig: TAKE ONE TABLET BY MOUTH TWICE A DAY     Pharmacy    [facility] [id number] - [facility] [address] - P [phone] - F [phone]  [address] [place] [address] [facility] [address]  Phone: [phone]  Fax: [phone]

## 2025-02-04 NOTE — TELEPHONE ENCOUNTER
From: Diana Jin  To: Dr. Ursula Lake  Sent: 3/12/2024 9:17 AM EDT  Subject: nephrologist    I received a voicemail from a nephrologist office but they are not on my insurance plan. Claude Juarez is on my [employer] and they require a referral for me to be seen. They ask that you fax the referral to [phone]. Please put my daughters phone number for them to call and schedule. Susan Craft [phone]    Claude Juarez  [address]   [place] [address]  Phone: [phone]  Fax: [phone]    Thank you. Jaymie Jin   We received the MRI report here at Dr. Kimball's office. I have placed it in his mail box for Letty.

## 2025-02-07 ENCOUNTER — OFFICE VISIT (OUTPATIENT)
Dept: PAIN MANAGEMENT | Age: 80
End: 2025-02-07
Payer: MEDICARE

## 2025-02-07 ENCOUNTER — PATIENT MESSAGE (OUTPATIENT)
Dept: PAIN MANAGEMENT | Age: 80
End: 2025-02-07

## 2025-02-07 VITALS
WEIGHT: 251 LBS | HEART RATE: 84 BPM | BODY MASS INDEX: 43.06 KG/M2 | OXYGEN SATURATION: 95 % | DIASTOLIC BLOOD PRESSURE: 72 MMHG | SYSTOLIC BLOOD PRESSURE: 118 MMHG

## 2025-02-07 DIAGNOSIS — G63 POLYNEUROPATHY ASSOCIATED WITH UNDERLYING DISEASE (HCC): ICD-10-CM

## 2025-02-07 DIAGNOSIS — M48.062 SPINAL STENOSIS OF LUMBAR REGION WITH NEUROGENIC CLAUDICATION: ICD-10-CM

## 2025-02-07 DIAGNOSIS — Z51.81 ENCOUNTER FOR THERAPEUTIC DRUG MONITORING: ICD-10-CM

## 2025-02-07 DIAGNOSIS — M48.062 SPINAL STENOSIS OF LUMBAR REGION WITH NEUROGENIC CLAUDICATION: Primary | ICD-10-CM

## 2025-02-07 DIAGNOSIS — T40.2X5A THERAPEUTIC OPIOID-INDUCED CONSTIPATION (OIC): ICD-10-CM

## 2025-02-07 DIAGNOSIS — G89.4 CHRONIC PAIN SYNDROME: ICD-10-CM

## 2025-02-07 DIAGNOSIS — M54.6 PAIN IN THORACIC SPINE: ICD-10-CM

## 2025-02-07 DIAGNOSIS — M47.817 LUMBOSACRAL SPONDYLOSIS WITHOUT MYELOPATHY: ICD-10-CM

## 2025-02-07 DIAGNOSIS — G62.9 NEUROPATHY: ICD-10-CM

## 2025-02-07 DIAGNOSIS — M96.1 FAILED BACK SURGICAL SYNDROME: ICD-10-CM

## 2025-02-07 DIAGNOSIS — R11.0 CHRONIC NAUSEA: ICD-10-CM

## 2025-02-07 DIAGNOSIS — K59.03 THERAPEUTIC OPIOID-INDUCED CONSTIPATION (OIC): ICD-10-CM

## 2025-02-07 PROCEDURE — 1090F PRES/ABSN URINE INCON ASSESS: CPT | Performed by: NURSE PRACTITIONER

## 2025-02-07 PROCEDURE — G8427 DOCREV CUR MEDS BY ELIG CLIN: HCPCS | Performed by: NURSE PRACTITIONER

## 2025-02-07 PROCEDURE — 1123F ACP DISCUSS/DSCN MKR DOCD: CPT | Performed by: NURSE PRACTITIONER

## 2025-02-07 PROCEDURE — 1159F MED LIST DOCD IN RCRD: CPT | Performed by: NURSE PRACTITIONER

## 2025-02-07 PROCEDURE — 99214 OFFICE O/P EST MOD 30 MIN: CPT | Performed by: NURSE PRACTITIONER

## 2025-02-07 PROCEDURE — G8417 CALC BMI ABV UP PARAM F/U: HCPCS | Performed by: NURSE PRACTITIONER

## 2025-02-07 PROCEDURE — 3074F SYST BP LT 130 MM HG: CPT | Performed by: NURSE PRACTITIONER

## 2025-02-07 PROCEDURE — 3078F DIAST BP <80 MM HG: CPT | Performed by: NURSE PRACTITIONER

## 2025-02-07 PROCEDURE — G8399 PT W/DXA RESULTS DOCUMENT: HCPCS | Performed by: NURSE PRACTITIONER

## 2025-02-07 PROCEDURE — 1160F RVW MEDS BY RX/DR IN RCRD: CPT | Performed by: NURSE PRACTITIONER

## 2025-02-07 PROCEDURE — 1036F TOBACCO NON-USER: CPT | Performed by: NURSE PRACTITIONER

## 2025-02-07 RX ORDER — HYDROCODONE BITARTRATE AND ACETAMINOPHEN 5; 325 MG/1; MG/1
1 TABLET ORAL EVERY 6 HOURS PRN
Qty: 112 TABLET | Refills: 0 | Status: SHIPPED | OUTPATIENT
Start: 2025-02-07 | End: 2025-03-07

## 2025-02-07 RX ORDER — PREGABALIN 225 MG/1
225 CAPSULE ORAL DAILY PRN
Qty: 30 CAPSULE | Refills: 0 | Status: SHIPPED | OUTPATIENT
Start: 2025-02-07 | End: 2025-03-09

## 2025-02-07 NOTE — PROGRESS NOTES
Diana Jin  1945  [id number]      HISTORY OF PRESENT ILLNESS: Ms. Jin is a 79 y.o. female returns for a follow up visit for pain management  She has a diagnosis of   1. Spinal stenosis of lumbar region with neurogenic claudication    2. Chronic nausea    3. Pain in thoracic spine    4. Chronic pain syndrome    5. Encounter for therapeutic drug monitoring    6. Failed back surgical syndrome    7. Lumbosacral spondylosis without myelopathy    8. Polyneuropathy associated with underlying disease (HCC)    9. Therapeutic opioid-induced constipation (OIC)    10. Neuropathy    .      New Medications since Last Office visit have been reviewed with patient.     As per Information Obtained from the PADT (Patient Assessment and Documentation Tool)    She complains of pain in the lower back, bilateral feet. She rates the pain 3/10 and describes it as sharp, aching, burning, numbness, pins and needles. Current treatment regimen has helped relieve about 85% of the pain since beginning treatment plan.  She denies any side effects from the current pain regimen. Patient reports that since implementation of their treatment plan; their physical functioning is better, family/social relationships are unchanged, mood is better sleep patterns are better, and that the overall functioning is better.  Patient denies/admits that any of the above have changed since last office visit. Patient denies misusing/abusing her narcotic pain medications or using any illegal drugs.      Upon obtaining medical history from Ms. Jin    ALLERGIES: Patients list of allergies were reviewed     MEDICATIONS: Ms. Jin list of medications were reviewed.Her current medications are   Outpatient Medications Prior to Visit   Medication Sig Dispense Refill    atorvastatin (LIPITOR) 20 MG tablet Take 1 tablet by mouth daily 90 tablet 3    amLODIPine (NORVASC) 5 MG tablet Take 1 tablet by mouth 2 times daily (before meals) 180 tablet 2    furosemide (LASIX) 20

## 2025-02-11 RX ORDER — PREGABALIN 225 MG/1
225 CAPSULE ORAL DAILY
Qty: 30 CAPSULE | Refills: 0 | Status: SHIPPED | OUTPATIENT
Start: 2025-02-11 | End: 2025-03-13

## 2025-02-19 NOTE — PROGRESS NOTES
[facility]     Outpatient Cardiology         Patient Name:  Diana Jin  Primary Care Physician: Kasie Silver MD  02/21/25     Assessment & Plan    Assessment / Plan:     CAD - s/p PCI to RCA.  No angina symptoms.  Continue Lipitor.  Check lipid panel.  Afib -  s/p RFA of AF, s/p cryo for AF (12/2018), s/p RFA/PVI of AF (11/1/19, [facility]), recurrent pAF post RFA, placed on sotalol w/ a DCCV to NSR (11/18/19), tolerating sotalol.  Has first-degree AV delay.  Check BMP.  No bleeding.  Check CBC  SSS - s/p dual ch MDT PPM (10/7/2021, Dr. Harris),   CHF - EF 55-60%, clinically euvolemic.  Stable.  Continue Lasix  HTN - well controlled today.  Continue Norvasc, losartan hydrochlorothiazide, Betapace  HLD - Continue Lipitor   RENE - unable to wear CPAP   Continue current therapy.  Follow-up in 6-7 months            Chief Complaint:     Chief Complaint   Patient presents with    6 Month Follow-Up    Atrial Fibrillation       History of Present Illness:       HPI     Diana Jinis a 79 y.o. female with PMH of afib s/p DCCV 2019, breast cancer, CAD, CHF, GERD, HLD HTN and RENE here for a follow up.       Today she reports she is doing well.  No worsening leg swelling.  No paroxysmal dyspnea.   Patient denies any chest pain, shortness of breath, palpitations, presyncope or syncope. No TIA. No claudication. No recent hospitalizations    PMH  Past Medical History:   Diagnosis Date    Abnormal nuclear stress test 06/07/2018    TAMEKA (acute kidney injury) 02/06/2024    Angina pectoris syndrome 06/07/2018    Arthritis     Atrial fibrillation (HCC) 08/2016    Breast cancer (HCC) 2012    Dr.Sara Elias - S/p chemo    CAD (coronary artery disease)     CHF (congestive heart failure) ([facility])     Generalized weakness 02/06/2024    GERD (gastroesophageal reflux disease) 2001    dx 2001. but has never had an issue. requires no meds    H/O: whooping cough 1945    Heart failure (HCC) 11/16/2019    Hernia, ventral      n/a

## 2025-02-19 NOTE — PATIENT INSTRUCTIONS
Thank you for choosing [facility] for your cardiac care.    During your visit today, we reviewed and confirmed your cardiac medications along with  medication prescribed by your other healthcare team members. Please be sure to discuss any  changes to medication with your providers.    Please bring a list of ALL medications (or the bottles) with you to EVERY appointment.  Also include vitamins and over-the-counter medications.    If you need refills for any cardiac medications, please call your pharmacy and they will reach out to us electronically.    Did your provider order testing today? If yes, then you will receive your results in three  possible ways. You can receive a [email or web address] message, a phone call, or letter in the mail. Please  note, if you are an active [email or web address] user, some of your testing will be available within 1-2 days.    Finally, please know that it is good for your heart to exercise and follow a healthy, low-fat diet  as advised by your physician and health care providers.    If you are experiencing a medical emergency, please call 911 immediately.    It's easy to register for a [email or web address] account if you don't already have one. With a [email or web address]  account you can manage your health record, view test results, schedule appointments and  more.     Dr. Power's clinical staff can be reached at the following phone number: [phone]    If any cardiac testing is ordered, please contact central scheduling at [phone] to get your test scheduled.

## 2025-02-21 ENCOUNTER — OFFICE VISIT (OUTPATIENT)
Dept: CARDIOLOGY CLINIC | Age: 80
End: 2025-02-21

## 2025-02-21 VITALS
WEIGHT: 253 LBS | OXYGEN SATURATION: 96 % | BODY MASS INDEX: 43.41 KG/M2 | HEART RATE: 75 BPM | DIASTOLIC BLOOD PRESSURE: 72 MMHG | SYSTOLIC BLOOD PRESSURE: 134 MMHG

## 2025-02-21 DIAGNOSIS — I10 PRIMARY HYPERTENSION: ICD-10-CM

## 2025-02-21 DIAGNOSIS — I48.0 PAROXYSMAL ATRIAL FIBRILLATION (HCC): ICD-10-CM

## 2025-02-21 DIAGNOSIS — I48.0 PAROXYSMAL ATRIAL FIBRILLATION (HCC): Primary | ICD-10-CM

## 2025-02-22 LAB
ANION GAP SERPL CALCULATED.3IONS-SCNC: 11 MMOL/L (ref 3–16)
BUN SERPL-MCNC: 28 MG/DL (ref 7–20)
CALCIUM SERPL-MCNC: 10 MG/DL (ref 8.3–10.6)
CHLORIDE SERPL-SCNC: 101 MMOL/L (ref 99–110)
CHOLEST SERPL-MCNC: 199 MG/DL (ref 0–199)
CO2 SERPL-SCNC: 31 MMOL/L (ref 21–32)
CREAT SERPL-MCNC: 1.1 MG/DL (ref 0.6–1.2)
DEPRECATED RDW RBC AUTO: 15.4 % (ref 12.4–15.4)
GFR SERPLBLD CREATININE-BSD FMLA CKD-EPI: 51 ML/MIN/{1.73_M2}
GLUCOSE SERPL-MCNC: 104 MG/DL (ref 70–99)
HCT VFR BLD AUTO: 41.5 % (ref 36–48)
HDLC SERPL-MCNC: 49 MG/DL (ref 40–60)
HGB BLD-MCNC: 13.4 G/DL (ref 12–16)
LDLC SERPL CALC-MCNC: 103 MG/DL
MCH RBC QN AUTO: 27.7 PG (ref 26–34)
MCHC RBC AUTO-ENTMCNC: 32.2 G/DL (ref 31–36)
MCV RBC AUTO: 86.2 FL (ref 80–100)
PLATELET # BLD AUTO: 249 K/UL (ref 135–450)
PMV BLD AUTO: 8.6 FL (ref 5–10.5)
POTASSIUM SERPL-SCNC: 5 MMOL/L (ref 3.5–5.1)
RBC # BLD AUTO: 4.82 M/UL (ref 4–5.2)
SODIUM SERPL-SCNC: 143 MMOL/L (ref 136–145)
TRIGL SERPL-MCNC: 237 MG/DL (ref 0–150)
VLDLC SERPL CALC-MCNC: 47 MG/DL
WBC # BLD AUTO: 7.2 K/UL (ref 4–11)

## 2025-02-24 ENCOUNTER — TELEPHONE (OUTPATIENT)
Dept: CARDIOLOGY CLINIC | Age: 80
End: 2025-02-24

## 2025-02-24 RX ORDER — ATORVASTATIN CALCIUM 40 MG/1
40 TABLET, FILM COATED ORAL DAILY
Qty: 90 TABLET | Refills: 3 | Status: SHIPPED | OUTPATIENT
Start: 2025-02-24

## 2025-02-24 NOTE — TELEPHONE ENCOUNTER
Called and spoke to patient and she is taking the Lipitor 20 mg's and is willing to go up to the 40 mg's. Please sign a new prescription for the Lipitor 40 mg's for her to start taking.    Cholesterol is increased compared to prior.  Please find out if patient is taking the statin.  Increase Lipitor to 40 mg daily if patient has been taking the medication on a regular basis..     Uday Power MD   
medical evaluation

## 2025-03-13 ENCOUNTER — TELEPHONE (OUTPATIENT)
Dept: PAIN MANAGEMENT | Age: 80
End: 2025-03-13

## 2025-03-13 DIAGNOSIS — G63 POLYNEUROPATHY ASSOCIATED WITH UNDERLYING DISEASE: ICD-10-CM

## 2025-03-13 DIAGNOSIS — G62.9 NEUROPATHY: ICD-10-CM

## 2025-03-13 DIAGNOSIS — M54.6 PAIN IN THORACIC SPINE: ICD-10-CM

## 2025-03-13 DIAGNOSIS — I10 PRIMARY HYPERTENSION: ICD-10-CM

## 2025-03-13 DIAGNOSIS — M48.062 SPINAL STENOSIS OF LUMBAR REGION WITH NEUROGENIC CLAUDICATION: ICD-10-CM

## 2025-03-13 DIAGNOSIS — M96.1 FAILED BACK SURGICAL SYNDROME: ICD-10-CM

## 2025-03-13 DIAGNOSIS — M47.817 LUMBOSACRAL SPONDYLOSIS WITHOUT MYELOPATHY: ICD-10-CM

## 2025-03-13 RX ORDER — LOSARTAN POTASSIUM AND HYDROCHLOROTHIAZIDE 25; 100 MG/1; MG/1
1 TABLET ORAL DAILY
Qty: 90 TABLET | Refills: 0 | Status: SHIPPED | OUTPATIENT
Start: 2025-03-13

## 2025-03-13 NOTE — TELEPHONE ENCOUNTER
I called patient's daughter Susan. She stated that patient is only taking 2 Hydrocodone tablets per day. She counted her Hydrocodone and has enough until her 4/4/25 appointment.      Patient has enough Lyrica to make it though the weekend.  Please [facility]ill [facility].

## 2025-03-13 NOTE — TELEPHONE ENCOUNTER
Patient daughter called stating her mother needs a refill on her Hydrocodone and Lyrica. Patient has an appointment to see SAMEER on 04/04/2025. Please advise

## 2025-03-13 NOTE — TELEPHONE ENCOUNTER
Is she taking more than 2 tabs per day? Last time I saw her she was using 2 tabs per day and I wrote for 4/day with a 2 month f/u. If she is using more than 2/day and needs a refill she should move up her appt so I can document her pain concerns/needs and refill the opiate for her.

## 2025-03-14 DIAGNOSIS — M54.6 PAIN IN THORACIC SPINE: ICD-10-CM

## 2025-03-14 DIAGNOSIS — I48.0 PAROXYSMAL ATRIAL FIBRILLATION (HCC): ICD-10-CM

## 2025-03-14 DIAGNOSIS — I48.92 PAROXYSMAL ATRIAL FLUTTER (HCC): ICD-10-CM

## 2025-03-14 RX ORDER — PREGABALIN 225 MG/1
225 CAPSULE ORAL DAILY
Qty: 30 CAPSULE | Refills: 0 | Status: SHIPPED | OUTPATIENT
Start: 2025-03-14 | End: 2025-04-13

## 2025-03-14 RX ORDER — DULOXETIN HYDROCHLORIDE 30 MG/1
30 CAPSULE, DELAYED RELEASE ORAL DAILY
Qty: 90 CAPSULE | Refills: 0 | OUTPATIENT
Start: 2025-03-14

## 2025-03-17 RX ORDER — SOTALOL HYDROCHLORIDE 120 MG/1
TABLET ORAL
Qty: 180 TABLET | Refills: 3 | Status: SHIPPED | OUTPATIENT
Start: 2025-03-17

## 2025-03-17 NOTE — TELEPHONE ENCOUNTER
Requested Prescriptions     Pending Prescriptions Disp Refills    sotalol (BETAPACE) 120 MG tablet 180 tablet 3     Sig: TAKE ONE TABLET BY MOUTH TWICE A DAY            Checked Correct Pharmacy: Yes    Any changes since last refill? No     Number: 180  Refills: 3    Last OV: 2/21/2025 Provider: BRADFORD    Next OV: 4/29/2025 Provider: DAIANA    Last Labs:   CBC:   Lab Results   Component Value Date    WBC 7.2 02/21/2025    HGB 13.4 02/21/2025    HCT 41.5 02/21/2025    MCV 86.2 02/21/2025     02/21/2025     BMP:   Lab Results   Component Value Date/Time     02/21/2025 11:43 AM    K 5.0 02/21/2025 11:43 AM    K 4.4 02/05/2024 02:16 PM     02/21/2025 11:43 AM    CO2 31 02/21/2025 11:43 AM    BUN 28 02/21/2025 11:43 AM    CREATININE 1.1 02/21/2025 11:43 AM    GLUCOSE 104 02/21/2025 11:43 AM    GLUCOSE 113 12/10/2024 12:00 AM    CALCIUM 10.0 02/21/2025 11:43 AM    LABGLOM 51 02/21/2025 11:43 AM    LABGLOM 38 03/05/2024 08:26 AM

## 2025-04-03 NOTE — H&P (VIEW-ONLY)
atrial fibrillation (HCC)    3. On continuous oral anticoagulation    4. Encounter for monitoring sotalol therapy    5. SSS (sick sinus syndrome) ([facility])    6. Tachycardia-bradycardia syndrome ([facility])    7. Pacemaker    8. Coronary artery disease involving native coronary artery of native heart without angina pectoris    9. Essential hypertension    10. Chronic diastolic CHF (congestive heart failure) ([facility])        Cardiac HX: Diana Jin is a 79 y.o. woman with a h/o HTN, HLD, RENE not on CPAP, CAD, s/p PCI of the RCA (10/2016, Dr. Nunez, [place], [place]), s/p LHC (6/2018, Dr. Nunez) no intervention, pAF/AFL, s/p DCCV to NSR (9/2018),  recurrent AF, s/p DCCV to NSR (11/2019), s/p RFA of AFL ([place], [place]), s/p cryo for AF (12/2018), s/p redo RFA/PVI of AF (11/2019, [facility]), recurrent pAF post RFA, placed on sotalol, s/p DCCV to NSR (11/2019), SB on sotalol, dose decreased to 120 mg BID, 1 week CAM (1/2021) SB, first-degree AV block, 22 AT episodes w/ longest lasting 21 beats, < 1% PAC/PVC burden, no AF, s/p dual ch MDT PPM (10/2021, Dr. Harris), s/p sotalol reloading.  [id number] 5. TSH 3.78 (3/2024)     pAF/AFL  - In AF/AFL - HR 76  - S/p RFA of AFL, s/p cryo RFA of AF (12/2018), s/p RFA/PVI of AF (11/2019)  - On Eliquis 5 mg BID - no s/s bleeding - continue - has not missed any doses  - On sotalol 120 mg BID - QTc 425  - RENE - unable to wear CPAP, not interested in pursuing f/u  - Reviewed risk factors, pathophysiology, treatment options and lifestyle modification for atrial fibrillation: Blood pressure control, blood sugar control, healthy diet, minimal alcohol intake, no smoking, activity and exercise, manage stress sleep apnea evaluation and symptoms of a stroke.  - Reviewed recent labs  - Echo - 55-60% (2021); 55-60% (2024)  - Will repeat echo - patient and daughter aware that if EF has dropped we would more aggressive w/ AF management  - Will discuss rhythm management with Dr. Harris - consider a DCCV and if recurrent will

## 2025-04-03 NOTE — PROGRESS NOTES
tablet by mouth 2 times daily (before meals) 180 tablet 2    furosemide (LASIX) 20 MG tablet Take 1 tablet by mouth 2 times daily 180 tablet 2    pregabalin (LYRICA) 225 MG capsule Take 1 capsule by mouth at bedtime for 180 days. Max Daily Amount: 225 mg 90 capsule 0    ELIQUIS 5 MG TABS tablet Take 1 tablet by mouth 2 times daily 90 tablet 3    Vitamin D (CHOLECALCIFEROL) 25 MCG (1000 UT) TABS tablet Take 1 tablet by mouth daily 90 tablet 0    calcium carbonate 600 MG TABS tablet Take 1 tablet by mouth daily      acetaminophen (TYLENOL) 500 MG tablet Take 2 tablets by mouth every 6 hours as needed for Pain or Fever      Glucosamine-Chondroit-Vit C-Mn (GLUCOSAMINE 1500 COMPLEX PO) Take 1 tablet by mouth 2 times daily       Docusate Calcium (STOOL SOFTENER PO) Take 1 capsule by mouth 2 times daily       aspirin 81 MG EC tablet Take 1 tablet by mouth nightly      Multiple Vitamins-Minerals (CENTRUM SILVER PO) Take by mouth daily       pregabalin (LYRICA) 225 MG capsule Take 1 capsule by mouth daily for 30 days. Increasing daily dose of lyrica Max Daily Amount: 225 mg (Patient not taking: Reported on 4/29/2025) 30 capsule 0     No current facility-administered medications on file prior to visit.       Past Medical History:   Diagnosis Date    Abnormal nuclear stress test 06/07/2018    TAMEKA (acute kidney injury) 02/06/2024    Angina pectoris syndrome 06/07/2018    Arthritis     Atrial fibrillation (HCC) 08/2016    Breast cancer ([facility]) 2012    Dr.Sara Elias - S/p chemo    CAD (coronary artery disease)     CHF (congestive heart failure) ([facility])     Generalized weakness 02/06/2024    GERD (gastroesophageal reflux disease) 2001    dx 2001. but has never had an issue. requires no meds    H/O: whooping cough 1945    Heart failure (HCC) 11/16/2019    Hernia, ventral     Recurrence (after ASHLEIGH)    History of shingles     1 year after receiving vaccine    Hx of endometriosis     Hypercholesterolemia 2010    Hypertension 1987

## 2025-04-04 ENCOUNTER — OFFICE VISIT (OUTPATIENT)
Dept: PAIN MANAGEMENT | Age: 80
End: 2025-04-04
Payer: MEDICARE

## 2025-04-04 VITALS
DIASTOLIC BLOOD PRESSURE: 78 MMHG | BODY MASS INDEX: 43.75 KG/M2 | SYSTOLIC BLOOD PRESSURE: 127 MMHG | HEART RATE: 73 BPM | WEIGHT: 255 LBS | OXYGEN SATURATION: 97 %

## 2025-04-04 DIAGNOSIS — T40.2X5A THERAPEUTIC OPIOID-INDUCED CONSTIPATION (OIC): ICD-10-CM

## 2025-04-04 DIAGNOSIS — M96.1 FAILED BACK SURGICAL SYNDROME: ICD-10-CM

## 2025-04-04 DIAGNOSIS — M48.062 SPINAL STENOSIS OF LUMBAR REGION WITH NEUROGENIC CLAUDICATION: Primary | ICD-10-CM

## 2025-04-04 DIAGNOSIS — R11.0 CHRONIC NAUSEA: ICD-10-CM

## 2025-04-04 DIAGNOSIS — G62.9 NEUROPATHY: ICD-10-CM

## 2025-04-04 DIAGNOSIS — M54.6 PAIN IN THORACIC SPINE: ICD-10-CM

## 2025-04-04 DIAGNOSIS — K59.03 THERAPEUTIC OPIOID-INDUCED CONSTIPATION (OIC): ICD-10-CM

## 2025-04-04 DIAGNOSIS — Z51.81 ENCOUNTER FOR THERAPEUTIC DRUG MONITORING: ICD-10-CM

## 2025-04-04 DIAGNOSIS — G63 POLYNEUROPATHY ASSOCIATED WITH UNDERLYING DISEASE: ICD-10-CM

## 2025-04-04 DIAGNOSIS — M47.817 LUMBOSACRAL SPONDYLOSIS WITHOUT MYELOPATHY: ICD-10-CM

## 2025-04-04 DIAGNOSIS — G89.4 CHRONIC PAIN SYNDROME: ICD-10-CM

## 2025-04-04 PROCEDURE — 3074F SYST BP LT 130 MM HG: CPT | Performed by: NURSE PRACTITIONER

## 2025-04-04 PROCEDURE — 1123F ACP DISCUSS/DSCN MKR DOCD: CPT | Performed by: NURSE PRACTITIONER

## 2025-04-04 PROCEDURE — G8417 CALC BMI ABV UP PARAM F/U: HCPCS | Performed by: NURSE PRACTITIONER

## 2025-04-04 PROCEDURE — G8399 PT W/DXA RESULTS DOCUMENT: HCPCS | Performed by: NURSE PRACTITIONER

## 2025-04-04 PROCEDURE — 1090F PRES/ABSN URINE INCON ASSESS: CPT | Performed by: NURSE PRACTITIONER

## 2025-04-04 PROCEDURE — 3078F DIAST BP <80 MM HG: CPT | Performed by: NURSE PRACTITIONER

## 2025-04-04 PROCEDURE — 1159F MED LIST DOCD IN RCRD: CPT | Performed by: NURSE PRACTITIONER

## 2025-04-04 PROCEDURE — 1036F TOBACCO NON-USER: CPT | Performed by: NURSE PRACTITIONER

## 2025-04-04 PROCEDURE — 99213 OFFICE O/P EST LOW 20 MIN: CPT | Performed by: NURSE PRACTITIONER

## 2025-04-04 PROCEDURE — 1160F RVW MEDS BY RX/DR IN RCRD: CPT | Performed by: NURSE PRACTITIONER

## 2025-04-04 PROCEDURE — G8427 DOCREV CUR MEDS BY ELIG CLIN: HCPCS | Performed by: NURSE PRACTITIONER

## 2025-04-04 RX ORDER — PREGABALIN 225 MG/1
225 CAPSULE ORAL NIGHTLY
Qty: 30 CAPSULE | Refills: 1 | Status: SHIPPED | OUTPATIENT
Start: 2025-04-04 | End: 2025-06-03

## 2025-04-04 RX ORDER — PREGABALIN 150 MG/1
150 CAPSULE ORAL 2 TIMES DAILY
Qty: 60 CAPSULE | Refills: 1 | Status: SHIPPED | OUTPATIENT
Start: 2025-04-04 | End: 2025-06-03

## 2025-04-04 RX ORDER — HYDROCODONE BITARTRATE AND ACETAMINOPHEN 5; 325 MG/1; MG/1
1 TABLET ORAL
Qty: 127 TABLET | Refills: 0 | Status: SHIPPED | OUTPATIENT
Start: 2025-04-04 | End: 2025-05-02

## 2025-04-04 RX ORDER — DULOXETIN HYDROCHLORIDE 30 MG/1
30 CAPSULE, DELAYED RELEASE ORAL DAILY
Qty: 90 CAPSULE | Refills: 0 | Status: SHIPPED | OUTPATIENT
Start: 2025-04-04

## 2025-04-04 NOTE — PROGRESS NOTES
educated on and understands the limitations of opiates regarding their inability to remove pain long term.   Will continue to monitor progress towards achieving/maintaining therapeutic goals with special emphasis on  1. Improvement in perceived interfernce of pain with ADL's. Ability to do home exercises independently. Ability to do household chores indoor and/or outdoor work and social and leisure activities.Improve psychosocial and physical functioning. she is showing progression towards this treatment goal with the current regimen.     She was advised against drinking alcohol with the narcotic pain medicines, advised against driving or handling machinery while adjusting the dose of medicines or if having cognitive  issues related to the current medications.Risk of overdose and death, if medicines not taken as prescribed, were also discussed. If the patient develops new symptoms or if the symptoms worsen, the patient should call the office.    While transcribing every attempt was made to maintain the accuracy of the note in terms of it's contents,there may have been some errors made inadvertently.  Thank you for allowing me to participate in the care of this patient.    LIZ Burgess    Cc: Kasie Persaud MD

## 2025-04-14 DIAGNOSIS — E87.6 HYPOKALEMIA: ICD-10-CM

## 2025-04-14 RX ORDER — POTASSIUM CHLORIDE 1500 MG/1
20 TABLET, EXTENDED RELEASE ORAL 2 TIMES DAILY
Qty: 180 TABLET | Refills: 0 | Status: SHIPPED | OUTPATIENT
Start: 2025-04-14 | End: 2025-04-15

## 2025-04-15 DIAGNOSIS — E87.6 HYPOKALEMIA: ICD-10-CM

## 2025-04-15 RX ORDER — POTASSIUM CHLORIDE 1500 MG/1
20 TABLET, EXTENDED RELEASE ORAL 2 TIMES DAILY
Qty: 180 TABLET | Refills: 0 | Status: SHIPPED | OUTPATIENT
Start: 2025-04-15

## 2025-04-17 DIAGNOSIS — R11.0 CHRONIC NAUSEA: ICD-10-CM

## 2025-04-28 RX ORDER — ONDANSETRON 4 MG/1
TABLET, FILM COATED ORAL
Qty: 28 TABLET | Refills: 3 | OUTPATIENT
Start: 2025-04-28

## 2025-04-29 ENCOUNTER — CLINICAL SUPPORT (OUTPATIENT)
Dept: CARDIOLOGY CLINIC | Age: 80
End: 2025-04-29

## 2025-04-29 ENCOUNTER — OFFICE VISIT (OUTPATIENT)
Dept: CARDIOLOGY CLINIC | Age: 80
End: 2025-04-29
Payer: MEDICARE

## 2025-04-29 VITALS
BODY MASS INDEX: 43.85 KG/M2 | SYSTOLIC BLOOD PRESSURE: 128 MMHG | DIASTOLIC BLOOD PRESSURE: 76 MMHG | WEIGHT: 255.6 LBS | HEART RATE: 76 BPM

## 2025-04-29 DIAGNOSIS — Z79.01 ON CONTINUOUS ORAL ANTICOAGULATION: ICD-10-CM

## 2025-04-29 DIAGNOSIS — Z95.0 PACEMAKER: ICD-10-CM

## 2025-04-29 DIAGNOSIS — I50.32 CHRONIC DIASTOLIC CHF (CONGESTIVE HEART FAILURE) (HCC): ICD-10-CM

## 2025-04-29 DIAGNOSIS — I48.19 PERSISTENT ATRIAL FIBRILLATION (HCC): ICD-10-CM

## 2025-04-29 DIAGNOSIS — Z79.899 ENCOUNTER FOR MONITORING SOTALOL THERAPY: ICD-10-CM

## 2025-04-29 DIAGNOSIS — I49.5 SSS (SICK SINUS SYNDROME) (HCC): ICD-10-CM

## 2025-04-29 DIAGNOSIS — I25.10 CORONARY ARTERY DISEASE INVOLVING NATIVE CORONARY ARTERY OF NATIVE HEART WITHOUT ANGINA PECTORIS: ICD-10-CM

## 2025-04-29 DIAGNOSIS — Z51.81 ENCOUNTER FOR MONITORING SOTALOL THERAPY: ICD-10-CM

## 2025-04-29 DIAGNOSIS — I48.92 PAROXYSMAL ATRIAL FLUTTER (HCC): Primary | ICD-10-CM

## 2025-04-29 DIAGNOSIS — I10 ESSENTIAL HYPERTENSION: ICD-10-CM

## 2025-04-29 DIAGNOSIS — I49.5 TACHYCARDIA-BRADYCARDIA SYNDROME (HCC): ICD-10-CM

## 2025-04-29 PROCEDURE — 3074F SYST BP LT 130 MM HG: CPT | Performed by: NURSE PRACTITIONER

## 2025-04-29 PROCEDURE — 1036F TOBACCO NON-USER: CPT | Performed by: NURSE PRACTITIONER

## 2025-04-29 PROCEDURE — G8417 CALC BMI ABV UP PARAM F/U: HCPCS | Performed by: NURSE PRACTITIONER

## 2025-04-29 PROCEDURE — 1090F PRES/ABSN URINE INCON ASSESS: CPT | Performed by: NURSE PRACTITIONER

## 2025-04-29 PROCEDURE — 1159F MED LIST DOCD IN RCRD: CPT | Performed by: NURSE PRACTITIONER

## 2025-04-29 PROCEDURE — G8399 PT W/DXA RESULTS DOCUMENT: HCPCS | Performed by: NURSE PRACTITIONER

## 2025-04-29 PROCEDURE — 99215 OFFICE O/P EST HI 40 MIN: CPT | Performed by: NURSE PRACTITIONER

## 2025-04-29 PROCEDURE — 1123F ACP DISCUSS/DSCN MKR DOCD: CPT | Performed by: NURSE PRACTITIONER

## 2025-04-29 PROCEDURE — 93000 ELECTROCARDIOGRAM COMPLETE: CPT | Performed by: NURSE PRACTITIONER

## 2025-04-29 PROCEDURE — 1160F RVW MEDS BY RX/DR IN RCRD: CPT | Performed by: NURSE PRACTITIONER

## 2025-04-29 PROCEDURE — G8427 DOCREV CUR MEDS BY ELIG CLIN: HCPCS | Performed by: NURSE PRACTITIONER

## 2025-04-29 PROCEDURE — 3078F DIAST BP <80 MM HG: CPT | Performed by: NURSE PRACTITIONER

## 2025-04-30 ENCOUNTER — TELEPHONE (OUTPATIENT)
Dept: CARDIOLOGY CLINIC | Age: 80
End: 2025-04-30

## 2025-04-30 DIAGNOSIS — I48.19 PERSISTENT ATRIAL FIBRILLATION (HCC): Primary | ICD-10-CM

## 2025-04-30 NOTE — TELEPHONE ENCOUNTER
Per FW, schedule pt for DCCV with UL/anesthesia. Please call pt to schedule and review instructions.    External Cardioversion    Date of Procedure:     Time of Arrival:    Cardiologist performing procedure: Dr. Harris    Arrive at [facility] through the main entrance.  Check in at the Outpatient Diagnostic desk on the 1st floor.    Do not eat or drink anything after midnight the night before the procedure.      You may brush your teeth and rinse the morning of the procedure.    Do NOT stop taking Eliquis (blood thinners).  It is very important to not miss any doses of Eliquis leading up to the cardioversion.  Please call our office if you accidentally miss a dose of your blood thinner.    Take all your other routine medications the morning of the procedure with the following exceptions:  If you take a water pill, please HOLD on the day of the procedure.    Do not put on any lotion, powder, or deodorant the morning of the procedure.    Please bring a list of your medications to the hospital with you.    You must have someone available to drive you home. It is recommended that you do not drive for 24 hours after the procedure. You will also need someone with you at home the night of the procedure.    If you are unable to make this appointment, please call [facility], at [phone].

## 2025-05-06 NOTE — TELEPHONE ENCOUNTER
Procedure: DCCV    Date Of Procedure:  5/20/25    Time Of Arrival: 1130AM    Procedure Time: 1PM       Called and spoke to patient and she is agreeable to the date and time. Reviewed the Pre-Procedure instructions and they verbalized understanding. Encouraged to call with any questions or concerns.       Published on [employer] / emailed to Cath lab / note in chart

## 2025-05-12 ENCOUNTER — PREP FOR PROCEDURE (OUTPATIENT)
Dept: CARDIOLOGY CLINIC | Age: 80
End: 2025-05-12

## 2025-05-12 RX ORDER — SODIUM CHLORIDE 9 MG/ML
INJECTION, SOLUTION INTRAVENOUS PRN
Status: CANCELLED | OUTPATIENT
Start: 2025-05-20

## 2025-05-12 RX ORDER — SODIUM CHLORIDE 0.9 % (FLUSH) 0.9 %
5-40 SYRINGE (ML) INJECTION EVERY 12 HOURS SCHEDULED
Status: CANCELLED | OUTPATIENT
Start: 2025-05-20

## 2025-05-12 RX ORDER — SODIUM CHLORIDE 0.9 % (FLUSH) 0.9 %
5-40 SYRINGE (ML) INJECTION PRN
Status: CANCELLED | OUTPATIENT
Start: 2025-05-20

## 2025-05-12 NOTE — TELEPHONE ENCOUNTER
Chiara, please schedule post DCCV f/u appt, thanks    Kelsy, please schedule for remote transmission prior to DCCV on 5/20, thanks

## 2025-05-16 NOTE — TELEPHONE ENCOUNTER
Remote transmission received and confirms 100% AT/AF burden w Presenting EGM illustrating AF w /Vs. Report sent to  for review via Murj.

## 2025-05-19 ENCOUNTER — RESULTS FOLLOW-UP (OUTPATIENT)
Dept: CARDIOLOGY CLINIC | Age: 80
End: 2025-05-19

## 2025-05-20 ENCOUNTER — ANESTHESIA EVENT (OUTPATIENT)
Dept: INTERVENTIONAL RADIOLOGY/VASCULAR | Age: 80
End: 2025-05-20
Payer: MEDICARE

## 2025-05-20 ENCOUNTER — ANESTHESIA (OUTPATIENT)
Dept: INTERVENTIONAL RADIOLOGY/VASCULAR | Age: 80
End: 2025-05-20
Payer: MEDICARE

## 2025-05-20 ENCOUNTER — HOSPITAL ENCOUNTER (OUTPATIENT)
Dept: INTERVENTIONAL RADIOLOGY/VASCULAR | Age: 80
Discharge: HOME | End: 2025-05-22
Payer: MEDICARE

## 2025-05-20 VITALS
RESPIRATION RATE: 14 BRPM | DIASTOLIC BLOOD PRESSURE: 68 MMHG | SYSTOLIC BLOOD PRESSURE: 131 MMHG | BODY MASS INDEX: 42.68 KG/M2 | HEIGHT: 64 IN | TEMPERATURE: 97.6 F | HEART RATE: 60 BPM | WEIGHT: 250 LBS | OXYGEN SATURATION: 100 %

## 2025-05-20 DIAGNOSIS — I48.19 PERSISTENT ATRIAL FIBRILLATION (HCC): ICD-10-CM

## 2025-05-20 LAB
ANION GAP SERPL CALCULATED.3IONS-SCNC: 13 MMOL/L (ref 3–16)
BUN SERPL-MCNC: 22 MG/DL (ref 7–20)
CALCIUM SERPL-MCNC: 9.3 MG/DL (ref 8.3–10.6)
CHLORIDE SERPL-SCNC: 99 MMOL/L (ref 99–110)
CO2 SERPL-SCNC: 28 MMOL/L (ref 21–32)
CREAT SERPL-MCNC: 1.1 MG/DL (ref 0.6–1.2)
ECHO BSA: 2.26 M2
EKG ATRIAL RATE: 62 BPM
EKG DIAGNOSIS: NORMAL
EKG DIAGNOSIS: NORMAL
EKG P-R INTERVAL: 330 MS
EKG Q-T INTERVAL: 402 MS
EKG Q-T INTERVAL: 454 MS
EKG QRS DURATION: 112 MS
EKG QRS DURATION: 114 MS
EKG QTC CALCULATION (BAZETT): 460 MS
EKG QTC CALCULATION (BAZETT): 478 MS
EKG R AXIS: -56 DEGREES
EKG R AXIS: -58 DEGREES
EKG T AXIS: 100 DEGREES
EKG T AXIS: 74 DEGREES
EKG VENTRICULAR RATE: 62 BPM
EKG VENTRICULAR RATE: 85 BPM
GFR SERPLBLD CREATININE-BSD FMLA CKD-EPI: 51 ML/MIN/{1.73_M2}
GLUCOSE SERPL-MCNC: 113 MG/DL (ref 70–99)
INR PPP: 1.3 (ref 0.85–1.15)
POTASSIUM SERPL-SCNC: 4.2 MMOL/L (ref 3.5–5.1)
PROTHROMBIN TIME: 16.4 SEC (ref 11.9–14.9)
SODIUM SERPL-SCNC: 140 MMOL/L (ref 136–145)

## 2025-05-20 PROCEDURE — 7100000010 HC PHASE II RECOVERY - FIRST 15 MIN: Performed by: INTERNAL MEDICINE

## 2025-05-20 PROCEDURE — 7100000011 HC PHASE II RECOVERY - ADDTL 15 MIN: Performed by: INTERNAL MEDICINE

## 2025-05-20 PROCEDURE — 3700000001 HC ADD 15 MINUTES (ANESTHESIA): Performed by: INTERNAL MEDICINE

## 2025-05-20 PROCEDURE — 93005 ELECTROCARDIOGRAM TRACING: CPT | Performed by: INTERNAL MEDICINE

## 2025-05-20 PROCEDURE — 85610 PROTHROMBIN TIME: CPT

## 2025-05-20 PROCEDURE — 93010 ELECTROCARDIOGRAM REPORT: CPT | Performed by: INTERNAL MEDICINE

## 2025-05-20 PROCEDURE — 92960 CARDIOVERSION ELECTRIC EXT: CPT | Performed by: INTERNAL MEDICINE

## 2025-05-20 PROCEDURE — 2580000003 HC RX 258: Performed by: NURSE ANESTHETIST, CERTIFIED REGISTERED

## 2025-05-20 PROCEDURE — 80048 BASIC METABOLIC PNL TOTAL CA: CPT

## 2025-05-20 PROCEDURE — 6360000002 HC RX W HCPCS: Performed by: NURSE ANESTHETIST, CERTIFIED REGISTERED

## 2025-05-20 PROCEDURE — 3700000000 HC ANESTHESIA ATTENDED CARE: Performed by: INTERNAL MEDICINE

## 2025-05-20 PROCEDURE — 92960 CARDIOVERSION ELECTRIC EXT: CPT

## 2025-05-20 RX ORDER — SODIUM CHLORIDE 9 MG/ML
INJECTION, SOLUTION INTRAVENOUS PRN
Status: DISCONTINUED | OUTPATIENT
Start: 2025-05-20 | End: 2025-05-23 | Stop reason: HOSPADM

## 2025-05-20 RX ORDER — SODIUM CHLORIDE 0.9 % (FLUSH) 0.9 %
5-40 SYRINGE (ML) INJECTION EVERY 12 HOURS SCHEDULED
Status: DISCONTINUED | OUTPATIENT
Start: 2025-05-20 | End: 2025-05-23 | Stop reason: HOSPADM

## 2025-05-20 RX ORDER — SODIUM CHLORIDE 9 MG/ML
INJECTION, SOLUTION INTRAVENOUS
Status: DISCONTINUED | OUTPATIENT
Start: 2025-05-20 | End: 2025-05-20 | Stop reason: SDUPTHER

## 2025-05-20 RX ORDER — SODIUM CHLORIDE 0.9 % (FLUSH) 0.9 %
5-40 SYRINGE (ML) INJECTION PRN
Status: DISCONTINUED | OUTPATIENT
Start: 2025-05-20 | End: 2025-05-23 | Stop reason: HOSPADM

## 2025-05-20 RX ORDER — PROPOFOL 10 MG/ML
INJECTION, EMULSION INTRAVENOUS
Status: DISCONTINUED | OUTPATIENT
Start: 2025-05-20 | End: 2025-05-20 | Stop reason: SDUPTHER

## 2025-05-20 RX ORDER — LIDOCAINE HYDROCHLORIDE 20 MG/ML
INJECTION, SOLUTION EPIDURAL; INFILTRATION; INTRACAUDAL; PERINEURAL
Status: DISCONTINUED | OUTPATIENT
Start: 2025-05-20 | End: 2025-05-20 | Stop reason: SDUPTHER

## 2025-05-20 RX ADMIN — SODIUM CHLORIDE: 9 INJECTION, SOLUTION INTRAVENOUS at 12:51

## 2025-05-20 RX ADMIN — PROPOFOL 100 MG: 10 INJECTION, EMULSION INTRAVENOUS at 13:41

## 2025-05-20 RX ADMIN — LIDOCAINE HYDROCHLORIDE 60 MG: 20 INJECTION, SOLUTION EPIDURAL; INFILTRATION; INTRACAUDAL; PERINEURAL at 13:41

## 2025-05-20 NOTE — PROGRESS NOTES
IV removed. Patient/family given discharge instructions. Patient/family verbalize understanding of discharge instructions, all questions addressed, copy given to patient/family. Pt transferred to vehicle via wheelchair to be discharged home with family.

## 2025-05-20 NOTE — ANESTHESIA POSTPROCEDURE EVALUATION
Department of Anesthesiology  Postprocedure Note    Patient: Diana Jin  MRN: [id number]  YOB: 1945  Date of evaluation: 5/20/2025    Procedure Summary       Date: 05/20/25 Room / Location: [facility] Special Procedures    Anesthesia Start: 1332 Anesthesia Stop: 1347    Procedure: CARDIOVERSION EXTERNAL Diagnosis: Persistent atrial fibrillation (HCC)    Scheduled Providers: Ben Rosenberg MD; Jaun Mendoza MD Responsible Provider: Ben Rosenberg MD    Anesthesia Type: general ASA Status: 3            Anesthesia Type: No value filed.    Mulu Phase I: Mulu Score: 7    Mulu Phase II:      Anesthesia Post Evaluation    Patient location during evaluation: PACU  Patient participation: complete - patient participated  Level of consciousness: awake  Airway patency: patent  Nausea & Vomiting: no nausea and no vomiting  Cardiovascular status: blood pressure returned to baseline and hemodynamically stable  Respiratory status: acceptable  Hydration status: euvolemic  Multimodal analgesia pain management approach  Pain management: adequate    No notable events documented.

## 2025-05-20 NOTE — ANESTHESIA PRE PROCEDURE
• Rectal bleeding 2016   • Sleep apnea 2018    Does not use CPAP   • Unstable angina (HCC) 10/08/2016   • Vertigo 1957    as a child       Past Surgical History:        Procedure Laterality Date   • ABDOMINAL HERNIA REPAIR     • ATRIAL ABLATION SURGERY  2019   • BACK SURGERY  2009    disk replacement    • BREAST LUMPECTOMY Right     cancer with lynph node biopsy   • BREAST SURGERY Left     benign tumor left breast    • BREAST SURGERY Left ,     cyst in left   • CARDIAC CATHETERIZATION  2016    3 Stents-at [facility]   • CARDIOVERSION  2019   • COLONOSCOPY     • FOOT SURGERY Left     heal spur   • HEMORRHOID SURGERY  2016    3 removed.    • HERNIA REPAIR     • ASHLEIGH AND BSO (CERVIX REMOVED)     • UPPER GASTROINTESTINAL ENDOSCOPY     • US BREAST BIOPSY W LOC DEVICE 1ST LESION RIGHT Right 2021    US BREAST NEEDLE BIOPSY RIGHT 2021 [facility]   • US BREAST BIOPSY W LOC DEVICE EACH ADDL LESION RIGHT Right 2021    US BREAST BIOPSY NEEDLE ADDITIONAL RIGHT 2021 [facility]   • VENTRICULAR ABLATION SURGERY  2019       Social History:    Social History     Tobacco Use   • Smoking status: Former     Current packs/day: 0.00     Average packs/day: 1 pack/day for 2.0 years (2.0 ttl pk-yrs)     Types: Cigarettes     Start date: 1993     Quit date: 1995     Years since quittin.4   • Smokeless tobacco: Never   Substance Use Topics   • Alcohol use: No                                Counseling given: Not Answered      Vital Signs (Current):   Vitals:    25 1140   BP: 129/82   Pulse: 75   Resp: 15   Temp: 97.6 °F (36.4 °C)   TempSrc: Oral   SpO2: 95%   Weight: 113.4 kg (250 lb)   Height: 1.626 m (5' 4\")                                              BP Readings from Last 3 Encounters:   25 129/82   25 (!) 156/80   25 128/76       NPO Status: Time of last liquid consumption: 2200

## 2025-05-20 NOTE — DISCHARGE INSTRUCTIONS
[facility]  Cardiovascular Special Procedures  Cardioversion  Patient Discharge Instructions      No driving for 24 hours.  We strongly recommend that a responsible adult stay with you for the next 24 hours.    Continue Eliquis as directed by physician     Hydrocortisone 1% cream to reddened areas as needed.

## 2025-05-20 NOTE — INTERVAL H&P NOTE
Update History & Physical    The patient's History and Physical of April 30, was reviewed with the patient and I examined the patient. There was no change. The surgical site was confirmed by the patient and me.     Plan: The risks, benefits, expected outcome, and alternative to the recommended procedure have been discussed with the patient. Patient understands and wants to proceed with the procedure.     Electronically signed by Jaun Mendoza MD on 5/20/2025 at 2:52 PM

## 2025-05-21 DIAGNOSIS — M54.6 PAIN IN THORACIC SPINE: ICD-10-CM

## 2025-05-21 DIAGNOSIS — M48.062 SPINAL STENOSIS OF LUMBAR REGION WITH NEUROGENIC CLAUDICATION: ICD-10-CM

## 2025-05-21 DIAGNOSIS — M47.817 LUMBOSACRAL SPONDYLOSIS WITHOUT MYELOPATHY: ICD-10-CM

## 2025-05-21 DIAGNOSIS — G62.9 NEUROPATHY: ICD-10-CM

## 2025-05-21 DIAGNOSIS — M96.1 FAILED BACK SURGICAL SYNDROME: ICD-10-CM

## 2025-05-23 ENCOUNTER — OFFICE VISIT (OUTPATIENT)
Dept: PAIN MANAGEMENT | Age: 80
End: 2025-05-23
Payer: MEDICARE

## 2025-05-23 ENCOUNTER — TELEPHONE (OUTPATIENT)
Dept: CARDIOLOGY CLINIC | Age: 80
End: 2025-05-23

## 2025-05-23 VITALS
SYSTOLIC BLOOD PRESSURE: 119 MMHG | HEART RATE: 87 BPM | WEIGHT: 257 LBS | OXYGEN SATURATION: 93 % | DIASTOLIC BLOOD PRESSURE: 75 MMHG | BODY MASS INDEX: 44.11 KG/M2

## 2025-05-23 DIAGNOSIS — G89.4 CHRONIC PAIN SYNDROME: ICD-10-CM

## 2025-05-23 DIAGNOSIS — M48.062 SPINAL STENOSIS OF LUMBAR REGION WITH NEUROGENIC CLAUDICATION: Primary | ICD-10-CM

## 2025-05-23 DIAGNOSIS — M47.817 LUMBOSACRAL SPONDYLOSIS WITHOUT MYELOPATHY: ICD-10-CM

## 2025-05-23 DIAGNOSIS — Z51.81 ENCOUNTER FOR THERAPEUTIC DRUG MONITORING: ICD-10-CM

## 2025-05-23 DIAGNOSIS — G63 POLYNEUROPATHY ASSOCIATED WITH UNDERLYING DISEASE: ICD-10-CM

## 2025-05-23 DIAGNOSIS — M96.1 FAILED BACK SURGICAL SYNDROME: ICD-10-CM

## 2025-05-23 DIAGNOSIS — G62.9 NEUROPATHY: ICD-10-CM

## 2025-05-23 DIAGNOSIS — M54.6 PAIN IN THORACIC SPINE: ICD-10-CM

## 2025-05-23 PROCEDURE — G8417 CALC BMI ABV UP PARAM F/U: HCPCS | Performed by: NURSE PRACTITIONER

## 2025-05-23 PROCEDURE — 99214 OFFICE O/P EST MOD 30 MIN: CPT | Performed by: NURSE PRACTITIONER

## 2025-05-23 PROCEDURE — 1159F MED LIST DOCD IN RCRD: CPT | Performed by: NURSE PRACTITIONER

## 2025-05-23 PROCEDURE — 1123F ACP DISCUSS/DSCN MKR DOCD: CPT | Performed by: NURSE PRACTITIONER

## 2025-05-23 PROCEDURE — G8427 DOCREV CUR MEDS BY ELIG CLIN: HCPCS | Performed by: NURSE PRACTITIONER

## 2025-05-23 PROCEDURE — 3074F SYST BP LT 130 MM HG: CPT | Performed by: NURSE PRACTITIONER

## 2025-05-23 PROCEDURE — 1090F PRES/ABSN URINE INCON ASSESS: CPT | Performed by: NURSE PRACTITIONER

## 2025-05-23 PROCEDURE — G8399 PT W/DXA RESULTS DOCUMENT: HCPCS | Performed by: NURSE PRACTITIONER

## 2025-05-23 PROCEDURE — 1160F RVW MEDS BY RX/DR IN RCRD: CPT | Performed by: NURSE PRACTITIONER

## 2025-05-23 PROCEDURE — 3078F DIAST BP <80 MM HG: CPT | Performed by: NURSE PRACTITIONER

## 2025-05-23 PROCEDURE — 1036F TOBACCO NON-USER: CPT | Performed by: NURSE PRACTITIONER

## 2025-05-23 RX ORDER — HYDROCODONE BITARTRATE AND ACETAMINOPHEN 5; 325 MG/1; MG/1
1 TABLET ORAL
Qty: 127 TABLET | Refills: 0 | Status: SHIPPED | OUTPATIENT
Start: 2025-05-23 | End: 2025-06-20

## 2025-05-23 RX ORDER — PREGABALIN 225 MG/1
225 CAPSULE ORAL NIGHTLY
Qty: 90 CAPSULE | Refills: 1 | Status: SHIPPED | OUTPATIENT
Start: 2025-05-23 | End: 2025-11-19

## 2025-05-23 RX ORDER — PREGABALIN 150 MG/1
150 CAPSULE ORAL 2 TIMES DAILY
Qty: 60 CAPSULE | Refills: 1 | Status: SHIPPED | OUTPATIENT
Start: 2025-05-23 | End: 2025-07-22

## 2025-05-23 RX ORDER — PREGABALIN 150 MG/1
150 CAPSULE ORAL 2 TIMES DAILY
Qty: 60 CAPSULE | Refills: 1 | OUTPATIENT
Start: 2025-05-23 | End: 2025-07-22

## 2025-05-23 RX ORDER — DULOXETIN HYDROCHLORIDE 30 MG/1
30 CAPSULE, DELAYED RELEASE ORAL DAILY
Qty: 90 CAPSULE | Refills: 1 | Status: SHIPPED | OUTPATIENT
Start: 2025-05-23 | End: 2025-11-19

## 2025-05-23 NOTE — TELEPHONE ENCOUNTER
Carelink alert transmission received d/t AT/AF Daily Thor > Threshold of 1 hrs per day. Hx recent DCCV 05.20.25/UL. Current event ongoing at the time of transmission w event initially detected 05.23.25 @ 3:05am. Pt on Eliquis, sotalol. Report sent to EP for review via Murj.

## 2025-05-23 NOTE — PROGRESS NOTES
Diana Jin  1945  [id number]      HISTORY OF PRESENT ILLNESS: Ms. Jin is a 79 y.o. female returns for a follow up visit for pain management  She has a diagnosis of   1. Spinal stenosis of lumbar region with neurogenic claudication    2. Pain in thoracic spine    3. Neuropathy    4. Failed back surgical syndrome    5. Lumbosacral spondylosis without myelopathy    6. Polyneuropathy associated with underlying disease    7. Chronic pain syndrome    8. Encounter for therapeutic drug monitoring    .      New Medications since Last Office visit have been reviewed with patient.     As per Information Obtained from the PADT (Patient Assessment and Documentation Tool)    She complains of pain in the lower back, right foot. She rates the pain 5/10 and describes it as aching. Current treatment regimen has helped relieve about 90% of the pain since beginning treatment plan.  She denies any side effects from the current pain regimen. Patient reports that since implementation of their treatment plan; their physical functioning is unchanged, family/social relationships are unchanged, mood is unchanged sleep patterns are unchanged, and that the overall functioning is better.  Patient denies/admits that any of the above have changed since last office visit. Patient denies misusing/abusing her narcotic pain medications or using any illegal drugs.      Upon obtaining medical history from Ms. Jin    ALLERGIES: Patients list of allergies were reviewed     MEDICATIONS: Ms. Jin list of medications were reviewed.Her current medications are   Outpatient Medications Prior to Visit   Medication Sig Dispense Refill    KLOR-CON M20 20 MEQ extended release tablet TAKE 1 TABLET BY MOUTH 2 TIMES A  tablet 0    pregabalin (LYRICA) 225 MG capsule Take 1 capsule by mouth nightly for 60 days. Max Daily Amount: 225 mg 30 capsule 1    sotalol (BETAPACE) 120 MG tablet TAKE ONE TABLET BY MOUTH TWICE A  tablet 3    pregabalin (LYRICA)

## 2025-05-27 NOTE — TELEPHONE ENCOUNTER
Spoke with daughter, Leanne (on HIPAA), as the pt was asleep. Pt could tell that she went back into AF on 5/23 (3 days post DCCV). She did feel better in those 3 days of SR. Pt is very fatigued with AF.    Scheduled f/u with UL to discuss possible ablation.

## 2025-05-28 LAB
1,3 CHLOROPHENYL PIPERAZINE, URINE: NOT DETECTED
6-MONOACETYLMORPHINE: NEGATIVE NG/ML
7-AMINOCLONAZEPAM/CREATININE URINE: NOT DETECTED NG/MG CREAT
8-HYDROXYAMOXAPINE, URINE: NOT DETECTED
8-HYDROXYLOXAPINE, URINE: NOT DETECTED
ACETAMINOPHEN, URINE: NORMAL UG/ML
ACETAMINOPHEN, URINE: PRESENT
ALPHA HYDROXYALPRAZOLAM/CREATININE URINE: NOT DETECTED NG/MG CREAT
ALPHA HYDROXYTRIAZOLAM/CREAT UR CFM: NOT DETECTED NG/MG CREAT
ALPHA-HYDROXYMIDAZOLAM, URINE: NOT DETECTED NG/MG CREAT
ALPRAZOLAM/CREATININE URINE: NOT DETECTED NG/MG CREAT
AMINO CHLOROPYRIDINE, URINE: NOT DETECTED
AMITRIPTYLINE: NOT DETECTED
AMOXAPINE, URINE: NOT DETECTED
AMPHETAMINE SCREEN URINE: NEGATIVE NG/ML
ANALGESICS SCREEN URINE: NORMAL
ANTIDEPRESSANTS SCREEN URINE: NORMAL
ANTIPSYCHOTICS SCREEN URINE: NEGATIVE
ARIPIPRAZOLE, URINE: NOT DETECTED
ASENAPINE, URINE: NOT DETECTED
BACLOFEN, URINE: NOT DETECTED
BARBITURATE SCREEN URINE: NEGATIVE NG/ML
BENZODIAZEPINE SCREEN, URINE: NEGATIVE
BUPRENORPHINE URINE: NEGATIVE
BUPRENORPHINE/CREATININE URINE: NOT DETECTED NG/MG CREAT
BUPROPION, URINE: NOT DETECTED
CANNABINOID SCREEN URINE: NEGATIVE NG/ML
CARBAMAZEPINE, URINE: NOT DETECTED
CARISOPRODOL, UR: NOT DETECTED
CHLORPROMAZINE, URINE: NOT DETECTED
CITALOPRAM, URINE: NOT DETECTED
CLOBAZAM, URINE: NOT DETECTED
CLOMIPRAMINE, URINE: NOT DETECTED
CLONAZEPAM/CREATININE URINE: NOT DETECTED NG/MG CREAT
CLOZAPINE, URINE: NOT DETECTED
COCAINE METABOLITE SCREEN URINE: NEGATIVE NG/ML
CODEINE, URINE: NOT DETECTED NG/MG CREAT
CREATININE URINE: 67 MG/DL
CYCLOBENZAPRINE, URINE: NOT DETECTED
DESALKYLFLURAZEPAM/CREATININE URINE: NOT DETECTED NG/MG CREAT
DESIPRAMINE: NOT DETECTED
DESMETHYLCITALOPRAM, URINE: NOT DETECTED
DESMETHYLCLOMIPRAMINE, URINE: NOT DETECTED
DESMETHYLCLOZAPINE, URINE: NOT DETECTED
DESMETHYLCYCLOBENZAPRINE, URINE: NOT DETECTED
DESMETHYLDOXEPIN, URINE: NOT DETECTED
DESMETHYLFLUNITRAZEPAM, URINE: NOT DETECTED NG/MG CREAT
DESMETHYLSERTRALINE, URINE: NOT DETECTED
DESMETHYLVENLAFAXINE, URINE: NOT DETECTED
DIAZEPAM/CREATININE URINE: NOT DETECTED NG/MG CREAT
DIHYDROCODEINE/CREATININE URINE: 166 NG/MG CREAT
DOXEPIN, URINE: NOT DETECTED
DULOXETINE, URINE: PRESENT
EZOGABINE, URINE: NOT DETECTED
FENTANYL / ANALOGUES SCREEN URINE: NEGATIVE
FENTANYL/CREATININE URINE: NOT DETECTED NG/MG CREAT
FLUNITRAZEPAM, URINE: NOT DETECTED NG/MG CREAT
FLUOXETINE, URINE: NOT DETECTED
FLUPHENAZINE, URINE: NOT DETECTED
FLUVOXAMINE, URINE: NOT DETECTED
GABAPENTIN: NOT DETECTED
HALOPERIDOL, URINE: NOT DETECTED
HYDROCODONE GC/MS CONF: 518 NG/MG CREAT
HYDROMORPHONE GC/MS CONF: 125 NG/MG CREAT
HYDROXYBUPROPION, URINE: NOT DETECTED
ILOPERIDONE, URINE: NOT DETECTED
IMIPRAMINE, URINE: NOT DETECTED
KETAMINE, URINE: NOT DETECTED
LABORATORY REPORT: NORMAL
LAMOTRIGINE, URINE: NOT DETECTED
LEVETIRACETAM, URINE: NOT DETECTED
LORAZEPAM/CREATININE URINE: NOT DETECTED NG/MG CREAT
LOXAPINE, URINE: NOT DETECTED
LURASIDONE, URINE: NOT DETECTED
MAPROTILINE, URINE: NOT DETECTED
MEPERIDINE: NEGATIVE NG/ML
MEPROBAMATE, URINE: NOT DETECTED
MESORIDAZINE, URINE: NOT DETECTED
METAXALONE, URINE: NOT DETECTED
METHADONE AND METABOLITES, URINE: NEGATIVE NG/ML
METHADONE SCREEN, URINE: NEGATIVE NG/ML
METHOCARBAMOL, URINE: NOT DETECTED
METHYLPHENIDATE, URINE: NOT DETECTED
MIDAZOLAM/CREATININE URINE: NOT DETECTED NG/MG CREAT
MIRTAZAPINE, URINE: NOT DETECTED
MOLINDONE, URINE: NOT DETECTED
MORPHINE GC/MS CONF: NOT DETECTED NG/MG CREAT
MUSCLE RELAXANTS SCREEN URINE: NEGATIVE
NEFAZODONE, URINE: NOT DETECTED
NORBUPRENORPHINE/CREATININE URINE: NOT DETECTED NG/MG CREAT
NORCODEINE/CREATININE URINE: NOT DETECTED NG/MG CREAT
NORDIAZEPAM/CREATININE URINE: NOT DETECTED NG/MG CREAT
NORFENTANYL/CREATININE URINE: NOT DETECTED NG/MG CREAT
NORFLUOXETINE, URINE: NOT DETECTED
NORHYDROCODONE (LCMSMS): 764 NG/MG CREAT
NORKETAMINE, URINE: NOT DETECTED
NORMORPHINE URINE: NOT DETECTED NG/MG CREAT
NORTRIPTYLINE: NOT DETECTED
OLANZAPINE, URINE: NOT DETECTED
OPIATE SCREEN URINE: NORMAL NG/ML
OPIATES: NORMAL
ORPHENADRINE, URINE: NOT DETECTED
OTHER HALLUCINOGENS SCREEN URINE: NEGATIVE
OXAZEPAM/CREATININE URINE: NOT DETECTED NG/MG CREAT
OXCARBAZEPINE MHD, URINE: NOT DETECTED
OXYCODONE SCREEN URINE: NEGATIVE NG/ML
PAROXETINE, URINE: NOT DETECTED
PERPHENAZINE, URINE: NOT DETECTED
PHENCYCLIDINE SCREEN URINE: NEGATIVE NG/ML
PHENYTOIN, URINE: NOT DETECTED
PIMOZIDE, URINE: NOT DETECTED
PREGABALIN, URINE: PRESENT
PREGABALIN: NORMAL
PRESCRIBED MEDICATIONS: NORMAL
PRIMIDONE, URINE: NOT DETECTED
PROCHLORPERAZINE, URINE: NOT DETECTED
PROPOXYPHENE: NEGATIVE NG/ML
PROTRIPTYLINE, URINE: NOT DETECTED
QUETIAPINE, URINE: NOT DETECTED
RISPERIDONE, URINE: NOT DETECTED
RITALINIC ACID, UR: NOT DETECTED
RUFINAMIDE, URINE: NOT DETECTED
SEDATIVES/HYPNOTICS SCREEN URINE: NEGATIVE
SERTRALINE, URINE: NOT DETECTED
SYMPATHOMIMETICS SCREEN URINE: NEGATIVE
TAPENTADOL SCREEN URINE: NEGATIVE NG/ML
TEMAZEPAM/CREATININE URINE: NOT DETECTED NG/MG CREAT
THIORIDAZINE, URINE: NOT DETECTED
THIOTHIXENE, URINE: NOT DETECTED
TIAGABINE, URINE: NOT DETECTED
TIZANIDINE, URINE: NOT DETECTED
TOPIRAMATE, URINE: NOT DETECTED
TRAMADOL SCREEN URINE: NEGATIVE NG/ML
TRAZODONE, URINE: NOT DETECTED
TRIFLUOPERAZINE, URINE: NOT DETECTED
TRIMIPRAMINE, URINE: NOT DETECTED
VENLAFAXINE, URINE: NOT DETECTED
VILAZODONE, URINE: NOT DETECTED
ZALEPLON, URINE: NOT DETECTED
ZIPRASIDONE, URINE: NOT DETECTED
ZOLPIDEM ACID, URINE: NOT DETECTED
ZOLPIDEM, URINE: NOT DETECTED
ZONISAMIDE, URINE: NOT DETECTED
ZOPICLONE/ESZOPICLONE, URINE: NOT DETECTED

## 2025-06-09 NOTE — PROGRESS NOTES
[facility]   Cardiac Electrophysiology Follow Up  Date: 7/3/2025    Chief Complaint:   Chief Complaint   Patient presents with    Follow-up    Atrial Fibrillation      HPI: Diana Jin is a 80 y.o. female with PMH significant for HTN, RENE, HLD, CAD, and PAF / AFL, s/p AFL ablation ([place], [place]) and started on sotalol at this time, s/p cryoablation for AF (12/2018), s/p RFA/PVI of AF (11/2019, [facility]), recurrence of AF post-procedure and initiated on sotalol with DCCV (11/2019), sotalol steadily decreased due to bradycardia, s/p dual chamber MDT PPM (10/7/21) for TBS, s/p sotalol loading. Recurrence of AF, s/p DCCV to SR (5/20/25), device recorded recurrence of AF on 5/23/25.    Interval History:   Today, she is here for 6 mo f/u, presenting in AF. Pt reports feeling better those 3 days that she was in SR. With the AF, she feels palpitations and very fatigued.    Device interrogation today shows normally functioning PPM with stable sensing and pacing thresholds.  AP 0.6%,  54.1%  AT/AF burden 100%, no VT  LENORE 7 yrs    Assessment:   1.  Longstanding persistent AF/AFL, s/p AFL ablation, s/p AF cryoablation (12/2018), s/p RFA/PVI AF x2 (11/2019), on Toprol, sotalol, Eliquis  2.  HTN, stable on losartan/HCTZ, amlodipine  3.  RENE, noncomplaint with CPAP  4.  HLD, on statin  5.  CAD, on statin, BB, follows Dr. Power  6.  TBS, s/p dual chamber PPM   7.  Obesity, morbid    Plan  Due symptomatic, persistent AF, I do recommend rhythm management. Discussed the following options:  A.  Switching from sotalol to dofetilide  B.  Redo AF ablation  C.  Upgrade to CRT-P with AVN ablation  She wishes to proceed with admission for dofetilide loading  Continue with remote home transmission every 3 months  Continue to f/u with Dr. Power as scheduled  Follow up with EP NP in 6 months    Atrial Fibrillation:    BMI    :   Body mass index is 44.22 kg/m².    Duration   :   Longstanding    Symptoms    :  fatigue,

## 2025-06-10 DIAGNOSIS — I10 PRIMARY HYPERTENSION: ICD-10-CM

## 2025-06-10 RX ORDER — LOSARTAN POTASSIUM AND HYDROCHLOROTHIAZIDE 25; 100 MG/1; MG/1
1 TABLET ORAL DAILY
Qty: 90 TABLET | Refills: 0 | Status: SHIPPED | OUTPATIENT
Start: 2025-06-10

## 2025-06-13 ENCOUNTER — HOSPITAL ENCOUNTER (OUTPATIENT)
Dept: WOMENS IMAGING | Age: 80
Discharge: HOME | End: 2025-06-13
Payer: MEDICARE

## 2025-06-13 VITALS — BODY MASS INDEX: 44.39 KG/M2 | HEIGHT: 64 IN | WEIGHT: 260 LBS

## 2025-06-13 DIAGNOSIS — R92.8 ABNORMAL SCREENING MAMMOGRAM: ICD-10-CM

## 2025-06-13 PROCEDURE — G0279 TOMOSYNTHESIS, MAMMO: HCPCS

## 2025-06-16 ENCOUNTER — RESULTS FOLLOW-UP (OUTPATIENT)
Dept: PRIMARY CARE CLINIC | Age: 80
End: 2025-06-16

## 2025-06-21 ENCOUNTER — PATIENT MESSAGE (OUTPATIENT)
Dept: PAIN MANAGEMENT | Age: 80
End: 2025-06-21

## 2025-06-27 ENCOUNTER — OFFICE VISIT (OUTPATIENT)
Dept: PRIMARY CARE CLINIC | Age: 80
End: 2025-06-27
Payer: MEDICARE

## 2025-06-27 VITALS
TEMPERATURE: 98.4 F | RESPIRATION RATE: 19 BRPM | SYSTOLIC BLOOD PRESSURE: 108 MMHG | BODY MASS INDEX: 43.87 KG/M2 | WEIGHT: 257 LBS | DIASTOLIC BLOOD PRESSURE: 78 MMHG | HEART RATE: 78 BPM | HEIGHT: 64 IN

## 2025-06-27 DIAGNOSIS — Z17.0 MALIGNANT NEOPLASM OF UPPER-OUTER QUADRANT OF RIGHT BREAST IN FEMALE, ESTROGEN RECEPTOR POSITIVE (HCC): ICD-10-CM

## 2025-06-27 DIAGNOSIS — C50.411 MALIGNANT NEOPLASM OF UPPER-OUTER QUADRANT OF RIGHT BREAST IN FEMALE, ESTROGEN RECEPTOR POSITIVE (HCC): ICD-10-CM

## 2025-06-27 DIAGNOSIS — I48.11 LONGSTANDING PERSISTENT ATRIAL FIBRILLATION (HCC): ICD-10-CM

## 2025-06-27 DIAGNOSIS — N18.31 STAGE 3A CHRONIC KIDNEY DISEASE (HCC): ICD-10-CM

## 2025-06-27 DIAGNOSIS — I49.5 TACHY-BRADY SYNDROME (HCC): ICD-10-CM

## 2025-06-27 DIAGNOSIS — I50.32 CHRONIC DIASTOLIC HEART FAILURE (HCC): ICD-10-CM

## 2025-06-27 DIAGNOSIS — Z00.00 HEALTHCARE MAINTENANCE: Primary | ICD-10-CM

## 2025-06-27 PROCEDURE — 1123F ACP DISCUSS/DSCN MKR DOCD: CPT | Performed by: FAMILY MEDICINE

## 2025-06-27 PROCEDURE — G0439 PPPS, SUBSEQ VISIT: HCPCS | Performed by: FAMILY MEDICINE

## 2025-06-27 PROCEDURE — 3078F DIAST BP <80 MM HG: CPT | Performed by: FAMILY MEDICINE

## 2025-06-27 PROCEDURE — 1159F MED LIST DOCD IN RCRD: CPT | Performed by: FAMILY MEDICINE

## 2025-06-27 PROCEDURE — 1160F RVW MEDS BY RX/DR IN RCRD: CPT | Performed by: FAMILY MEDICINE

## 2025-06-27 PROCEDURE — 3074F SYST BP LT 130 MM HG: CPT | Performed by: FAMILY MEDICINE

## 2025-06-27 RX ORDER — HYDROCODONE BITARTRATE AND ACETAMINOPHEN 5; 325 MG/1; MG/1
1 TABLET ORAL
COMMUNITY

## 2025-06-27 SDOH — HEALTH STABILITY: PHYSICAL HEALTH: ON AVERAGE, HOW MANY DAYS PER WEEK DO YOU ENGAGE IN MODERATE TO STRENUOUS EXERCISE (LIKE A BRISK WALK)?: 0 DAYS

## 2025-06-27 SDOH — HEALTH STABILITY: PHYSICAL HEALTH: ON AVERAGE, HOW MANY MINUTES DO YOU ENGAGE IN EXERCISE AT THIS LEVEL?: 0 MIN

## 2025-06-27 ASSESSMENT — PATIENT HEALTH QUESTIONNAIRE - PHQ9
SUM OF ALL RESPONSES TO PHQ QUESTIONS 1-9: 0
1. LITTLE INTEREST OR PLEASURE IN DOING THINGS: NOT AT ALL
2. FEELING DOWN, DEPRESSED OR HOPELESS: NOT AT ALL
SUM OF ALL RESPONSES TO PHQ QUESTIONS 1-9: 0

## 2025-06-27 ASSESSMENT — ENCOUNTER SYMPTOMS
RHINORRHEA: 0
COLOR CHANGE: 0
VOMITING: 0
COUGH: 0
NAUSEA: 0
DIARRHEA: 0
ABDOMINAL PAIN: 0
BLOOD IN STOOL: 0
SHORTNESS OF BREATH: 0

## 2025-06-27 ASSESSMENT — LIFESTYLE VARIABLES
HOW MANY STANDARD DRINKS CONTAINING ALCOHOL DO YOU HAVE ON A TYPICAL DAY: PATIENT DOES NOT DRINK
HOW OFTEN DO YOU HAVE SIX OR MORE DRINKS ON ONE OCCASION: 1
HOW OFTEN DO YOU HAVE A DRINK CONTAINING ALCOHOL: NEVER
HOW OFTEN DO YOU HAVE A DRINK CONTAINING ALCOHOL: 1
HOW MANY STANDARD DRINKS CONTAINING ALCOHOL DO YOU HAVE ON A TYPICAL DAY: 0

## 2025-06-27 NOTE — PROGRESS NOTES
Diana Jin is a 80 y.o. year old female here for:    Chief Complaint:    Chief Complaint   Patient presents with    Annual Exam     Patient's Sex: female Medicare Member ID: [id number] - (Medicare Managed)    Ethnicity:    Non- / Non   Race:   White (non-)    Provider Name:  Kasie Silver M.D.  [facility]  [address] [place] [address]  Dept: [phone]  Dept Fax: [phone]    Provider ID Type: NPI  Billing Provider ID:  [id number]    Patient Care Team:  Kasie Silver MD as PCP - General (Family Medicine)  Kasie Silver MD as PCP - Empaneled Provider  Corbin Gregory MD as Consulting Physician (Cardiology)    Current concerns:    Medical History:  Past Medical History:   Diagnosis Date    Abnormal nuclear stress test 06/07/2018    TAMEKA (acute kidney injury) 02/06/2024    Angina pectoris syndrome 06/07/2018    Arthritis     Atrial fibrillation ([facility]) 08/2016    Breast cancer ([facility]) 2012    Dr.Sara Elias - S/p chemo    CAD (coronary artery disease)     CHF (congestive heart failure) ([facility])     Generalized weakness 02/06/2024    GERD (gastroesophageal reflux disease) 2001    dx 2001. but has never had an issue. requires no meds    H/O: whooping cough 1945    Heart failure ([facility]) 11/16/2019    Hernia, ventral     Recurrence (after ASHLEIGH)    History of shingles     1 year after receiving vaccine    Hx of endometriosis     Hypercholesterolemia 2010    Hypertension 1987    Iron deficiency 08/2016    Neuropathy     PONV (postoperative nausea and vomiting)     Rectal bleeding 08/24/2016    Sleep apnea 06/2018    Does not use CPAP    Unstable angina ([facility]) 10/08/2016    Vertigo 1957    as a child       Patient Active Problem List   Diagnosis    Diverticulosis of large intestine without hemorrhage    Second degree hemorrhoids    Collagenous colitis    Hypertension    Iron deficiency    S/P ablation of atrial fibrillation    Neuropathy    Atrial fibrillation

## 2025-06-27 NOTE — ASSESSMENT & PLAN NOTE
Overall doing well. Reminded to schedule dental and eye exams when able. Counseled briefly and provided written materials on recommendations for exercise. Other problems addressed today as otherwise noted.

## 2025-06-27 NOTE — ASSESSMENT & PLAN NOTE
Well controlled and following with nephrology. Renally dosed meds. Hydration encouraged to increase previously if able (while considering heart failure as well) possibly to continue as is or to 3 bottles per day if able.

## 2025-06-27 NOTE — PATIENT INSTRUCTIONS
Reminder: Please call to schedule dental and eye exams when able.    If you drink soda, consider a soda that has Stevia as the sweetener (and in fact, Stevia if a sweetener is needed to be added to food/drink). Examples of these sodas are Zevia or Olipop.     Lifestyle modifications discussed today:    Exercise:    In accordance with AHA/ACC guidelines:    - Get at least 150 minutes per week of moderate-intensity aerobic activity OR 75 minutes per week of vigorous aerobic activity, OR a combination of both, preferably spread throughout the week.    - Add moderate- to high-intensity muscle-strengthening activity (such as resistance or weights) on at least 2 days per week.    - Gain even more benefits by being active at least 300 minutes (5 hours) per week.    Increase amount and intensity gradually over time.

## 2025-06-27 NOTE — ASSESSMENT & PLAN NOTE
Well controlled. Following with cardiology.  Defer to cardiology on this. May get ablation soon due to cardioversion not working well last time done.

## 2025-07-03 ENCOUNTER — OFFICE VISIT (OUTPATIENT)
Dept: CARDIOLOGY CLINIC | Age: 80
End: 2025-07-03
Payer: MEDICARE

## 2025-07-03 ENCOUNTER — TELEPHONE (OUTPATIENT)
Dept: CARDIOLOGY CLINIC | Age: 80
End: 2025-07-03

## 2025-07-03 VITALS
BODY MASS INDEX: 43.98 KG/M2 | DIASTOLIC BLOOD PRESSURE: 72 MMHG | HEIGHT: 64 IN | HEART RATE: 73 BPM | WEIGHT: 257.6 LBS | SYSTOLIC BLOOD PRESSURE: 120 MMHG

## 2025-07-03 DIAGNOSIS — I48.19 PERSISTENT ATRIAL FIBRILLATION (HCC): Primary | ICD-10-CM

## 2025-07-03 DIAGNOSIS — Z95.0 PACEMAKER: ICD-10-CM

## 2025-07-03 DIAGNOSIS — Z79.01 ON CONTINUOUS ORAL ANTICOAGULATION: ICD-10-CM

## 2025-07-03 DIAGNOSIS — I10 ESSENTIAL HYPERTENSION: ICD-10-CM

## 2025-07-03 DIAGNOSIS — I48.11 LONGSTANDING PERSISTENT ATRIAL FIBRILLATION (HCC): Primary | ICD-10-CM

## 2025-07-03 DIAGNOSIS — Z51.81 ENCOUNTER FOR MONITORING SOTALOL THERAPY: ICD-10-CM

## 2025-07-03 DIAGNOSIS — I49.5 TACHYCARDIA-BRADYCARDIA SYNDROME (HCC): ICD-10-CM

## 2025-07-03 DIAGNOSIS — Z79.899 ENCOUNTER FOR MONITORING SOTALOL THERAPY: ICD-10-CM

## 2025-07-03 DIAGNOSIS — I25.10 CORONARY ARTERY DISEASE INVOLVING NATIVE CORONARY ARTERY OF NATIVE HEART WITHOUT ANGINA PECTORIS: ICD-10-CM

## 2025-07-03 DIAGNOSIS — E66.813 OBESITY, CLASS III, BMI 40-49.9 (MORBID OBESITY) (HCC): ICD-10-CM

## 2025-07-03 DIAGNOSIS — I49.5 SSS (SICK SINUS SYNDROME) (HCC): ICD-10-CM

## 2025-07-03 PROCEDURE — G8399 PT W/DXA RESULTS DOCUMENT: HCPCS | Performed by: INTERNAL MEDICINE

## 2025-07-03 PROCEDURE — G8427 DOCREV CUR MEDS BY ELIG CLIN: HCPCS | Performed by: INTERNAL MEDICINE

## 2025-07-03 PROCEDURE — 3078F DIAST BP <80 MM HG: CPT | Performed by: INTERNAL MEDICINE

## 2025-07-03 PROCEDURE — 1159F MED LIST DOCD IN RCRD: CPT | Performed by: INTERNAL MEDICINE

## 2025-07-03 PROCEDURE — 1036F TOBACCO NON-USER: CPT | Performed by: INTERNAL MEDICINE

## 2025-07-03 PROCEDURE — 3074F SYST BP LT 130 MM HG: CPT | Performed by: INTERNAL MEDICINE

## 2025-07-03 PROCEDURE — 1123F ACP DISCUSS/DSCN MKR DOCD: CPT | Performed by: INTERNAL MEDICINE

## 2025-07-03 PROCEDURE — 1090F PRES/ABSN URINE INCON ASSESS: CPT | Performed by: INTERNAL MEDICINE

## 2025-07-03 PROCEDURE — 99214 OFFICE O/P EST MOD 30 MIN: CPT | Performed by: INTERNAL MEDICINE

## 2025-07-03 PROCEDURE — 93000 ELECTROCARDIOGRAM COMPLETE: CPT | Performed by: INTERNAL MEDICINE

## 2025-07-03 PROCEDURE — G2211 COMPLEX E/M VISIT ADD ON: HCPCS | Performed by: INTERNAL MEDICINE

## 2025-07-03 PROCEDURE — G8417 CALC BMI ABV UP PARAM F/U: HCPCS | Performed by: INTERNAL MEDICINE

## 2025-07-03 NOTE — TELEPHONE ENCOUNTER
Spoke with Leanne. Schedule dofetilide loading for 7/29/25. Reviewed instructions with pt/daughter during OV today.    Bita from admitting aware.

## 2025-07-03 NOTE — PATIENT INSTRUCTIONS
new prescriptions prior to starting.     - Please check any new medications including over the counter medications via the website [email or web address].    Can I stop taking blood thinners?    - In general, if you were on a blood thinner before starting dofetilide, you should  remain on one at all times, even if you believe you are no longer in atrial fibrillation.    What will happen during my hospitalization?    - You will receive an EKG and blood test before your arrival in the hospital.    - Once you arrive, your hospital team will start you on dofetilide. (It is a capsule.)    - You will get an EKG two hours after each dose, for a total of five doses.    - On the second or fifth day (physician preference), you may have a cardioversion (electrical shock) to try to get you   out of atrial fibrillation. (In some cases, you will have a cardioversion before starting  the medication.)    - You will most likely go home on the third day. Be sure to follow up with your doctor as  scheduled, no later than three months after starting the medicine. Plan to have  someone drive you home.    Who should I call for more questions?    - Call our office and ask to speak to the heart rhythm team, including Dr. Harris,   Iron Dawn CNP, Janina Mims CNP at [phone].    - Call the [facility] nursing hotline at [phone] ([phone]).    Where can I get this medication?    - Unfortunately, only a limited number of pharmacies stock this medication. Call your  regular pharmacy and ask if they are able to order the medication.    - In most cases, you will leave the hospital with a 1 week free supply of dofetilide in hand.   This will allow your pharmacy time to fill your prescription. Do not wait to drop off your   prescription to your outpatient pharmacy to allow them time to order the medication in.   In addition, a prescription will be sent to your home pharmacy.      For more about [employer], go to [email or web address] or call

## 2025-07-13 DIAGNOSIS — E87.6 HYPOKALEMIA: ICD-10-CM

## 2025-07-15 RX ORDER — POTASSIUM CHLORIDE 1500 MG/1
20 TABLET, EXTENDED RELEASE ORAL 2 TIMES DAILY
Qty: 180 TABLET | Refills: 0 | Status: SHIPPED | OUTPATIENT
Start: 2025-07-15

## 2025-07-17 ENCOUNTER — OFFICE VISIT (OUTPATIENT)
Dept: PAIN MANAGEMENT | Age: 80
End: 2025-07-17
Payer: MEDICARE

## 2025-07-17 VITALS
SYSTOLIC BLOOD PRESSURE: 138 MMHG | BODY MASS INDEX: 44.11 KG/M2 | DIASTOLIC BLOOD PRESSURE: 62 MMHG | WEIGHT: 257 LBS | HEART RATE: 73 BPM

## 2025-07-17 DIAGNOSIS — M96.1 FAILED BACK SURGICAL SYNDROME: ICD-10-CM

## 2025-07-17 DIAGNOSIS — M48.062 SPINAL STENOSIS OF LUMBAR REGION WITH NEUROGENIC CLAUDICATION: Primary | ICD-10-CM

## 2025-07-17 DIAGNOSIS — G89.4 CHRONIC PAIN SYNDROME: ICD-10-CM

## 2025-07-17 DIAGNOSIS — G62.9 NEUROPATHY: ICD-10-CM

## 2025-07-17 DIAGNOSIS — M47.817 LUMBOSACRAL SPONDYLOSIS WITHOUT MYELOPATHY: ICD-10-CM

## 2025-07-17 DIAGNOSIS — G63 POLYNEUROPATHY ASSOCIATED WITH UNDERLYING DISEASE: ICD-10-CM

## 2025-07-17 DIAGNOSIS — Z51.81 ENCOUNTER FOR THERAPEUTIC DRUG MONITORING: ICD-10-CM

## 2025-07-17 DIAGNOSIS — T40.2X5A THERAPEUTIC OPIOID-INDUCED CONSTIPATION (OIC): ICD-10-CM

## 2025-07-17 DIAGNOSIS — R11.0 CHRONIC NAUSEA: ICD-10-CM

## 2025-07-17 DIAGNOSIS — K59.03 THERAPEUTIC OPIOID-INDUCED CONSTIPATION (OIC): ICD-10-CM

## 2025-07-17 DIAGNOSIS — M54.6 PAIN IN THORACIC SPINE: ICD-10-CM

## 2025-07-17 PROCEDURE — 1160F RVW MEDS BY RX/DR IN RCRD: CPT | Performed by: NURSE PRACTITIONER

## 2025-07-17 PROCEDURE — G8427 DOCREV CUR MEDS BY ELIG CLIN: HCPCS | Performed by: NURSE PRACTITIONER

## 2025-07-17 PROCEDURE — 3078F DIAST BP <80 MM HG: CPT | Performed by: NURSE PRACTITIONER

## 2025-07-17 PROCEDURE — 1123F ACP DISCUSS/DSCN MKR DOCD: CPT | Performed by: NURSE PRACTITIONER

## 2025-07-17 PROCEDURE — 1090F PRES/ABSN URINE INCON ASSESS: CPT | Performed by: NURSE PRACTITIONER

## 2025-07-17 PROCEDURE — 3075F SYST BP GE 130 - 139MM HG: CPT | Performed by: NURSE PRACTITIONER

## 2025-07-17 PROCEDURE — 1036F TOBACCO NON-USER: CPT | Performed by: NURSE PRACTITIONER

## 2025-07-17 PROCEDURE — 99214 OFFICE O/P EST MOD 30 MIN: CPT | Performed by: NURSE PRACTITIONER

## 2025-07-17 PROCEDURE — G8417 CALC BMI ABV UP PARAM F/U: HCPCS | Performed by: NURSE PRACTITIONER

## 2025-07-17 PROCEDURE — G8399 PT W/DXA RESULTS DOCUMENT: HCPCS | Performed by: NURSE PRACTITIONER

## 2025-07-17 PROCEDURE — 1159F MED LIST DOCD IN RCRD: CPT | Performed by: NURSE PRACTITIONER

## 2025-07-17 RX ORDER — PREGABALIN 150 MG/1
150 CAPSULE ORAL 2 TIMES DAILY
Qty: 60 CAPSULE | Refills: 1 | Status: SHIPPED | OUTPATIENT
Start: 2025-07-17 | End: 2025-09-15

## 2025-07-17 RX ORDER — HYDROCODONE BITARTRATE AND ACETAMINOPHEN 5; 325 MG/1; MG/1
1 TABLET ORAL
Qty: 127 TABLET | Refills: 0 | Status: SHIPPED | OUTPATIENT
Start: 2025-07-17 | End: 2025-08-14

## 2025-07-17 NOTE — PROGRESS NOTES
Diana Jin  1945  [id number]      HISTORY OF PRESENT ILLNESS: Ms. Jin is a 80 y.o. female returns for a follow up visit for pain management  She has a diagnosis of   1. Spinal stenosis of lumbar region with neurogenic claudication    2. Pain in thoracic spine    3. Neuropathy    4. Failed back surgical syndrome    5. Lumbosacral spondylosis without myelopathy    6. Polyneuropathy associated with underlying disease    7. Chronic pain syndrome    8. Encounter for therapeutic drug monitoring    9. Chronic nausea    10. Therapeutic opioid-induced constipation (OIC)    .      New Medications since Last Office visit have been reviewed with patient.     As per Information Obtained from the PADT (Patient Assessment and Documentation Tool)    She complains of pain in the lower back, bilateral feet. She rates the pain 3/10 and describes it as aching. Current treatment regimen has helped relieve about 90% of the pain since beginning treatment plan.  She denies any side effects from the current pain regimen. Patient reports that since implementation of their treatment plan; their physical functioning is unchanged, family/social relationships are unchanged, mood is unchanged sleep patterns are unchanged, and that the overall functioning is better.  Patient denies/admits that any of the above have changed since last office visit. Patient denies misusing/abusing her narcotic pain medications or using any illegal drugs.      Upon obtaining medical history from Ms. Jin    ALLERGIES: Patients list of allergies were reviewed     MEDICATIONS: Ms. Jin list of medications were reviewed.Her current medications are   Outpatient Medications Prior to Visit   Medication Sig Dispense Refill    KLOR-CON M20 20 MEQ extended release tablet TAKE 1 TABLET BY MOUTH 2 TIMES A  tablet 0    HYDROcodone-acetaminophen (NORCO) 5-325 MG per tablet Take 1 tablet by mouth nightly.      losartan-hydroCHLOROthiazide (HYZAAR) 100-25 MG per

## 2025-07-24 ENCOUNTER — TELEPHONE (OUTPATIENT)
Dept: CARDIOLOGY CLINIC | Age: 80
End: 2025-07-24

## 2025-07-24 NOTE — TELEPHONE ENCOUNTER
The patient's daughter Susan would like to know if the patient is supposed to her hold her Losartan medication for her cardioversion procedure. Please advise [phone]

## 2025-07-25 DIAGNOSIS — I48.11 LONGSTANDING PERSISTENT ATRIAL FIBRILLATION (HCC): ICD-10-CM

## 2025-07-25 LAB
INR PPP: 1.45 (ref 0.86–1.14)
PROTHROMBIN TIME: 17.8 SEC (ref 12.1–14.9)

## 2025-07-26 LAB
ANION GAP SERPL CALCULATED.3IONS-SCNC: 11 MMOL/L (ref 3–16)
BUN SERPL-MCNC: 28 MG/DL (ref 7–20)
CALCIUM SERPL-MCNC: 9.7 MG/DL (ref 8.3–10.6)
CHLORIDE SERPL-SCNC: 101 MMOL/L (ref 99–110)
CO2 SERPL-SCNC: 30 MMOL/L (ref 21–32)
CREAT SERPL-MCNC: 1.1 MG/DL (ref 0.6–1.2)
DEPRECATED RDW RBC AUTO: 14.8 % (ref 12.4–15.4)
GFR SERPLBLD CREATININE-BSD FMLA CKD-EPI: 51 ML/MIN/{1.73_M2}
GLUCOSE SERPL-MCNC: 137 MG/DL (ref 70–99)
HCT VFR BLD AUTO: 38.8 % (ref 36–48)
HGB BLD-MCNC: 13 G/DL (ref 12–16)
MAGNESIUM SERPL-MCNC: 2.41 MG/DL (ref 1.8–2.4)
MCH RBC QN AUTO: 27 PG (ref 26–34)
MCHC RBC AUTO-ENTMCNC: 33.6 G/DL (ref 31–36)
MCV RBC AUTO: 80.3 FL (ref 80–100)
PLATELET # BLD AUTO: 247 K/UL (ref 135–450)
PMV BLD AUTO: 9.1 FL (ref 5–10.5)
POTASSIUM SERPL-SCNC: 4.1 MMOL/L (ref 3.5–5.1)
RBC # BLD AUTO: 4.83 M/UL (ref 4–5.2)
SODIUM SERPL-SCNC: 142 MMOL/L (ref 136–145)
WBC # BLD AUTO: 7 K/UL (ref 4–11)

## 2025-07-27 PROBLEM — Z00.00 HEALTHCARE MAINTENANCE: Status: RESOLVED | Noted: 2025-06-27 | Resolved: 2025-07-27

## 2025-07-29 ENCOUNTER — HOSPITAL ENCOUNTER (INPATIENT)
Age: 80
LOS: 3 days | Discharge: HOME | DRG: 309 | End: 2025-08-01
Attending: INTERNAL MEDICINE | Admitting: INTERNAL MEDICINE
Payer: MEDICARE

## 2025-07-29 DIAGNOSIS — E87.6 HYPOKALEMIA: ICD-10-CM

## 2025-07-29 PROBLEM — I48.19 PERSISTENT ATRIAL FIBRILLATION (HCC): Status: ACTIVE | Noted: 2025-07-29

## 2025-07-29 PROBLEM — I48.92 PAROXYSMAL ATRIAL FLUTTER (HCC): Status: ACTIVE | Noted: 2025-07-29

## 2025-07-29 PROBLEM — Z79.01 ON CONTINUOUS ORAL ANTICOAGULATION: Status: ACTIVE | Noted: 2025-07-29

## 2025-07-29 LAB
ANION GAP SERPL CALCULATED.3IONS-SCNC: 16 MMOL/L (ref 3–16)
BASOPHILS # BLD: 0.1 K/UL (ref 0–0.2)
BASOPHILS NFR BLD: 1.1 %
BUN SERPL-MCNC: 27 MG/DL (ref 7–20)
CALCIUM SERPL-MCNC: 9.5 MG/DL (ref 8.3–10.6)
CHLORIDE SERPL-SCNC: 100 MMOL/L (ref 99–110)
CO2 SERPL-SCNC: 26 MMOL/L (ref 21–32)
CREAT SERPL-MCNC: 1.1 MG/DL (ref 0.6–1.2)
DEPRECATED RDW RBC AUTO: 14.8 % (ref 12.4–15.4)
EKG ATRIAL RATE: 340 BPM
EKG DIAGNOSIS: NORMAL
EKG Q-T INTERVAL: 264 MS
EKG QRS DURATION: 108 MS
EKG QTC CALCULATION (BAZETT): 286 MS
EKG R AXIS: -54 DEGREES
EKG T AXIS: 137 DEGREES
EKG VENTRICULAR RATE: 71 BPM
EOSINOPHIL # BLD: 0.2 K/UL (ref 0–0.6)
EOSINOPHIL NFR BLD: 1.7 %
GFR SERPLBLD CREATININE-BSD FMLA CKD-EPI: 51 ML/MIN/{1.73_M2}
GLUCOSE SERPL-MCNC: 104 MG/DL (ref 70–99)
HCT VFR BLD AUTO: 39.2 % (ref 36–48)
HGB BLD-MCNC: 13.2 G/DL (ref 12–16)
LYMPHOCYTES # BLD: 1.2 K/UL (ref 1–5.1)
LYMPHOCYTES NFR BLD: 11.9 %
MAGNESIUM SERPL-MCNC: 2.01 MG/DL (ref 1.8–2.4)
MCH RBC QN AUTO: 26.9 PG (ref 26–34)
MCHC RBC AUTO-ENTMCNC: 33.7 G/DL (ref 31–36)
MCV RBC AUTO: 79.9 FL (ref 80–100)
MONOCYTES # BLD: 0.7 K/UL (ref 0–1.3)
MONOCYTES NFR BLD: 7.5 %
NEUTROPHILS # BLD: 7.8 K/UL (ref 1.7–7.7)
NEUTROPHILS NFR BLD: 77.8 %
PLATELET # BLD AUTO: 188 K/UL (ref 135–450)
PMV BLD AUTO: 8.3 FL (ref 5–10.5)
POTASSIUM SERPL-SCNC: 3.9 MMOL/L (ref 3.5–5.1)
POTASSIUM SERPL-SCNC: ABNORMAL MMOL/L (ref 3.5–5.1)
RBC # BLD AUTO: 4.9 M/UL (ref 4–5.2)
SODIUM SERPL-SCNC: 142 MMOL/L (ref 136–145)
WBC # BLD AUTO: 10 K/UL (ref 4–11)

## 2025-07-29 PROCEDURE — 2060000000 HC ICU INTERMEDIATE R&B

## 2025-07-29 PROCEDURE — 93005 ELECTROCARDIOGRAM TRACING: CPT | Performed by: NURSE PRACTITIONER

## 2025-07-29 PROCEDURE — 99223 1ST HOSP IP/OBS HIGH 75: CPT | Performed by: NURSE PRACTITIONER

## 2025-07-29 PROCEDURE — 80048 BASIC METABOLIC PNL TOTAL CA: CPT

## 2025-07-29 PROCEDURE — 83735 ASSAY OF MAGNESIUM: CPT

## 2025-07-29 PROCEDURE — 85025 COMPLETE CBC W/AUTO DIFF WBC: CPT

## 2025-07-29 PROCEDURE — 93010 ELECTROCARDIOGRAM REPORT: CPT | Performed by: INTERNAL MEDICINE

## 2025-07-29 PROCEDURE — 93005 ELECTROCARDIOGRAM TRACING: CPT | Performed by: INTERNAL MEDICINE

## 2025-07-29 PROCEDURE — 36415 COLL VENOUS BLD VENIPUNCTURE: CPT

## 2025-07-29 PROCEDURE — 84132 ASSAY OF SERUM POTASSIUM: CPT

## 2025-07-29 PROCEDURE — 6370000000 HC RX 637 (ALT 250 FOR IP): Performed by: NURSE PRACTITIONER

## 2025-07-29 RX ORDER — METOPROLOL SUCCINATE 25 MG/1
25 TABLET, EXTENDED RELEASE ORAL DAILY
Status: DISCONTINUED | OUTPATIENT
Start: 2025-07-29 | End: 2025-08-01 | Stop reason: HOSPADM

## 2025-07-29 RX ORDER — ASPIRIN 81 MG/1
81 TABLET ORAL NIGHTLY
Status: DISCONTINUED | OUTPATIENT
Start: 2025-07-29 | End: 2025-08-01 | Stop reason: HOSPADM

## 2025-07-29 RX ORDER — HYDROCORTISONE ACETATE 0.5 %
1500 CREAM (GRAM) TOPICAL 2 TIMES DAILY
Status: DISCONTINUED | OUTPATIENT
Start: 2025-07-29 | End: 2025-07-29

## 2025-07-29 RX ORDER — PREGABALIN 150 MG/1
150 CAPSULE ORAL 2 TIMES DAILY
Status: DISCONTINUED | OUTPATIENT
Start: 2025-07-29 | End: 2025-07-29

## 2025-07-29 RX ORDER — VITAMIN B COMPLEX
1000 TABLET ORAL DAILY
Status: DISCONTINUED | OUTPATIENT
Start: 2025-07-29 | End: 2025-08-01 | Stop reason: HOSPADM

## 2025-07-29 RX ORDER — ATORVASTATIN CALCIUM 40 MG/1
40 TABLET, FILM COATED ORAL DAILY
Status: DISCONTINUED | OUTPATIENT
Start: 2025-07-29 | End: 2025-08-01 | Stop reason: HOSPADM

## 2025-07-29 RX ORDER — CALCIUM CARBONATE 500(1250)
1 TABLET ORAL DAILY
Status: DISCONTINUED | OUTPATIENT
Start: 2025-07-29 | End: 2025-08-01 | Stop reason: HOSPADM

## 2025-07-29 RX ORDER — HYDROCODONE BITARTRATE AND ACETAMINOPHEN 5; 325 MG/1; MG/1
2 TABLET ORAL
Refills: 0 | Status: DISCONTINUED | OUTPATIENT
Start: 2025-07-29 | End: 2025-08-01 | Stop reason: HOSPADM

## 2025-07-29 RX ORDER — PREGABALIN 150 MG/1
150 CAPSULE ORAL 2 TIMES DAILY
Status: DISCONTINUED | OUTPATIENT
Start: 2025-07-29 | End: 2025-08-01 | Stop reason: HOSPADM

## 2025-07-29 RX ORDER — FUROSEMIDE 20 MG/1
20 TABLET ORAL DAILY
Status: DISCONTINUED | OUTPATIENT
Start: 2025-07-29 | End: 2025-08-01 | Stop reason: HOSPADM

## 2025-07-29 RX ORDER — ACETAMINOPHEN 325 MG/1
650 TABLET ORAL EVERY 4 HOURS PRN
Status: DISCONTINUED | OUTPATIENT
Start: 2025-07-29 | End: 2025-08-01 | Stop reason: HOSPADM

## 2025-07-29 RX ORDER — DOCUSATE SODIUM 100 MG/1
100 CAPSULE, LIQUID FILLED ORAL 2 TIMES DAILY
Status: DISCONTINUED | OUTPATIENT
Start: 2025-07-29 | End: 2025-08-01 | Stop reason: HOSPADM

## 2025-07-29 RX ORDER — HYDROCODONE BITARTRATE AND ACETAMINOPHEN 5; 325 MG/1; MG/1
1 TABLET ORAL EVERY 4 HOURS PRN
Refills: 0 | Status: DISCONTINUED | OUTPATIENT
Start: 2025-07-29 | End: 2025-08-01 | Stop reason: HOSPADM

## 2025-07-29 RX ORDER — POTASSIUM CHLORIDE 1500 MG/1
20 TABLET, EXTENDED RELEASE ORAL 2 TIMES DAILY
Status: DISCONTINUED | OUTPATIENT
Start: 2025-07-29 | End: 2025-07-30

## 2025-07-29 RX ORDER — DOFETILIDE 0.12 MG/1
250 CAPSULE ORAL EVERY 12 HOURS SCHEDULED
Status: DISCONTINUED | OUTPATIENT
Start: 2025-07-29 | End: 2025-07-29

## 2025-07-29 RX ORDER — DULOXETIN HYDROCHLORIDE 30 MG/1
30 CAPSULE, DELAYED RELEASE ORAL NIGHTLY
Status: DISCONTINUED | OUTPATIENT
Start: 2025-07-29 | End: 2025-08-01 | Stop reason: HOSPADM

## 2025-07-29 RX ORDER — LOSARTAN POTASSIUM 100 MG/1
100 TABLET ORAL NIGHTLY
Status: DISCONTINUED | OUTPATIENT
Start: 2025-07-29 | End: 2025-07-30

## 2025-07-29 RX ORDER — DOFETILIDE 0.12 MG/1
250 CAPSULE ORAL EVERY 12 HOURS SCHEDULED
Status: DISCONTINUED | OUTPATIENT
Start: 2025-07-29 | End: 2025-08-01 | Stop reason: HOSPADM

## 2025-07-29 RX ORDER — POTASSIUM CHLORIDE 1500 MG/1
20 TABLET, EXTENDED RELEASE ORAL ONCE
Status: COMPLETED | OUTPATIENT
Start: 2025-07-29 | End: 2025-07-29

## 2025-07-29 RX ORDER — LOSARTAN POTASSIUM AND HYDROCHLOROTHIAZIDE 25; 100 MG/1; MG/1
1 TABLET ORAL NIGHTLY
Status: DISCONTINUED | OUTPATIENT
Start: 2025-07-29 | End: 2025-07-29

## 2025-07-29 RX ORDER — HYDROCHLOROTHIAZIDE 25 MG/1
25 TABLET ORAL NIGHTLY
Status: DISCONTINUED | OUTPATIENT
Start: 2025-07-29 | End: 2025-07-29

## 2025-07-29 RX ORDER — M-VIT,TX,IRON,MINS/CALC/FOLIC 27MG-0.4MG
1 TABLET ORAL DAILY
Status: DISCONTINUED | OUTPATIENT
Start: 2025-07-29 | End: 2025-08-01 | Stop reason: HOSPADM

## 2025-07-29 RX ORDER — AMLODIPINE BESYLATE 5 MG/1
5 TABLET ORAL
Status: DISCONTINUED | OUTPATIENT
Start: 2025-07-29 | End: 2025-08-01 | Stop reason: HOSPADM

## 2025-07-29 RX ADMIN — DOFETILIDE 250 MCG: 0.12 CAPSULE ORAL at 21:25

## 2025-07-29 RX ADMIN — DOCUSATE SODIUM 100 MG: 100 CAPSULE, LIQUID FILLED ORAL at 21:22

## 2025-07-29 RX ADMIN — POTASSIUM CHLORIDE 20 MEQ: 1500 TABLET, EXTENDED RELEASE ORAL at 12:01

## 2025-07-29 RX ADMIN — AMLODIPINE BESYLATE 5 MG: 5 TABLET ORAL at 10:01

## 2025-07-29 RX ADMIN — Medication 1000 UNITS: at 12:11

## 2025-07-29 RX ADMIN — CALCIUM 500 MG: 500 TABLET ORAL at 10:01

## 2025-07-29 RX ADMIN — DOCUSATE SODIUM 100 MG: 100 CAPSULE, LIQUID FILLED ORAL at 10:00

## 2025-07-29 RX ADMIN — DULOXETINE 30 MG: 30 CAPSULE, DELAYED RELEASE ORAL at 21:22

## 2025-07-29 RX ADMIN — FUROSEMIDE 20 MG: 20 TABLET ORAL at 12:02

## 2025-07-29 RX ADMIN — ATORVASTATIN CALCIUM 40 MG: 40 TABLET, FILM COATED ORAL at 10:01

## 2025-07-29 RX ADMIN — PREGABALIN 225 MG: 150 CAPSULE ORAL at 21:20

## 2025-07-29 RX ADMIN — HYDROCODONE BITARTRATE AND ACETAMINOPHEN 2 TABLET: 5; 325 TABLET ORAL at 21:19

## 2025-07-29 RX ADMIN — ASPIRIN 81 MG: 81 TABLET, COATED ORAL at 21:21

## 2025-07-29 RX ADMIN — METOPROLOL SUCCINATE 25 MG: 25 TABLET, EXTENDED RELEASE ORAL at 17:24

## 2025-07-29 RX ADMIN — Medication 1 TABLET: at 10:01

## 2025-07-29 RX ADMIN — PREGABALIN 150 MG: 150 CAPSULE ORAL at 12:10

## 2025-07-29 RX ADMIN — POTASSIUM CHLORIDE 20 MEQ: 1500 TABLET, EXTENDED RELEASE ORAL at 21:22

## 2025-07-29 RX ADMIN — LOSARTAN POTASSIUM 100 MG: 100 TABLET, FILM COATED ORAL at 21:21

## 2025-07-29 RX ADMIN — POTASSIUM CHLORIDE 20 MEQ: 1500 TABLET, EXTENDED RELEASE ORAL at 12:02

## 2025-07-29 RX ADMIN — AMLODIPINE BESYLATE 5 MG: 5 TABLET ORAL at 16:14

## 2025-07-29 RX ADMIN — APIXABAN 5 MG: 5 TABLET, FILM COATED ORAL at 21:18

## 2025-07-29 ASSESSMENT — PAIN DESCRIPTION - FREQUENCY: FREQUENCY: INTERMITTENT

## 2025-07-29 ASSESSMENT — PAIN SCALES - GENERAL
PAINLEVEL_OUTOF10: 0
PAINLEVEL_OUTOF10: 5
PAINLEVEL_OUTOF10: 0

## 2025-07-29 ASSESSMENT — PAIN DESCRIPTION - LOCATION: LOCATION: HEAD

## 2025-07-29 ASSESSMENT — PAIN - FUNCTIONAL ASSESSMENT: PAIN_FUNCTIONAL_ASSESSMENT: ACTIVITIES ARE NOT PREVENTED

## 2025-07-29 ASSESSMENT — PAIN DESCRIPTION - ORIENTATION: ORIENTATION: ANTERIOR

## 2025-07-29 ASSESSMENT — PAIN DESCRIPTION - DESCRIPTORS: DESCRIPTORS: ACHING

## 2025-07-29 ASSESSMENT — PAIN DESCRIPTION - PAIN TYPE: TYPE: ACUTE PAIN

## 2025-07-29 ASSESSMENT — PAIN DESCRIPTION - ONSET: ONSET: GRADUAL

## 2025-07-29 NOTE — PLAN OF CARE
Problem: Chronic Conditions and Co-morbidities  Goal: Patient's chronic conditions and co-morbidity symptoms are monitored and maintained or improved  Outcome: Progressing  Flowsheets (Taken 7/29/2025 1400)  Care Plan - Patient's Chronic Conditions and Co-Morbidity Symptoms are Monitored and Maintained or Improved:   Monitor and assess patient's chronic conditions and comorbid symptoms for stability, deterioration, or improvement   Collaborate with multidisciplinary team to address chronic and comorbid conditions and prevent exacerbation or deterioration   Update acute care plan with appropriate goals if chronic or comorbid symptoms are exacerbated and prevent overall improvement and discharge     Problem: Discharge Planning  Goal: Discharge to home or other facility with appropriate resources  Outcome: Progressing  Flowsheets (Taken 7/29/2025 1400)  Discharge to home or other facility with appropriate resources:   Identify barriers to discharge with patient and caregiver   Arrange for needed discharge resources and transportation as appropriate   Identify discharge learning needs (meds, wound care, etc)   Arrange for interpreters to assist at discharge as needed   Refer to discharge planning if patient needs post-hospital services based on physician order or complex needs related to functional status, cognitive ability or social support system     Problem: Safety - Adult  Goal: Free from fall injury  Outcome: Progressing  Flowsheets (Taken 7/29/2025 1400)  Free From Fall Injury: Instruct family/caregiver on patient safety

## 2025-07-29 NOTE — H&P
[facility] [place]   Electrophysiology   H&P    Date: 7/29/2025    History Obtained From: Patient and medical record.     Chief Complaint: AF, dofetilide loading    Cardiac Hx: Diana Jin is an 81 y/o woman with a h/o HTN, HLD, RENE not on CPAP, CAD, s/p LHC w/ PCI to RCA (10/2016, Dr. Nunez, [place], [place]), s/p LHC (6/2018, Dr. Nunez) CAD w/ no intervention, pAF/AFL, s/p DCCV to NSR (9/2018),  recurrent AF, s/p DCCV to NSR (11/2019), s/p RFA of AFL ([place], [place]), s/p cryo for AF (12/2018), s/p redo RFA/PVI of AF (11/2019, [facility]), recurrent pAF post RFA, placed on sotalol, s/p DCCV to NSR (11/2019), SB on sotalol, dose decreased to 120 mg BID, 1 week CAM (1/2021) SB, first-degree AV block, 22 AT episodes w/ longest lasting 21 beats, < 1% PAC/PVC burden, no AF, s/p dual ch MDT PPM (10/2021, Dr. Harris), s/p sotalol reloading, recurrent AF, s/p DCCV to NSR (5/2025), recurrent AF, here for dofetilide loading.     Interval Hx/HPI: Patient is being admitted for dofetilide loading for persistent AF/AFL.  She has a longstanding history of paroxysmal AF and AFL.  She had an ablation for atrial flutter in the past when she lived in [place].  She then underwent a cryo for atrial fibrillation in December 2018.  She had recurrent AF/AFL in 2019 at [facility].  She had recurrent AF post procedure was placed on sotalol.  She underwent a DCCV to NSR in November 2019.  She was noted to be bradycardic on sotalol and her dose was decreased to 220 mg twice a day.  She wore a 1 week CAM in January 2021 that showed sinus bradycardia and she was implanted with a dual-chamber MDT PPM in October 2021.  Postprocedure she was reloaded with sotalol.  She had recurrent AF and underwent a DCCV to NSR in May 2025.  In follow-up she was back in atrial fibrillation.  She is being admitted for dofetilide loading.    Home medications:   No current facility-administered medications on file prior to encounter.     Current Outpatient Medications on File  MOB ULTRASOUND    US BREAST BIOPSY W LOC DEVICE EACH ADDL LESION RIGHT Right 02/11/2021    US BREAST BIOPSY NEEDLE ADDITIONAL RIGHT 2/11/2021 [facility]OB ULTRASOUND    VENTRICULAR ABLATION SURGERY  11/01/2019       Allergies   Allergen Reactions    Acetaminophen Other (See Comments)    Oxycodone-Acetaminophen     Pyrilamine Other (See Comments)    Morphine And Codeine Nausea And Vomiting and Other (See Comments)     \"Any pain med makes her sick, vomit violently\"      Tape [Adhesive Tape] Itching       Social History:  Reviewed.  reports that she quit smoking about 30 years ago. Her smoking use included cigarettes. She started smoking about 32 years ago. She has a 2 pack-year smoking history. She has never used smokeless tobacco. She reports that she does not drink alcohol and does not use drugs.     Family History:  Reviewed. family history includes Breast Cancer in her mother, sister, and sister; Colon Cancer in her sister; Diabetes in her mother; Heart Disease in her brother and mother; High Blood Pressure in her father and mother; Stroke in her brother and mother.     Review of System:    Constitutional: No fevers, chills.   Eyes: No visual changes or diplopia. No scleral icterus.  ENT: No Headaches. No mouth sores or sore throat.  Cardiovascular: No for chest pain, No for dyspnea on exertion, No for palpitations or No for loss of consciousness. No cough, hemoptysis, No for pleuritic pain, or phlebitis.  Respiratory: No for cough or wheezing. No hematemesis.    Gastrointestinal: No abdominal pain, blood in stools.   Genitourinary: No dysuria, or hematuria.  Musculoskeletal: No gait disturbance,    Integumentary: No rash or pruritis.  Neurological: No headache, change in muscle strength, numbness or tingling.   Psychiatric: No anxiety, or depression.  Endocrine: No temperature intolerance. No excessive thirst, fluid intake, or urination.   Hem/Lymph: No abnormal bruising or bleeding, blood clots or swollen lymph

## 2025-07-29 NOTE — CONSULTS
Clinical Pharmacy Progress Note    Electrolytes (Goal K > 4, Mg > 2) - Management by Pharmacy    Consult Date(s): 7/29/25  Consulting Provider(s): VIC Corey    Assessment / Plan  Electrolyte Management --   Pharmacy asked to assist with electrolyte replacement.  Goal K > 4, Mg > 2.  Electrolytes --  Magnesium at goal (2.01) - no replacement needed  K below goal (3.9) -- ordered KCl 20 mEq po x1 in addition to home dose of KCl 20 Meq po BID.  Pharmacy will continue to monitor and replace electrolytes as appropriate.    Please call with questions--  Fifi Quezada, Kurt, [facility]  Wireless: [phone]   7/29/2025 11:19 AM    ________________________________________________________________________    Subjective/Objective:   Diana Jin is a 80 y.o. female with a PMHx significant for HTN, RENE, CAD who is admitted for dofetilide loading.     Pharmacy is consulted to assist with electrolyte management (goal K > 4, Mg > 2) per Consuelo HO.    Recent Labs     07/29/25  0755 07/29/25  1041     --    K see below 3.9     --    CO2 26  --    BUN 27*  --    CREATININE 1.1  --    GLUCOSE 104*  --    MG 2.01  --        Estimated Creatinine Clearance: 53 mL/min (based on SCr of 1.1 mg/dL).

## 2025-07-29 NOTE — PLAN OF CARE
Reviewed w/ Dr. Harris. Will start dofetilide tonight as patient received HCTZ last pm. HCTZ is contraindicated w/ dofetilide.   HR elevated on tele - will add Toprol XL 25 mg QD.

## 2025-07-30 ENCOUNTER — HOSPITAL ENCOUNTER (OUTPATIENT)
Dept: INTERVENTIONAL RADIOLOGY/VASCULAR | Age: 80
Discharge: HOME | End: 2025-07-30
Attending: INTERNAL MEDICINE

## 2025-07-30 LAB
ANION GAP SERPL CALCULATED.3IONS-SCNC: 13 MMOL/L (ref 3–16)
BUN SERPL-MCNC: 20 MG/DL (ref 7–20)
CALCIUM SERPL-MCNC: 9.3 MG/DL (ref 8.3–10.6)
CHLORIDE SERPL-SCNC: 100 MMOL/L (ref 99–110)
CO2 SERPL-SCNC: 29 MMOL/L (ref 21–32)
CREAT SERPL-MCNC: 1 MG/DL (ref 0.6–1.2)
EKG DIAGNOSIS: NORMAL
EKG DIAGNOSIS: NORMAL
EKG Q-T INTERVAL: 386 MS
EKG Q-T INTERVAL: 392 MS
EKG QRS DURATION: 108 MS
EKG QRS DURATION: 108 MS
EKG QTC CALCULATION (BAZETT): 466 MS
EKG QTC CALCULATION (BAZETT): 467 MS
EKG R AXIS: -56 DEGREES
EKG R AXIS: -58 DEGREES
EKG T AXIS: 102 DEGREES
EKG T AXIS: 106 DEGREES
EKG VENTRICULAR RATE: 85 BPM
EKG VENTRICULAR RATE: 88 BPM
GFR SERPLBLD CREATININE-BSD FMLA CKD-EPI: 57 ML/MIN/{1.73_M2}
GLUCOSE SERPL-MCNC: 109 MG/DL (ref 70–99)
MAGNESIUM SERPL-MCNC: 2.27 MG/DL (ref 1.8–2.4)
POTASSIUM SERPL-SCNC: 3.8 MMOL/L (ref 3.5–5.1)
SODIUM SERPL-SCNC: 142 MMOL/L (ref 136–145)
TSH SERPL DL<=0.005 MIU/L-ACNC: 2 UIU/ML (ref 0.27–4.2)

## 2025-07-30 PROCEDURE — 93005 ELECTROCARDIOGRAM TRACING: CPT | Performed by: INTERNAL MEDICINE

## 2025-07-30 PROCEDURE — 80048 BASIC METABOLIC PNL TOTAL CA: CPT

## 2025-07-30 PROCEDURE — 83735 ASSAY OF MAGNESIUM: CPT

## 2025-07-30 PROCEDURE — 2060000000 HC ICU INTERMEDIATE R&B

## 2025-07-30 PROCEDURE — 93010 ELECTROCARDIOGRAM REPORT: CPT | Performed by: INTERNAL MEDICINE

## 2025-07-30 PROCEDURE — 99233 SBSQ HOSP IP/OBS HIGH 50: CPT | Performed by: NURSE PRACTITIONER

## 2025-07-30 PROCEDURE — 6370000000 HC RX 637 (ALT 250 FOR IP): Performed by: NURSE PRACTITIONER

## 2025-07-30 PROCEDURE — 84443 ASSAY THYROID STIM HORMONE: CPT

## 2025-07-30 PROCEDURE — 36415 COLL VENOUS BLD VENIPUNCTURE: CPT

## 2025-07-30 RX ORDER — POTASSIUM CHLORIDE 1500 MG/1
20 TABLET, EXTENDED RELEASE ORAL 2 TIMES DAILY
Status: DISCONTINUED | OUTPATIENT
Start: 2025-07-30 | End: 2025-07-31

## 2025-07-30 RX ORDER — POTASSIUM CHLORIDE 1500 MG/1
40 TABLET, EXTENDED RELEASE ORAL ONCE
Status: COMPLETED | OUTPATIENT
Start: 2025-07-30 | End: 2025-07-30

## 2025-07-30 RX ORDER — VALSARTAN 80 MG/1
80 TABLET ORAL NIGHTLY
Status: DISCONTINUED | OUTPATIENT
Start: 2025-07-30 | End: 2025-08-01 | Stop reason: HOSPADM

## 2025-07-30 RX ORDER — VALSARTAN 80 MG/1
80 TABLET ORAL DAILY
Status: DISCONTINUED | OUTPATIENT
Start: 2025-07-30 | End: 2025-07-30

## 2025-07-30 RX ADMIN — VALSARTAN 80 MG: 80 TABLET, FILM COATED ORAL at 21:01

## 2025-07-30 RX ADMIN — AMLODIPINE BESYLATE 5 MG: 5 TABLET ORAL at 08:41

## 2025-07-30 RX ADMIN — FUROSEMIDE 20 MG: 20 TABLET ORAL at 08:41

## 2025-07-30 RX ADMIN — CALCIUM 500 MG: 500 TABLET ORAL at 08:41

## 2025-07-30 RX ADMIN — HYDROCODONE BITARTRATE AND ACETAMINOPHEN 1 TABLET: 5; 325 TABLET ORAL at 17:03

## 2025-07-30 RX ADMIN — Medication 1 TABLET: at 09:33

## 2025-07-30 RX ADMIN — PREGABALIN 150 MG: 150 CAPSULE ORAL at 12:28

## 2025-07-30 RX ADMIN — DOCUSATE SODIUM 100 MG: 100 CAPSULE, LIQUID FILLED ORAL at 20:59

## 2025-07-30 RX ADMIN — METOPROLOL SUCCINATE 25 MG: 25 TABLET, EXTENDED RELEASE ORAL at 08:41

## 2025-07-30 RX ADMIN — HYDROCODONE BITARTRATE AND ACETAMINOPHEN 2 TABLET: 5; 325 TABLET ORAL at 20:58

## 2025-07-30 RX ADMIN — POTASSIUM CHLORIDE 20 MEQ: 1500 TABLET, EXTENDED RELEASE ORAL at 20:58

## 2025-07-30 RX ADMIN — ASPIRIN 81 MG: 81 TABLET, COATED ORAL at 20:59

## 2025-07-30 RX ADMIN — DULOXETINE 30 MG: 30 CAPSULE, DELAYED RELEASE ORAL at 20:09

## 2025-07-30 RX ADMIN — PREGABALIN 225 MG: 150 CAPSULE ORAL at 20:08

## 2025-07-30 RX ADMIN — PREGABALIN 150 MG: 150 CAPSULE ORAL at 08:41

## 2025-07-30 RX ADMIN — DOFETILIDE 250 MCG: 0.12 CAPSULE ORAL at 20:58

## 2025-07-30 RX ADMIN — AMLODIPINE BESYLATE 5 MG: 5 TABLET ORAL at 15:12

## 2025-07-30 RX ADMIN — POTASSIUM CHLORIDE 40 MEQ: 1500 TABLET, EXTENDED RELEASE ORAL at 08:40

## 2025-07-30 RX ADMIN — FUROSEMIDE 20 MG: 20 TABLET ORAL at 12:28

## 2025-07-30 RX ADMIN — Medication 1000 UNITS: at 08:41

## 2025-07-30 RX ADMIN — DOFETILIDE 250 MCG: 0.12 CAPSULE ORAL at 09:33

## 2025-07-30 RX ADMIN — DOCUSATE SODIUM 100 MG: 100 CAPSULE, LIQUID FILLED ORAL at 08:41

## 2025-07-30 RX ADMIN — APIXABAN 5 MG: 5 TABLET, FILM COATED ORAL at 08:40

## 2025-07-30 RX ADMIN — ATORVASTATIN CALCIUM 40 MG: 40 TABLET, FILM COATED ORAL at 08:41

## 2025-07-30 RX ADMIN — APIXABAN 5 MG: 5 TABLET, FILM COATED ORAL at 20:57

## 2025-07-30 ASSESSMENT — PAIN - FUNCTIONAL ASSESSMENT
PAIN_FUNCTIONAL_ASSESSMENT: PREVENTS OR INTERFERES SOME ACTIVE ACTIVITIES AND ADLS
PAIN_FUNCTIONAL_ASSESSMENT: PREVENTS OR INTERFERES SOME ACTIVE ACTIVITIES AND ADLS

## 2025-07-30 ASSESSMENT — PAIN DESCRIPTION - DESCRIPTORS
DESCRIPTORS: DISCOMFORT
DESCRIPTORS: PINS AND NEEDLES;TINGLING

## 2025-07-30 ASSESSMENT — PAIN DESCRIPTION - FREQUENCY
FREQUENCY: INTERMITTENT
FREQUENCY: INTERMITTENT

## 2025-07-30 ASSESSMENT — PAIN DESCRIPTION - ONSET
ONSET: GRADUAL
ONSET: GRADUAL

## 2025-07-30 ASSESSMENT — PAIN DESCRIPTION - PAIN TYPE
TYPE: CHRONIC PAIN
TYPE: CHRONIC PAIN

## 2025-07-30 ASSESSMENT — PAIN DESCRIPTION - ORIENTATION
ORIENTATION: RIGHT;LEFT
ORIENTATION: LOWER;LEFT;RIGHT

## 2025-07-30 ASSESSMENT — PAIN SCALES - GENERAL
PAINLEVEL_OUTOF10: 0
PAINLEVEL_OUTOF10: 8
PAINLEVEL_OUTOF10: 0
PAINLEVEL_OUTOF10: 3
PAINLEVEL_OUTOF10: 5
PAINLEVEL_OUTOF10: 0

## 2025-07-30 ASSESSMENT — PAIN DESCRIPTION - LOCATION
LOCATION: FOOT
LOCATION: FOOT

## 2025-07-30 NOTE — DISCHARGE INSTRUCTIONS
Tikosyn/dofetilide            Medication Information for Your Tikosyn®  (Dofetilide) Admission    Why am I going to start dofetilide?    You have atrial fibrillation or atrial flutter that may be causing you  problems. Tikosyn, also called dofetilide (generic name), is one of  the medications used to try to keep you out of atrial fibrillation.    Why do I need to come in to the hospital?    - Although dofetilide is safe in most people, there are a small  number of people who will have an exaggerated electrical  effect from this medication. You will be monitored with an EKG  (electrocardiogram) 2 hours after each oral dose of dofetilide   to make sure there are no EKG changes.     - If your doctor suspects that you are having this exaggerated  effect or your EKG shows changes (a prolonged QTc), your dose   will either be reduced or the medication stopped altogether.    - If you have not converted on your own after 3-5 doses of dofetilide,   you may undergo a cardioversion to place you back in a regular rhythm   (sinus rhythm) prior to discharge.    - After five total doses monitored in the hospital, it is safe for you  to leave the hospital and continue to take the medicine at  home.    How do I take dofetilide?    - Dofetilide is a pill that you take every 12 hours.    - You must be very careful to take it as close to every 12 hours  as possible. You may have breakthrough of your arrhythmia   (atrial fibrillation or atrial flutter) if doses are taken late and not 12 hours apart.     What happens if I’m late with a dose?    - If it’s within one to two hours of your scheduled time, take it right away  and stay on your schedule of every 12 hours    - If you are late by more than one or two hours, skip the dose and take  the next one as scheduled.    What happens if I skip more than one dose?    - Do not miss more than one dose.    - If you miss more than one dose, you must stop taking the  medication and let your  Call the [facility] nursing hotline at 3-529-TIKOSYN (1-413.349.1968).    Where can I get this medication?    - Unfortunately, only a limited number of pharmacies stock this medication. Call your  regular pharmacy and ask if they are able to order the medication.    - In most cases, you will leave the hospital with a 1 week free supply of dofetilide in hand.   This will allow your pharmacy time to fill your prescription. Do not wait to drop off your   prescription to your outpatient pharmacy to allow them time to order the medication in.   In addition, a prescription will be sent to your home pharmacy.      For more about [employer], go to www.Anpro21 or call 3-938-DoubleMap (1-191.967.1497).        Extra Heart Failure Education/ Tools/ Resources:     https://Kiwigrid.Sokolin/publication/?f=643078   --- this is American Heart Association interactive Healthier Living with Heart Failure guidebook.  Please click hyperlink or copy / paste link into search bar. The QR Code is also available below. Use your mouse to scroll through the pages.  Lots of information about weight monitoring, diet tips, activity, meds, etc    Heart Failure Tools and Resources QR Code is below. It includes multiple resources to include symptom tracker, med tracker, further HF info, and access to a HF Support Network online Community    HF Red Jacket Jackeline  -- this is a free smart phone jackeline available for iPhone and Android download.  Use your phone to track sodium / fluid intake, zone tool symptom tracking, weights, medications, etc. Click on this hyperlink  HF Red Jacket Jackeline   for QR code for easy download or the link is also found in the below HF Tools and Resources.      DASH (Dietary Approach to Stop Hypertension) diet --  https://www.nhlbi.nih.gov/education/dash-eating-plan -- this diet is a flexible eating plan that promotes heart healthy eating style.  Click on hyperlink or copy / paste link into search bar.  Lots of low sodium recipes and

## 2025-07-30 NOTE — CONSULTS
Clinical Pharmacy Progress Note    Electrolytes (Goal K > 4, Mg > 2) - Management by Pharmacy    Consult Date(s): 7/29/25  Consulting Provider(s): VIC Corey    Assessment / Plan  Electrolyte Management --   Pharmacy asked to assist with electrolyte replacement.  Goal K > 4, Mg > 2.  Electrolytes --  Magnesium at goal (2.27) - no replacement needed  K below goal (3.8) -- Will give KCl 40 mEq po x1 this AM, and continue home dose of  KCl 20 Meq po BID.  Pharmacy will continue to monitor and replace electrolytes as appropriate.    Please call with questions--  Fifi Quezada, Kurt, [facility]  Wireless: [phone]   7/30/2025 7:51 AM    ________________________________________________________________________    Subjective/Objective:   Diana Jin is a 80 y.o. female with a PMHx significant for HTN, RENE, CAD who is admitted for dofetilide loading.     Pharmacy is consulted to assist with electrolyte management (goal K > 4, Mg > 2) per Consuelo HO.    Recent Labs     07/29/25  0755 07/29/25  1041 07/30/25  0525     --  142   K see below 3.9 3.8     --  100   CO2 26  --  29   BUN 27*  --  20   CREATININE 1.1  --  1.0   GLUCOSE 104*  --  109*   MG 2.01  --  2.27       Estimated Creatinine Clearance: 56 mL/min (based on SCr of 1 mg/dL).

## 2025-07-30 NOTE — PLAN OF CARE
Problem: Safety - Adult  Goal: Free from fall injury  Outcome: Progressing  Flowsheets (Taken 7/30/2025 1432)  Free From Fall Injury:   Instruct family/caregiver on patient safety   Based on caregiver fall risk screen, instruct family/caregiver to ask for assistance with transferring infant if caregiver noted to have fall risk factors  Note: Pt ambulating without rollator throughout room

## 2025-07-30 NOTE — PLAN OF CARE
Problem: Cardiovascular - Adult  Goal: Maintains optimal cardiac output and hemodynamic stability  Outcome: Progressing  Flowsheets (Taken 7/30/2025 0007)  Maintains optimal cardiac output and hemodynamic stability:   Monitor blood pressure and heart rate   Assess for signs of decreased cardiac output  Note: Pt is Tikosyn loading. EKG obtained 2 hours later. A. Fib/ A flutter on telemetry.     Problem: Safety - Adult  Goal: Free from fall injury  7/30/2025 0007 by Liz Jensen RN  Outcome: Progressing  Flowsheets (Taken 7/30/2025 0007)  Free From Fall Injury: Instruct family/caregiver on patient safety  Note: Fall precautions are in place. Bed alarm is on and in lowest position. Pt is using call light appropriately. Will continue to monitor.       Problem: Discharge Planning  Goal: Discharge to home or other facility with appropriate resources  7/30/2025 0007 by Liz Jensen RN  Outcome: Progressing  Flowsheets (Taken 7/30/2025 0007)  Discharge to home or other facility with appropriate resources:   Identify barriers to discharge with patient and caregiver   Identify discharge learning needs (meds, wound care, etc)   Refer to discharge planning if patient needs post-hospital services based on physician order or complex needs related to functional status, cognitive ability or social support system

## 2025-07-30 NOTE — PROGRESS NOTES
Electrophysiology - PROGRESS NOTE    Admit Date: 7/29/2025     Chief Complaint: persistent AF/AFL, dofetilide loading     Interval History:   Patient seen and examined and notes reviewed. Patient is being followed for persistent atrial fib, dofetilide loading. Patient has a longstanding history of paroxysmal AF and AFL. She had an ablation for atrial flutter in the past when she lived in [place]. She then underwent a cryo for atrial fibrillation in December 2018. She had recurrent AF/AFL in 2019 at [facility]. She had recurrent AF post procedure was placed on sotalol. She underwent a DCCV to NSR in November 2019. She was noted to be bradycardic on sotalol and her dose was decreased to 220 mg twice a day. She wore a 1 week CAM in January 2021 that showed sinus bradycardia and she was implanted with a dual-chamber MDT PPM in October 2021. Postprocedure she was reloaded with sotalol. She had recurrent AF and underwent a DCCV to NSR in May 2025. In follow-up she was back in atrial fibrillation. She was admitted for dofetilide loading. HCTZ had not been d/c'd prior to admission, initial dosing started 2100 last pm to allow for washout. Initiated on 250 mcg BID. K+ replacement ordered.     In: 560 [P.O.:560]  Out: 0    Wt Readings from Last 2 Encounters:   07/30/25 114.2 kg (251 lb 12.3 oz)   07/17/25 116.6 kg (257 lb)       Data:   Scheduled Meds:   Scheduled Meds:   potassium chloride  40 mEq Oral Once    potassium chloride  20 mEq Oral BID    amLODIPine  5 mg Oral BID AC    aspirin  81 mg Oral Nightly    atorvastatin  40 mg Oral Daily    calcium elemental  1 tablet Oral Daily    docusate sodium  100 mg Oral BID    DULoxetine  30 mg Oral Nightly    apixaban  5 mg Oral BID    furosemide  20 mg Oral Daily    furosemide  20 mg Oral Daily    HYDROcodone-acetaminophen  2 tablet Oral QHS    therapeutic multivitamin-minerals  1 tablet Oral Daily    pregabalin  225 mg Oral  alert, appears stated age and cooperative, No acute distress   Eyes: Conjunctiva and pupils normal and reactive  Skin: Skin color, texture, turgor normal. No rashes or ecchymosis.  Neck: no JVD, supple, symmetrical, trachea midline   Lungs: , no accessory muscle use, no respiratory distress  Heart: irreg, not tachy  Abdomen: soft, non-tender; bowel sounds normal  Extremities: No edema, DP +  Psychiatric: normal insight and affect    Patient Active Problem List:     Diverticulosis of large intestine without hemorrhage     Second degree hemorrhoids     Collagenous colitis     Hypertension     Iron deficiency     S/P ablation of atrial fibrillation     Neuropathy     Atrial fibrillation (HCC)     Tachy-valerie syndrome (HCC)     Cardiac pacemaker in situ     Personal history of other endocrine, nutritional and metabolic disease     Arteriosclerosis of coronary artery     Estrogen receptor positive status (ER+)     Fibromyositis     Malignant neoplasm of upper-outer quadrant of right breast in female, estrogen receptor positive (HCC)     Metabolic syndrome     Microalbuminuria     Obstructive sleep apnea syndrome     Osteopenia     Pain in thoracic spine     Spinal stenosis of cervical region     Stage 3a chronic kidney disease (HCC)     Ventral incisional hernia     Chronic diastolic heart failure (HCC)     Chronic nausea     Failed back surgical syndrome     Spinal stenosis of lumbar region with neurogenic claudication     Chronic pain syndrome     Encounter for therapeutic drug monitoring     Lumbosacral spondylosis without myelopathy     Polyneuropathy associated with underlying disease     Persistent atrial fibrillation (HCC)     Paroxysmal atrial flutter (HCC)     On continuous oral anticoagulation        Assessment & Plan:      Persistent AF/AFL  On OAC  Failed sotalol  SSS/TBS  Pacemaker  CAD  HTN    Cardiac HX: Diana Jin is a 79 y.o. woman with a h/o HTN, HLD, RENE not on CPAP, CAD, s/p PCI of the RCA

## 2025-07-31 ENCOUNTER — HOSPITAL ENCOUNTER (INPATIENT)
Dept: INTERVENTIONAL RADIOLOGY/VASCULAR | Age: 80
Discharge: HOME | DRG: 309 | End: 2025-08-02
Attending: INTERNAL MEDICINE
Payer: MEDICARE

## 2025-07-31 ENCOUNTER — ANESTHESIA (OUTPATIENT)
Dept: INTERVENTIONAL RADIOLOGY/VASCULAR | Age: 80
DRG: 309 | End: 2025-07-31
Payer: MEDICARE

## 2025-07-31 ENCOUNTER — ANESTHESIA EVENT (OUTPATIENT)
Dept: INTERVENTIONAL RADIOLOGY/VASCULAR | Age: 80
DRG: 309 | End: 2025-07-31
Payer: MEDICARE

## 2025-07-31 VITALS
SYSTOLIC BLOOD PRESSURE: 114 MMHG | RESPIRATION RATE: 14 BRPM | HEART RATE: 64 BPM | OXYGEN SATURATION: 92 % | DIASTOLIC BLOOD PRESSURE: 56 MMHG

## 2025-07-31 DIAGNOSIS — I48.19 PERSISTENT ATRIAL FIBRILLATION (HCC): ICD-10-CM

## 2025-07-31 LAB
ANION GAP SERPL CALCULATED.3IONS-SCNC: 12 MMOL/L (ref 3–16)
BUN SERPL-MCNC: 22 MG/DL (ref 7–20)
CALCIUM SERPL-MCNC: 9.3 MG/DL (ref 8.3–10.6)
CHLORIDE SERPL-SCNC: 102 MMOL/L (ref 99–110)
CO2 SERPL-SCNC: 28 MMOL/L (ref 21–32)
CREAT SERPL-MCNC: 1.1 MG/DL (ref 0.6–1.2)
ECHO BSA: 2.35 M2
EKG ATRIAL RATE: 77 BPM
EKG DIAGNOSIS: NORMAL
EKG P-R INTERVAL: 332 MS
EKG Q-T INTERVAL: 362 MS
EKG Q-T INTERVAL: 388 MS
EKG Q-T INTERVAL: 422 MS
EKG QRS DURATION: 104 MS
EKG QRS DURATION: 104 MS
EKG QRS DURATION: 106 MS
EKG QTC CALCULATION (BAZETT): 435 MS
EKG QTC CALCULATION (BAZETT): 461 MS
EKG QTC CALCULATION (BAZETT): 477 MS
EKG R AXIS: -54 DEGREES
EKG R AXIS: -55 DEGREES
EKG R AXIS: -57 DEGREES
EKG T AXIS: 110 DEGREES
EKG T AXIS: 131 DEGREES
EKG T AXIS: 78 DEGREES
EKG VENTRICULAR RATE: 77 BPM
EKG VENTRICULAR RATE: 85 BPM
EKG VENTRICULAR RATE: 87 BPM
GFR SERPLBLD CREATININE-BSD FMLA CKD-EPI: 51 ML/MIN/{1.73_M2}
GLUCOSE SERPL-MCNC: 104 MG/DL (ref 70–99)
INR PPP: 1.33 (ref 0.86–1.14)
MAGNESIUM SERPL-MCNC: 2.34 MG/DL (ref 1.8–2.4)
POTASSIUM SERPL-SCNC: 3.9 MMOL/L (ref 3.5–5.1)
PROTHROMBIN TIME: 16.6 SEC (ref 12.1–14.9)
SODIUM SERPL-SCNC: 142 MMOL/L (ref 136–145)

## 2025-07-31 PROCEDURE — 6360000002 HC RX W HCPCS

## 2025-07-31 PROCEDURE — 93010 ELECTROCARDIOGRAM REPORT: CPT | Performed by: INTERNAL MEDICINE

## 2025-07-31 PROCEDURE — 99232 SBSQ HOSP IP/OBS MODERATE 35: CPT | Performed by: NURSE PRACTITIONER

## 2025-07-31 PROCEDURE — 2060000000 HC ICU INTERMEDIATE R&B

## 2025-07-31 PROCEDURE — 6370000000 HC RX 637 (ALT 250 FOR IP): Performed by: NURSE PRACTITIONER

## 2025-07-31 PROCEDURE — 7100000011 HC PHASE II RECOVERY - ADDTL 15 MIN: Performed by: INTERNAL MEDICINE

## 2025-07-31 PROCEDURE — 92960 CARDIOVERSION ELECTRIC EXT: CPT

## 2025-07-31 PROCEDURE — 5A2204Z RESTORATION OF CARDIAC RHYTHM, SINGLE: ICD-10-PCS | Performed by: INTERNAL MEDICINE

## 2025-07-31 PROCEDURE — 83735 ASSAY OF MAGNESIUM: CPT

## 2025-07-31 PROCEDURE — 3700000000 HC ANESTHESIA ATTENDED CARE: Performed by: INTERNAL MEDICINE

## 2025-07-31 PROCEDURE — 2580000003 HC RX 258

## 2025-07-31 PROCEDURE — 80048 BASIC METABOLIC PNL TOTAL CA: CPT

## 2025-07-31 PROCEDURE — 85610 PROTHROMBIN TIME: CPT

## 2025-07-31 PROCEDURE — 36415 COLL VENOUS BLD VENIPUNCTURE: CPT

## 2025-07-31 PROCEDURE — 7100000010 HC PHASE II RECOVERY - FIRST 15 MIN: Performed by: INTERNAL MEDICINE

## 2025-07-31 PROCEDURE — 93005 ELECTROCARDIOGRAM TRACING: CPT | Performed by: INTERNAL MEDICINE

## 2025-07-31 RX ORDER — SODIUM CHLORIDE 9 MG/ML
INJECTION, SOLUTION INTRAVENOUS
Status: DISCONTINUED | OUTPATIENT
Start: 2025-07-31 | End: 2025-07-31 | Stop reason: SDUPTHER

## 2025-07-31 RX ORDER — POTASSIUM CHLORIDE 1500 MG/1
40 TABLET, EXTENDED RELEASE ORAL ONCE
Status: COMPLETED | OUTPATIENT
Start: 2025-07-31 | End: 2025-07-31

## 2025-07-31 RX ORDER — POTASSIUM CHLORIDE 1500 MG/1
20 TABLET, EXTENDED RELEASE ORAL 2 TIMES DAILY
Status: DISCONTINUED | OUTPATIENT
Start: 2025-07-31 | End: 2025-08-01 | Stop reason: HOSPADM

## 2025-07-31 RX ORDER — PROPOFOL 10 MG/ML
INJECTION, EMULSION INTRAVENOUS
Status: DISCONTINUED | OUTPATIENT
Start: 2025-07-31 | End: 2025-07-31 | Stop reason: SDUPTHER

## 2025-07-31 RX ADMIN — PREGABALIN 150 MG: 150 CAPSULE ORAL at 08:41

## 2025-07-31 RX ADMIN — FUROSEMIDE 20 MG: 20 TABLET ORAL at 08:41

## 2025-07-31 RX ADMIN — PREGABALIN 150 MG: 150 CAPSULE ORAL at 12:00

## 2025-07-31 RX ADMIN — DOCUSATE SODIUM 100 MG: 100 CAPSULE, LIQUID FILLED ORAL at 21:23

## 2025-07-31 RX ADMIN — SODIUM CHLORIDE: 9 INJECTION, SOLUTION INTRAVENOUS at 10:20

## 2025-07-31 RX ADMIN — POTASSIUM CHLORIDE 40 MEQ: 1500 TABLET, EXTENDED RELEASE ORAL at 08:41

## 2025-07-31 RX ADMIN — VALSARTAN 80 MG: 80 TABLET, FILM COATED ORAL at 21:23

## 2025-07-31 RX ADMIN — DULOXETINE 30 MG: 30 CAPSULE, DELAYED RELEASE ORAL at 16:37

## 2025-07-31 RX ADMIN — Medication 1 TABLET: at 08:41

## 2025-07-31 RX ADMIN — METOPROLOL SUCCINATE 25 MG: 25 TABLET, EXTENDED RELEASE ORAL at 08:41

## 2025-07-31 RX ADMIN — PROPOFOL 25 MG: 10 INJECTION, EMULSION INTRAVENOUS at 10:25

## 2025-07-31 RX ADMIN — DOFETILIDE 250 MCG: 0.12 CAPSULE ORAL at 21:28

## 2025-07-31 RX ADMIN — PREGABALIN 225 MG: 150 CAPSULE ORAL at 21:24

## 2025-07-31 RX ADMIN — DOCUSATE SODIUM 100 MG: 100 CAPSULE, LIQUID FILLED ORAL at 08:41

## 2025-07-31 RX ADMIN — DOFETILIDE 250 MCG: 0.12 CAPSULE ORAL at 08:41

## 2025-07-31 RX ADMIN — ATORVASTATIN CALCIUM 40 MG: 40 TABLET, FILM COATED ORAL at 08:41

## 2025-07-31 RX ADMIN — FUROSEMIDE 20 MG: 20 TABLET ORAL at 11:59

## 2025-07-31 RX ADMIN — APIXABAN 5 MG: 5 TABLET, FILM COATED ORAL at 08:41

## 2025-07-31 RX ADMIN — Medication 1000 UNITS: at 08:41

## 2025-07-31 RX ADMIN — CALCIUM 500 MG: 500 TABLET ORAL at 08:41

## 2025-07-31 RX ADMIN — PROPOFOL 50 MG: 10 INJECTION, EMULSION INTRAVENOUS at 10:24

## 2025-07-31 RX ADMIN — APIXABAN 5 MG: 5 TABLET, FILM COATED ORAL at 21:24

## 2025-07-31 RX ADMIN — POTASSIUM CHLORIDE 20 MEQ: 1500 TABLET, EXTENDED RELEASE ORAL at 21:23

## 2025-07-31 RX ADMIN — AMLODIPINE BESYLATE 5 MG: 5 TABLET ORAL at 07:04

## 2025-07-31 RX ADMIN — ASPIRIN 81 MG: 81 TABLET, COATED ORAL at 21:23

## 2025-07-31 RX ADMIN — AMLODIPINE BESYLATE 5 MG: 5 TABLET ORAL at 16:37

## 2025-07-31 RX ADMIN — HYDROCODONE BITARTRATE AND ACETAMINOPHEN 2 TABLET: 5; 325 TABLET ORAL at 19:22

## 2025-07-31 ASSESSMENT — PAIN DESCRIPTION - PAIN TYPE
TYPE: CHRONIC PAIN
TYPE: CHRONIC PAIN

## 2025-07-31 ASSESSMENT — PAIN DESCRIPTION - ORIENTATION
ORIENTATION: RIGHT
ORIENTATION: RIGHT

## 2025-07-31 ASSESSMENT — PAIN DESCRIPTION - DESCRIPTORS
DESCRIPTORS: PINS AND NEEDLES;TINGLING
DESCRIPTORS: PINS AND NEEDLES;TINGLING

## 2025-07-31 ASSESSMENT — PAIN DESCRIPTION - FREQUENCY
FREQUENCY: INTERMITTENT
FREQUENCY: INTERMITTENT

## 2025-07-31 ASSESSMENT — PAIN DESCRIPTION - ONSET
ONSET: GRADUAL
ONSET: GRADUAL

## 2025-07-31 ASSESSMENT — PAIN SCALES - GENERAL
PAINLEVEL_OUTOF10: 4
PAINLEVEL_OUTOF10: 8
PAINLEVEL_OUTOF10: 5

## 2025-07-31 ASSESSMENT — PAIN DESCRIPTION - LOCATION
LOCATION: FOOT
LOCATION: FOOT

## 2025-07-31 NOTE — PROGRESS NOTES
Electrophysiology - PROGRESS NOTE      Admit Date: 7/29/2025     Chief Complaint: persistent AF/AFL, dofetilide loading    Interval Hx:  Patient seen and examined and notes reviewed. Patient is being followed for persistent atrial fib, dofetilide loading. Patient has a longstanding history of paroxysmal AF and AFL. She had an ablation for atrial flutter in the past when she lived in [place]. She then underwent a cryo for atrial fibrillation in December 2018. She had recurrent AF/AFL in 2019 at [facility]. She had recurrent AF post procedure was placed on sotalol. She underwent a DCCV to NSR in November 2019. She was noted to be bradycardic on sotalol and her dose was decreased to 220 mg twice a day. She wore a 1 week CAM in January 2021 that showed sinus bradycardia and she was implanted with a dual-chamber MDT PPM in October 2021. Postprocedure she was reloaded with sotalol. She had recurrent AF and underwent a DCCV to NSR in May 2025. In follow-up she was back in atrial fibrillation. She was admitted for dofetilide loading. HCTZ had not been d/c'd prior to admission, initial dosing started 7/29/25 at 2100 to allow for washout. Initiated on 250 mcg BID. K+ replacement ordered. Patient is NPO today for scheduled DCCV. Tolerating 250mcg well. K 3.8 this AM - replacement ordered .Patient denies chest pain, SOB, palpitations/racing heart rate, LH/dizziness, orthopnea/PND or LE Edema.  Telemetry reviewed, remains in Afl/fib with controlled rates, rare PVCs.       Assessment and Plan:   1. Persistent AF/AFL  2. On OAC  3. Failed sotalol  4. SSS/TBS  5. Pacemaker  6. CAD  7. HTN     Cardiac HX: Diana Jin is a 79 y.o. woman with a h/o HTN, HLD, RENE not on CPAP, CAD, s/p PCI of the RCA (10/2016, Dr. Nunez, [place], [place]), s/p LHC (6/2018, Dr. Nunez) no intervention, pAF/AFL, s/p DCCV to NSR (9/2018),  recurrent AF, s/p DCCV to NSR (11/2019), s/p RFA of AFL ([place], [place]),

## 2025-07-31 NOTE — ANESTHESIA POSTPROCEDURE EVALUATION
Department of Anesthesiology  Postprocedure Note    Patient: Diana Jin  MRN: [id number]  YOB: 1945  Date of evaluation: 7/31/2025    Procedure Summary       Date: 07/31/25 Room / Location: [facility] Special Procedures    Anesthesia Start: 1020 Anesthesia Stop: 1029    Procedure: CARDIOVERSION EXTERNAL Diagnosis: Persistent atrial fibrillation (HCC)    Scheduled Providers: Zhang Jennings MD Responsible Provider: Zhang Jennings MD    Anesthesia Type: MAC ASA Status: 4            Anesthesia Type: No value filed.    Mulu Phase I: Mulu Score: 10    Mulu Phase II: Mulu Score: 10    Anesthesia Post Evaluation    Patient location during evaluation: PACU  Patient participation: complete - patient participated  Level of consciousness: awake  Airway patency: patent  Nausea & Vomiting: no nausea and no vomiting  Cardiovascular status: blood pressure returned to baseline and hemodynamically stable  Respiratory status: acceptable  Hydration status: euvolemic  Multimodal analgesia pain management approach  Pain management: adequate    No notable events documented.

## 2025-07-31 NOTE — CONSULTS
Clinical Pharmacy Progress Note    Electrolytes (Goal K > 4, Mg > 2) - Management by Pharmacy    Consult Date(s): 7/29/25  Consulting Provider(s): VIC Corey    Assessment / Plan  Electrolyte Management --   Pharmacy asked to assist with electrolyte replacement.  Goal K > 4, Mg > 2.  Electrolytes --  Magnesium at goal (2.34) - no replacement needed  K below goal (3.9) -- Will give KCl 40 mEq po x1 this AM, and continue home dose of  KCl 20 Meq po BID.  Pharmacy will continue to monitor and replace electrolytes as appropriate.    Please call with questions--  Fifi Quezada, PharmD, [facility]  Wireless: [phone]   7/31/2025 8:31 AM    ________________________________________________________________________    Subjective/Objective:   Diana Jin is a 80 y.o. female with a PMHx significant for HTN, RENE, CAD who is admitted for dofetilide loading.     Pharmacy is consulted to assist with electrolyte management (goal K > 4, Mg > 2) per Consuelo HO.    Recent Labs     07/30/25  0525 07/31/25  0702    142   K 3.8 3.9    102   CO2 29 28   BUN 20 22*   CREATININE 1.0 1.1   GLUCOSE 109* 104*   MG 2.27 2.34       Estimated Creatinine Clearance: 51 mL/min (based on SCr of 1.1 mg/dL).

## 2025-07-31 NOTE — PLAN OF CARE
Problem: Chronic Conditions and Co-morbidities  Goal: Patient's chronic conditions and co-morbidity symptoms are monitored and maintained or improved  7/31/2025 1136 by Lashawn Vasquez, RN  Outcome: Progressing  Flowsheets (Taken 7/31/2025 1136)  Care Plan - Patient's Chronic Conditions and Co-Morbidity Symptoms are Monitored and Maintained or Improved:   Monitor and assess patient's chronic conditions and comorbid symptoms for stability, deterioration, or improvement   Collaborate with multidisciplinary team to address chronic and comorbid conditions and prevent exacerbation or deterioration   Update acute care plan with appropriate goals if chronic or comorbid symptoms are exacerbated and prevent overall improvement and discharge  Note: Education provided to patient/family on co-morbidities as needed throughout shift.        Problem: Discharge Planning  Goal: Discharge to home or other facility with appropriate resources  7/31/2025 1136 by Lashawn Vasquez, RN  Outcome: Progressing  Flowsheets (Taken 7/31/2025 1136)  Discharge to home or other facility with appropriate resources:   Identify barriers to discharge with patient and caregiver   Identify discharge learning needs (meds, wound care, etc)   Refer to discharge planning if patient needs post-hospital services based on physician order or complex needs related to functional status, cognitive ability or social support system  Note: No plan for discharge this shift, patient going for cardioversion today     Problem: Safety - Adult  Goal: Free from fall injury  7/31/2025 1136 by Lashawn Vasquez, RN  Outcome: Progressing  Flowsheets (Taken 7/31/2025 1136)  Free From Fall Injury: Instruct family/caregiver on patient safety  Note: Pt will remain free from accidental injury this shift. Pt has fall risk measures (fall risk bracelet, fall risk sign, fall risk cloth, non-slip socks, dome light on) in place. Pt bed is in low position, bed alarm on, bed wheels locked, call light  within reach, bedside table within reach, chair wheels locked, chair alarm on.       Problem: Cardiovascular - Adult  Goal: Maintains optimal cardiac output and hemodynamic stability  7/31/2025 1136 by Lashawn Vasquez RN  Outcome: Progressing  Flowsheets (Taken 7/31/2025 1136)  Maintains optimal cardiac output and hemodynamic stability:   Monitor blood pressure and heart rate   Monitor urine output and notify Licensed Independent Practitioner for values outside of normal range   Assess for signs of decreased cardiac output  Note: Patient is on continuous telemetry monitoring, HR is 87 Afib

## 2025-07-31 NOTE — ANESTHESIA PRE PROCEDURE
Department of Anesthesiology  Preprocedure Note       Name:  Diana Jin   Age:  80 y.o.  :  1945                                          MRN:  [id number]         Date:  2025      Surgeon: * No surgeons listed *    Procedure: * No procedures listed *    Medications prior to admission:   Prior to Admission medications    Medication Sig Start Date End Date Taking? Authorizing Provider   HYDROcodone-acetaminophen (NORCO) 5-325 MG per tablet Take 1 tablet by mouth every 4-6 hours as needed for Pain for up to 28 days. Max Daily Amount: 6 tablets 25  Stefany Busby APRN - CNP   pregabalin (LYRICA) 150 MG capsule Take 1 capsule by mouth 2 times daily for 60 days. Max Daily Amount: 300 mg 7/17/25 9/15/25  Stefany Busby APRN - CNP   KLOR-CON M20 20 MEQ extended release tablet TAKE 1 TABLET BY MOUTH 2 TIMES A DAY 7/15/25   Kasie Silver MD   HYDROcodone-acetaminophen (NORCO) 5-325 MG per tablet Take 1 tablet by mouth nightly.    Provider, MD Jesus Alberto   losartan-hydroCHLOROthiazide (HYZAAR) 100-25 MG per tablet TAKE 1 TABLET BY MOUTH DAILY 6/10/25   Kasie Silver MD   DULoxetine (CYMBALTA) 30 MG extended release capsule Take 1 capsule by mouth daily 25  Stefany Busby APRN - CNP   pregabalin (LYRICA) 225 MG capsule Take 1 capsule by mouth at bedtime for 180 days. Max Daily Amount: 225 mg 25  Stefany Busby APRN - CNP   pregabalin (LYRICA) 225 MG capsule Take 1 capsule by mouth nightly for 60 days. Max Daily Amount: 225 mg 25  Stefany Busby APRN - CNP   pregabalin (LYRICA) 225 MG capsule Take 1 capsule by mouth daily for 30 days. Increasing daily dose of lyrica Max Daily Amount: 225 mg 3/14/25 7/17/25  Stefany Busby APRN - CNP   atorvastatin (LIPITOR) 40 MG tablet Take 1 tablet by mouth daily 25   Uday Power MD   amLODIPine (NORVASC) 5 MG tablet Take 1 tablet by mouth 2 times daily (before meals) 25   Uday Power MD   furosemide (LASIX)

## 2025-07-31 NOTE — CARE COORDINATION
Discharge Planning:    CM following for DCP- completed chart review. Pt from home with family support, IPTA, plans to return home with no anticipated CM needs. Recommend OOB as appropriate/tolerated to maintain prior level of function and independence at DC. CM will follow peripherally, please contact directly if needs arise prior to DC.     Pt will require 2nd [facility] notification prior to DC.    Thank you  Cat Santos LAMB, BSN, CM  PCU   [phone]

## 2025-07-31 NOTE — PLAN OF CARE
Problem: Chronic Conditions and Co-morbidities  Goal: Patient's chronic conditions and co-morbidity symptoms are monitored and maintained or improved  Outcome: Progressing  Flowsheets (Taken 7/31/2025 0110)  Care Plan - Patient's Chronic Conditions and Co-Morbidity Symptoms are Monitored and Maintained or Improved:   Monitor and assess patient's chronic conditions and comorbid symptoms for stability, deterioration, or improvement   Collaborate with multidisciplinary team to address chronic and comorbid conditions and prevent exacerbation or deterioration   Update acute care plan with appropriate goals if chronic or comorbid symptoms are exacerbated and prevent overall improvement and discharge     Problem: Discharge Planning  Goal: Discharge to home or other facility with appropriate resources  Outcome: Progressing  Flowsheets (Taken 7/31/2025 0110)  Discharge to home or other facility with appropriate resources:   Identify barriers to discharge with patient and caregiver   Arrange for needed discharge resources and transportation as appropriate   Identify discharge learning needs (meds, wound care, etc)   Refer to discharge planning if patient needs post-hospital services based on physician order or complex needs related to functional status, cognitive ability or social support system     Problem: Safety - Adult  Goal: Free from fall injury  7/31/2025 0110 by Estela Gee RN  Outcome: Progressing  Flowsheets (Taken 7/31/2025 0110)  Free From Fall Injury: Instruct family/caregiver on patient safety     Problem: Pain  Goal: Verbalizes/displays adequate comfort level or baseline comfort level  Outcome: Progressing  Flowsheets (Taken 7/31/2025 0110)  Verbalizes/displays adequate comfort level or baseline comfort level:   Encourage patient to monitor pain and request assistance   Assess pain using appropriate pain scale   Administer analgesics based on type and severity of pain and evaluate response   Implement

## 2025-08-01 ENCOUNTER — TELEPHONE (OUTPATIENT)
Dept: PRIMARY CARE CLINIC | Age: 80
End: 2025-08-01

## 2025-08-01 VITALS
HEIGHT: 64 IN | WEIGHT: 251.77 LBS | TEMPERATURE: 98 F | BODY MASS INDEX: 42.98 KG/M2 | SYSTOLIC BLOOD PRESSURE: 133 MMHG | OXYGEN SATURATION: 95 % | DIASTOLIC BLOOD PRESSURE: 64 MMHG | RESPIRATION RATE: 16 BRPM | HEART RATE: 70 BPM

## 2025-08-01 LAB
ANION GAP SERPL CALCULATED.3IONS-SCNC: 11 MMOL/L (ref 3–16)
ANION GAP SERPL CALCULATED.3IONS-SCNC: 9 MMOL/L (ref 3–16)
BUN SERPL-MCNC: 20 MG/DL (ref 7–20)
BUN SERPL-MCNC: 21 MG/DL (ref 7–20)
CALCIUM SERPL-MCNC: 9 MG/DL (ref 8.3–10.6)
CALCIUM SERPL-MCNC: 9 MG/DL (ref 8.3–10.6)
CHLORIDE SERPL-SCNC: 103 MMOL/L (ref 99–110)
CHLORIDE SERPL-SCNC: 105 MMOL/L (ref 99–110)
CO2 SERPL-SCNC: 24 MMOL/L (ref 21–32)
CO2 SERPL-SCNC: 28 MMOL/L (ref 21–32)
CREAT SERPL-MCNC: 0.9 MG/DL (ref 0.6–1.2)
CREAT SERPL-MCNC: 1 MG/DL (ref 0.6–1.2)
GFR SERPLBLD CREATININE-BSD FMLA CKD-EPI: 57 ML/MIN/{1.73_M2}
GFR SERPLBLD CREATININE-BSD FMLA CKD-EPI: 65 ML/MIN/{1.73_M2}
GLUCOSE SERPL-MCNC: 104 MG/DL (ref 70–99)
GLUCOSE SERPL-MCNC: 140 MG/DL (ref 70–99)
MAGNESIUM SERPL-MCNC: 2.33 MG/DL (ref 1.8–2.4)
POTASSIUM SERPL-SCNC: 4.2 MMOL/L (ref 3.5–5.1)
POTASSIUM SERPL-SCNC: ABNORMAL MMOL/L (ref 3.5–5.1)
SODIUM SERPL-SCNC: 140 MMOL/L (ref 136–145)
SODIUM SERPL-SCNC: 140 MMOL/L (ref 136–145)

## 2025-08-01 PROCEDURE — 99239 HOSP IP/OBS DSCHRG MGMT >30: CPT | Performed by: NURSE PRACTITIONER

## 2025-08-01 PROCEDURE — 93005 ELECTROCARDIOGRAM TRACING: CPT | Performed by: INTERNAL MEDICINE

## 2025-08-01 PROCEDURE — 80048 BASIC METABOLIC PNL TOTAL CA: CPT

## 2025-08-01 PROCEDURE — 93005 ELECTROCARDIOGRAM TRACING: CPT

## 2025-08-01 PROCEDURE — 83735 ASSAY OF MAGNESIUM: CPT

## 2025-08-01 PROCEDURE — 6370000000 HC RX 637 (ALT 250 FOR IP): Performed by: NURSE PRACTITIONER

## 2025-08-01 PROCEDURE — 36415 COLL VENOUS BLD VENIPUNCTURE: CPT

## 2025-08-01 RX ORDER — VALSARTAN 80 MG/1
80 TABLET ORAL NIGHTLY
Qty: 90 TABLET | Refills: 3 | Status: SHIPPED | OUTPATIENT
Start: 2025-08-01

## 2025-08-01 RX ORDER — METOPROLOL SUCCINATE 25 MG/1
25 TABLET, EXTENDED RELEASE ORAL DAILY
Qty: 90 TABLET | Refills: 3 | Status: SHIPPED | OUTPATIENT
Start: 2025-08-02

## 2025-08-01 RX ORDER — POTASSIUM CHLORIDE 1500 MG/1
20 TABLET, EXTENDED RELEASE ORAL 3 TIMES DAILY
Qty: 180 TABLET | Refills: 0 | Status: SHIPPED | OUTPATIENT
Start: 2025-08-01

## 2025-08-01 RX ORDER — DOFETILIDE 0.25 MG/1
250 CAPSULE ORAL EVERY 12 HOURS SCHEDULED
Qty: 180 CAPSULE | Refills: 3 | Status: SHIPPED | OUTPATIENT
Start: 2025-08-01

## 2025-08-01 RX ORDER — DOFETILIDE 0.25 MG/1
250 CAPSULE ORAL EVERY 12 HOURS SCHEDULED
Qty: 60 CAPSULE | Refills: 0 | Status: SHIPPED | OUTPATIENT
Start: 2025-08-01 | End: 2025-08-01

## 2025-08-01 RX ADMIN — POTASSIUM CHLORIDE 20 MEQ: 1500 TABLET, EXTENDED RELEASE ORAL at 08:34

## 2025-08-01 RX ADMIN — DOCUSATE SODIUM 100 MG: 100 CAPSULE, LIQUID FILLED ORAL at 08:34

## 2025-08-01 RX ADMIN — APIXABAN 5 MG: 5 TABLET, FILM COATED ORAL at 08:34

## 2025-08-01 RX ADMIN — PREGABALIN 150 MG: 150 CAPSULE ORAL at 08:34

## 2025-08-01 RX ADMIN — FUROSEMIDE 20 MG: 20 TABLET ORAL at 12:37

## 2025-08-01 RX ADMIN — METOPROLOL SUCCINATE 25 MG: 25 TABLET, EXTENDED RELEASE ORAL at 08:34

## 2025-08-01 RX ADMIN — Medication 1000 UNITS: at 08:34

## 2025-08-01 RX ADMIN — ATORVASTATIN CALCIUM 40 MG: 40 TABLET, FILM COATED ORAL at 08:34

## 2025-08-01 RX ADMIN — FUROSEMIDE 20 MG: 20 TABLET ORAL at 08:34

## 2025-08-01 RX ADMIN — DOFETILIDE 250 MCG: 0.12 CAPSULE ORAL at 09:51

## 2025-08-01 RX ADMIN — CALCIUM 500 MG: 500 TABLET ORAL at 08:34

## 2025-08-01 RX ADMIN — AMLODIPINE BESYLATE 5 MG: 5 TABLET ORAL at 05:12

## 2025-08-01 RX ADMIN — Medication 1 TABLET: at 08:34

## 2025-08-01 RX ADMIN — PREGABALIN 150 MG: 150 CAPSULE ORAL at 12:37

## 2025-08-01 ASSESSMENT — PAIN SCALES - GENERAL
PAINLEVEL_OUTOF10: 0
PAINLEVEL_OUTOF10: 0

## 2025-08-01 NOTE — PLAN OF CARE
Problem: Chronic Conditions and Co-morbidities  Goal: Patient's chronic conditions and co-morbidity symptoms are monitored and maintained or improved  8/1/2025 1300 by Vernon Varela RN  Outcome: Adequate for Discharge  8/1/2025 0405 by Estela Gee RN  Outcome: Progressing  Flowsheets (Taken 7/31/2025 1136 by Lashawn Vasquez RN)  Care Plan - Patient's Chronic Conditions and Co-Morbidity Symptoms are Monitored and Maintained or Improved:   Monitor and assess patient's chronic conditions and comorbid symptoms for stability, deterioration, or improvement   Collaborate with multidisciplinary team to address chronic and comorbid conditions and prevent exacerbation or deterioration   Update acute care plan with appropriate goals if chronic or comorbid symptoms are exacerbated and prevent overall improvement and discharge     Problem: Discharge Planning  Goal: Discharge to home or other facility with appropriate resources  8/1/2025 1300 by Vernon Varela RN  Outcome: Adequate for Discharge  8/1/2025 0405 by Estela Gee RN  Outcome: Progressing  Flowsheets (Taken 7/31/2025 1136 by Lashawn Vasquez RN)  Discharge to home or other facility with appropriate resources:   Identify barriers to discharge with patient and caregiver   Identify discharge learning needs (meds, wound care, etc)   Refer to discharge planning if patient needs post-hospital services based on physician order or complex needs related to functional status, cognitive ability or social support system     Problem: Safety - Adult  Goal: Free from fall injury  8/1/2025 1300 by Vernon Varela RN  Outcome: Adequate for Discharge  8/1/2025 0405 by Estela Gee RN  Outcome: Progressing  Flowsheets (Taken 8/1/2025 0405)  Free From Fall Injury: Instruct family/caregiver on patient safety     Problem: Pain  Goal: Verbalizes/displays adequate comfort level or baseline comfort level  8/1/2025 1300 by Vernon Varela RN  Outcome: Adequate for

## 2025-08-01 NOTE — DISCHARGE SUMMARY
EP Discharge Summary  [facility]  MD Iron Fuentes CNP, CNP  08/01/25    Patient :Diana Jin  Room/Bed: 4322/4322-01  Medical Record: [id number]  YOB: 1945    Admit date: 7/29/2025  Discharge date and time: 08/01/25     Admitting Physician: Jaun Mendoza MD   Discharge Physician: Jaun Mendoza MD    Admission Diagnoses: Atrial fibrillation (HCC) [I48.91]  Persistent atrial fibrillation (HCC) [I48.19]  Dofetilide loading  Discharge Diagnoses: Persistent atrial fibrillaiton     Discharged Condition: good    Hospital Course:  Patient has a longstanding history of paroxysmal AF and AFL. She had an ablation for atrial flutter in the past when she lived in [place]. She then underwent a cryo for atrial fibrillation in December 2018. She had recurrent AF/AFL in 2019 at [facility]. She had recurrent AF post procedure was placed on sotalol. She underwent a DCCV to NSR in November 2019. She was noted to be bradycardic on sotalol and her dose was decreased to 220 mg twice a day. She wore a 1 week CAM in January 2021 that showed sinus bradycardia and she was implanted with a dual-chamber MDT PPM in October 2021. Postprocedure she was reloaded with sotalol. She had recurrent AF and underwent a DCCV to NSR in May 2025. In follow-up she was back in atrial fibrillation. She was admitted for dofetilide loading. HCTZ had not been d/c'd prior to admission, initial dosing started 7/29/25 at 2100 to allow for washout. Initiated on 250 mcg BID. K+ replacement ordered. BP and HR elevated after discontinuation of HCTZ. Added Toprol XL 25 mg daily and changed Losartan to Valsartan. She underwent a successful DCCV to NSR on 7/31/25. She will increase K+ supplement at home to three times a day. She will continue Dofetilide 250mcg BID. She received a 30 day medication supply in hand prior to discharge and a year supply Rx sent home with her. Prior to discharge ECG

## 2025-08-01 NOTE — PLAN OF CARE
Problem: Chronic Conditions and Co-morbidities  Goal: Patient's chronic conditions and co-morbidity symptoms are monitored and maintained or improved  Outcome: Progressing  Flowsheets (Taken 7/31/2025 1136 by Lashawn Vasquez, RN)  Care Plan - Patient's Chronic Conditions and Co-Morbidity Symptoms are Monitored and Maintained or Improved:   Monitor and assess patient's chronic conditions and comorbid symptoms for stability, deterioration, or improvement   Collaborate with multidisciplinary team to address chronic and comorbid conditions and prevent exacerbation or deterioration   Update acute care plan with appropriate goals if chronic or comorbid symptoms are exacerbated and prevent overall improvement and discharge     Problem: Discharge Planning  Goal: Discharge to home or other facility with appropriate resources  Outcome: Progressing  Flowsheets (Taken 7/31/2025 1136 by Lashawn Vasquez, RN)  Discharge to home or other facility with appropriate resources:   Identify barriers to discharge with patient and caregiver   Identify discharge learning needs (meds, wound care, etc)   Refer to discharge planning if patient needs post-hospital services based on physician order or complex needs related to functional status, cognitive ability or social support system     Problem: Safety - Adult  Goal: Free from fall injury  Outcome: Progressing  Flowsheets (Taken 8/1/2025 0405)  Free From Fall Injury: Instruct family/caregiver on patient safety     Problem: Pain  Goal: Verbalizes/displays adequate comfort level or baseline comfort level  Outcome: Progressing  Flowsheets (Taken 7/31/2025 0110)  Verbalizes/displays adequate comfort level or baseline comfort level:   Encourage patient to monitor pain and request assistance   Assess pain using appropriate pain scale   Administer analgesics based on type and severity of pain and evaluate response   Implement non-pharmacological measures as appropriate and evaluate response   Consider

## 2025-08-01 NOTE — PROGRESS NOTES
PT being discharged.  PT IV and tele removed.  PT given/explained discharge instructions.  PT verbalized understanding.

## 2025-08-01 NOTE — CONSULTS
Clinical Pharmacy Progress Note    Electrolytes (Goal K > 4, Mg > 2) - Management by Pharmacy    Consult Date(s): 7/29/25  Consulting Provider(s): VIC Corey    Assessment / Plan  Electrolyte Management --   Pharmacy asked to assist with electrolyte replacement.  Goal K > 4, Mg > 2.  Electrolytes at goal (K 4.2, Mg 2.33) - no replacement needed.  Remains on home dose of  KCl 20 Meq po BID.  Pharmacy will continue to monitor and replace electrolytes as appropriate.    Please call with questions--  Fifi Quezada, Kurt, [facility]  Wireless: [phone]   8/1/2025 9:55 AM    ________________________________________________________________________    Subjective/Objective:   Diana Jin is a 80 y.o. female with a PMHx significant for HTN, RENE, CAD who is admitted for dofetilide loading.     Pharmacy is consulted to assist with electrolyte management (goal K > 4, Mg > 2) per Consuelo HO.    Recent Labs     07/31/25  0702 08/01/25  0544 08/01/25  0910    140 140   K 3.9 see below 4.2    105 103   CO2 28 24 28   BUN 22* 21* 20   CREATININE 1.1 1.0 0.9   GLUCOSE 104* 104* 140*   MG 2.34 2.33  --        Estimated Creatinine Clearance: 62 mL/min (based on SCr of 0.9 mg/dL).

## 2025-08-02 LAB
EKG ATRIAL RATE: 64 BPM
EKG ATRIAL RATE: 65 BPM
EKG DIAGNOSIS: NORMAL
EKG DIAGNOSIS: NORMAL
EKG P AXIS: -25 DEGREES
EKG P-R INTERVAL: 308 MS
EKG P-R INTERVAL: 314 MS
EKG Q-T INTERVAL: 414 MS
EKG Q-T INTERVAL: 422 MS
EKG QRS DURATION: 104 MS
EKG QRS DURATION: 108 MS
EKG QTC CALCULATION (BAZETT): 430 MS
EKG QTC CALCULATION (BAZETT): 435 MS
EKG R AXIS: -56 DEGREES
EKG R AXIS: -58 DEGREES
EKG T AXIS: -23 DEGREES
EKG T AXIS: 81 DEGREES
EKG VENTRICULAR RATE: 64 BPM
EKG VENTRICULAR RATE: 65 BPM

## 2025-08-02 PROCEDURE — 93010 ELECTROCARDIOGRAM REPORT: CPT | Performed by: INTERNAL MEDICINE

## 2025-08-05 ENCOUNTER — TELEMEDICINE (OUTPATIENT)
Dept: PRIMARY CARE CLINIC | Age: 80
End: 2025-08-05

## 2025-08-05 DIAGNOSIS — I48.19 PERSISTENT ATRIAL FIBRILLATION (HCC): Primary | ICD-10-CM

## 2025-08-05 ASSESSMENT — ENCOUNTER SYMPTOMS
COUGH: 0
ABDOMINAL PAIN: 0
SHORTNESS OF BREATH: 0
DIARRHEA: 0
VOMITING: 0
COLOR CHANGE: 0
RHINORRHEA: 0
BLOOD IN STOOL: 0
NAUSEA: 0

## 2025-08-05 NOTE — PROGRESS NOTES
Physician Progress Note      PATIENT:               SHE MOJICA  [facility] #:                  [id number]  :                       1945  ADMIT DATE:       2025 7:13 AM  DISCH DATE:        2025 1:34 PM  RESPONDING  PROVIDER #:        CONSUELO Avalos CNP          QUERY TEXT:    Based on your medical judgment, please clarify these findings and document if   any of the following are being evaluated and/or treated:    The clinical indicators include:  Patient presents with Atrial fibrillation    80-year-old female with HTN, CHF, on anticoagulant    Persistent AF/AFL-Reviewed EKG w/ Dr. Harris, keep on tele to monitor for   torsades-On Eliquis 5 mg BID - no s/s bleeding - continue - has not missed any   doses (EP Progress Notes by Consuelo Dawn APRN - CNP at 2025)    [id number] 5.  As per EPI C labs: Prothrombin time: 17.8    -apixaban tablet 5 mg ,dofetilide  capsule 250 mcg (MAR), monitoring etc.  Keisha GARDNER  S, CDS  Options provided:  -- Secondary hypercoagulable state in a patient with atrial fibrillation  -- Other - I will add my own diagnosis  -- Disagree - Not applicable / Not valid  -- Disagree - Clinically unable to determine / Unknown  -- Refer to Clinical Documentation Reviewer    PROVIDER RESPONSE TEXT:    This patient has secondary hypercoagulable state in a patient with atrial   fibrillation.    Query created by: Ej Lopez on 2025 3:35 AM      QUERY TEXT:    BMI 43.22 kg/m? is documented in the Progress Notes by Consuelo Dawn APRN - CNP at 2025. Based on your medical judgment, please clarify these   findings and document if any of the following are being evaluated and/or   treated:    The clinical indicators include:  Patient presents with Atrial fibrillation    80-year-old female with HTN, CHF, on anticoagulant    General appearance: alert, appears stated age and cooperative, No acute   distress    Vitals: Ht 1.626 m (5' 4\")  Wt 114.2 kg (251 lb 12.3 oz) BMI

## 2025-08-26 ENCOUNTER — OFFICE VISIT (OUTPATIENT)
Dept: CARDIOLOGY CLINIC | Age: 80
End: 2025-08-26
Payer: MEDICARE

## 2025-08-26 ENCOUNTER — TELEPHONE (OUTPATIENT)
Dept: CARDIOLOGY CLINIC | Age: 80
End: 2025-08-26

## 2025-08-26 ENCOUNTER — CLINICAL SUPPORT (OUTPATIENT)
Dept: CARDIOLOGY CLINIC | Age: 80
End: 2025-08-26

## 2025-08-26 VITALS
HEART RATE: 85 BPM | WEIGHT: 256.4 LBS | BODY MASS INDEX: 44.01 KG/M2 | DIASTOLIC BLOOD PRESSURE: 62 MMHG | SYSTOLIC BLOOD PRESSURE: 110 MMHG

## 2025-08-26 DIAGNOSIS — I48.11 LONGSTANDING PERSISTENT ATRIAL FIBRILLATION (HCC): ICD-10-CM

## 2025-08-26 DIAGNOSIS — I10 PRIMARY HYPERTENSION: Primary | ICD-10-CM

## 2025-08-26 DIAGNOSIS — I25.10 ARTERIOSCLEROSIS OF CORONARY ARTERY: ICD-10-CM

## 2025-08-26 DIAGNOSIS — G47.33 OBSTRUCTIVE SLEEP APNEA SYNDROME: ICD-10-CM

## 2025-08-26 DIAGNOSIS — Z95.0 CARDIAC PACEMAKER IN SITU: Primary | ICD-10-CM

## 2025-08-26 DIAGNOSIS — I49.5 TACHY-BRADY SYNDROME (HCC): ICD-10-CM

## 2025-08-26 DIAGNOSIS — I48.19 PERSISTENT ATRIAL FIBRILLATION (HCC): ICD-10-CM

## 2025-08-26 PROCEDURE — 1090F PRES/ABSN URINE INCON ASSESS: CPT | Performed by: INTERNAL MEDICINE

## 2025-08-26 PROCEDURE — G8399 PT W/DXA RESULTS DOCUMENT: HCPCS | Performed by: INTERNAL MEDICINE

## 2025-08-26 PROCEDURE — 3078F DIAST BP <80 MM HG: CPT | Performed by: INTERNAL MEDICINE

## 2025-08-26 PROCEDURE — 1123F ACP DISCUSS/DSCN MKR DOCD: CPT | Performed by: INTERNAL MEDICINE

## 2025-08-26 PROCEDURE — G8417 CALC BMI ABV UP PARAM F/U: HCPCS | Performed by: INTERNAL MEDICINE

## 2025-08-26 PROCEDURE — G8428 CUR MEDS NOT DOCUMENT: HCPCS | Performed by: INTERNAL MEDICINE

## 2025-08-26 PROCEDURE — 3074F SYST BP LT 130 MM HG: CPT | Performed by: INTERNAL MEDICINE

## 2025-08-26 PROCEDURE — 1111F DSCHRG MED/CURRENT MED MERGE: CPT | Performed by: INTERNAL MEDICINE

## 2025-08-26 PROCEDURE — 1036F TOBACCO NON-USER: CPT | Performed by: INTERNAL MEDICINE

## 2025-08-26 PROCEDURE — 99214 OFFICE O/P EST MOD 30 MIN: CPT | Performed by: INTERNAL MEDICINE

## 2025-08-26 PROCEDURE — 93000 ELECTROCARDIOGRAM COMPLETE: CPT | Performed by: INTERNAL MEDICINE

## 2025-08-27 ENCOUNTER — ANESTHESIA (OUTPATIENT)
Dept: INTERVENTIONAL RADIOLOGY/VASCULAR | Age: 80
End: 2025-08-27
Payer: MEDICARE

## 2025-08-27 ENCOUNTER — HOSPITAL ENCOUNTER (OUTPATIENT)
Dept: INTERVENTIONAL RADIOLOGY/VASCULAR | Age: 80
Discharge: HOME | End: 2025-08-29
Attending: INTERNAL MEDICINE
Payer: MEDICARE

## 2025-08-27 ENCOUNTER — ANESTHESIA EVENT (OUTPATIENT)
Dept: INTERVENTIONAL RADIOLOGY/VASCULAR | Age: 80
End: 2025-08-27
Payer: MEDICARE

## 2025-08-27 VITALS
OXYGEN SATURATION: 100 % | TEMPERATURE: 97.7 F | DIASTOLIC BLOOD PRESSURE: 76 MMHG | RESPIRATION RATE: 14 BRPM | SYSTOLIC BLOOD PRESSURE: 119 MMHG | HEART RATE: 61 BPM

## 2025-08-27 DIAGNOSIS — I48.19 PERSISTENT ATRIAL FIBRILLATION (HCC): ICD-10-CM

## 2025-08-27 LAB
ANION GAP SERPL CALCULATED.3IONS-SCNC: 9 MMOL/L (ref 3–16)
BUN SERPL-MCNC: 24 MG/DL (ref 7–20)
CALCIUM SERPL-MCNC: 9.4 MG/DL (ref 8.3–10.6)
CHLORIDE SERPL-SCNC: 103 MMOL/L (ref 99–110)
CO2 SERPL-SCNC: 28 MMOL/L (ref 21–32)
CREAT SERPL-MCNC: 1 MG/DL (ref 0.6–1.2)
EKG ATRIAL RATE: 69 BPM
EKG ATRIAL RATE: 90 BPM
EKG DIAGNOSIS: NORMAL
EKG DIAGNOSIS: NORMAL
EKG P-R INTERVAL: 308 MS
EKG Q-T INTERVAL: 406 MS
EKG Q-T INTERVAL: 414 MS
EKG QRS DURATION: 144 MS
EKG QRS DURATION: 92 MS
EKG QTC CALCULATION (BAZETT): 443 MS
EKG QTC CALCULATION (BAZETT): 496 MS
EKG R AXIS: -69 DEGREES
EKG R AXIS: 263 DEGREES
EKG T AXIS: -40 DEGREES
EKG T AXIS: 69 DEGREES
EKG VENTRICULAR RATE: 69 BPM
EKG VENTRICULAR RATE: 90 BPM
GFR SERPLBLD CREATININE-BSD FMLA CKD-EPI: 57 ML/MIN/{1.73_M2}
GLUCOSE SERPL-MCNC: 109 MG/DL (ref 70–99)
INR PPP: 1.44 (ref 0.86–1.14)
POTASSIUM SERPL-SCNC: 4.3 MMOL/L (ref 3.5–5.1)
PROTHROMBIN TIME: 17.7 SEC (ref 12.1–14.9)
SODIUM SERPL-SCNC: 140 MMOL/L (ref 136–145)

## 2025-08-27 PROCEDURE — 7100000011 HC PHASE II RECOVERY - ADDTL 15 MIN

## 2025-08-27 PROCEDURE — 93010 ELECTROCARDIOGRAM REPORT: CPT | Performed by: INTERNAL MEDICINE

## 2025-08-27 PROCEDURE — 3700000000 HC ANESTHESIA ATTENDED CARE

## 2025-08-27 PROCEDURE — 2580000003 HC RX 258: Performed by: NURSE ANESTHETIST, CERTIFIED REGISTERED

## 2025-08-27 PROCEDURE — 85610 PROTHROMBIN TIME: CPT

## 2025-08-27 PROCEDURE — 92960 CARDIOVERSION ELECTRIC EXT: CPT

## 2025-08-27 PROCEDURE — 80048 BASIC METABOLIC PNL TOTAL CA: CPT

## 2025-08-27 PROCEDURE — 6360000002 HC RX W HCPCS: Performed by: NURSE ANESTHETIST, CERTIFIED REGISTERED

## 2025-08-27 PROCEDURE — 7100000010 HC PHASE II RECOVERY - FIRST 15 MIN

## 2025-08-27 PROCEDURE — 93005 ELECTROCARDIOGRAM TRACING: CPT | Performed by: INTERNAL MEDICINE

## 2025-08-27 RX ORDER — PROPOFOL 10 MG/ML
INJECTION, EMULSION INTRAVENOUS
Status: DISCONTINUED | OUTPATIENT
Start: 2025-08-27 | End: 2025-08-27 | Stop reason: SDUPTHER

## 2025-08-27 RX ORDER — LIDOCAINE HYDROCHLORIDE 20 MG/ML
INJECTION, SOLUTION INTRAVENOUS
Status: DISCONTINUED | OUTPATIENT
Start: 2025-08-27 | End: 2025-08-27 | Stop reason: SDUPTHER

## 2025-08-27 RX ORDER — SODIUM CHLORIDE, SODIUM LACTATE, POTASSIUM CHLORIDE, CALCIUM CHLORIDE 600; 310; 30; 20 MG/100ML; MG/100ML; MG/100ML; MG/100ML
INJECTION, SOLUTION INTRAVENOUS
Status: DISCONTINUED | OUTPATIENT
Start: 2025-08-27 | End: 2025-08-27 | Stop reason: SDUPTHER

## 2025-08-27 RX ADMIN — PROPOFOL 40 MG: 10 INJECTION, EMULSION INTRAVENOUS at 11:28

## 2025-08-27 RX ADMIN — SODIUM CHLORIDE, SODIUM LACTATE, POTASSIUM CHLORIDE, AND CALCIUM CHLORIDE: .6; .31; .03; .02 INJECTION, SOLUTION INTRAVENOUS at 11:26

## 2025-08-27 RX ADMIN — LIDOCAINE HYDROCHLORIDE 100 MG: 20 INJECTION, SOLUTION INTRAVENOUS at 11:28

## 2025-08-27 ASSESSMENT — PAIN SCALES - GENERAL
PAINLEVEL_OUTOF10: 0
PAINLEVEL_OUTOF10: 0